# Patient Record
Sex: MALE | Race: WHITE | NOT HISPANIC OR LATINO | Employment: OTHER | URBAN - METROPOLITAN AREA
[De-identification: names, ages, dates, MRNs, and addresses within clinical notes are randomized per-mention and may not be internally consistent; named-entity substitution may affect disease eponyms.]

---

## 2017-07-17 ENCOUNTER — TRANSCRIBE ORDERS (OUTPATIENT)
Dept: ADMINISTRATIVE | Facility: HOSPITAL | Age: 82
End: 2017-07-17

## 2017-07-17 DIAGNOSIS — R55 SYNCOPE, NEAR: Primary | ICD-10-CM

## 2017-07-20 ENCOUNTER — HOSPITAL ENCOUNTER (OUTPATIENT)
Dept: MRI IMAGING | Facility: HOSPITAL | Age: 82
Discharge: HOME | End: 2017-07-20
Admitting: FAMILY MEDICINE

## 2017-07-20 ENCOUNTER — APPOINTMENT (OUTPATIENT)
Dept: MRI IMAGING | Facility: HOSPITAL | Age: 82
End: 2017-07-20

## 2017-07-20 ENCOUNTER — HOSPITAL ENCOUNTER (OUTPATIENT)
Dept: CARDIOLOGY | Facility: HOSPITAL | Age: 82
Discharge: HOME | End: 2017-07-20

## 2017-07-20 ENCOUNTER — HOSPITAL ENCOUNTER (INPATIENT)
Facility: HOSPITAL | Age: 82
LOS: 6 days | Discharge: HOME | End: 2017-07-26
Attending: EMERGENCY MEDICINE | Admitting: INTERNAL MEDICINE

## 2017-07-20 DIAGNOSIS — IMO0002 CAROTID STENOSIS WITH CEREBRAL INFARCTION LESS THAN 8 WEEKS AGO: ICD-10-CM

## 2017-07-20 DIAGNOSIS — I63.9 ACUTE CVA (CEREBROVASCULAR ACCIDENT) (HCC): Primary | ICD-10-CM

## 2017-07-20 DIAGNOSIS — R55 SYNCOPE, NEAR: ICD-10-CM

## 2017-07-20 PROBLEM — I48.91 ATRIAL FIBRILLATION (HCC): Status: ACTIVE | Noted: 2017-07-20

## 2017-07-20 PROBLEM — I65.29 CAROTID STENOSIS: Status: ACTIVE | Noted: 2017-07-20

## 2017-07-20 PROBLEM — Z79.01 LONG TERM (CURRENT) USE OF ANTICOAGULANTS: Status: ACTIVE | Noted: 2017-07-20

## 2017-07-20 LAB
ALBUMIN SERPL-MCNC: 3.9 G/DL (ref 3.5–5.2)
ALBUMIN/GLOB SERPL: 1 G/DL
ALP SERPL-CCNC: 40 U/L (ref 39–117)
ALT SERPL W P-5'-P-CCNC: 13 U/L (ref 1–41)
ANION GAP SERPL CALCULATED.3IONS-SCNC: 9.4 MMOL/L
AST SERPL-CCNC: 18 U/L (ref 1–40)
BASOPHILS # BLD AUTO: 0.09 10*3/MM3 (ref 0–0.2)
BASOPHILS NFR BLD AUTO: 1.2 % (ref 0–1.5)
BH CV XLRA MEAS LEFT DIST CCA EDV: -19.8 CM/SEC
BH CV XLRA MEAS LEFT DIST CCA PSV: -84.9 CM/SEC
BH CV XLRA MEAS LEFT DIST ICA EDV: -24.8 CM/SEC
BH CV XLRA MEAS LEFT DIST ICA PSV: -73.6 CM/SEC
BH CV XLRA MEAS LEFT ICA/CCA RATIO: 1
BH CV XLRA MEAS LEFT MID ICA EDV: -16.3 CM/SEC
BH CV XLRA MEAS LEFT MID ICA PSV: -78.5 CM/SEC
BH CV XLRA MEAS LEFT PROX CCA EDV: 16.3 CM/SEC
BH CV XLRA MEAS LEFT PROX CCA PSV: 72.9 CM/SEC
BH CV XLRA MEAS LEFT PROX ECA EDV: -6.4 CM/SEC
BH CV XLRA MEAS LEFT PROX ECA PSV: -63.7 CM/SEC
BH CV XLRA MEAS LEFT PROX ICA EDV: -14.9 CM/SEC
BH CV XLRA MEAS LEFT PROX ICA PSV: -77.1 CM/SEC
BH CV XLRA MEAS LEFT PROX SCLA PSV: 155 CM/SEC
BH CV XLRA MEAS LEFT VERTEBRAL A EDV: 5.2 CM/SEC
BH CV XLRA MEAS LEFT VERTEBRAL A PSV: 34.1 CM/SEC
BH CV XLRA MEAS RIGHT DIST CCA EDV: -13.4 CM/SEC
BH CV XLRA MEAS RIGHT DIST CCA PSV: -35.4 CM/SEC
BH CV XLRA MEAS RIGHT DIST ICA EDV: -17.2 CM/SEC
BH CV XLRA MEAS RIGHT DIST ICA PSV: -44.2 CM/SEC
BH CV XLRA MEAS RIGHT ICA/CCA RATIO: 12.2
BH CV XLRA MEAS RIGHT MID ICA EDV: -36.8 CM/SEC
BH CV XLRA MEAS RIGHT MID ICA PSV: -89.9 CM/SEC
BH CV XLRA MEAS RIGHT PROX CCA EDV: 14.2 CM/SEC
BH CV XLRA MEAS RIGHT PROX CCA PSV: 74 CM/SEC
BH CV XLRA MEAS RIGHT PROX ECA EDV: -67.7 CM/SEC
BH CV XLRA MEAS RIGHT PROX ECA PSV: -298 CM/SEC
BH CV XLRA MEAS RIGHT PROX ICA EDV: 184 CM/SEC
BH CV XLRA MEAS RIGHT PROX ICA PSV: 431 CM/SEC
BH CV XLRA MEAS RIGHT PROX SCLA PSV: 124 CM/SEC
BH CV XLRA MEAS RIGHT VERTEBRAL A EDV: 19 CM/SEC
BH CV XLRA MEAS RIGHT VERTEBRAL A PSV: 66.4 CM/SEC
BILIRUB SERPL-MCNC: 0.4 MG/DL (ref 0.1–1.2)
BUN BLD-MCNC: 22 MG/DL (ref 8–23)
BUN/CREAT SERPL: 18.5 (ref 7–25)
CALCIUM SPEC-SCNC: 9.6 MG/DL (ref 8.2–9.6)
CHLORIDE SERPL-SCNC: 101 MMOL/L (ref 98–107)
CO2 SERPL-SCNC: 27.6 MMOL/L (ref 22–29)
CREAT BLD-MCNC: 1.19 MG/DL (ref 0.76–1.27)
DEPRECATED RDW RBC AUTO: 47.9 FL (ref 37–54)
EOSINOPHIL # BLD AUTO: 0.24 10*3/MM3 (ref 0–0.7)
EOSINOPHIL NFR BLD AUTO: 3.2 % (ref 0.3–6.2)
ERYTHROCYTE [DISTWIDTH] IN BLOOD BY AUTOMATED COUNT: 14.7 % (ref 11.5–14.5)
GFR SERPL CREATININE-BSD FRML MDRD: 57 ML/MIN/1.73
GLOBULIN UR ELPH-MCNC: 3.8 GM/DL
GLUCOSE BLD-MCNC: 86 MG/DL (ref 65–99)
HCT VFR BLD AUTO: 45.7 % (ref 40.4–52.2)
HGB BLD-MCNC: 14.4 G/DL (ref 13.7–17.6)
HOLD SPECIMEN: NORMAL
HOLD SPECIMEN: NORMAL
IMM GRANULOCYTES # BLD: 0 10*3/MM3 (ref 0–0.03)
IMM GRANULOCYTES NFR BLD: 0 % (ref 0–0.5)
INR PPP: 2.86 (ref 0.9–1.1)
LEFT ARM BP: NORMAL MMHG
LYMPHOCYTES # BLD AUTO: 1.83 10*3/MM3 (ref 0.9–4.8)
LYMPHOCYTES NFR BLD AUTO: 24 % (ref 19.6–45.3)
MCH RBC QN AUTO: 28 PG (ref 27–32.7)
MCHC RBC AUTO-ENTMCNC: 31.5 G/DL (ref 32.6–36.4)
MCV RBC AUTO: 88.9 FL (ref 79.8–96.2)
MONOCYTES # BLD AUTO: 0.67 10*3/MM3 (ref 0.2–1.2)
MONOCYTES NFR BLD AUTO: 8.8 % (ref 5–12)
NEUTROPHILS # BLD AUTO: 4.78 10*3/MM3 (ref 1.9–8.1)
NEUTROPHILS NFR BLD AUTO: 62.8 % (ref 42.7–76)
PLATELET # BLD AUTO: 342 10*3/MM3 (ref 140–500)
PMV BLD AUTO: 9.4 FL (ref 6–12)
POTASSIUM BLD-SCNC: 4.3 MMOL/L (ref 3.5–5.2)
PROT SERPL-MCNC: 7.7 G/DL (ref 6–8.5)
PROTHROMBIN TIME: 29.1 SECONDS (ref 11.7–14.2)
RBC # BLD AUTO: 5.14 10*6/MM3 (ref 4.6–6)
RIGHT ARM BP: NORMAL MMHG
SODIUM BLD-SCNC: 138 MMOL/L (ref 136–145)
WBC NRBC COR # BLD: 7.61 10*3/MM3 (ref 4.5–10.7)
WHOLE BLOOD HOLD SPECIMEN: NORMAL
WHOLE BLOOD HOLD SPECIMEN: NORMAL

## 2017-07-20 PROCEDURE — 85610 PROTHROMBIN TIME: CPT | Performed by: EMERGENCY MEDICINE

## 2017-07-20 PROCEDURE — 99284 EMERGENCY DEPT VISIT MOD MDM: CPT

## 2017-07-20 PROCEDURE — 70549 MR ANGIOGRAPH NECK W/O&W/DYE: CPT

## 2017-07-20 PROCEDURE — A9577 INJ MULTIHANCE: HCPCS | Performed by: INTERNAL MEDICINE

## 2017-07-20 PROCEDURE — 70544 MR ANGIOGRAPHY HEAD W/O DYE: CPT

## 2017-07-20 PROCEDURE — 93005 ELECTROCARDIOGRAM TRACING: CPT | Performed by: EMERGENCY MEDICINE

## 2017-07-20 PROCEDURE — 0 GADOBENATE DIMEGLUMINE 529 MG/ML SOLUTION: Performed by: INTERNAL MEDICINE

## 2017-07-20 PROCEDURE — 80053 COMPREHEN METABOLIC PANEL: CPT | Performed by: EMERGENCY MEDICINE

## 2017-07-20 PROCEDURE — 93010 ELECTROCARDIOGRAM REPORT: CPT | Performed by: INTERNAL MEDICINE

## 2017-07-20 PROCEDURE — 99223 1ST HOSP IP/OBS HIGH 75: CPT | Performed by: RADIOLOGY

## 2017-07-20 PROCEDURE — 85025 COMPLETE CBC W/AUTO DIFF WBC: CPT | Performed by: EMERGENCY MEDICINE

## 2017-07-20 RX ORDER — ATORVASTATIN CALCIUM 80 MG/1
80 TABLET, FILM COATED ORAL DAILY
Status: DISCONTINUED | OUTPATIENT
Start: 2017-07-20 | End: 2017-07-26 | Stop reason: HOSPADM

## 2017-07-20 RX ORDER — WARFARIN SODIUM 5 MG/1
5 TABLET ORAL SEE ADMIN INSTRUCTIONS
COMMUNITY
End: 2017-07-26 | Stop reason: HOSPADM

## 2017-07-20 RX ORDER — SODIUM CHLORIDE 9 MG/ML
100 INJECTION, SOLUTION INTRAVENOUS CONTINUOUS
Status: DISCONTINUED | OUTPATIENT
Start: 2017-07-20 | End: 2017-07-24

## 2017-07-20 RX ORDER — SODIUM CHLORIDE 0.9 % (FLUSH) 0.9 %
1-10 SYRINGE (ML) INJECTION AS NEEDED
Status: DISCONTINUED | OUTPATIENT
Start: 2017-07-20 | End: 2017-07-26 | Stop reason: HOSPADM

## 2017-07-20 RX ORDER — ACETAMINOPHEN 325 MG/1
650 TABLET ORAL EVERY 4 HOURS PRN
Status: DISCONTINUED | OUTPATIENT
Start: 2017-07-20 | End: 2017-07-26 | Stop reason: HOSPADM

## 2017-07-20 RX ORDER — ASPIRIN 325 MG
325 TABLET ORAL ONCE
Status: COMPLETED | OUTPATIENT
Start: 2017-07-20 | End: 2017-07-20

## 2017-07-20 RX ORDER — SODIUM CHLORIDE 0.9 % (FLUSH) 0.9 %
10 SYRINGE (ML) INJECTION AS NEEDED
Status: DISCONTINUED | OUTPATIENT
Start: 2017-07-20 | End: 2017-07-24

## 2017-07-20 RX ORDER — ONDANSETRON 2 MG/ML
4 INJECTION INTRAMUSCULAR; INTRAVENOUS EVERY 6 HOURS PRN
Status: DISCONTINUED | OUTPATIENT
Start: 2017-07-20 | End: 2017-07-26 | Stop reason: HOSPADM

## 2017-07-20 RX ORDER — ACETAMINOPHEN 650 MG/1
650 SUPPOSITORY RECTAL EVERY 4 HOURS PRN
Status: DISCONTINUED | OUTPATIENT
Start: 2017-07-20 | End: 2017-07-26 | Stop reason: HOSPADM

## 2017-07-20 RX ADMIN — SODIUM CHLORIDE 100 ML/HR: 9 INJECTION, SOLUTION INTRAVENOUS at 21:16

## 2017-07-20 RX ADMIN — SODIUM CHLORIDE 1000 ML: 9 INJECTION, SOLUTION INTRAVENOUS at 16:17

## 2017-07-20 RX ADMIN — GADOBENATE DIMEGLUMINE 14 ML: 529 INJECTION, SOLUTION INTRAVENOUS at 20:09

## 2017-07-20 RX ADMIN — SODIUM CHLORIDE 500 ML: 9 INJECTION, SOLUTION INTRAVENOUS at 17:49

## 2017-07-20 RX ADMIN — ASPIRIN 325 MG: 325 TABLET ORAL at 17:09

## 2017-07-20 NOTE — PROGRESS NOTES
Bilateral carotid doppler complete and preliminary report positive for severe right carotid artery disease and mild lt carotid disease and pt. Complains of lt arm and leg numbness this week, and dizziness  . Spoke with Cara in Dr. Hadley's office. Pt. Was instructed to go to the emergency room now

## 2017-07-20 NOTE — NURSING NOTE
Dr Hadley called to instruct us to tell the pt: He is on current anticoagulant therapy. We can let him go and have him follow up with Dr Hadley as scheduled. Informed the pt and he still has a Vascular exam. Vascular called because appt is at 2:45 - pt is being taken over to that area now. NAD noted. Family with pt as well.

## 2017-07-21 ENCOUNTER — APPOINTMENT (OUTPATIENT)
Dept: CARDIOLOGY | Facility: HOSPITAL | Age: 82
End: 2017-07-21
Attending: RADIOLOGY

## 2017-07-21 LAB
ASCENDING AORTA: 2.5 CM
BH CV ECHO MEAS - ACS: 1 CM
BH CV ECHO MEAS - AI DEC SLOPE: 277 CM/SEC^2
BH CV ECHO MEAS - AI MAX PG: 65.4 MMHG
BH CV ECHO MEAS - AI MAX VEL: 404 CM/SEC
BH CV ECHO MEAS - AI P1/2T: 427.2 MSEC
BH CV ECHO MEAS - AO MEAN PG (FULL): 7 MMHG
BH CV ECHO MEAS - AO MEAN PG: 8 MMHG
BH CV ECHO MEAS - AO ROOT AREA (BSA CORRECTED): 1.7
BH CV ECHO MEAS - AO ROOT AREA: 8 CM^2
BH CV ECHO MEAS - AO ROOT DIAM: 3.2 CM
BH CV ECHO MEAS - AO V2 MEAN: 132 CM/SEC
BH CV ECHO MEAS - AO V2 VTI: 38.7 CM
BH CV ECHO MEAS - ASC AORTA: 2.5 CM
BH CV ECHO MEAS - AVA(I,A): 1.3 CM^2
BH CV ECHO MEAS - AVA(I,D): 1.3 CM^2
BH CV ECHO MEAS - BSA(HAYCOCK): 1.8 M^2
BH CV ECHO MEAS - BSA: 1.8 M^2
BH CV ECHO MEAS - BZI_BMI: 24.7 KILOGRAMS/M^2
BH CV ECHO MEAS - BZI_METRIC_HEIGHT: 170.2 CM
BH CV ECHO MEAS - BZI_METRIC_WEIGHT: 71.7 KG
BH CV ECHO MEAS - CONTRAST EF (2CH): 70.7 ML/M^2
BH CV ECHO MEAS - CONTRAST EF 4CH: 71.1 ML/M^2
BH CV ECHO MEAS - EDV(CUBED): 54.9 ML
BH CV ECHO MEAS - EDV(MOD-SP2): 92 ML
BH CV ECHO MEAS - EDV(MOD-SP4): 83 ML
BH CV ECHO MEAS - EDV(TEICH): 62 ML
BH CV ECHO MEAS - EF(CUBED): 77.8 %
BH CV ECHO MEAS - EF(MOD-SP2): 70.7 %
BH CV ECHO MEAS - EF(MOD-SP4): 71.1 %
BH CV ECHO MEAS - EF(TEICH): 70.8 %
BH CV ECHO MEAS - ESV(CUBED): 12.2 ML
BH CV ECHO MEAS - ESV(MOD-SP2): 27 ML
BH CV ECHO MEAS - ESV(MOD-SP4): 24 ML
BH CV ECHO MEAS - ESV(TEICH): 18.1 ML
BH CV ECHO MEAS - FS: 39.5 %
BH CV ECHO MEAS - IVS/LVPW: 1.2
BH CV ECHO MEAS - IVSD: 1.1 CM
BH CV ECHO MEAS - LAT PEAK E' VEL: 11 CM/SEC
BH CV ECHO MEAS - LV DIASTOLIC VOL/BSA (35-75): 45.4 ML/M^2
BH CV ECHO MEAS - LV MASS(C)D: 117.3 GRAMS
BH CV ECHO MEAS - LV MASS(C)DI: 64.1 GRAMS/M^2
BH CV ECHO MEAS - LV MEAN PG: 1 MMHG
BH CV ECHO MEAS - LV SYSTOLIC VOL/BSA (12-30): 13.1 ML/M^2
BH CV ECHO MEAS - LV V1 MEAN: 54.2 CM/SEC
BH CV ECHO MEAS - LV V1 VTI: 17.5 CM
BH CV ECHO MEAS - LVIDD: 3.8 CM
BH CV ECHO MEAS - LVIDS: 2.3 CM
BH CV ECHO MEAS - LVLD AP2: 7.2 CM
BH CV ECHO MEAS - LVLD AP4: 7.4 CM
BH CV ECHO MEAS - LVLS AP2: 6.1 CM
BH CV ECHO MEAS - LVLS AP4: 6.1 CM
BH CV ECHO MEAS - LVOT AREA (M): 2.8 CM^2
BH CV ECHO MEAS - LVOT AREA: 2.8 CM^2
BH CV ECHO MEAS - LVOT DIAM: 1.9 CM
BH CV ECHO MEAS - LVPWD: 0.9 CM
BH CV ECHO MEAS - MED PEAK E' VEL: 8 CM/SEC
BH CV ECHO MEAS - MR MAX PG: 83.2 MMHG
BH CV ECHO MEAS - MR MAX VEL: 456 CM/SEC
BH CV ECHO MEAS - MV DEC SLOPE: 352 CM/SEC^2
BH CV ECHO MEAS - MV DEC TIME: 0.22 SEC
BH CV ECHO MEAS - MV E MAX VEL: 78 CM/SEC
BH CV ECHO MEAS - MV MEAN PG: 2 MMHG
BH CV ECHO MEAS - MV P1/2T MAX VEL: 108 CM/SEC
BH CV ECHO MEAS - MV P1/2T: 89.9 MSEC
BH CV ECHO MEAS - MV V2 MEAN: 63.8 CM/SEC
BH CV ECHO MEAS - MV V2 VTI: 21.3 CM
BH CV ECHO MEAS - MVA P1/2T LCG: 2 CM^2
BH CV ECHO MEAS - MVA(P1/2T): 2.4 CM^2
BH CV ECHO MEAS - MVA(VTI): 2.3 CM^2
BH CV ECHO MEAS - PA ACC SLOPE: 0 CM/SEC^2
BH CV ECHO MEAS - PA ACC TIME: 0.05 SEC
BH CV ECHO MEAS - PA MAX PG (FULL): 2.1 MMHG
BH CV ECHO MEAS - PA MAX PG: 3.2 MMHG
BH CV ECHO MEAS - PA PR(ACCEL): 55.2 MMHG
BH CV ECHO MEAS - PA V2 MAX: 89.6 CM/SEC
BH CV ECHO MEAS - PI END-D VEL: 86.9 CM/SEC
BH CV ECHO MEAS - PVA(V,A): 1 CM^2
BH CV ECHO MEAS - PVA(V,D): 1 CM^2
BH CV ECHO MEAS - QP/QS: 0.32
BH CV ECHO MEAS - RAP SYSTOLE: 3 MMHG
BH CV ECHO MEAS - RV MAX PG: 1.1 MMHG
BH CV ECHO MEAS - RV MEAN PG: 1 MMHG
BH CV ECHO MEAS - RV V1 MAX: 51.9 CM/SEC
BH CV ECHO MEAS - RV V1 MEAN: 33.3 CM/SEC
BH CV ECHO MEAS - RV V1 VTI: 9 CM
BH CV ECHO MEAS - RVOT AREA: 1.8 CM^2
BH CV ECHO MEAS - RVOT DIAM: 1.5 CM
BH CV ECHO MEAS - RVSP: 41.2 MMHG
BH CV ECHO MEAS - SI(AO): 170.1 ML/M^2
BH CV ECHO MEAS - SI(CUBED): 23.3 ML/M^2
BH CV ECHO MEAS - SI(LVOT): 27.1 ML/M^2
BH CV ECHO MEAS - SI(MOD-SP2): 35.5 ML/M^2
BH CV ECHO MEAS - SI(MOD-SP4): 32.3 ML/M^2
BH CV ECHO MEAS - SI(TEICH): 24 ML/M^2
BH CV ECHO MEAS - SUP REN AO DIAM: 1.71 CM
BH CV ECHO MEAS - SV(AO): 311.2 ML
BH CV ECHO MEAS - SV(CUBED): 42.7 ML
BH CV ECHO MEAS - SV(LVOT): 49.6 ML
BH CV ECHO MEAS - SV(MOD-SP2): 65 ML
BH CV ECHO MEAS - SV(MOD-SP4): 59 ML
BH CV ECHO MEAS - SV(RVOT): 15.9 ML
BH CV ECHO MEAS - SV(TEICH): 43.8 ML
BH CV ECHO MEAS - TAPSE (>1.6): 1.9 CM2
BH CV ECHO MEAS - TR MAX VEL: 309 CM/SEC
BH CV XLRA - RV BASE: 3.2 CM
BH CV XLRA - TDI S': 11 CM/SEC
CHOLEST SERPL-MCNC: 166 MG/DL (ref 0–200)
DEPRECATED RDW RBC AUTO: 47.8 FL (ref 37–54)
E/E' RATIO: 8
ERYTHROCYTE [DISTWIDTH] IN BLOOD BY AUTOMATED COUNT: 14.7 % (ref 11.5–14.5)
HBA1C MFR BLD: 5.7 % (ref 4.8–5.6)
HCT VFR BLD AUTO: 42.1 % (ref 40.4–52.2)
HDLC SERPL-MCNC: 43 MG/DL (ref 40–60)
HGB BLD-MCNC: 13.2 G/DL (ref 13.7–17.6)
LDLC SERPL CALC-MCNC: 108 MG/DL (ref 0–100)
LDLC/HDLC SERPL: 2.5 {RATIO}
LEFT ATRIUM VOLUME INDEX: 25 ML/M2
MCH RBC QN AUTO: 28 PG (ref 27–32.7)
MCHC RBC AUTO-ENTMCNC: 31.4 G/DL (ref 32.6–36.4)
MCV RBC AUTO: 89.2 FL (ref 79.8–96.2)
PLATELET # BLD AUTO: 266 10*3/MM3 (ref 140–500)
PMV BLD AUTO: 9.7 FL (ref 6–12)
RBC # BLD AUTO: 4.72 10*6/MM3 (ref 4.6–6)
TRIGL SERPL-MCNC: 77 MG/DL (ref 0–150)
VLDLC SERPL-MCNC: 15.4 MG/DL (ref 5–40)
WBC NRBC COR # BLD: 6.47 10*3/MM3 (ref 4.5–10.7)

## 2017-07-21 PROCEDURE — 93306 TTE W/DOPPLER COMPLETE: CPT

## 2017-07-21 PROCEDURE — 80061 LIPID PANEL: CPT | Performed by: RADIOLOGY

## 2017-07-21 PROCEDURE — 97110 THERAPEUTIC EXERCISES: CPT

## 2017-07-21 PROCEDURE — 97161 PT EVAL LOW COMPLEX 20 MIN: CPT

## 2017-07-21 PROCEDURE — G8979 MOBILITY GOAL STATUS: HCPCS

## 2017-07-21 PROCEDURE — G8980 MOBILITY D/C STATUS: HCPCS

## 2017-07-21 PROCEDURE — 97535 SELF CARE MNGMENT TRAINING: CPT | Performed by: OCCUPATIONAL THERAPIST

## 2017-07-21 PROCEDURE — 97165 OT EVAL LOW COMPLEX 30 MIN: CPT | Performed by: OCCUPATIONAL THERAPIST

## 2017-07-21 PROCEDURE — G8978 MOBILITY CURRENT STATUS: HCPCS

## 2017-07-21 PROCEDURE — 85027 COMPLETE CBC AUTOMATED: CPT | Performed by: RADIOLOGY

## 2017-07-21 PROCEDURE — 93306 TTE W/DOPPLER COMPLETE: CPT | Performed by: INTERNAL MEDICINE

## 2017-07-21 PROCEDURE — 92610 EVALUATE SWALLOWING FUNCTION: CPT

## 2017-07-21 PROCEDURE — 83036 HEMOGLOBIN GLYCOSYLATED A1C: CPT | Performed by: RADIOLOGY

## 2017-07-21 RX ORDER — CLOPIDOGREL BISULFATE 75 MG/1
75 TABLET ORAL DAILY
Status: DISCONTINUED | OUTPATIENT
Start: 2017-07-21 | End: 2017-07-26 | Stop reason: HOSPADM

## 2017-07-21 RX ORDER — WARFARIN SODIUM 5 MG/1
5 TABLET ORAL SEE ADMIN INSTRUCTIONS
Status: DISCONTINUED | OUTPATIENT
Start: 2017-07-21 | End: 2017-07-21

## 2017-07-21 RX ADMIN — CLOPIDOGREL 75 MG: 75 TABLET, FILM COATED ORAL at 10:52

## 2017-07-21 RX ADMIN — SODIUM CHLORIDE 100 ML/HR: 9 INJECTION, SOLUTION INTRAVENOUS at 17:25

## 2017-07-21 RX ADMIN — SODIUM CHLORIDE 100 ML/HR: 9 INJECTION, SOLUTION INTRAVENOUS at 07:29

## 2017-07-21 RX ADMIN — ATORVASTATIN CALCIUM 80 MG: 80 TABLET, FILM COATED ORAL at 10:52

## 2017-07-22 LAB
INR PPP: 2.2 (ref 0.9–1.1)
PROTHROMBIN TIME: 23.7 SECONDS (ref 11.7–14.2)

## 2017-07-22 PROCEDURE — 99233 SBSQ HOSP IP/OBS HIGH 50: CPT | Performed by: NURSE PRACTITIONER

## 2017-07-22 PROCEDURE — 85610 PROTHROMBIN TIME: CPT | Performed by: RADIOLOGY

## 2017-07-22 RX ORDER — ASPIRIN 325 MG
325 TABLET ORAL DAILY
Status: DISCONTINUED | OUTPATIENT
Start: 2017-07-22 | End: 2017-07-26 | Stop reason: HOSPADM

## 2017-07-22 RX ADMIN — SODIUM CHLORIDE 100 ML/HR: 9 INJECTION, SOLUTION INTRAVENOUS at 03:36

## 2017-07-22 RX ADMIN — ATORVASTATIN CALCIUM 80 MG: 80 TABLET, FILM COATED ORAL at 08:26

## 2017-07-22 RX ADMIN — ASPIRIN 325 MG: 325 TABLET ORAL at 16:23

## 2017-07-22 RX ADMIN — CLOPIDOGREL 75 MG: 75 TABLET, FILM COATED ORAL at 08:26

## 2017-07-22 RX ADMIN — SODIUM CHLORIDE 100 ML/HR: 9 INJECTION, SOLUTION INTRAVENOUS at 14:17

## 2017-07-23 LAB
INR PPP: 1.64 (ref 0.9–1.1)
PROTHROMBIN TIME: 18.9 SECONDS (ref 11.7–14.2)

## 2017-07-23 PROCEDURE — 99024 POSTOP FOLLOW-UP VISIT: CPT | Performed by: NURSE PRACTITIONER

## 2017-07-23 PROCEDURE — 85610 PROTHROMBIN TIME: CPT | Performed by: NURSE PRACTITIONER

## 2017-07-23 RX ADMIN — SODIUM CHLORIDE 100 ML/HR: 9 INJECTION, SOLUTION INTRAVENOUS at 19:51

## 2017-07-23 RX ADMIN — ASPIRIN 325 MG: 325 TABLET ORAL at 08:12

## 2017-07-23 RX ADMIN — ATORVASTATIN CALCIUM 80 MG: 80 TABLET, FILM COATED ORAL at 08:12

## 2017-07-23 RX ADMIN — CLOPIDOGREL 75 MG: 75 TABLET, FILM COATED ORAL at 08:12

## 2017-07-23 RX ADMIN — SODIUM CHLORIDE 100 ML/HR: 9 INJECTION, SOLUTION INTRAVENOUS at 00:10

## 2017-07-23 RX ADMIN — SODIUM CHLORIDE 100 ML/HR: 9 INJECTION, SOLUTION INTRAVENOUS at 10:18

## 2017-07-24 ENCOUNTER — APPOINTMENT (OUTPATIENT)
Dept: GENERAL RADIOLOGY | Facility: HOSPITAL | Age: 82
End: 2017-07-24

## 2017-07-24 ENCOUNTER — ANESTHESIA EVENT (OUTPATIENT)
Dept: PERIOP | Facility: HOSPITAL | Age: 82
End: 2017-07-24

## 2017-07-24 ENCOUNTER — ANESTHESIA (OUTPATIENT)
Dept: PERIOP | Facility: HOSPITAL | Age: 82
End: 2017-07-24

## 2017-07-24 LAB
ALBUMIN SERPL-MCNC: 3.4 G/DL (ref 3.5–5.2)
ALBUMIN/GLOB SERPL: 1 G/DL
ALP SERPL-CCNC: 37 U/L (ref 39–117)
ALT SERPL W P-5'-P-CCNC: 10 U/L (ref 1–41)
ANION GAP SERPL CALCULATED.3IONS-SCNC: 13.3 MMOL/L
APTT PPP: 39.9 SECONDS (ref 22.7–35.4)
ASA PLATELET INHIBITION: 575 ARU
AST SERPL-CCNC: 16 U/L (ref 1–40)
BASOPHILS # BLD AUTO: 0.06 10*3/MM3 (ref 0–0.2)
BASOPHILS NFR BLD AUTO: 0.8 % (ref 0–1.5)
BILIRUB SERPL-MCNC: 0.5 MG/DL (ref 0.1–1.2)
BUN BLD-MCNC: 12 MG/DL (ref 8–23)
BUN/CREAT SERPL: 12.9 (ref 7–25)
CALCIUM SPEC-SCNC: 8.8 MG/DL (ref 8.2–9.6)
CHLORIDE SERPL-SCNC: 105 MMOL/L (ref 98–107)
CO2 SERPL-SCNC: 23.7 MMOL/L (ref 22–29)
CREAT BLD-MCNC: 0.93 MG/DL (ref 0.76–1.27)
DEPRECATED RDW RBC AUTO: 48.2 FL (ref 37–54)
EOSINOPHIL # BLD AUTO: 0.35 10*3/MM3 (ref 0–0.7)
EOSINOPHIL NFR BLD AUTO: 4.7 % (ref 0.3–6.2)
ERYTHROCYTE [DISTWIDTH] IN BLOOD BY AUTOMATED COUNT: 14.9 % (ref 11.5–14.5)
GFR SERPL CREATININE-BSD FRML MDRD: 76 ML/MIN/1.73
GLOBULIN UR ELPH-MCNC: 3.4 GM/DL
GLUCOSE BLD-MCNC: 88 MG/DL (ref 65–99)
GLUCOSE BLDC GLUCOMTR-MCNC: 80 MG/DL (ref 70–130)
HCT VFR BLD AUTO: 45.2 % (ref 40.4–52.2)
HGB BLD-MCNC: 14 G/DL (ref 13.7–17.6)
IMM GRANULOCYTES # BLD: 0 10*3/MM3 (ref 0–0.03)
IMM GRANULOCYTES NFR BLD: 0 % (ref 0–0.5)
INR PPP: 1.34 (ref 0.9–1.1)
LYMPHOCYTES # BLD AUTO: 1.7 10*3/MM3 (ref 0.9–4.8)
LYMPHOCYTES NFR BLD AUTO: 23.1 % (ref 19.6–45.3)
MCH RBC QN AUTO: 27.7 PG (ref 27–32.7)
MCHC RBC AUTO-ENTMCNC: 31 G/DL (ref 32.6–36.4)
MCV RBC AUTO: 89.3 FL (ref 79.8–96.2)
MONOCYTES # BLD AUTO: 0.71 10*3/MM3 (ref 0.2–1.2)
MONOCYTES NFR BLD AUTO: 9.6 % (ref 5–12)
NEUTROPHILS # BLD AUTO: 4.55 10*3/MM3 (ref 1.9–8.1)
NEUTROPHILS NFR BLD AUTO: 61.8 % (ref 42.7–76)
PA ADP PRP-ACNC: 69 PRU (ref 194–418)
PLATELET # BLD AUTO: 314 10*3/MM3 (ref 140–500)
PMV BLD AUTO: 9.8 FL (ref 6–12)
POTASSIUM BLD-SCNC: 3.9 MMOL/L (ref 3.5–5.2)
PROT SERPL-MCNC: 6.8 G/DL (ref 6–8.5)
PROTHROMBIN TIME: 16.1 SECONDS (ref 11.7–14.2)
RBC # BLD AUTO: 5.06 10*6/MM3 (ref 4.6–6)
SODIUM BLD-SCNC: 142 MMOL/L (ref 136–145)
WBC NRBC COR # BLD: 7.37 10*3/MM3 (ref 4.5–10.7)

## 2017-07-24 PROCEDURE — C1760 CLOSURE DEV, VASC: HCPCS | Performed by: RADIOLOGY

## 2017-07-24 PROCEDURE — 25010000002 HEPARIN (PORCINE) PER 1000 UNITS: Performed by: ANESTHESIOLOGY

## 2017-07-24 PROCEDURE — 85576 BLOOD PLATELET AGGREGATION: CPT | Performed by: NURSE PRACTITIONER

## 2017-07-24 PROCEDURE — C1725 CATH, TRANSLUMIN NON-LASER: HCPCS | Performed by: RADIOLOGY

## 2017-07-24 PROCEDURE — 25010000002 PROTAMINE SULFATE PER 10 MG: Performed by: ANESTHESIOLOGY

## 2017-07-24 PROCEDURE — C1884 EMBOLIZATION PROTECT SYST: HCPCS | Performed by: RADIOLOGY

## 2017-07-24 PROCEDURE — 85347 COAGULATION TIME ACTIVATED: CPT

## 2017-07-24 PROCEDURE — C1894 INTRO/SHEATH, NON-LASER: HCPCS | Performed by: RADIOLOGY

## 2017-07-24 PROCEDURE — C1769 GUIDE WIRE: HCPCS | Performed by: RADIOLOGY

## 2017-07-24 PROCEDURE — 85007 BL SMEAR W/DIFF WBC COUNT: CPT | Performed by: NURSE PRACTITIONER

## 2017-07-24 PROCEDURE — 80053 COMPREHEN METABOLIC PANEL: CPT | Performed by: NURSE PRACTITIONER

## 2017-07-24 PROCEDURE — C1760 CLOSURE DEV, VASC: HCPCS

## 2017-07-24 PROCEDURE — 25010000002 HEPARIN (PORCINE) PER 1000 UNITS: Performed by: RADIOLOGY

## 2017-07-24 PROCEDURE — 85610 PROTHROMBIN TIME: CPT | Performed by: NURSE PRACTITIONER

## 2017-07-24 PROCEDURE — C1876 STENT, NON-COA/NON-COV W/DEL: HCPCS

## 2017-07-24 PROCEDURE — C1876 STENT, NON-COA/NON-COV W/DEL: HCPCS | Performed by: RADIOLOGY

## 2017-07-24 PROCEDURE — 82962 GLUCOSE BLOOD TEST: CPT

## 2017-07-24 PROCEDURE — 85730 THROMBOPLASTIN TIME PARTIAL: CPT | Performed by: NURSE PRACTITIONER

## 2017-07-24 PROCEDURE — 99024 POSTOP FOLLOW-UP VISIT: CPT | Performed by: NURSE PRACTITIONER

## 2017-07-24 PROCEDURE — 037K3DZ DILATION OF RIGHT INTERNAL CAROTID ARTERY WITH INTRALUMINAL DEVICE, PERCUTANEOUS APPROACH: ICD-10-PCS | Performed by: RADIOLOGY

## 2017-07-24 PROCEDURE — 85025 COMPLETE CBC W/AUTO DIFF WBC: CPT | Performed by: NURSE PRACTITIONER

## 2017-07-24 PROCEDURE — 0 IODIXANOL PER 1 ML: Performed by: INTERNAL MEDICINE

## 2017-07-24 DEVICE — IMPLANTABLE DEVICE: Type: IMPLANTABLE DEVICE | Site: CAROTID | Status: FUNCTIONAL

## 2017-07-24 RX ORDER — ONDANSETRON 2 MG/ML
4 INJECTION INTRAMUSCULAR; INTRAVENOUS ONCE AS NEEDED
Status: DISCONTINUED | OUTPATIENT
Start: 2017-07-24 | End: 2017-07-24 | Stop reason: HOSPADM

## 2017-07-24 RX ORDER — IODIXANOL 320 MG/ML
100 INJECTION, SOLUTION INTRAVASCULAR
Status: COMPLETED | OUTPATIENT
Start: 2017-07-24 | End: 2017-07-24

## 2017-07-24 RX ORDER — MIDAZOLAM HYDROCHLORIDE 1 MG/ML
2 INJECTION INTRAMUSCULAR; INTRAVENOUS
Status: DISCONTINUED | OUTPATIENT
Start: 2017-07-24 | End: 2017-07-24 | Stop reason: HOSPADM

## 2017-07-24 RX ORDER — SODIUM CHLORIDE 0.9 % (FLUSH) 0.9 %
1-10 SYRINGE (ML) INJECTION AS NEEDED
Status: DISCONTINUED | OUTPATIENT
Start: 2017-07-24 | End: 2017-07-24 | Stop reason: HOSPADM

## 2017-07-24 RX ORDER — PROTAMINE SULFATE 10 MG/ML
INJECTION, SOLUTION INTRAVENOUS AS NEEDED
Status: DISCONTINUED | OUTPATIENT
Start: 2017-07-24 | End: 2017-07-24 | Stop reason: SURG

## 2017-07-24 RX ORDER — SODIUM CHLORIDE 9 MG/ML
9 INJECTION, SOLUTION INTRAVENOUS CONTINUOUS
Status: DISCONTINUED | OUTPATIENT
Start: 2017-07-24 | End: 2017-07-24

## 2017-07-24 RX ORDER — LABETALOL HYDROCHLORIDE 5 MG/ML
10 INJECTION, SOLUTION INTRAVENOUS
Status: COMPLETED | OUTPATIENT
Start: 2017-07-24 | End: 2017-07-25

## 2017-07-24 RX ORDER — MIDAZOLAM HYDROCHLORIDE 1 MG/ML
1 INJECTION INTRAMUSCULAR; INTRAVENOUS
Status: DISCONTINUED | OUTPATIENT
Start: 2017-07-24 | End: 2017-07-24 | Stop reason: HOSPADM

## 2017-07-24 RX ORDER — SODIUM CHLORIDE, SODIUM LACTATE, POTASSIUM CHLORIDE, CALCIUM CHLORIDE 600; 310; 30; 20 MG/100ML; MG/100ML; MG/100ML; MG/100ML
9 INJECTION, SOLUTION INTRAVENOUS CONTINUOUS
Status: DISCONTINUED | OUTPATIENT
Start: 2017-07-24 | End: 2017-07-24

## 2017-07-24 RX ORDER — FAMOTIDINE 10 MG/ML
20 INJECTION, SOLUTION INTRAVENOUS ONCE
Status: COMPLETED | OUTPATIENT
Start: 2017-07-24 | End: 2017-07-24

## 2017-07-24 RX ORDER — HEPARIN SODIUM 1000 [USP'U]/ML
INJECTION, SOLUTION INTRAVENOUS; SUBCUTANEOUS AS NEEDED
Status: DISCONTINUED | OUTPATIENT
Start: 2017-07-24 | End: 2017-07-24 | Stop reason: SURG

## 2017-07-24 RX ORDER — SODIUM CHLORIDE 9 MG/ML
75 INJECTION, SOLUTION INTRAVENOUS CONTINUOUS
Status: DISCONTINUED | OUTPATIENT
Start: 2017-07-24 | End: 2017-07-26 | Stop reason: HOSPADM

## 2017-07-24 RX ORDER — FENTANYL CITRATE 50 UG/ML
50 INJECTION, SOLUTION INTRAMUSCULAR; INTRAVENOUS
Status: DISCONTINUED | OUTPATIENT
Start: 2017-07-24 | End: 2017-07-24 | Stop reason: HOSPADM

## 2017-07-24 RX ORDER — LABETALOL HYDROCHLORIDE 5 MG/ML
INJECTION, SOLUTION INTRAVENOUS AS NEEDED
Status: DISCONTINUED | OUTPATIENT
Start: 2017-07-24 | End: 2017-07-24 | Stop reason: SURG

## 2017-07-24 RX ADMIN — HEPARIN SODIUM 3000 UNITS: 1000 INJECTION, SOLUTION INTRAVENOUS; SUBCUTANEOUS at 11:04

## 2017-07-24 RX ADMIN — ASPIRIN 325 MG: 325 TABLET ORAL at 08:01

## 2017-07-24 RX ADMIN — SODIUM CHLORIDE: 9 INJECTION, SOLUTION INTRAVENOUS at 11:15

## 2017-07-24 RX ADMIN — SODIUM CHLORIDE 9 ML/HR: 9 INJECTION, SOLUTION INTRAVENOUS at 09:02

## 2017-07-24 RX ADMIN — HEPARIN SODIUM 5000 UNITS: 1000 INJECTION, SOLUTION INTRAVENOUS; SUBCUTANEOUS at 10:45

## 2017-07-24 RX ADMIN — IODIXANOL 77 ML: 320 INJECTION, SOLUTION INTRAVASCULAR at 11:00

## 2017-07-24 RX ADMIN — LABETALOL HYDROCHLORIDE 10 MG: 5 INJECTION, SOLUTION INTRAVENOUS at 11:24

## 2017-07-24 RX ADMIN — SODIUM CHLORIDE 125 ML/HR: 9 INJECTION, SOLUTION INTRAVENOUS at 20:34

## 2017-07-24 RX ADMIN — LABETALOL HYDROCHLORIDE 10 MG: 5 INJECTION, SOLUTION INTRAVENOUS at 22:51

## 2017-07-24 RX ADMIN — FAMOTIDINE 20 MG: 10 INJECTION INTRAVENOUS at 09:02

## 2017-07-24 RX ADMIN — ATORVASTATIN CALCIUM 80 MG: 80 TABLET, FILM COATED ORAL at 23:05

## 2017-07-24 RX ADMIN — CLOPIDOGREL 75 MG: 75 TABLET, FILM COATED ORAL at 08:01

## 2017-07-24 RX ADMIN — PROTAMINE SULFATE 100 MG: 10 INJECTION, SOLUTION INTRAVENOUS at 11:27

## 2017-07-24 RX ADMIN — SODIUM CHLORIDE 100 ML/HR: 9 INJECTION, SOLUTION INTRAVENOUS at 05:27

## 2017-07-24 RX ADMIN — SODIUM CHLORIDE 125 ML/HR: 9 INJECTION, SOLUTION INTRAVENOUS at 13:16

## 2017-07-24 RX ADMIN — SODIUM CHLORIDE 5 MG/HR: 9 INJECTION, SOLUTION INTRAVENOUS at 10:14

## 2017-07-24 NOTE — ANESTHESIA POSTPROCEDURE EVALUATION
Patient: Danni Aguilar    Procedure Summary     Date Anesthesia Start Anesthesia Stop Room / Location    07/24/17 0924 1158  [facility] /  [facility]       Procedure Diagnosis Provider Provider    DIAGNOSTIC CEREBRAL ANGIOGRAM AND RIGHT CAROTID STENT PLACEMENT (N/A ) No diagnosis on file. MD Jillian Ruelas MD          Anesthesia Type: MAC  Last vitals  BP   120/69 (07/24/17 1315)    Temp        Pulse   73 (07/24/17 1315)   Resp   16 (07/24/17 1315)    SpO2   99 % (07/24/17 1315)      Post Anesthesia Care and Evaluation    Patient location during evaluation: bedside  Patient participation: complete - patient participated  Level of consciousness: awake and alert  Pain management: adequate  Airway patency: patent  Anesthetic complications: No anesthetic complications    Cardiovascular status: acceptable  Respiratory status: acceptable  Hydration status: acceptable

## 2017-07-24 NOTE — ANESTHESIA PREPROCEDURE EVALUATION
Anesthesia Evaluation     Patient summary reviewed and Nursing notes reviewed   NPO Solid Status: > 8 hours  NPO Liquid Status: > 8 hours     Airway   Mallampati: II  Dental      Comment: Prominent overbite    Pulmonary - negative pulmonary ROS and normal exam   Cardiovascular     ECG reviewed  Rhythm: irregular    (+) dysrhythmias Atrial Fib, PVD,       Neuro/Psych  (+) CVA,    GI/Hepatic/Renal/Endo - negative ROS     Musculoskeletal (-) negative ROS    Abdominal    Substance History - negative use     OB/GYN          Other - negative ROS                                       Anesthesia Plan    ASA 3     MAC     intravenous induction   Anesthetic plan and risks discussed with patient.

## 2017-07-25 PROBLEM — R33.9 URINARY RETENTION: Status: ACTIVE | Noted: 2017-07-25

## 2017-07-25 LAB
ACT BLD: 213 SECONDS (ref 82–152)
ACT BLD: 252 SECONDS (ref 82–152)
ANION GAP SERPL CALCULATED.3IONS-SCNC: 15.1 MMOL/L
BACTERIA UR QL AUTO: ABNORMAL /HPF
BILIRUB UR QL STRIP: NEGATIVE
BUN BLD-MCNC: 12 MG/DL (ref 8–23)
BUN/CREAT SERPL: 11.8 (ref 7–25)
CALCIUM SPEC-SCNC: 7.8 MG/DL (ref 8.2–9.6)
CHLORIDE SERPL-SCNC: 101 MMOL/L (ref 98–107)
CK MB SERPL-CCNC: 1.88 NG/ML
CK SERPL-CCNC: 44 U/L (ref 20–200)
CLARITY UR: CLEAR
CO2 SERPL-SCNC: 18.9 MMOL/L (ref 22–29)
COLOR UR: YELLOW
CREAT BLD-MCNC: 1.02 MG/DL (ref 0.76–1.27)
DEPRECATED RDW RBC AUTO: 48.3 FL (ref 37–54)
ERYTHROCYTE [DISTWIDTH] IN BLOOD BY AUTOMATED COUNT: 14.8 % (ref 11.5–14.5)
GFR SERPL CREATININE-BSD FRML MDRD: 69 ML/MIN/1.73
GLUCOSE BLD-MCNC: 114 MG/DL (ref 65–99)
GLUCOSE UR STRIP-MCNC: NEGATIVE MG/DL
HCT VFR BLD AUTO: 38.6 % (ref 40.4–52.2)
HGB BLD-MCNC: 12 G/DL (ref 13.7–17.6)
HGB UR QL STRIP.AUTO: ABNORMAL
HYALINE CASTS UR QL AUTO: ABNORMAL /LPF
KETONES UR QL STRIP: NEGATIVE
LEUKOCYTE ESTERASE UR QL STRIP.AUTO: NEGATIVE
MCH RBC QN AUTO: 27.8 PG (ref 27–32.7)
MCHC RBC AUTO-ENTMCNC: 31.1 G/DL (ref 32.6–36.4)
MCV RBC AUTO: 89.4 FL (ref 79.8–96.2)
NITRITE UR QL STRIP: NEGATIVE
PH UR STRIP.AUTO: 6 [PH] (ref 5–8)
PLATELET # BLD AUTO: 277 10*3/MM3 (ref 140–500)
PMV BLD AUTO: 9.8 FL (ref 6–12)
POTASSIUM BLD-SCNC: 3.7 MMOL/L (ref 3.5–5.2)
PROT UR QL STRIP: NEGATIVE
RBC # BLD AUTO: 4.32 10*6/MM3 (ref 4.6–6)
RBC # UR: ABNORMAL /HPF
REF LAB TEST METHOD: ABNORMAL
SODIUM BLD-SCNC: 135 MMOL/L (ref 136–145)
SP GR UR STRIP: 1.01 (ref 1–1.03)
SQUAMOUS #/AREA URNS HPF: ABNORMAL /HPF
TROPONIN T SERPL-MCNC: <0.01 NG/ML (ref 0–0.03)
UROBILINOGEN UR QL STRIP: ABNORMAL
WBC NRBC COR # BLD: 9.49 10*3/MM3 (ref 4.5–10.7)
WBC UR QL AUTO: ABNORMAL /HPF

## 2017-07-25 PROCEDURE — 85027 COMPLETE CBC AUTOMATED: CPT | Performed by: NURSE PRACTITIONER

## 2017-07-25 PROCEDURE — 25010000002 ONDANSETRON PER 1 MG: Performed by: RADIOLOGY

## 2017-07-25 PROCEDURE — 82550 ASSAY OF CK (CPK): CPT | Performed by: NURSE PRACTITIONER

## 2017-07-25 PROCEDURE — 81001 URINALYSIS AUTO W/SCOPE: CPT | Performed by: INTERNAL MEDICINE

## 2017-07-25 PROCEDURE — 82553 CREATINE MB FRACTION: CPT | Performed by: NURSE PRACTITIONER

## 2017-07-25 PROCEDURE — 84484 ASSAY OF TROPONIN QUANT: CPT | Performed by: NURSE PRACTITIONER

## 2017-07-25 PROCEDURE — 99024 POSTOP FOLLOW-UP VISIT: CPT | Performed by: NURSE PRACTITIONER

## 2017-07-25 PROCEDURE — 80048 BASIC METABOLIC PNL TOTAL CA: CPT | Performed by: NURSE PRACTITIONER

## 2017-07-25 RX ORDER — LISINOPRIL 10 MG/1
10 TABLET ORAL
Status: DISCONTINUED | OUTPATIENT
Start: 2017-07-25 | End: 2017-07-26

## 2017-07-25 RX ORDER — TAMSULOSIN HYDROCHLORIDE 0.4 MG/1
0.4 CAPSULE ORAL DAILY
Status: DISCONTINUED | OUTPATIENT
Start: 2017-07-25 | End: 2017-07-26 | Stop reason: HOSPADM

## 2017-07-25 RX ADMIN — SODIUM CHLORIDE 125 ML/HR: 9 INJECTION, SOLUTION INTRAVENOUS at 04:03

## 2017-07-25 RX ADMIN — ONDANSETRON 4 MG: 2 INJECTION INTRAMUSCULAR; INTRAVENOUS at 18:35

## 2017-07-25 RX ADMIN — ATORVASTATIN CALCIUM 80 MG: 80 TABLET, FILM COATED ORAL at 09:06

## 2017-07-25 RX ADMIN — CLOPIDOGREL 75 MG: 75 TABLET, FILM COATED ORAL at 09:06

## 2017-07-25 RX ADMIN — LISINOPRIL 10 MG: 10 TABLET ORAL at 18:36

## 2017-07-25 RX ADMIN — LABETALOL HYDROCHLORIDE 10 MG: 5 INJECTION, SOLUTION INTRAVENOUS at 22:49

## 2017-07-25 RX ADMIN — TAMSULOSIN HYDROCHLORIDE 0.4 MG: 0.4 CAPSULE ORAL at 09:06

## 2017-07-25 RX ADMIN — LABETALOL HYDROCHLORIDE 10 MG: 5 INJECTION, SOLUTION INTRAVENOUS at 09:24

## 2017-07-25 RX ADMIN — ASPIRIN 325 MG: 325 TABLET ORAL at 09:06

## 2017-07-25 RX ADMIN — SODIUM CHLORIDE 75 ML/HR: 9 INJECTION, SOLUTION INTRAVENOUS at 16:02

## 2017-07-26 VITALS
DIASTOLIC BLOOD PRESSURE: 50 MMHG | HEART RATE: 73 BPM | OXYGEN SATURATION: 90 % | HEIGHT: 67 IN | WEIGHT: 155 LBS | TEMPERATURE: 97.4 F | SYSTOLIC BLOOD PRESSURE: 96 MMHG | RESPIRATION RATE: 16 BRPM | BODY MASS INDEX: 24.33 KG/M2

## 2017-07-26 PROCEDURE — 99024 POSTOP FOLLOW-UP VISIT: CPT | Performed by: NURSE PRACTITIONER

## 2017-07-26 PROCEDURE — 97161 PT EVAL LOW COMPLEX 20 MIN: CPT

## 2017-07-26 RX ORDER — ASPIRIN 325 MG
325 TABLET ORAL DAILY
Qty: 14 TABLET | Refills: 0 | Status: SHIPPED | OUTPATIENT
Start: 2017-07-26 | End: 2017-08-09

## 2017-07-26 RX ORDER — CLOPIDOGREL BISULFATE 75 MG/1
75 TABLET ORAL DAILY
Qty: 30 TABLET | Refills: 0 | Status: SHIPPED | OUTPATIENT
Start: 2017-07-26 | End: 2017-08-25

## 2017-07-26 RX ORDER — LISINOPRIL 5 MG/1
5 TABLET ORAL
Status: DISCONTINUED | OUTPATIENT
Start: 2017-07-27 | End: 2017-07-26 | Stop reason: HOSPADM

## 2017-07-26 RX ORDER — TAMSULOSIN HYDROCHLORIDE 0.4 MG/1
0.4 CAPSULE ORAL DAILY
Qty: 30 CAPSULE | Refills: 0 | Status: SHIPPED | OUTPATIENT
Start: 2017-07-26 | End: 2020-08-06

## 2017-07-26 RX ORDER — LISINOPRIL 5 MG/1
5 TABLET ORAL
Qty: 30 TABLET | Refills: 0 | Status: SHIPPED | OUTPATIENT
Start: 2017-07-27 | End: 2017-08-25

## 2017-07-26 RX ORDER — ATORVASTATIN CALCIUM 80 MG/1
80 TABLET, FILM COATED ORAL DAILY
Qty: 30 TABLET | Refills: 0 | Status: SHIPPED | OUTPATIENT
Start: 2017-07-26

## 2017-07-26 RX ADMIN — TAMSULOSIN HYDROCHLORIDE 0.4 MG: 0.4 CAPSULE ORAL at 08:43

## 2017-07-26 RX ADMIN — CLOPIDOGREL 75 MG: 75 TABLET, FILM COATED ORAL at 08:43

## 2017-07-26 RX ADMIN — LISINOPRIL 10 MG: 10 TABLET ORAL at 08:43

## 2017-07-26 RX ADMIN — ASPIRIN 325 MG: 325 TABLET ORAL at 08:44

## 2017-07-26 RX ADMIN — SODIUM CHLORIDE 75 ML/HR: 9 INJECTION, SOLUTION INTRAVENOUS at 06:42

## 2017-07-26 RX ADMIN — ATORVASTATIN CALCIUM 80 MG: 80 TABLET, FILM COATED ORAL at 08:43

## 2017-07-28 ENCOUNTER — TELEPHONE (OUTPATIENT)
Dept: NEUROLOGY | Facility: CLINIC | Age: 82
End: 2017-07-28

## 2017-07-28 DIAGNOSIS — R55 SYNCOPE AND COLLAPSE: Primary | ICD-10-CM

## 2017-07-28 NOTE — TELEPHONE ENCOUNTER
I s/w patient and he is aware his 30 day f/u carotid u/s is scheduled for 8/23/17 arrival at 10:15am. His f/u with Taylor CHEATHAM is on 8/25/17 at 1pm arriving at 12:30pm. I mailed a reminder out today as well.

## 2017-07-28 NOTE — TELEPHONE ENCOUNTER
----- Message from Mary Rainey sent at 7/26/2017  4:08 PM EDT -----  Contact: Patient   Patient is calling for a 1 month follow up appointment with Dr Deepthi Amaya. Patient was discharged 7/26/17

## 2017-08-17 ENCOUNTER — TELEPHONE (OUTPATIENT)
Dept: NEUROLOGY | Facility: CLINIC | Age: 82
End: 2017-08-17

## 2017-08-17 NOTE — TELEPHONE ENCOUNTER
I S/W Mr. Aguilar.  He stated he was back to normal.  He and his wife are living alone and taking care of their own affairs.  He told he saw Dr. Cao and had his F/C removed yesterday and has been going to the bathroom without difficulty.  We reviewed his medications.  Lisinopril 5mg daily, Lipitor 80mg daily, and Plavix 75mg daily.  He acknowledged that he did receive a F/U packet to [facility] on August 25th to see Taylor CHEATHAM and to have a carotid US done on the 23rd of August.  We reviewed S/S of stroke and to call 911 immediately.  mRS 1.  IONA Wisdom RN

## 2017-08-23 ENCOUNTER — HOSPITAL ENCOUNTER (OUTPATIENT)
Dept: CARDIOLOGY | Facility: HOSPITAL | Age: 82
Discharge: HOME | End: 2017-08-23
Admitting: NURSE PRACTITIONER

## 2017-08-23 DIAGNOSIS — R55 SYNCOPE AND COLLAPSE: ICD-10-CM

## 2017-08-23 LAB
BH CV XLRA MEAS LEFT CCA RATIO VEL: 88.8 CM/SEC
BH CV XLRA MEAS LEFT DIST CCA EDV: 23.6 CM/SEC
BH CV XLRA MEAS LEFT DIST CCA PSV: 88.8 CM/SEC
BH CV XLRA MEAS LEFT DIST ICA EDV: -17 CM/SEC
BH CV XLRA MEAS LEFT DIST ICA PSV: -79.2 CM/SEC
BH CV XLRA MEAS LEFT ICA RATIO VEL: -79.2 CM/SEC
BH CV XLRA MEAS LEFT ICA/CCA RATIO: -0.89
BH CV XLRA MEAS LEFT MID ICA EDV: -22.3 CM/SEC
BH CV XLRA MEAS LEFT MID ICA PSV: -62.1 CM/SEC
BH CV XLRA MEAS LEFT PROX CCA EDV: 14.9 CM/SEC
BH CV XLRA MEAS LEFT PROX CCA PSV: 91.1 CM/SEC
BH CV XLRA MEAS LEFT PROX ECA EDV: -5.5 CM/SEC
BH CV XLRA MEAS LEFT PROX ECA PSV: -76.2 CM/SEC
BH CV XLRA MEAS LEFT PROX ICA EDV: -17.6 CM/SEC
BH CV XLRA MEAS LEFT PROX ICA PSV: -68.6 CM/SEC
BH CV XLRA MEAS LEFT PROX SCLA PSV: 117 CM/SEC
BH CV XLRA MEAS LEFT VERTEBRAL A EDV: -7.4 CM/SEC
BH CV XLRA MEAS LEFT VERTEBRAL A PSV: -38.3 CM/SEC
BH CV XLRA MEAS RIGHT CCA RATIO VEL: 67.4 CM/SEC
BH CV XLRA MEAS RIGHT DIST CCA EDV: 13.5 CM/SEC
BH CV XLRA MEAS RIGHT DIST CCA PSV: 67.4 CM/SEC
BH CV XLRA MEAS RIGHT DIST ICA EDV: -27 CM/SEC
BH CV XLRA MEAS RIGHT DIST ICA PSV: -97.9 CM/SEC
BH CV XLRA MEAS RIGHT ICA RATIO VEL: -117 CM/SEC
BH CV XLRA MEAS RIGHT ICA/CCA RATIO: -1.7
BH CV XLRA MEAS RIGHT MID ICA EDV: -17 CM/SEC
BH CV XLRA MEAS RIGHT MID ICA PSV: -90.9 CM/SEC
BH CV XLRA MEAS RIGHT PROX CCA EDV: 19.3 CM/SEC
BH CV XLRA MEAS RIGHT PROX CCA PSV: 80.3 CM/SEC
BH CV XLRA MEAS RIGHT PROX ECA EDV: -15.7 CM/SEC
BH CV XLRA MEAS RIGHT PROX ECA PSV: -134 CM/SEC
BH CV XLRA MEAS RIGHT PROX ICA EDV: -26.7 CM/SEC
BH CV XLRA MEAS RIGHT PROX ICA PSV: -117 CM/SEC
BH CV XLRA MEAS RIGHT PROX SCLA EDV: 22 CM/SEC
BH CV XLRA MEAS RIGHT PROX SCLA PSV: 101 CM/SEC
BH CV XLRA MEAS RIGHT VERTEBRAL A EDV: -21.2 CM/SEC
BH CV XLRA MEAS RIGHT VERTEBRAL A PSV: -67.2 CM/SEC
LEFT ARM BP: NORMAL MMHG
RIGHT ARM BP: NORMAL MMHG

## 2017-08-23 PROCEDURE — 93880 EXTRACRANIAL BILAT STUDY: CPT

## 2017-08-25 ENCOUNTER — OFFICE VISIT (OUTPATIENT)
Dept: NEUROLOGY | Facility: CLINIC | Age: 82
End: 2017-08-25

## 2017-08-25 VITALS
OXYGEN SATURATION: 98 % | HEIGHT: 67 IN | DIASTOLIC BLOOD PRESSURE: 43 MMHG | HEART RATE: 87 BPM | WEIGHT: 155 LBS | BODY MASS INDEX: 24.33 KG/M2 | SYSTOLIC BLOOD PRESSURE: 108 MMHG

## 2017-08-25 DIAGNOSIS — I48.19 PERSISTENT ATRIAL FIBRILLATION (HCC): ICD-10-CM

## 2017-08-25 DIAGNOSIS — I63.231 CEREBROVASCULAR ACCIDENT (CVA) DUE TO STENOSIS OF RIGHT CAROTID ARTERY (HCC): ICD-10-CM

## 2017-08-25 PROCEDURE — 99024 POSTOP FOLLOW-UP VISIT: CPT | Performed by: NURSE PRACTITIONER

## 2017-08-25 RX ORDER — WARFARIN SODIUM 5 MG/1
5 TABLET ORAL
COMMUNITY

## 2017-08-25 RX ORDER — ASPIRIN 81 MG/1
81 TABLET ORAL DAILY
Qty: 90 TABLET | Refills: 2 | Status: SHIPPED | OUTPATIENT
Start: 2017-08-25 | End: 2018-08-25

## 2017-08-25 NOTE — PROGRESS NOTES
DOS: 2017  NAME: Danni Aguilar   : 1926  PCP: Rolando Hadley MD    Chief Complaint   Patient presents with   • Cerebrovascular Accident      Neurological Problem and Interval History:  89 y.o. RHW male with Afib and LIU causing a stroke. He presents today for his 30 day carotid artery stent follow up.     He denies any new S/S of stroke and H/A. His groins have completely healed. He is back to doing everything he was able to do prior to the stroke. His BP has been low on the lisinopril. His plavix was stopped and the Coumadin resumed with Lovenox. Is also on Lipitor 80mg but has not had his labs checked. He will have his INR checked next week. He is tolerating his medications.   17 he presented to [facility] ER with recurrent left sided numbness and ? weakness. His symptoms resolved and he underwent ISAC stent placement for severe stenosis causing a stroke. At the time of the event he was on coumadin alone for his afib, no asa or plavix.     Review of Systems:        Review of Systems   Constitutional: Positive for fatigue. Negative for activity change, appetite change, chills, diaphoresis, fever and unexpected weight change.   HENT: Positive for voice change. Negative for drooling, hearing loss, tinnitus and trouble swallowing.    Eyes: Negative for photophobia, pain and visual disturbance.   Respiratory: Negative for chest tightness and shortness of breath.    Cardiovascular: Negative for chest pain, palpitations and leg swelling.   Gastrointestinal: Negative for blood in stool, nausea and vomiting.   Endocrine: Negative for cold intolerance and heat intolerance.   Genitourinary: Negative for difficulty urinating.   Musculoskeletal: Positive for neck stiffness (back of neck). Negative for gait problem and neck pain.   Skin: Negative for rash.   Neurological: Positive for light-headedness. Negative for dizziness, tremors, seizures, syncope, facial asymmetry, speech difficulty, weakness, numbness and  "headaches.   Hematological: Bruises/bleeds easily.   Psychiatric/Behavioral: Negative for agitation, behavioral problems, confusion, hallucinations, sleep disturbance and suicidal ideas. The patient is not nervous/anxious.        \"The following portions of the patient's history were reviewed and updated as appropriate: allergies, current medications, past family history, past medical history, past social history, past surgical history and problem list.\"  Review and Interpretation of Imagin17 Carotid US: Right  ICA PSV  , Left ICA PSV 26, EDV 17  17 Carotid US: right ICA  , Left ICA PSV 77, EDV 14      Laboratory Results:             Lab Results   Component Value Date    HGBA1C 5.70 (H) 2017     Lab Results   Component Value Date    CHOL 166 2017     Lab Results   Component Value Date    LDLCALC 108 (H) 2017     Lab Results   Component Value Date    HDL 43 2017     Lab Results   Component Value Date    TRIG 77 2017   No results found for: RPR  No results found for: TSH  No results found for: [id number]    Physical Examination:  mRS: 0  General Appearance:   Well developed, thin, well groomed, alert, and cooperative.  HEENT: Normocephalic.    Peripheral Vasculature: Radial and pedal pulses are equal and symmetric. No signs of distal embolization.  Extremities:    No edema or deformities. Normal joint ROM.  Skin:    No rashes or birth marks. Generalized ecchymosis     Neurological examination:  Higher Integrative  Function: Oriented to time, place and person. Normal registration, attention span and concentration. Normal language including comprehension, spontaneous speech, repetition, naming and vocabulary. No neglect with normal visual-spatial function and construction. Normal fund of knowledge and higher integrative function.  CN V: Normal facial sensation and strength of muscles of mastication.  CN VII: Facial movements are symmetric. No weakness.  CN " VIII:   Auditory acuity is normal.  CN IX & X:   Symmetric palatal movement.  CN XI: Sternocleidomastoid and trapezius are normal.  No weakness.  CN XII:   The tongue is midline.  No atrophy or fasciculations.  Motor: Normal muscle strength, bulk and tone in upper and lower extremities.  No fasciculations, rigidity, spasticity, or abnormal movements.  Sensation: Normal to light touch, temperature, and proprioception in arms and legs. Normal graphesthesia and no extinction on DSS.  Station and Gait: Normal gait and station.    Coordination:  Rapid alternating movements are normal.      Diagnoses / Discussion: Mr. Aguilar is a 89yo who presented today for his 30 day carotid stent follow up. In July he was Dx with multiple strokes in the right MCA territory due to severe ISAC stenosis. The repeat carotid US reveals that the stent is patent without re-stenosis. His coumadin has been resumed by Dr. Hadley, with subq lovenox bridge. The plavix has been stopped. I will start ASA, he will need one antiplatelet, life long. His BP is well controlled, stop lisinopril. He has been on Lipitor 80mg. I will check a lipid panel and decide on changing the dose of Lipitor. He will need repeat carotid US and Fu in 5 months, 1 year and then yearly. He and his family agree with the plan and will call with any questions or concerns.     Plan:   Start ASA 81mg daily   Stop Lisinopril    Continue coumadin and lovenox per Dr. Hadley    Continue lipitor 80mg, check lipid panel    Blood pressure control to <130/80   Goal LDL <70-recommend high dose statins-    Serum glucose < 140   Call 911 for stroke any stroke symptoms   Follow-up 5 months with carotid US  Danni was seen today for cerebrovascular accident.    Diagnoses and all orders for this visit:    Cerebrovascular accident (CVA) due to stenosis of right carotid artery  -     Lipid Panel; Future  -     Duplex Carotid Ultrasound CAR; Future    Persistent atrial fibrillation    Other  orders  -     aspirin 81 MG EC tablet; Take 1 tablet by mouth Daily.        Coding

## 2017-09-19 ENCOUNTER — HOSPITAL ENCOUNTER (EMERGENCY)
Facility: HOSPITAL | Age: 82
Discharge: HOME | End: 2017-09-19
Attending: EMERGENCY MEDICINE | Admitting: EMERGENCY MEDICINE

## 2017-09-19 ENCOUNTER — APPOINTMENT (OUTPATIENT)
Dept: GENERAL RADIOLOGY | Facility: HOSPITAL | Age: 82
End: 2017-09-19

## 2017-09-19 VITALS
HEART RATE: 93 BPM | OXYGEN SATURATION: 98 % | DIASTOLIC BLOOD PRESSURE: 78 MMHG | WEIGHT: 155 LBS | SYSTOLIC BLOOD PRESSURE: 135 MMHG | RESPIRATION RATE: 17 BRPM | BODY MASS INDEX: 23.49 KG/M2 | HEIGHT: 68 IN | TEMPERATURE: 97.7 F

## 2017-09-19 DIAGNOSIS — S50.12XA TRAUMATIC HEMATOMA OF LEFT FOREARM, INITIAL ENCOUNTER: Primary | ICD-10-CM

## 2017-09-19 DIAGNOSIS — R79.1 ELEVATED INR: ICD-10-CM

## 2017-09-19 LAB
BASOPHILS # BLD AUTO: 0.07 10*3/MM3 (ref 0–0.2)
BASOPHILS NFR BLD AUTO: 1 % (ref 0–1.5)
DEPRECATED RDW RBC AUTO: 50.1 FL (ref 37–54)
EOSINOPHIL # BLD AUTO: 0.23 10*3/MM3 (ref 0–0.7)
EOSINOPHIL NFR BLD AUTO: 3.2 % (ref 0.3–6.2)
ERYTHROCYTE [DISTWIDTH] IN BLOOD BY AUTOMATED COUNT: 15.3 % (ref 11.5–14.5)
HCT VFR BLD AUTO: 39 % (ref 40.4–52.2)
HGB BLD-MCNC: 12 G/DL (ref 13.7–17.6)
IMM GRANULOCYTES # BLD: 0.02 10*3/MM3 (ref 0–0.03)
IMM GRANULOCYTES NFR BLD: 0.3 % (ref 0–0.5)
INR PPP: 3.34 (ref 0.9–1.1)
LYMPHOCYTES # BLD AUTO: 1.23 10*3/MM3 (ref 0.9–4.8)
LYMPHOCYTES NFR BLD AUTO: 16.9 % (ref 19.6–45.3)
MCH RBC QN AUTO: 27.4 PG (ref 27–32.7)
MCHC RBC AUTO-ENTMCNC: 30.8 G/DL (ref 32.6–36.4)
MCV RBC AUTO: 89 FL (ref 79.8–96.2)
MONOCYTES # BLD AUTO: 0.63 10*3/MM3 (ref 0.2–1.2)
MONOCYTES NFR BLD AUTO: 8.6 % (ref 5–12)
NEUTROPHILS # BLD AUTO: 5.11 10*3/MM3 (ref 1.9–8.1)
NEUTROPHILS NFR BLD AUTO: 70 % (ref 42.7–76)
PLATELET # BLD AUTO: 379 10*3/MM3 (ref 140–500)
PMV BLD AUTO: 9.2 FL (ref 6–12)
PROTHROMBIN TIME: 32.8 SECONDS (ref 11.7–14.2)
RBC # BLD AUTO: 4.38 10*6/MM3 (ref 4.6–6)
WBC NRBC COR # BLD: 7.29 10*3/MM3 (ref 4.5–10.7)

## 2017-09-19 PROCEDURE — 73070 X-RAY EXAM OF ELBOW: CPT

## 2017-09-19 PROCEDURE — 85610 PROTHROMBIN TIME: CPT | Performed by: EMERGENCY MEDICINE

## 2017-09-19 PROCEDURE — 85025 COMPLETE CBC W/AUTO DIFF WBC: CPT | Performed by: EMERGENCY MEDICINE

## 2017-09-19 PROCEDURE — 99283 EMERGENCY DEPT VISIT LOW MDM: CPT

## 2017-09-19 NOTE — ED PROVIDER NOTES
EMERGENCY DEPARTMENT ENCOUNTER    CHIEF COMPLAINT  Chief Complaint: fall  History given by: patient  History limited by: nothing  Room Number: 16/16  PMD: Rolando Hadley MD      HPI:  Pt is a 89 y.o. male who presents complaining of left elbow hematoma s/p trip and fall one week ago. Pt states that he is unsure what he hit on the ground and denies a blow to the head, numbness, N/V, focal weakness, LOC or left elbow pain. Pt states that he went to his PMD's office earlier today and was told to come to the ED for a XR. Pt is on coumadin for a-fib.    Duration:  One week ago   Onset: sudden  Timing: constant  Location: left elbow  Radiation: none  Quality: hematoma  Intensity/Severity: moderate  Progression: unchanged  Associated Symptoms: none  Aggravating Factors: none  Alleviating Factors: none  Previous Episodes: Pt denies similar symptoms previously.  Treatment before arrival: none    PAST MEDICAL HISTORY  Active Ambulatory Problems     Diagnosis Date Noted   • Acute CVA (cerebrovascular accident) 07/20/2017   • Atrial fibrillation 07/20/2017   • Long term (current) use of anticoagulants 07/20/2017   • Carotid stenosis 07/20/2017   • Urinary retention 07/25/2017   • Cerebrovascular accident (CVA) due to stenosis of right carotid artery 08/25/2017     Resolved Ambulatory Problems     Diagnosis Date Noted   • No Resolved Ambulatory Problems     Past Medical History:   Diagnosis Date   • A-fib    • Irregular heart beat        PAST SURGICAL HISTORY  Past Surgical History:   Procedure Laterality Date   • BACK SURGERY     • CEREBRAL ANGIOGRAM N/A 7/24/2017    Procedure: DIAGNOSTIC CEREBRAL ANGIOGRAM AND RIGHT CAROTID STENT PLACEMENT;  Surgeon: Mauricio Yung MD;  [facility];  Service:        FAMILY HISTORY  History reviewed. No pertinent family history.    SOCIAL HISTORY  Social History     Social History   • Marital status:      Spouse name: N/A   • Number of children: N/A   • Years of  education: N/A     Occupational History   • Not on file.     Social History Main Topics   • Smoking status: Never Smoker   • Smokeless tobacco: Not on file   • Alcohol use No   • Drug use: Defer   • Sexual activity: Not on file     Other Topics Concern   • Not on file     Social History Narrative   • No narrative on file       ALLERGIES  Review of patient's allergies indicates no known allergies.    REVIEW OF SYSTEMS  Review of Systems   Constitutional: Negative for fever.   Respiratory: Negative for shortness of breath.    Cardiovascular: Negative for chest pain.   Gastrointestinal: Negative for nausea and vomiting.   Musculoskeletal: Negative for arthralgias.   Skin: Positive for wound (hematoma to left forearrm).   Neurological: Negative for weakness and numbness.       PHYSICAL EXAM  ED Triage Vitals   Temp Heart Rate Resp BP SpO2   09/19/17 1045 09/19/17 1045 09/19/17 1045 09/19/17 1050 09/19/17 1045   98.1 °F (36.7 °C) 111 18 130/66 98 %      Temp src Heart Rate Source Patient Position BP Location FiO2 (%)   09/19/17 1045 09/19/17 1045 09/19/17 1050 -- --   Tympanic Monitor Sitting         Physical Exam   Constitutional: He is oriented to person, place, and time. He appears distressed (mild).   HENT:   Head: Normocephalic and atraumatic.   Eyes: EOM are normal. Pupils are equal, round, and reactive to light.   Neck: Normal range of motion. Neck supple.   Cardiovascular: An irregularly irregular rhythm present. Bradycardia present.    Murmur heard.   Systolic (ejection murmur at the left sternal border) murmur is present with a grade of 2/6   Pulses:       Radial pulses are 2+ on the left side.   Pulmonary/Chest: Effort normal and breath sounds normal. No respiratory distress.   Abdominal: Soft. There is no tenderness. There is no rebound and no guarding.   Musculoskeletal: Normal range of motion. He exhibits no edema.        Left elbow: He exhibits normal range of motion. No tenderness found.        Cervical  back: He exhibits no tenderness.        Thoracic back: He exhibits no tenderness.        Lumbar back: He exhibits no tenderness.   Healing abrasions on posterior distal left arm and left elbow. Hematoma ([id number]) on proximal left ulnar forearm   Neurological: He is alert and oriented to person, place, and time. He has normal sensation and normal strength.   NVI distally   Skin: Skin is warm and dry. Abrasion noted.   Psychiatric: Mood and affect normal.   Nursing note and vitals reviewed.      LAB RESULTS  Lab Results (last 24 hours)     Procedure Component Value Units Date/Time    CBC & Differential [[id number]] Collected:  09/19/17 1125    Specimen:  Blood Updated:  09/19/17 1147    Narrative:       The following orders were created for panel order CBC & Differential.  Procedure                               Abnormality         Status                     ---------                               -----------         ------                     CBC Auto Differential[[id number]]        Abnormal            Final result                 Please view results for these tests on the individual orders.    Protime-INR [[id number]]  (Abnormal) Collected:  09/19/17 1125    Specimen:  Blood Updated:  09/19/17 1157     Protime 32.8 (H) Seconds      INR 3.34 (H)    CBC Auto Differential [[id number]]  (Abnormal) Collected:  09/19/17 1125    Specimen:  Blood Updated:  09/19/17 1147     WBC 7.29 10*3/mm3      RBC 4.38 (L) 10*6/mm3      Hemoglobin 12.0 (L) g/dL      Hematocrit 39.0 (L) %      MCV 89.0 fL      MCH 27.4 pg      MCHC 30.8 (L) g/dL      RDW 15.3 (H) %      RDW-SD 50.1 fl      MPV 9.2 fL      Platelets 379 10*3/mm3      Neutrophil % 70.0 %      Lymphocyte % 16.9 (L) %      Monocyte % 8.6 %      Eosinophil % 3.2 %      Basophil % 1.0 %      Immature Grans % 0.3 %      Neutrophils, Absolute 5.11 10*3/mm3      Lymphocytes, Absolute 1.23 10*3/mm3      Monocytes, Absolute 0.63 10*3/mm3      Eosinophils, Absolute 0.23 10*3/mm3       Basophils, Absolute 0.07 10*3/mm3      Immature Grans, Absolute 0.02 10*3/mm3           I ordered the above labs and reviewed the results    RADIOLOGY  XR Elbow 2 View Left   Final Result         1200- Reviewed pt's L elbow XR, which shows no fracture or joint effusion. Independently viewed by me. Interpreted by radiologist.       I ordered the above noted radiological studies. Interpreted by radiologist.  Reviewed by me in PACS.       PROCEDURES  Procedures      PROGRESS AND CONSULTS  ED Course     1111- Ordered blood work, PT with INR and L elbow XR for further evaluation.    1115- Discussed the plan to order a PT with INR and imaging for further evaluation. Pt agrees with the plan and all questions were addressed.    1224- Rechecked pt. Pt is resting comfortably. Notified pt and family of the pt's lab and imaging results, including the negative L elbow XR and elevated INR. Discussed the plan to discharge the pt home with instructions to not take his next dose of coumadin. Pt and family agree with the plan and all questions were addressed.    MEDICAL DECISION MAKING  Results were reviewed/discussed with the patient and they were also made aware of online access. Pt also made aware that some labs, such as cultures, will not be resulted during ER visit and follow up with PMD is necessary.     MDM  Number of Diagnoses or Management Options     Amount and/or Complexity of Data Reviewed  Clinical lab tests: ordered and reviewed (INR=3.34)  Tests in the radiology section of CPT®: ordered and reviewed (L elbow XR shows no fracture)  Independent visualization of images, tracings, or specimens: yes    Patient Progress  Patient progress: stable         DIAGNOSIS  Final diagnoses:   Traumatic hematoma of left forearm, initial encounter   Elevated INR       DISPOSITION  DISCHARGE    Patient discharged in stable condition.    Reviewed implications of results, diagnosis, meds, responsibility to follow up, warning signs and  symptoms of possible worsening, potential complications and reasons to return to ER, including fever, worsening pain or any concerns.    Patient/Family voiced understanding of above instructions.    Discussed plan for discharge, as there is no emergent indication for admission.  Pt/family is agreeable and understands need for follow up and repeat testing.  Pt is aware that discharge does not mean that nothing is wrong but it indicates no emergency is present that requires admission and they must continue care with follow-up as given below or physician of their choice.     FOLLOW-UP  Rolando Hadley MD  [address]  [address]  [facility] [address] [phone]    Call  As needed, If symptoms worsen         Medication List      Notice     No changes were made to your prescriptions during this visit.            Latest Documented Vital Signs:  As of 3:13 PM  BP- 135/78 HR- 93 Temp- 97.7 °F (36.5 °C) (Tympanic) O2 sat- 98%    --  Documentation assistance provided by ankur Chirinos for Dr. Bedoya.  Information recorded by the scribe was done at my direction and has been verified and validated by me.       Sophia Chirinos  09/19/17 [id number]       Gurpreet Bedoya MD  09/19/17 [id number]

## 2017-09-19 NOTE — DISCHARGE INSTRUCTIONS
Do not take your next coumadin dose.  You can use cool compresses to your forearm as needed.  The swelling may take up to a month to resolve.

## 2018-01-26 ENCOUNTER — HOSPITAL ENCOUNTER (OUTPATIENT)
Dept: CARDIOLOGY | Facility: HOSPITAL | Age: 83
Discharge: HOME | End: 2018-01-26
Admitting: NURSE PRACTITIONER

## 2018-01-26 DIAGNOSIS — I63.231 CEREBROVASCULAR ACCIDENT (CVA) DUE TO STENOSIS OF RIGHT CAROTID ARTERY (HCC): ICD-10-CM

## 2018-01-26 LAB
BH CV XLRA MEAS LEFT CCA RATIO VEL: 91.5 CM/SEC
BH CV XLRA MEAS LEFT DIST CCA EDV: 19.9 CM/SEC
BH CV XLRA MEAS LEFT DIST CCA PSV: 91.5 CM/SEC
BH CV XLRA MEAS LEFT DIST ICA EDV: -19.9 CM/SEC
BH CV XLRA MEAS LEFT DIST ICA PSV: -70.4 CM/SEC
BH CV XLRA MEAS LEFT ICA RATIO VEL: -55.1 CM/SEC
BH CV XLRA MEAS LEFT ICA/CCA RATIO: -0.6
BH CV XLRA MEAS LEFT MID ICA EDV: -26.4 CM/SEC
BH CV XLRA MEAS LEFT MID ICA PSV: -82.1 CM/SEC
BH CV XLRA MEAS LEFT PROX CCA EDV: 23.5 CM/SEC
BH CV XLRA MEAS LEFT PROX CCA PSV: 92.6 CM/SEC
BH CV XLRA MEAS LEFT PROX ECA EDV: -13.5 CM/SEC
BH CV XLRA MEAS LEFT PROX ECA PSV: -74.5 CM/SEC
BH CV XLRA MEAS LEFT PROX ICA EDV: -18.8 CM/SEC
BH CV XLRA MEAS LEFT PROX ICA PSV: -55.1 CM/SEC
BH CV XLRA MEAS LEFT PROX SCLA PSV: 110 CM/SEC
BH CV XLRA MEAS LEFT VERTEBRAL A EDV: 9.4 CM/SEC
BH CV XLRA MEAS LEFT VERTEBRAL A PSV: 41 CM/SEC
BH CV XLRA MEAS RIGHT CCA RATIO VEL: 61.3 CM/SEC
BH CV XLRA MEAS RIGHT DIST CCA EDV: 14.9 CM/SEC
BH CV XLRA MEAS RIGHT DIST CCA PSV: 61.3 CM/SEC
BH CV XLRA MEAS RIGHT DIST ICA EDV: -23.6 CM/SEC
BH CV XLRA MEAS RIGHT DIST ICA PSV: -99.8 CM/SEC
BH CV XLRA MEAS RIGHT ICA RATIO VEL: -126 CM/SEC
BH CV XLRA MEAS RIGHT ICA/CCA RATIO: -2.1
BH CV XLRA MEAS RIGHT MID ICA EDV: -25.9 CM/SEC
BH CV XLRA MEAS RIGHT MID ICA PSV: -91.9 CM/SEC
BH CV XLRA MEAS RIGHT PROX CCA EDV: 19.3 CM/SEC
BH CV XLRA MEAS RIGHT PROX CCA PSV: 76.2 CM/SEC
BH CV XLRA MEAS RIGHT PROX ECA EDV: -32.7 CM/SEC
BH CV XLRA MEAS RIGHT PROX ECA PSV: -213 CM/SEC
BH CV XLRA MEAS RIGHT PROX ICA EDV: -44 CM/SEC
BH CV XLRA MEAS RIGHT PROX ICA PSV: -126 CM/SEC
BH CV XLRA MEAS RIGHT PROX SCLA PSV: 86.7 CM/SEC
BH CV XLRA MEAS RIGHT VERTEBRAL A EDV: 17.3 CM/SEC
BH CV XLRA MEAS RIGHT VERTEBRAL A PSV: 57.8 CM/SEC
LEFT ARM BP: NORMAL MMHG
RIGHT ARM BP: NORMAL MMHG

## 2018-01-26 PROCEDURE — 93880 EXTRACRANIAL BILAT STUDY: CPT

## 2018-01-29 ENCOUNTER — OFFICE VISIT (OUTPATIENT)
Dept: NEUROLOGY | Facility: CLINIC | Age: 83
End: 2018-01-29

## 2018-01-29 VITALS
HEART RATE: 65 BPM | SYSTOLIC BLOOD PRESSURE: 108 MMHG | WEIGHT: 155 LBS | HEIGHT: 68 IN | BODY MASS INDEX: 23.49 KG/M2 | OXYGEN SATURATION: 96 % | DIASTOLIC BLOOD PRESSURE: 58 MMHG

## 2018-01-29 DIAGNOSIS — I48.19 PERSISTENT ATRIAL FIBRILLATION (HCC): ICD-10-CM

## 2018-01-29 DIAGNOSIS — I65.21 STENOSIS OF RIGHT CAROTID ARTERY: ICD-10-CM

## 2018-01-29 DIAGNOSIS — I63.231 CEREBROVASCULAR ACCIDENT (CVA) DUE TO STENOSIS OF RIGHT CAROTID ARTERY (HCC): ICD-10-CM

## 2018-01-29 DIAGNOSIS — E78.5 HYPERLIPIDEMIA, UNSPECIFIED HYPERLIPIDEMIA TYPE: ICD-10-CM

## 2018-01-29 DIAGNOSIS — I63.231 CEREBROVASCULAR ACCIDENT (CVA) DUE TO STENOSIS OF RIGHT CAROTID ARTERY (HCC): Primary | ICD-10-CM

## 2018-01-29 PROCEDURE — 99213 OFFICE O/P EST LOW 20 MIN: CPT | Performed by: NURSE PRACTITIONER

## 2018-01-29 NOTE — PROGRESS NOTES
DOS: 2018  NAME: Danni Aguilar   : 1926  PCP: Rolando Hadley MD    Chief Complaint   Patient presents with   • Cerebrovascular Accident        Neurological Problem and Interval History:  89 y.o. RHW male with hyperlipidemia, Afib and right carotid stenosis S/P stent placement 2017 and stroke. He presents today for his 6 month carotid artery stent follow up.     He denies any new S/S of stroke, no H/A, no falls. He is tolerate is medication, ASA and coumadin. He had lab work recently. His INR was 1.7 when last checked.     17 he presented to [facility] ER with recurrent left sided numbness and ? weakness. His symptoms resolved and he underwent ISAC stent placement for severe stenosis causing a stroke. At the time of the event he was on coumadin alone for his afib, no asa or plavix    Review of Systems:        Review of Systems   Constitutional: Negative for chills, fatigue and fever.   HENT: Negative for hearing loss, tinnitus and trouble swallowing.    Eyes: Negative for pain, redness, itching and visual disturbance.   Respiratory: Negative for cough, shortness of breath and wheezing.    Cardiovascular: Negative for chest pain, palpitations and leg swelling.   Gastrointestinal: Negative for constipation, diarrhea, nausea and vomiting.   Endocrine: Negative for cold intolerance, heat intolerance and polydipsia.   Genitourinary: Negative for decreased urine volume, frequency and urgency.   Musculoskeletal: Positive for gait problem and neck stiffness. Negative for back pain and neck pain.   Skin: Negative for color change, rash and wound.   Allergic/Immunologic: Negative for environmental allergies, food allergies and immunocompromised state.   Neurological: Negative for dizziness, tremors, seizures, syncope, facial asymmetry, speech difficulty, weakness, light-headedness, numbness and headaches.   Hematological: Negative for adenopathy. Bruises/bleeds easily.   Psychiatric/Behavioral: Negative for  "agitation, behavioral problems, confusion, decreased concentration, dysphoric mood, hallucinations, self-injury, sleep disturbance and suicidal ideas. The patient is not nervous/anxious and is not hyperactive.        \"The following portions of the patient's history were reviewed and updated as appropriate: allergies, current medications, past family history, past medical history, past social history, past surgical history and problem list.\"  Review and Interpretation of Imagin2018 Carotid US:   Right   ICA Prox -126.0 cm/sec       -44.0 cm/sec         Left   ICA Prox -55.1 cm/sec       -18.8 cm/sec           17 Carotid US: Right  ICA PSV  , Left ICA PSV 26, EDV 17  17 Carotid US: right ICA  , Left ICA PSV 77, EDV 14       Laboratory Results:       18 INR 1.8, LDL 74        Lab Results   Component Value Date    HGBA1C 5.70 (H) 2017     No results found for: RPR  No results found for: TSH  No results found for: [id number]    Physical Examination:  mRS: 0  General Appearance:   Well developed, well nourished, well groomed, alert, and cooperative.  HEENT: Normocephalic.    Neck and Spine: Normal range of motion.  Normal alignment. No mass or tenderness. No bruits.  Cardiac: Irregularly irregular. No murmurs.  Peripheral Vasculature: Radial and pedal pulses are equal and symmetric.     Neurological examination:  Higher Integrative  Function: Oriented to time, place and person. Normal registration, recall, attention span and concentration. Normal language including comprehension, spontaneous speech, repetition, naming and vocabulary. No neglect with normal visual-spatial function and construction. Normal fund of knowledge and higher integrative function.  CN III IV VI: Extraocular movements are full without nystagmus.   CN V: Normal facial sensation and strength of muscles of mastication.  CN VII: Facial movements are symmetric. No weakness.  CN VIII:   Auditory acuity is " normal.  CN IX & X:   Symmetric palatal movement.  CN XI: Sternocleidomastoid and trapezius are normal.  No weakness.  CN XII:   The tongue is midline.  No atrophy or fasciculations.  Motor: Normal muscle strength, bulk and tone in upper and lower extremities.    Station and Gait: Normal gait and station.        Diagnoses / Discussion:  Mr. Aguilar is a 89yo who presented today for his 6 month right carotid stent follow up. In July 2017 he was Dx with multiple strokes in the right MCA territory due to severe ISAC stenosis. The repeat carotid US reveals that the stent is patent without re-stenosis. He has remained on baby ASA, life long, and coumadin for stroke prevention. He needs continued aggressive risk factor control, goal LDL 40-70, continue Lipitor 80mg. He needs to continue to FU with Dr. Hadley for his INR and regular lipid panel.  He will need repeat carotid US and FU in 6 months with a carotid US, then yearly. He can discuss going off of Flomax with Dr. Hadley. He and his wife with the plan and will call with any questions or concerns.      Plan:   Continue ASA 81mg    Continue Lipitor 80mg   Blood pressure control to <130/80   Goal LDL <70-recommend high dose statins-    Serum glucose < 140   Call 911 for stroke any stroke symptoms   Follow-up 6 months with carotid US-same day  Danni was seen today for cerebrovascular accident.    Diagnoses and all orders for this visit:    Stenosis of right carotid artery    Cerebrovascular accident (CVA) due to stenosis of right carotid artery    Persistent atrial fibrillation    Hyperlipidemia, unspecified hyperlipidemia type      Coding

## 2018-07-12 ENCOUNTER — TELEPHONE (OUTPATIENT)
Dept: NEUROLOGY | Facility: CLINIC | Age: 83
End: 2018-07-12

## 2018-07-12 NOTE — TELEPHONE ENCOUNTER
I s/w pt and he is aware Taylor will see him in office on Monday 7/16/18 after his carotid u/s scan. I put him on her schedule for 3pm, but he will come to office after scan.

## 2018-07-12 NOTE — TELEPHONE ENCOUNTER
----- Message from LINDEN Dooley sent at 7/12/2018  3:38 PM EDT -----  Sure, or the next day I am in the office  ----- Message -----  From: Yancy Shaffer MA  Sent: 7/12/2018   1:34 PM  To: LINDEN Dooley    You are in the hospital that day. So it is ok to schedule a 30 min with you after u/s?    ----- Message -----  From: LINDEN Dooley  Sent: 7/12/2018  12:07 PM  To: Yancy Shaffer MA    Either works, whatever works for the patient   ----- Message -----  From: Yancy Shaffer MA  Sent: 7/12/2018  11:05 AM  To: LINDEN Dooley    Patient has his f/u carotid u/s on Monday 7/16/18 and said he was to follow up with you in clinic same day. He is coming from [place]. Would you want me to schedule him after for you to see, or just call him results? Thanks!           Post-Care Instructions: I reviewed with the patient in detail post-care instructions. Patient is to wear sunprotection, and avoid picking at any of the treated lesions. Pt may apply Vaseline to crusted or scabbing areas. Consent: The patient's consent was obtained including but not limited to risks of crusting, scabbing, blistering, scarring, darker or lighter pigmentary change, recurrence, incomplete removal and infection. Detail Level: Simple Render Post-Care Instructions In Note?: no Duration Of Freeze Thaw-Cycle (Seconds): 0

## 2018-07-16 ENCOUNTER — HOSPITAL ENCOUNTER (OUTPATIENT)
Dept: CARDIOLOGY | Facility: HOSPITAL | Age: 83
Discharge: HOME | End: 2018-07-16
Admitting: NURSE PRACTITIONER

## 2018-07-16 ENCOUNTER — OFFICE VISIT (OUTPATIENT)
Dept: NEUROLOGY | Facility: CLINIC | Age: 83
End: 2018-07-16

## 2018-07-16 VITALS
OXYGEN SATURATION: 96 % | HEART RATE: 82 BPM | BODY MASS INDEX: 23.19 KG/M2 | SYSTOLIC BLOOD PRESSURE: 128 MMHG | WEIGHT: 153 LBS | HEIGHT: 68 IN | DIASTOLIC BLOOD PRESSURE: 74 MMHG

## 2018-07-16 DIAGNOSIS — E78.5 HYPERLIPIDEMIA, UNSPECIFIED HYPERLIPIDEMIA TYPE: ICD-10-CM

## 2018-07-16 DIAGNOSIS — I63.231 CEREBROVASCULAR ACCIDENT (CVA) DUE TO STENOSIS OF RIGHT CAROTID ARTERY (HCC): ICD-10-CM

## 2018-07-16 DIAGNOSIS — I48.19 PERSISTENT ATRIAL FIBRILLATION (HCC): ICD-10-CM

## 2018-07-16 DIAGNOSIS — I65.21 STENOSIS OF RIGHT CAROTID ARTERY: ICD-10-CM

## 2018-07-16 LAB
BH CV XLRA MEAS LEFT DIST CCA EDV: 18.9 CM/SEC
BH CV XLRA MEAS LEFT DIST CCA PSV: 90.4 CM/SEC
BH CV XLRA MEAS LEFT DIST ICA EDV: 22.9 CM/SEC
BH CV XLRA MEAS LEFT DIST ICA PSV: 83.8 CM/SEC
BH CV XLRA MEAS LEFT ICA/CCA RATIO: 0.93
BH CV XLRA MEAS LEFT MID CCA EDV: 20.4 CM/SEC
BH CV XLRA MEAS LEFT MID CCA PSV: 95.9 CM/SEC
BH CV XLRA MEAS LEFT MID ICA EDV: 17 CM/SEC
BH CV XLRA MEAS LEFT MID ICA PSV: 79.7 CM/SEC
BH CV XLRA MEAS LEFT PROX CCA EDV: 15.7 CM/SEC
BH CV XLRA MEAS LEFT PROX CCA PSV: 112 CM/SEC
BH CV XLRA MEAS LEFT PROX ECA EDV: 6.38 CM/SEC
BH CV XLRA MEAS LEFT PROX ECA PSV: 80.4 CM/SEC
BH CV XLRA MEAS LEFT PROX ICA EDV: 15.2 CM/SEC
BH CV XLRA MEAS LEFT PROX ICA PSV: 66.3 CM/SEC
BH CV XLRA MEAS LEFT PROX SCLA EDV: 0 CM/SEC
BH CV XLRA MEAS LEFT PROX SCLA PSV: 154 CM/SEC
BH CV XLRA MEAS LEFT VERTEBRAL A EDV: 7.86 CM/SEC
BH CV XLRA MEAS LEFT VERTEBRAL A PSV: 35.7 CM/SEC
BH CV XLRA MEAS RIGHT DIST CCA EDV: 11.8 CM/SEC
BH CV XLRA MEAS RIGHT DIST CCA PSV: 53 CM/SEC
BH CV XLRA MEAS RIGHT DIST ICA EDV: 34.2 CM/SEC
BH CV XLRA MEAS RIGHT DIST ICA PSV: 115 CM/SEC
BH CV XLRA MEAS RIGHT ICA/CCA RATIO: 2.39
BH CV XLRA MEAS RIGHT MID CCA EDV: 15.8 CM/SEC
BH CV XLRA MEAS RIGHT MID CCA PSV: 63.3 CM/SEC
BH CV XLRA MEAS RIGHT MID ICA EDV: 29.5 CM/SEC
BH CV XLRA MEAS RIGHT MID ICA PSV: 107 CM/SEC
BH CV XLRA MEAS RIGHT PROX CCA EDV: 14.7 CM/SEC
BH CV XLRA MEAS RIGHT PROX CCA PSV: 87.9 CM/SEC
BH CV XLRA MEAS RIGHT PROX ECA EDV: 33.7 CM/SEC
BH CV XLRA MEAS RIGHT PROX ECA PSV: 400 CM/SEC
BH CV XLRA MEAS RIGHT PROX ICA EDV: 32.4 CM/SEC
BH CV XLRA MEAS RIGHT PROX ICA PSV: 127 CM/SEC
BH CV XLRA MEAS RIGHT PROX SCLA EDV: 0 CM/SEC
BH CV XLRA MEAS RIGHT PROX SCLA PSV: 153 CM/SEC
BH CV XLRA MEAS RIGHT VERTEBRAL A EDV: 17 CM/SEC
BH CV XLRA MEAS RIGHT VERTEBRAL A PSV: 87.9 CM/SEC
LEFT ARM BP: NORMAL MMHG
RIGHT ARM BP: NORMAL MMHG

## 2018-07-16 PROCEDURE — 99213 OFFICE O/P EST LOW 20 MIN: CPT | Performed by: NURSE PRACTITIONER

## 2018-07-16 PROCEDURE — 93880 EXTRACRANIAL BILAT STUDY: CPT

## 2018-07-16 NOTE — PROGRESS NOTES
DOS: 2018  NAME: Danni Aguilar   : 1926  PCP: Rolando Hadley MD    Chief Complaint   Patient presents with   • Cerebrovascular Accident        Neurological Problem and Interval History:  89 y.o. RHW male with hyperlipidemia, Afib and right carotid stenosis S/P stent placement 2017 and stroke. He presents today for his 1 year carotid artery stent follow up.      He denies any new S/S of stroke, no H/A, no falls. His INR is now regulated, they had trouble for a few months. He is tolerating his ASA.      17 he presented to [facility] ER with recurrent left sided numbness and ? weakness. His symptoms resolved and he underwent ISAC stent placement for severe stenosis causing a stroke. At the time of the event he was on coumadin alone for his afib, no asa or plavix     Review of Systems:        Review of Systems   Constitutional: Negative for chills, fatigue and fever.   HENT: Negative for hearing loss, tinnitus and trouble swallowing.    Eyes: Negative for pain, redness, itching and visual disturbance.   Respiratory: Negative for cough, shortness of breath and wheezing.    Cardiovascular: Negative for chest pain, palpitations and leg swelling.   Gastrointestinal: Negative for constipation, diarrhea, nausea and vomiting.   Endocrine: Negative for cold intolerance, heat intolerance and polydipsia.   Musculoskeletal: Negative for back pain, gait problem, neck pain and neck stiffness.   Skin: Negative for color change, rash and wound.   Allergic/Immunologic: Negative for environmental allergies, food allergies and immunocompromised state.   Neurological: Negative for dizziness, tremors, seizures, syncope, facial asymmetry, speech difficulty, weakness, light-headedness, numbness and headaches.   Hematological: Negative for adenopathy. Bruises/bleeds easily.   Psychiatric/Behavioral: Negative for agitation, behavioral problems, confusion, decreased concentration, dysphoric mood, hallucinations, self-injury,  "sleep disturbance and suicidal ideas. The patient is not nervous/anxious and is not hyperactive.          Current Outpatient Prescriptions:   •  aspirin 81 MG EC tablet, Take 1 tablet by mouth Daily., Disp: 90 tablet, Rfl: 2  •  atorvastatin (LIPITOR) 80 MG tablet, Take 1 tablet by mouth Daily., Disp: 30 tablet, Rfl: 0  •  warfarin (COUMADIN) 5 MG tablet, Take 5 mg by mouth Daily., Disp: , Rfl:   •  tamsulosin (FLOMAX) 0.4 MG capsule 24 hr capsule, Take 1 capsule by mouth Daily., Disp: 30 capsule, Rfl: 0    \"The following portions of the patient's history were reviewed and updated as appropriate: allergies, current medications, past family history, past medical history, past social history, past surgical history and problem list.\"  Review and Interpretation of Imagin2018 carotid US  Right   ICA Prox 127 cm/sec       32.4 cm/sec         ICA Mid 107 cm/sec       29.5 cm/sec         ICA Dist 115 cm/sec       34.2 cm/sec         Left   ICA Prox 66.3 cm/sec       15.2 cm/sec         ICA Mid 79.7 cm/sec       17 cm/sec         ICA Dist 83.8 cm/sec       22.9 cm/sec             2018 Carotid US:   Right   ICA Prox -126.0 cm/sec       -44.0 cm/sec         Left   ICA Prox -55.1 cm/sec       -18.8 cm/sec           17 Carotid US: pre treatment    right ICA PSV 431 , Left ICA PSV 77, EDV 14    Laboratory Results:   2018 LDL 74             Lab Results   Component Value Date    HGBA1C 5.70 (H) 2017         Lab Results   Component Value Date    CHOL 166 2017         Lab Results   Component Value Date    HDL 43 2017         Lab Results   Component Value Date     (H) 2017         Lab Results   Component Value Date    TRIG 77 2017       No results found for: RPR  No results found for: TSH  No results found for: [id number]    Physical Examination:  mRS: 0  General Appearance:   Well developed, well nourished, well groomed, alert, and cooperative.  HEENT: Normocephalic.  "   Neck and Spine: No bruits.  Cardiac: Irregularly irregular. No murmurs.  Extremities:    No edema or deformities.     Neurological examination:  Higher Integrative  Function: Oriented to time, place and person. Normal registration, attention span and concentration. Normal language including comprehension, spontaneous speech, repetition, reading, writing, naming and vocabulary. No neglect with normal visual-spatial function and construction. Normal fund of knowledge and higher integrative function.  CN II: Normal visual acuity and visual fields.    CN III IV VI: Extraocular movements are full without nystagmus.   CN V: Normal facial sensation and strength of muscles of mastication.  CN VII: Facial movements are symmetric. No weakness.  CN VIII:   Auditory acuity is normal.  CN IX & X:   Symmetric palatal movement.  CN XI: Sternocleidomastoid and trapezius are normal.  No weakness.  CN XII:   The tongue is midline.  No atrophy or fasciculations.  Motor: Normal muscle strength, bulk and tone in upper and lower extremities.  No fasciculations, rigidity, spasticity, or abnormal movements.  Sensation: Normal to light touch, temperature, and proprioception in arms and legs.   Station and Gait: Normal gait and station.    Coordination: Rapid alternating movements are normal.      Diagnoses / Discussion:  Mr. Aguilar is a 89yo who presented today for his 1 year right carotid stent follow up. In July 2017 he was Dx with multiple strokes in the right MCA territory due to severe ISAC stenosis. The repeat carotid US from today reveals that the stent is patent without re-stenosis. He has remained on baby ASA, life long, and coumadin for stroke prevention. He needs continued aggressive risk factor control, goal LDL 40-70, continue Lipitor 80mg. He needs to continue to FU with Dr. Hadley for his INR and regular lipid panel.  He will need repeat carotid US and FU in 1 year with a carotid US. He agrees with the plan and will call  with any questions or concerns.     Plan:   Continue ASA and Coumain   Lipitor 80mg    Blood pressure control to <130/80   Goal LDL <70-recommend high dose statins-    Serum glucose < 140   Call 911 for stroke any stroke symptoms   Follow-up 1 year with carotid US   Danni was seen today for cerebrovascular accident.    Diagnoses and all orders for this visit:    Stenosis of right carotid artery  -     Duplex Carotid Ultrasound CAR; Future    Hyperlipidemia, unspecified hyperlipidemia type    Persistent atrial fibrillation (CMS/HCC)        Coding

## 2019-01-14 ENCOUNTER — LAB REQUISITION (OUTPATIENT)
Dept: LAB | Facility: HOSPITAL | Age: 84
End: 2019-01-14

## 2019-01-14 DIAGNOSIS — Z00.00 ENCOUNTER FOR GENERAL ADULT MEDICAL EXAMINATION WITHOUT ABNORMAL FINDINGS: ICD-10-CM

## 2019-01-14 PROCEDURE — 88307 TISSUE EXAM BY PATHOLOGIST: CPT | Performed by: COLON & RECTAL SURGERY

## 2019-01-16 LAB
CYTO UR: NORMAL
LAB AP CASE REPORT: NORMAL
LAB AP CLINICAL INFORMATION: NORMAL
PATH REPORT.FINAL DX SPEC: NORMAL
PATH REPORT.GROSS SPEC: NORMAL

## 2019-07-09 ENCOUNTER — TELEPHONE (OUTPATIENT)
Dept: NEUROLOGY | Facility: CLINIC | Age: 84
End: 2019-07-09

## 2019-07-09 DIAGNOSIS — I65.23 BILATERAL CAROTID ARTERY STENOSIS: Primary | ICD-10-CM

## 2019-07-09 NOTE — TELEPHONE ENCOUNTER
----- Message from Katie Hernadez sent at 2019  1:04 PM EDT -----  Contact: [phone]  Patient was last seen on 18 with Taylor Robertson. She ordered a Duplex Carotid Ultrasound, which patient hasn't done. I've spoken with patient and he is agreeable to have scan done and also come in to follow up for his 1 year follow up. Can you see if Michelle could put in a new order for the scan, it has . Patient lives far away, and is trying to have scan and follow up appointment same day. Let me know if you have any questions, thanks.

## 2019-07-23 ENCOUNTER — HOSPITAL ENCOUNTER (OUTPATIENT)
Dept: CARDIOLOGY | Facility: HOSPITAL | Age: 84
Discharge: HOME | End: 2019-07-23
Admitting: NURSE PRACTITIONER

## 2019-07-23 DIAGNOSIS — I65.23 BILATERAL CAROTID ARTERY STENOSIS: ICD-10-CM

## 2019-07-23 LAB
BH CV DISTAL RIGHT ICA HIDDEN LRR: 1 CM
BH CV MID RIGHT ICA HIDDEN LRR: 1 CM
BH CV VAS CAROTID RIGHT DISTAL STENT EDV: 25.5 CM/S
BH CV VAS CAROTID RIGHT DISTAL STENT PSV: 116 CM/S
BH CV VAS CAROTID RIGHT MID STENT EDV: 20.6 CM/S
BH CV VAS CAROTID RIGHT MID STENT PSV: 122 CM/S
BH CV VAS CAROTID RIGHT PROXIMAL STENT EDV: 19.6 CM/S
BH CV VAS CAROTID RIGHT PROXIMAL STENT PSV: 130 CM/S
BH CV VAS CAROTID RIGHT STENT NATIVE VESSEL PROXIMAL EDV: 12.9 CM/S
BH CV VAS CAROTID RIGHT STENT NATIVE VESSEL PROXIMAL PS: 69.8 CM/S
BH CV XLRA MEAS LEFT DIST CCA EDV: -15.8 CM/SEC
BH CV XLRA MEAS LEFT DIST CCA PSV: -83.8 CM/SEC
BH CV XLRA MEAS LEFT DIST ICA EDV: -18.8 CM/SEC
BH CV XLRA MEAS LEFT DIST ICA PSV: -70.9 CM/SEC
BH CV XLRA MEAS LEFT MID ICA EDV: -18.8 CM/SEC
BH CV XLRA MEAS LEFT MID ICA PSV: -75.6 CM/SEC
BH CV XLRA MEAS LEFT PROX CCA EDV: 12.9 CM/SEC
BH CV XLRA MEAS LEFT PROX CCA PSV: 80.9 CM/SEC
BH CV XLRA MEAS LEFT PROX ECA EDV: -5.9 CM/SEC
BH CV XLRA MEAS LEFT PROX ECA PSV: -75 CM/SEC
BH CV XLRA MEAS LEFT PROX ICA EDV: -12.9 CM/SEC
BH CV XLRA MEAS LEFT PROX ICA PSV: -66.8 CM/SEC
BH CV XLRA MEAS LEFT PROX SCLA PSV: 155 CM/SEC
BH CV XLRA MEAS LEFT VERTEBRAL A EDV: -7.5 CM/SEC
BH CV XLRA MEAS LEFT VERTEBRAL A PSV: -42.4 CM/SEC
BH CV XLRA MEAS RIGHT DIST CCA EDV: 12.9 CM/SEC
BH CV XLRA MEAS RIGHT DIST CCA PSV: 69.8 CM/SEC
BH CV XLRA MEAS RIGHT DIST ICA EDV: -25.5 CM/SEC
BH CV XLRA MEAS RIGHT DIST ICA PSV: -116 CM/SEC
BH CV XLRA MEAS RIGHT MID ICA EDV: -20.6 CM/SEC
BH CV XLRA MEAS RIGHT MID ICA PSV: -122 CM/SEC
BH CV XLRA MEAS RIGHT PROX CCA EDV: 12.9 CM/SEC
BH CV XLRA MEAS RIGHT PROX CCA PSV: 77.4 CM/SEC
BH CV XLRA MEAS RIGHT PROX ECA EDV: -14.4 CM/SEC
BH CV XLRA MEAS RIGHT PROX ECA PSV: -210 CM/SEC
BH CV XLRA MEAS RIGHT PROX ICA EDV: -19.6 CM/SEC
BH CV XLRA MEAS RIGHT PROX ICA PSV: -130 CM/SEC
BH CV XLRA MEAS RIGHT PROX SCLA PSV: 145 CM/SEC
BH CV XLRA MEAS RIGHT VERTEBRAL A EDV: -17 CM/SEC
BH CV XLRA MEAS RIGHT VERTEBRAL A PSV: -63.3 CM/SEC
BH CVPROX RIGHT ICA HIDDEN LRR: 1 CM
LEFT ARM BP: NORMAL MMHG
RIGHT ARM BP: NORMAL MMHG

## 2019-07-23 PROCEDURE — 93880 EXTRACRANIAL BILAT STUDY: CPT

## 2019-08-08 ENCOUNTER — OFFICE VISIT (OUTPATIENT)
Dept: NEUROLOGY | Facility: CLINIC | Age: 84
End: 2019-08-08

## 2019-08-08 VITALS
HEART RATE: 76 BPM | BODY MASS INDEX: 23.67 KG/M2 | RESPIRATION RATE: 16 BRPM | HEIGHT: 68 IN | WEIGHT: 156.2 LBS | SYSTOLIC BLOOD PRESSURE: 152 MMHG | OXYGEN SATURATION: 98 % | DIASTOLIC BLOOD PRESSURE: 76 MMHG

## 2019-08-08 DIAGNOSIS — I63.231 CEREBROVASCULAR ACCIDENT (CVA) DUE TO STENOSIS OF RIGHT CAROTID ARTERY (HCC): Primary | ICD-10-CM

## 2019-08-08 DIAGNOSIS — Z79.01 LONG TERM CURRENT USE OF ANTICOAGULANT THERAPY: ICD-10-CM

## 2019-08-08 DIAGNOSIS — E78.2 MIXED HYPERLIPIDEMIA: ICD-10-CM

## 2019-08-08 DIAGNOSIS — I48.19 PERSISTENT ATRIAL FIBRILLATION (HCC): ICD-10-CM

## 2019-08-08 PROCEDURE — 99214 OFFICE O/P EST MOD 30 MIN: CPT | Performed by: NURSE PRACTITIONER

## 2019-08-08 RX ORDER — ASPIRIN 81 MG/1
81 TABLET, CHEWABLE ORAL DAILY
Status: ON HOLD | COMMUNITY
End: 2020-12-09

## 2019-08-08 NOTE — PROGRESS NOTES
DOS: 2019  NAME: Danni Aguilar   : 1926  PCP: Rolando Hadley MD    Chief Complaint   Patient presents with   • Coronary Artery Disease     1 year follow up   • Cerebrovascular Accident     Due to stenosis of R carotid artery      SUBJECTIVE  Neurological Problem:  89 y.o. RHW male with Afib, HLD, stroke d/t carotid stenosis s/p stent () who presents today for follow-up. He is accompanied by his daughter.     Interval History:   - 2017:  Mr. Aguilar presented to the [facility] with recurrent right-sided numbness/weakness.  His imaging showed multiple strokes in the right MCA territory along with severe right ICA stenosis.  He subsequently underwent right carotid artery stent placement.  He was placed on ASA and Plavix for 2 weeks followed by resuming anticoagulation and single antiplatelet therapy with ASA 81 mg.     He presents today and he continues on ASA 81 mg, warfarin and Lipitor is tolerating medications well.  He denies any signs/symptoms of bleeding.  He denies any new stroke/TIA symptoms.  He continues to live independently with his spouse [place] a farm, taking care of all his ADLs.  He continues to drive.  He takes his blood pressure regularly and states it is well controlled, usually lower than today's reading.  He follows up regularly with his PCP for routine lab work.  No record of recent lipid panel available.  He denies any changes in his health since his last visit.  He had a follow-up carotid ultrasound the end of July that was stable compared to recent one done a year prior.  He denies smoking. No alcohol.     Review of Systems:Review of Systems   Constitutional: Negative for activity change, appetite change and fatigue.   HENT: Negative for drooling, facial swelling, trouble swallowing and voice change.    Eyes: Positive for redness (L eye). Negative for photophobia, pain and visual disturbance.   Respiratory: Negative for choking, chest tightness and shortness of breath.     Cardiovascular: Negative for chest pain, palpitations and leg swelling.   Gastrointestinal: Negative for abdominal pain, constipation, diarrhea, nausea and vomiting.   Endocrine: Negative for polydipsia and polyphagia.   Musculoskeletal: Negative for back pain, gait problem and neck pain.   Skin: Negative for rash and wound.   Neurological: Negative for dizziness, tremors, seizures, syncope, facial asymmetry, speech difficulty, weakness, light-headedness, numbness and headaches.   Hematological: Bruises/bleeds easily.   Psychiatric/Behavioral: Negative for agitation, behavioral problems, confusion, decreased concentration, dysphoric mood, hallucinations, self-injury, sleep disturbance and suicidal ideas. The patient is not nervous/anxious and is not hyperactive.     Above ROS reviewed    Current Medications:   Current Outpatient Medications:   •  aspirin 81 MG chewable tablet, Chew 81 mg Daily., Disp: , Rfl:   •  atorvastatin (LIPITOR) 80 MG tablet, Take 1 tablet by mouth Daily., Disp: 30 tablet, Rfl: 0  •  warfarin (COUMADIN) 5 MG tablet, Take 5 mg by mouth Daily., Disp: , Rfl:   •  tamsulosin (FLOMAX) 0.4 MG capsule 24 hr capsule, Take 1 capsule by mouth Daily., Disp: 30 capsule, Rfl: 0    The following portions of the patient's history were reviewed and updated as appropriate: allergies, current medications, past family history, past medical history, past social history, past surgical history and problem list.    OBJECTIVE  Vitals:    08/08/19 0941   BP: 152/76   Pulse: 76   Resp: 16   SpO2: 98%       Diagnostics:  Carotid US 7/23/19:   Blood Pressure Measurements       Right Side Left Side   Blood Pressure 130/76 mmHg       143/74 mmHg            Carotid Velocities - Right Side      Systolic Diastolic   CCA Prox 77.4 cm/sec       12.9 cm/sec         CCA Mid           CCA Dist 69.8 cm/sec       12.9 cm/sec         ICA Prox -130 cm/sec       -19.6 cm/sec         ICA Mid -122 cm/sec       -20.6 cm/sec         ICA  Dist -116 cm/sec       -25.5 cm/sec         ECA -210 cm/sec       -14.4 cm/sec         Vertebral -63.3 cm/sec       -17 cm/sec         Subclavian 145 cm/sec             ICA/CCA              Carotid Velocities - Left Side      Systolic Diastolic   CCA Prox 80.9 cm/sec       12.9 cm/sec         CCA Mid           CCA Dist -83.8 cm/sec       -15.8 cm/sec         ICA Prox -66.8 cm/sec       -12.9 cm/sec         ICA Mid -75.6 cm/sec       -18.8 cm/sec         ICA Dist -70.9 cm/sec       -18.8 cm/sec         ECA -75 cm/sec       -5.9 cm/sec         Vertebral -42.4 cm/sec       -7.5 cm/sec         Subclavian 155 cm/sec             ICA/CCA              Carotid Stent Velocities - Right      Peak Systolic Velocity End Diastolic Velocity   Prox to stent 69.8 cm/s       12.9 cm/s         Prox stent 130 cm/s       19.6 cm/s         Mid Stent 122 cm/s       20.6 cm/s         Dist stent 116 cm/s       25.5 cm/s                Laboratory Results:         Lab Results   Component Value Date    WBC 7.29 09/19/2017    HGB 12.0 (L) 09/19/2017    HCT 39.0 (L) 09/19/2017    MCV 89.0 09/19/2017     09/19/2017     Lab Results   Component Value Date    GLUCOSE 114 (H) 07/25/2017    BUN 12 07/25/2017    CREATININE 1.02 07/25/2017    EGFRIFNONA 69 07/25/2017    BCR 11.8 07/25/2017    K 3.7 07/25/2017    CO2 18.9 (L) 07/25/2017    CALCIUM 7.8 (L) 07/25/2017    ALBUMIN 3.40 (L) 07/24/2017    AST 16 07/24/2017    ALT 10 07/24/2017     Lab Results   Component Value Date    HGBA1C 5.70 (H) 07/21/2017     Lab Results   Component Value Date    CHOL 166 07/21/2017     Lab Results   Component Value Date    HDL 43 07/21/2017     Lab Results   Component Value Date     (H) 07/21/2017     Lab Results   Component Value Date    TRIG 77 07/21/2017     No results found for: RPR  No results found for: TSH  No results found for: [id number]    Physical Examination:   General Appearance:   Well developed, thin, well groomed, alert, and  cooperative.  HEENT: Normocephalic.    Neck and Spine: Normal range of motion.  Normal alignment. No mass or tenderness.   Cardiac: Irregularly irregular.   Peripheral Vasculature: Radial pulses are equal and symmetric. No signs of distal embolization.  Extremities:    No edema or deformities. Normal joint ROM.  Skin:    No rashes or birth marks.  Psychiatric:    Euthymic. Normal affect.    Neurological examination:  Higher Integrative  Function: Oriented to time, place and person. Normal registration, recall (3/3 with 1 clue), attention span and concentration. Normal language including comprehension, spontaneous speech, repetition, reading, writing, naming and vocabulary. No neglect with normal visual-spatial function and construction. Normal fund of knowledge and higher integrative function.  CN II: Pupils are equal, round, and reactive to light. Normal visual acuity and visual fields.    CN III IV VI: Extraocular movements are full without nystagmus.   CN V: Normal facial sensation and strength of muscles of mastication.  CN VII: Facial movements are symmetric. No weakness.  CN VIII:   Auditory acuity is normal.  CN IX & X:   Symmetric palatal movement.  CN XI: Sternocleidomastoid and trapezius are normal.  No weakness.  CN XII:   The tongue is midline.  No atrophy or fasciculations.  Motor: Normal muscle strength, bulk and tone in upper and lower extremities.  No fasciculations, rigidity, spasticity, or abnormal movements.  Reflexes: 2+ in the upper and lower extremities.   Sensation: Normal to light touch, vibration, temperature, and proprioception in arms and legs. Normal graphesthesia and no extinction on DSS.  Station and Gait: Normal gait and station.    Coordination: Finger to nose test shows no dysmetria.  Heel to shin normal.    Impression:  Mr. Aguilar continues to do well following his right MCA territory stroke he suffered secondary to severe right ICA stenosis as/P carotid artery stent in July 2017.   His most recent carotid ultrasound shows patent stent, similar to previous surveillance scans.  He has remained on ASA 81 mg and warfarin for stroke prevention.  We reviewed his risk factors for stroke and the importance of risk factor control for stroke prevention including BP and cholesterol control.  We also discussed the signs/symptoms of stroke and the importance of calling 911 if you were to develop any of these.  We reviewed the importance of falls prevention given that he is on anticoagulation.  Patient and daughter voiced understanding and agree with above plan.  He will follow-up in 1 year with repeat carotid ultrasound, sooner if symptoms warrant.    Plan:     Continue ASA 81 mg and warfarin, Lipitor  Request lab work from PCP -- (recent lipid panel)  Monitor BP regularly  Keep well-hydrated   Secondary stroke prevention: Ideal targets for stroke prevention would be Blood pressure < 130/80; B12 > 500 TSH in normal range and LDL < 70; HbA1c < 6.5 and smoking cessation if applicable.  Call 911 for stroke symptoms  Follow-up in one year with carotid US.     I spent 30 minutes face to face with patient and daughter, with  > 50% spent counseling patient regarding diagnosis, review of diagnostics, personal risk factors for stroke and importance of risk factor control for stroke prevention.   Danni was seen today for coronary artery disease and cerebrovascular accident.    Diagnoses and all orders for this visit:    Cerebrovascular accident (CVA) due to stenosis of right carotid artery (CMS/HCC)    Persistent atrial fibrillation (CMS/HCC)    Mixed hyperlipidemia    Long term (current) use of anticoagulants        Coding      Dictated using Dragon

## 2020-08-05 ENCOUNTER — TELEPHONE (OUTPATIENT)
Dept: NEUROLOGY | Facility: CLINIC | Age: 85
End: 2020-08-05

## 2020-08-06 ENCOUNTER — OFFICE VISIT (OUTPATIENT)
Dept: NEUROLOGY | Facility: CLINIC | Age: 85
End: 2020-08-06

## 2020-08-06 VITALS
DIASTOLIC BLOOD PRESSURE: 80 MMHG | SYSTOLIC BLOOD PRESSURE: 118 MMHG | HEIGHT: 68 IN | WEIGHT: 149 LBS | HEART RATE: 85 BPM | OXYGEN SATURATION: 98 % | BODY MASS INDEX: 22.58 KG/M2

## 2020-08-06 DIAGNOSIS — I65.23 BILATERAL CAROTID ARTERY STENOSIS: ICD-10-CM

## 2020-08-06 DIAGNOSIS — I63.231 CEREBROVASCULAR ACCIDENT (CVA) DUE TO STENOSIS OF RIGHT CAROTID ARTERY (HCC): Primary | ICD-10-CM

## 2020-08-06 DIAGNOSIS — E78.2 MIXED HYPERLIPIDEMIA: ICD-10-CM

## 2020-08-06 DIAGNOSIS — I48.91 ATRIAL FIBRILLATION, UNSPECIFIED TYPE (HCC): ICD-10-CM

## 2020-08-06 PROCEDURE — 99213 OFFICE O/P EST LOW 20 MIN: CPT | Performed by: NURSE PRACTITIONER

## 2020-08-06 NOTE — PROGRESS NOTES
DOS: 2020  NAME: Danni Aguilar   : 1926  PCP: Rolando Hadley MD    Chief Complaint   Patient presents with   • Stroke      SUBJECTIVE  Neurological Problem:  89 y.o. RHW male with Afib, HLD, stroke d/t carotid stenosis s/p stent (2017) who presents today for follow-up    Interval History:   - 2017:  Mr. Aguilar presented to the [facility] with recurrent right-sided numbness/weakness.  His imaging showed multiple strokes in the right MCA territory along with severe right ICA stenosis.  He subsequently underwent right carotid artery stent placement.  He was placed on ASA and Plavix for 2 weeks followed by resuming anticoagulation and single antiplatelet therapy with ASA 81 mg.     Presents today and continues on ASA 81 mg, warfarin and Lipitor.  He is tolerating medications well.  He denies any signs or symptoms of bleeding.  He denies any new stroke/TIA symptoms.  He continues to live independently with his spouse on a farm, takes care of all of his ADLs and apparently continues to drive.  He states his BP is well controlled.  He follows up with his PCP for routine lab work, labs from 2020 show a lipid panel with a total of 146, HDL 48, LDL 77,  magnesium 2.1, INR 1.8.  His last carotid ultrasound was in 2019 that was stable compared to previous scan 1 year prior.  He denies smoking.  No alcohol.  No falls.    Review of Systems:Review of Systems   Constitutional: Negative for activity change, appetite change and fatigue.   HENT: Negative for ear pain, tinnitus and trouble swallowing.    Eyes: Negative for photophobia, pain and visual disturbance.   Musculoskeletal: Negative for back pain, gait problem and neck pain.   Neurological: Negative for dizziness, tremors, seizures, syncope, facial asymmetry, speech difficulty, weakness, light-headedness, numbness and headaches.   Psychiatric/Behavioral: Negative for agitation, behavioral problems, confusion, decreased concentration, dysphoric  mood, hallucinations, self-injury, sleep disturbance and suicidal ideas. The patient is not nervous/anxious and is not hyperactive.     Above ROS reviewed    The following portions of the patient's history were reviewed and updated as appropriate: allergies, current medications, past family history, past medical history, past social history, past surgical history and problem list.    Current Medications:   Current Outpatient Medications:   •  aspirin 81 MG chewable tablet, Chew 81 mg Daily., Disp: , Rfl:   •  atorvastatin (LIPITOR) 80 MG tablet, Take 1 tablet by mouth Daily., Disp: 30 tablet, Rfl: 0  •  warfarin (COUMADIN) 5 MG tablet, Take 5 mg by mouth Daily., Disp: , Rfl:   •  tamsulosin (FLOMAX) 0.4 MG capsule 24 hr capsule, Take 1 capsule by mouth Daily., Disp: 30 capsule, Rfl: 0  **I did not stop or change the above medications.  Patient's medication list was updated to reflect medications they have reported as currently taking, including medication changes made by other providers.    OBJECTIVE  Vitals:    08/06/20 0940   BP: 118/80   Pulse: 85   SpO2: 98%     Body mass index is 22.66 kg/m².    Diagnostics:    Laboratory Results:         Lab Results   Component Value Date    WBC 7.29 09/19/2017    HGB 12.0 (L) 09/19/2017    HCT 39.0 (L) 09/19/2017    MCV 89.0 09/19/2017     09/19/2017     Lab Results   Component Value Date    GLUCOSE 114 (H) 07/25/2017    BUN 12 07/25/2017    CREATININE 1.02 07/25/2017    EGFRIFNONA 69 07/25/2017    BCR 11.8 07/25/2017    K 3.7 07/25/2017    CO2 18.9 (L) 07/25/2017    CALCIUM 7.8 (L) 07/25/2017    ALBUMIN 3.40 (L) 07/24/2017    AST 16 07/24/2017    ALT 10 07/24/2017     Lab Results   Component Value Date    HGBA1C 5.70 (H) 07/21/2017     Lab Results   Component Value Date    CHOL 166 07/21/2017     Lab Results   Component Value Date    HDL 43 07/21/2017     Lab Results   Component Value Date     (H) 07/21/2017     Lab Results   Component Value Date    TRIG 77  07/21/2017       Physical Exam:  GENERAL: NAD, thin  HEENT: Normocephalic, atraumatic   COR: RRR  Resp: Even and unlabored  Extremities: Equal pulses, no signs of distal embolization.   Psychiatric: Normal mood and affect.    Neurological:   MS: AO. Language normal. No neglect. Follows all commands.  CN: II-XII grossly normal  Motor: Normal strength and tone throughout.  Sensory: Intact to light touch in arms and legs  Station and Gait: Normal gait and station.    Coordination: Normal finger to nose bilaterally    Impression:  Mr. Aguilar continues to do well following his right MCA territory stroke he suffered in July 2017 secondary to severe right ICA stenosis s/p LIU.  He has remained on ASA 81 mg and warfarin for stroke prevention, will recheck a carotid ultrasound, likely okay to d/c warfarin if US results remain stable.  We reviewed at length the importance of falls prevention given that he is on anticoagulation and antiplatelet therapy.  We also discussed the importance of BP and cholesterol control.  He will f/u here in one year or pending results of carotid US. Patient voiced understanding and agrees with plan.    Plan:     Check Carotid US  Okay to D/C ASA 81 mg  Continue warfarin  Monitor BP regularly  Follow-up with PCP for routine cholesterol surveillance  Keep well-hydrated  Secondary stroke prevention: Ideal targets for stroke prevention would be Blood pressure < 130/80; B12 > 500 TSH in normal range and LDL < 70; HbA1c < 6.5 and smoking cessation if applicable.  Call 911 for stroke symptoms  Follow-up as needed or pending results of Carotid US.    Danni was seen today for stroke.    Diagnoses and all orders for this visit:    Cerebrovascular accident (CVA) due to stenosis of right carotid artery (CMS/HCC)  -     Duplex Carotid Ultrasound CAR; Future    Bilateral carotid artery stenosis  -     Duplex Carotid Ultrasound CAR; Future        Coding      Dictated using Dragon

## 2020-08-14 ENCOUNTER — HOSPITAL ENCOUNTER (OUTPATIENT)
Dept: CARDIOLOGY | Facility: HOSPITAL | Age: 85
Discharge: HOME | End: 2020-08-14
Admitting: NURSE PRACTITIONER

## 2020-08-14 DIAGNOSIS — I65.23 BILATERAL CAROTID ARTERY STENOSIS: ICD-10-CM

## 2020-08-14 DIAGNOSIS — I63.231 CEREBROVASCULAR ACCIDENT (CVA) DUE TO STENOSIS OF RIGHT CAROTID ARTERY (HCC): ICD-10-CM

## 2020-08-14 LAB
BH CV XLRA MEAS LEFT DIST CCA EDV: 16.4 CM/SEC
BH CV XLRA MEAS LEFT DIST CCA PSV: 70.9 CM/SEC
BH CV XLRA MEAS LEFT DIST ICA EDV: -25.2 CM/SEC
BH CV XLRA MEAS LEFT DIST ICA PSV: -71.5 CM/SEC
BH CV XLRA MEAS LEFT ICA/CCA RATIO: 1.04
BH CV XLRA MEAS LEFT MID ICA EDV: -18.2 CM/SEC
BH CV XLRA MEAS LEFT MID ICA PSV: -73.9 CM/SEC
BH CV XLRA MEAS LEFT PROX CCA EDV: 13.5 CM/SEC
BH CV XLRA MEAS LEFT PROX CCA PSV: 66.8 CM/SEC
BH CV XLRA MEAS LEFT PROX ECA EDV: -13.5 CM/SEC
BH CV XLRA MEAS LEFT PROX ECA PSV: -78 CM/SEC
BH CV XLRA MEAS LEFT PROX ICA EDV: -13.5 CM/SEC
BH CV XLRA MEAS LEFT PROX ICA PSV: -73.9 CM/SEC
BH CV XLRA MEAS LEFT PROX SCLA PSV: 136 CM/SEC
BH CV XLRA MEAS LEFT VERTEBRAL A EDV: 6.7 CM/SEC
BH CV XLRA MEAS LEFT VERTEBRAL A PSV: 42 CM/SEC
BH CV XLRA MEAS RIGHT DIST CCA EDV: 12.3 CM/SEC
BH CV XLRA MEAS RIGHT DIST CCA PSV: 66.3 CM/SEC
BH CV XLRA MEAS RIGHT DIST ICA EDV: -23.4 CM/SEC
BH CV XLRA MEAS RIGHT DIST ICA PSV: -75.3 CM/SEC
BH CV XLRA MEAS RIGHT ICA/CCA RATIO: 1.41
BH CV XLRA MEAS RIGHT MID ICA EDV: -22.3 CM/SEC
BH CV XLRA MEAS RIGHT MID ICA PSV: -93.8 CM/SEC
BH CV XLRA MEAS RIGHT PROX CCA EDV: 13.5 CM/SEC
BH CV XLRA MEAS RIGHT PROX CCA PSV: 58 CM/SEC
BH CV XLRA MEAS RIGHT PROX ECA EDV: -37.7 CM/SEC
BH CV XLRA MEAS RIGHT PROX ECA PSV: -259 CM/SEC
BH CV XLRA MEAS RIGHT PROX ICA EDV: -21.1 CM/SEC
BH CV XLRA MEAS RIGHT PROX ICA PSV: -78 CM/SEC
BH CV XLRA MEAS RIGHT PROX SCLA PSV: 93.2 CM/SEC
BH CV XLRA MEAS RIGHT VERTEBRAL A EDV: -10.7 CM/SEC
BH CV XLRA MEAS RIGHT VERTEBRAL A PSV: -54.1 CM/SEC
BH CVPROX RIGHT ICA HIDDEN LRR: 1 CM
LEFT ARM BP: NORMAL MMHG
RIGHT ARM BP: NORMAL MMHG

## 2020-08-14 PROCEDURE — 93880 EXTRACRANIAL BILAT STUDY: CPT

## 2020-08-17 ENCOUNTER — TELEPHONE (OUTPATIENT)
Dept: NEUROLOGY | Facility: CLINIC | Age: 85
End: 2020-08-17

## 2020-08-17 NOTE — TELEPHONE ENCOUNTER
----- Message from LINDEN Mejia sent at 8/14/2020  5:09 PM EDT -----  Can you let patient know that his recent carotid US showed a patent right stent without significant narrowing and the LICA with some plaque but no significant stenosis.     Due to his bleeding risk while on both warfarin and ASA, recommend that he d/c his ASA 81 mg. Thanks.     I

## 2020-08-17 NOTE — TELEPHONE ENCOUNTER
Attempted to reach patient.  Unavailable at the time of the call per his wife, Ginna.  Gave carotid US results and instructions to dc ASA to his wife who states she will give him the message.

## 2020-12-09 ENCOUNTER — APPOINTMENT (OUTPATIENT)
Dept: GENERAL RADIOLOGY | Facility: HOSPITAL | Age: 85
End: 2020-12-09

## 2020-12-09 ENCOUNTER — HOSPITAL ENCOUNTER (OUTPATIENT)
Facility: HOSPITAL | Age: 85
Setting detail: OBSERVATION
Discharge: HOME | End: 2020-12-10
Attending: EMERGENCY MEDICINE | Admitting: INTERNAL MEDICINE

## 2020-12-09 ENCOUNTER — APPOINTMENT (OUTPATIENT)
Dept: MRI IMAGING | Facility: HOSPITAL | Age: 85
End: 2020-12-09

## 2020-12-09 ENCOUNTER — APPOINTMENT (OUTPATIENT)
Dept: CT IMAGING | Facility: HOSPITAL | Age: 85
End: 2020-12-09

## 2020-12-09 DIAGNOSIS — R20.0 LEFT LEG NUMBNESS: ICD-10-CM

## 2020-12-09 DIAGNOSIS — G45.9 TIA (TRANSIENT ISCHEMIC ATTACK): Primary | ICD-10-CM

## 2020-12-09 DIAGNOSIS — R20.0 LEFT ARM NUMBNESS: ICD-10-CM

## 2020-12-09 LAB
ABO GROUP BLD: NORMAL
ALBUMIN SERPL-MCNC: 4.1 G/DL (ref 3.5–5.2)
ALBUMIN SERPL-MCNC: 4.1 G/DL (ref 3.5–5.2)
ALBUMIN/GLOB SERPL: 1.1 G/DL
ALBUMIN/GLOB SERPL: 1.2 G/DL
ALP SERPL-CCNC: 61 U/L (ref 39–117)
ALP SERPL-CCNC: 63 U/L (ref 39–117)
ALT SERPL W P-5'-P-CCNC: 23 U/L (ref 1–41)
ALT SERPL W P-5'-P-CCNC: 24 U/L (ref 1–41)
ANION GAP SERPL CALCULATED.3IONS-SCNC: 7.9 MMOL/L (ref 5–15)
ANION GAP SERPL CALCULATED.3IONS-SCNC: 8.3 MMOL/L (ref 5–15)
AST SERPL-CCNC: 23 U/L (ref 1–40)
AST SERPL-CCNC: 26 U/L (ref 1–40)
B PARAPERT DNA SPEC QL NAA+PROBE: NOT DETECTED
B PERT DNA SPEC QL NAA+PROBE: NOT DETECTED
BASOPHILS # BLD AUTO: 0.13 10*3/MM3 (ref 0–0.2)
BASOPHILS NFR BLD AUTO: 1.6 % (ref 0–1.5)
BILIRUB SERPL-MCNC: 0.6 MG/DL (ref 0–1.2)
BILIRUB SERPL-MCNC: 0.7 MG/DL (ref 0–1.2)
BILIRUB UR QL STRIP: NEGATIVE
BLD GP AB SCN SERPL QL: NEGATIVE
BUN SERPL-MCNC: 17 MG/DL (ref 8–23)
BUN SERPL-MCNC: 23 MG/DL (ref 8–23)
BUN/CREAT SERPL: 17.3 (ref 7–25)
BUN/CREAT SERPL: 21.1 (ref 7–25)
C PNEUM DNA NPH QL NAA+NON-PROBE: NOT DETECTED
CALCIUM SPEC-SCNC: 9.2 MG/DL (ref 8.2–9.6)
CALCIUM SPEC-SCNC: 9.4 MG/DL (ref 8.2–9.6)
CHLORIDE SERPL-SCNC: 100 MMOL/L (ref 98–107)
CHLORIDE SERPL-SCNC: 97 MMOL/L (ref 98–107)
CLARITY UR: CLEAR
CO2 SERPL-SCNC: 28.7 MMOL/L (ref 22–29)
CO2 SERPL-SCNC: 29.1 MMOL/L (ref 22–29)
COLOR UR: YELLOW
CREAT SERPL-MCNC: 0.98 MG/DL (ref 0.76–1.27)
CREAT SERPL-MCNC: 1.09 MG/DL (ref 0.76–1.27)
DEPRECATED RDW RBC AUTO: 40 FL (ref 37–54)
DEPRECATED RDW RBC AUTO: 41.3 FL (ref 37–54)
EOSINOPHIL # BLD AUTO: 0.32 10*3/MM3 (ref 0–0.4)
EOSINOPHIL NFR BLD AUTO: 3.8 % (ref 0.3–6.2)
ERYTHROCYTE [DISTWIDTH] IN BLOOD BY AUTOMATED COUNT: 13.8 % (ref 12.3–15.4)
ERYTHROCYTE [DISTWIDTH] IN BLOOD BY AUTOMATED COUNT: 14 % (ref 12.3–15.4)
FLUAV SUBTYP SPEC NAA+PROBE: NOT DETECTED
FLUBV RNA ISLT QL NAA+PROBE: NOT DETECTED
GFR SERPL CREATININE-BSD FRML MDRD: 63 ML/MIN/1.73
GFR SERPL CREATININE-BSD FRML MDRD: 71 ML/MIN/1.73
GLOBULIN UR ELPH-MCNC: 3.5 GM/DL
GLOBULIN UR ELPH-MCNC: 3.7 GM/DL
GLUCOSE BLDC GLUCOMTR-MCNC: 92 MG/DL (ref 70–130)
GLUCOSE BLDC GLUCOMTR-MCNC: 97 MG/DL (ref 70–130)
GLUCOSE SERPL-MCNC: 100 MG/DL (ref 65–99)
GLUCOSE SERPL-MCNC: 84 MG/DL (ref 65–99)
GLUCOSE UR STRIP-MCNC: NEGATIVE MG/DL
HADV DNA SPEC NAA+PROBE: NOT DETECTED
HCOV 229E RNA SPEC QL NAA+PROBE: NOT DETECTED
HCOV HKU1 RNA SPEC QL NAA+PROBE: NOT DETECTED
HCOV NL63 RNA SPEC QL NAA+PROBE: NOT DETECTED
HCOV OC43 RNA SPEC QL NAA+PROBE: NOT DETECTED
HCT VFR BLD AUTO: 44 % (ref 37.5–51)
HCT VFR BLD AUTO: 46 % (ref 37.5–51)
HGB BLD-MCNC: 14.4 G/DL (ref 13–17.7)
HGB BLD-MCNC: 15 G/DL (ref 13–17.7)
HGB UR QL STRIP.AUTO: NEGATIVE
HMPV RNA NPH QL NAA+NON-PROBE: NOT DETECTED
HOLD SPECIMEN: NORMAL
HOLD SPECIMEN: NORMAL
HPIV1 RNA SPEC QL NAA+PROBE: NOT DETECTED
HPIV2 RNA SPEC QL NAA+PROBE: NOT DETECTED
HPIV3 RNA NPH QL NAA+PROBE: NOT DETECTED
HPIV4 P GENE NPH QL NAA+PROBE: NOT DETECTED
IMM GRANULOCYTES # BLD AUTO: 0.05 10*3/MM3 (ref 0–0.05)
IMM GRANULOCYTES NFR BLD AUTO: 0.6 % (ref 0–0.5)
INR PPP: 1.83 (ref 0.9–1.1)
INR PPP: 1.88 (ref 0.9–1.1)
KETONES UR QL STRIP: NEGATIVE
LEUKOCYTE ESTERASE UR QL STRIP.AUTO: NEGATIVE
LYMPHOCYTES # BLD AUTO: 2.14 10*3/MM3 (ref 0.7–3.1)
LYMPHOCYTES NFR BLD AUTO: 25.7 % (ref 19.6–45.3)
M PNEUMO IGG SER IA-ACNC: NOT DETECTED
MCH RBC QN AUTO: 26.3 PG (ref 26.6–33)
MCH RBC QN AUTO: 26.6 PG (ref 26.6–33)
MCHC RBC AUTO-ENTMCNC: 32.6 G/DL (ref 31.5–35.7)
MCHC RBC AUTO-ENTMCNC: 32.7 G/DL (ref 31.5–35.7)
MCV RBC AUTO: 80.3 FL (ref 79–97)
MCV RBC AUTO: 81.7 FL (ref 79–97)
MONOCYTES # BLD AUTO: 0.81 10*3/MM3 (ref 0.1–0.9)
MONOCYTES NFR BLD AUTO: 9.7 % (ref 5–12)
NEUTROPHILS NFR BLD AUTO: 4.87 10*3/MM3 (ref 1.7–7)
NEUTROPHILS NFR BLD AUTO: 58.6 % (ref 42.7–76)
NITRITE UR QL STRIP: NEGATIVE
NRBC BLD AUTO-RTO: 0 /100 WBC (ref 0–0.2)
PH UR STRIP.AUTO: 7 [PH] (ref 5–8)
PLATELET # BLD AUTO: 441 10*3/MM3 (ref 140–450)
PLATELET # BLD AUTO: 486 10*3/MM3 (ref 140–450)
PMV BLD AUTO: 8.9 FL (ref 6–12)
PMV BLD AUTO: 9.3 FL (ref 6–12)
POTASSIUM SERPL-SCNC: 3.8 MMOL/L (ref 3.5–5.2)
POTASSIUM SERPL-SCNC: 3.9 MMOL/L (ref 3.5–5.2)
PROT SERPL-MCNC: 7.6 G/DL (ref 6–8.5)
PROT SERPL-MCNC: 7.8 G/DL (ref 6–8.5)
PROT UR QL STRIP: NEGATIVE
PROTHROMBIN TIME: 20.9 SECONDS (ref 11.7–14.2)
PROTHROMBIN TIME: 21.4 SECONDS (ref 11.7–14.2)
QT INTERVAL: 444 MS
RBC # BLD AUTO: 5.48 10*6/MM3 (ref 4.14–5.8)
RBC # BLD AUTO: 5.63 10*6/MM3 (ref 4.14–5.8)
RH BLD: POSITIVE
RHINOVIRUS RNA SPEC NAA+PROBE: NOT DETECTED
RSV RNA NPH QL NAA+NON-PROBE: NOT DETECTED
SARS-COV-2 RNA NPH QL NAA+NON-PROBE: NOT DETECTED
SODIUM SERPL-SCNC: 134 MMOL/L (ref 136–145)
SODIUM SERPL-SCNC: 137 MMOL/L (ref 136–145)
SP GR UR STRIP: 1.01 (ref 1–1.03)
T&S EXPIRATION DATE: NORMAL
UROBILINOGEN UR QL STRIP: NORMAL
WBC # BLD AUTO: 8.17 10*3/MM3 (ref 3.4–10.8)
WBC # BLD AUTO: 8.32 10*3/MM3 (ref 3.4–10.8)
WHOLE BLOOD HOLD SPECIMEN: NORMAL
WHOLE BLOOD HOLD SPECIMEN: NORMAL

## 2020-12-09 PROCEDURE — 0 IOPAMIDOL PER 1 ML: Performed by: EMERGENCY MEDICINE

## 2020-12-09 PROCEDURE — 80053 COMPREHEN METABOLIC PANEL: CPT | Performed by: PHYSICIAN ASSISTANT

## 2020-12-09 PROCEDURE — 82962 GLUCOSE BLOOD TEST: CPT

## 2020-12-09 PROCEDURE — 70498 CT ANGIOGRAPHY NECK: CPT

## 2020-12-09 PROCEDURE — 81003 URINALYSIS AUTO W/O SCOPE: CPT | Performed by: PHYSICIAN ASSISTANT

## 2020-12-09 PROCEDURE — G0378 HOSPITAL OBSERVATION PER HR: HCPCS

## 2020-12-09 PROCEDURE — 93005 ELECTROCARDIOGRAM TRACING: CPT | Performed by: PHYSICIAN ASSISTANT

## 2020-12-09 PROCEDURE — 82565 ASSAY OF CREATININE: CPT

## 2020-12-09 PROCEDURE — 86901 BLOOD TYPING SEROLOGIC RH(D): CPT | Performed by: PHYSICIAN ASSISTANT

## 2020-12-09 PROCEDURE — 99285 EMERGENCY DEPT VISIT HI MDM: CPT

## 2020-12-09 PROCEDURE — 86850 RBC ANTIBODY SCREEN: CPT | Performed by: PHYSICIAN ASSISTANT

## 2020-12-09 PROCEDURE — 80053 COMPREHEN METABOLIC PANEL: CPT | Performed by: NURSE PRACTITIONER

## 2020-12-09 PROCEDURE — 85025 COMPLETE CBC W/AUTO DIFF WBC: CPT | Performed by: PHYSICIAN ASSISTANT

## 2020-12-09 PROCEDURE — 85027 COMPLETE CBC AUTOMATED: CPT | Performed by: NURSE PRACTITIONER

## 2020-12-09 PROCEDURE — 99214 OFFICE O/P EST MOD 30 MIN: CPT | Performed by: PSYCHIATRY & NEUROLOGY

## 2020-12-09 PROCEDURE — 92610 EVALUATE SWALLOWING FUNCTION: CPT

## 2020-12-09 PROCEDURE — 0202U NFCT DS 22 TRGT SARS-COV-2: CPT | Performed by: PHYSICIAN ASSISTANT

## 2020-12-09 PROCEDURE — 93010 ELECTROCARDIOGRAM REPORT: CPT | Performed by: INTERNAL MEDICINE

## 2020-12-09 PROCEDURE — 70551 MRI BRAIN STEM W/O DYE: CPT

## 2020-12-09 PROCEDURE — 71045 X-RAY EXAM CHEST 1 VIEW: CPT

## 2020-12-09 PROCEDURE — 85610 PROTHROMBIN TIME: CPT | Performed by: PHYSICIAN ASSISTANT

## 2020-12-09 PROCEDURE — 70496 CT ANGIOGRAPHY HEAD: CPT

## 2020-12-09 PROCEDURE — 85610 PROTHROMBIN TIME: CPT | Performed by: NURSE PRACTITIONER

## 2020-12-09 PROCEDURE — 0042T HC CT CEREBRAL PERFUSION W/WO CONTRAST: CPT

## 2020-12-09 PROCEDURE — 86900 BLOOD TYPING SEROLOGIC ABO: CPT | Performed by: PHYSICIAN ASSISTANT

## 2020-12-09 RX ORDER — WARFARIN SODIUM 5 MG/1
5 TABLET ORAL
Status: DISCONTINUED | OUTPATIENT
Start: 2020-12-09 | End: 2020-12-10 | Stop reason: SDUPTHER

## 2020-12-09 RX ORDER — SODIUM CHLORIDE 0.9 % (FLUSH) 0.9 %
10 SYRINGE (ML) INJECTION AS NEEDED
Status: DISCONTINUED | OUTPATIENT
Start: 2020-12-09 | End: 2020-12-10 | Stop reason: HOSPADM

## 2020-12-09 RX ORDER — ASPIRIN 81 MG/1
81 TABLET, CHEWABLE ORAL DAILY
Status: DISCONTINUED | OUTPATIENT
Start: 2020-12-09 | End: 2020-12-10 | Stop reason: HOSPADM

## 2020-12-09 RX ORDER — ACETAMINOPHEN 650 MG/1
650 SUPPOSITORY RECTAL EVERY 4 HOURS PRN
Status: DISCONTINUED | OUTPATIENT
Start: 2020-12-09 | End: 2020-12-10 | Stop reason: HOSPADM

## 2020-12-09 RX ORDER — ATORVASTATIN CALCIUM 80 MG/1
80 TABLET, FILM COATED ORAL NIGHTLY
Status: DISCONTINUED | OUTPATIENT
Start: 2020-12-09 | End: 2020-12-10 | Stop reason: HOSPADM

## 2020-12-09 RX ORDER — ONDANSETRON 2 MG/ML
4 INJECTION INTRAMUSCULAR; INTRAVENOUS EVERY 6 HOURS PRN
Status: DISCONTINUED | OUTPATIENT
Start: 2020-12-09 | End: 2020-12-10 | Stop reason: HOSPADM

## 2020-12-09 RX ORDER — SODIUM CHLORIDE 0.9 % (FLUSH) 0.9 %
10 SYRINGE (ML) INJECTION EVERY 12 HOURS SCHEDULED
Status: DISCONTINUED | OUTPATIENT
Start: 2020-12-09 | End: 2020-12-10 | Stop reason: HOSPADM

## 2020-12-09 RX ORDER — ASPIRIN 300 MG/1
300 SUPPOSITORY RECTAL DAILY
Status: DISCONTINUED | OUTPATIENT
Start: 2020-12-09 | End: 2020-12-10 | Stop reason: HOSPADM

## 2020-12-09 RX ORDER — ACETAMINOPHEN 325 MG/1
650 TABLET ORAL EVERY 4 HOURS PRN
Status: DISCONTINUED | OUTPATIENT
Start: 2020-12-09 | End: 2020-12-10 | Stop reason: HOSPADM

## 2020-12-09 RX ADMIN — IOPAMIDOL 95 ML: 755 INJECTION, SOLUTION INTRAVENOUS at 09:04

## 2020-12-09 RX ADMIN — IOPAMIDOL 50 ML: 755 INJECTION, SOLUTION INTRAVENOUS at 09:04

## 2020-12-09 RX ADMIN — SODIUM CHLORIDE, PRESERVATIVE FREE 10 ML: 5 INJECTION INTRAVENOUS at 20:19

## 2020-12-09 RX ADMIN — ASPIRIN 81 MG: 81 TABLET, CHEWABLE ORAL at 17:50

## 2020-12-09 NOTE — PLAN OF CARE
Goal Outcome Evaluation:  Plan of Care Reviewed With: patient  Progress: no change  Outcome Summary:     Bedside swallow evaluation completed with oropharyngeal swallow WFL. Trace L sided residue with pt aware and clearing independently.     Recommend: regular and thin liquid diet. Medications with thin liquids. Compensations to include: small bites/sips, upright for all PO, pt self check mouth frequently for oral pocketing. SLP to follow peripherally, available with changes in condition or if difficulties arise. Will monitor further neurology workup and imaging to determine if speech/language/cognitive evaluation is appropriate.    PPE: Patient was not wearing a face mask during this therapy encounter per swallow evaluation performed. Therapist used appropriate personal protective equipment including mask, eye protection and gloves.  Mask used was standard procedure mask. Appropriate PPE was worn during the entire therapy session. The patient coughed during this evaluation. Therapist was within 6 feet for 15 minutes or more during the evaluation. Hand hygiene was completed before and after therapy session. Patient is not in enhanced droplet precautions.

## 2020-12-09 NOTE — H&P
Patient Name:  Danni Aguilar  YOB: 1926  MRN:  [id number]  Admit Date:  12/9/2020  Patient Care Team:  Rolando Hadley MD as PCP - General (Family Medicine)      Subjective   History Present Illness     Chief Complaint   Patient presents with   • Neuro Deficit(s)     PT C/O INTERM. LEFT SIDED WEAKNESS AND FLACCIDITY STARTING 2-3 DAYS AGO; PT REPORTS LAST EPISODE OF FLACCIDITY WAS LAST NIGHT LASTING APPROX. 5-10 MINS; PT WEARING FACE MASK     HPI  Mr. Aguilar is a 89 y.o. non-smoker with a history of atrial fibrillation on warfarin, prior CVA, prior right ICA stenosis s/p stent, HLD and urinary retention that presents to [facility] complaining of intermittent left-sided weakness and numbness at home. He has a history of CVA as well as right carotid stenosis. He had a stent placed in 2017 and is followed by Neurology in the outpatient setting. He was last seen in their office in August of this year and was doing well from their standpoint. He was maintained on ASA/statin and warfarin therapy. Repeat carotid US did not show any restenosis at that time.    The patient came to the ED today where he reported intermittent left sided weakness for the last week. The episodes came spontaneously and resolved on their own. There is no known trigger to the event and lasted approximately 5 minutes at a time. He denied any loss of vision, speech changes or facial droop. He did report some ataxia with left leg dragging during the spells.   In the ED, CTA revealed patent right carotid stent with no significant extracranial or intracranial flow-limiting stenosis seen. CT perfusion was normal. Lab work showed subtherapeutic INR of 1.8 despite being on coumadin, otherwise labs are fairly unremarkable.        Review of Systems   Constitutional: Negative for chills and fever.   HENT: Negative for congestion and sore throat.    Eyes: Negative for discharge and itching.   Respiratory: Negative for cough,  chest tightness and shortness of breath.    Cardiovascular: Negative for chest pain, palpitations and leg swelling.   Gastrointestinal: Negative for abdominal pain, nausea and vomiting.   Endocrine: Negative for cold intolerance and heat intolerance.   Genitourinary: Negative for difficulty urinating and dysuria.   Musculoskeletal: Negative for back pain and gait problem.   Skin: Negative for color change and pallor.   Allergic/Immunologic: Negative for environmental allergies and food allergies.   Neurological: Positive for weakness and numbness. Negative for dizziness, facial asymmetry, speech difficulty and light-headedness.   Psychiatric/Behavioral: Negative for agitation and confusion.        Personal History     Past Medical History:   Diagnosis Date   • A-fib (CMS/HCC)    • Irregular heart beat      Past Surgical History:   Procedure Laterality Date   • BACK SURGERY     • CEREBRAL ANGIOGRAM N/A 7/24/2017    Procedure: DIAGNOSTIC CEREBRAL ANGIOGRAM AND RIGHT CAROTID STENT PLACEMENT;  Surgeon: Mauricio Yung MD;  Location: [facility] 18/19;  Service:      History reviewed. No pertinent family history.  Social History     Tobacco Use   • Smoking status: Never Smoker   • Smokeless tobacco: Never Used   Substance Use Topics   • Alcohol use: No   • Drug use: Defer     No current facility-administered medications on file prior to encounter.      Current Outpatient Medications on File Prior to Encounter   Medication Sig Dispense Refill   • atorvastatin (LIPITOR) 80 MG tablet Take 1 tablet by mouth Daily. 30 tablet 0   • warfarin (COUMADIN) 5 MG tablet Take 5 mg by mouth Daily.     • [DISCONTINUED] aspirin 81 MG chewable tablet Chew 81 mg Daily.       No Known Allergies    Objective    Objective     Vital Signs  Temp:  [97.8 °F (36.6 °C)-98 °F (36.7 °C)] 97.8 °F (36.6 °C)  Heart Rate:  [69-95] 85  Resp:  [16-18] 16  BP: (150-186)/(73-96) 180/95  SpO2:  [90 %-97 %] 97 %  on   ;   Device (Oxygen Therapy): room  air  Body mass index is 23.06 kg/m².    Physical Exam  Vitals signs and nursing note reviewed.   Constitutional:       Appearance: Normal appearance. He is well-developed. He is not ill-appearing.      Comments: appears stated age   HENT:      Head: Normocephalic and atraumatic.   Eyes:      Extraocular Movements: Extraocular movements intact.      Conjunctiva/sclera: Conjunctivae normal.   Neck:      Musculoskeletal: Neck supple.      Vascular: No JVD.   Cardiovascular:      Rate and Rhythm: Normal rate and regular rhythm.   Pulmonary:      Effort: Pulmonary effort is normal.      Breath sounds: Normal breath sounds.   Abdominal:      General: Bowel sounds are normal. There is no distension.      Palpations: Abdomen is soft.      Tenderness: There is no abdominal tenderness.   Musculoskeletal: Normal range of motion.   Skin:     General: Skin is warm and dry.   Neurological:      Mental Status: He is alert and oriented to person, place, and time. Mental status is at baseline.      Comments: maybe just slightly weaker  in the left hand   Psychiatric:         Mood and Affect: Mood normal.         Behavior: Behavior normal.         Results Review:  I reviewed the patient's new clinical results.  I reviewed the patient's new imaging results and agree with the interpretation.  I reviewed the patient's other test results and agree with the interpretation  I personally viewed and interpreted the patient's EKG/Telemetry data  Discussed with ED provider.    Lab Results (last 24 hours)     Procedure Component Value Units Date/Time    CBC & Differential [[id number]]  (Abnormal) Collected: 12/09/20 0752    Specimen: Blood Updated: 12/09/20 0909    Narrative:      The following orders were created for panel order CBC & Differential.  Procedure                               Abnormality         Status                     ---------                               -----------         ------                     CBC Auto  Differential[[id number]]        Abnormal            Final result                 Please view results for these tests on the individual orders.    Comprehensive Metabolic Panel [[id number]]  (Abnormal) Collected: 12/09/20 0752    Specimen: Blood Updated: 12/09/20 0910     Glucose 84 mg/dL      BUN 23 mg/dL      Creatinine 1.09 mg/dL      Sodium 137 mmol/L      Potassium 3.8 mmol/L      Chloride 100 mmol/L      CO2 29.1 mmol/L      Calcium 9.2 mg/dL      Total Protein 7.6 g/dL      Albumin 4.10 g/dL      ALT (SGPT) 24 U/L      AST (SGOT) 23 U/L      Alkaline Phosphatase 61 U/L      Total Bilirubin 0.6 mg/dL      eGFR Non African Amer 63 mL/min/1.73      Globulin 3.5 gm/dL      A/G Ratio 1.2 g/dL      BUN/Creatinine Ratio 21.1     Anion Gap 7.9 mmol/L     Narrative:      GFR Normal >60  Chronic Kidney Disease <60  Kidney Failure <15      Protime-INR [[id number]]  (Abnormal) Collected: 12/09/20 0752    Specimen: Blood Updated: 12/09/20 0909     Protime 21.4 Seconds      INR 1.88    CBC Auto Differential [[id number]]  (Abnormal) Collected: 12/09/20 0752    Specimen: Blood Updated: 12/09/20 0909     WBC 8.32 10*3/mm3      RBC 5.48 10*6/mm3      Hemoglobin 14.4 g/dL      Hematocrit 44.0 %      MCV 80.3 fL      MCH 26.3 pg      MCHC 32.7 g/dL      RDW 13.8 %      RDW-SD 40.0 fl      MPV 9.3 fL      Platelets 486 10*3/mm3      Neutrophil % 58.6 %      Lymphocyte % 25.7 %      Monocyte % 9.7 %      Eosinophil % 3.8 %      Basophil % 1.6 %      Immature Grans % 0.6 %      Neutrophils, Absolute 4.87 10*3/mm3      Lymphocytes, Absolute 2.14 10*3/mm3      Monocytes, Absolute 0.81 10*3/mm3      Eosinophils, Absolute 0.32 10*3/mm3      Basophils, Absolute 0.13 10*3/mm3      Immature Grans, Absolute 0.05 10*3/mm3      nRBC 0.0 /100 WBC     Urinalysis With Microscopic If Indicated (No Culture) - Urine, Clean Catch [[id number]]  (Normal) Collected: 12/09/20 0948    Specimen: Urine, Clean Catch Updated: 12/09/20 1022     Color, UA Yellow      Appearance, UA Clear     pH, UA 7.0     Specific Gravity, UA 1.015     Glucose, UA Negative     Ketones, UA Negative     Bilirubin, UA Negative     Blood, UA Negative     Protein, UA Negative     Leuk Esterase, UA Negative     Nitrite, UA Negative     Urobilinogen, UA 0.2 E.U./dL    Narrative:      Urine microscopic not indicated.    COVID PRE-OP / PRE-PROCEDURE SCREENING ORDER (NO ISOLATION) - Swab, Nasopharynx [[id number]]  (Normal) Collected: 12/09/20 0948    Specimen: Swab from Nasopharynx Updated: 12/09/20 1128    Narrative:      The following orders were created for panel order COVID PRE-OP / PRE-PROCEDURE SCREENING ORDER (NO ISOLATION) - Swab, Nasopharynx.  Procedure                               Abnormality         Status                     ---------                               -----------         ------                     Respiratory Panel PCR w/...[[id number]]  Normal              Final result                 Please view results for these tests on the individual orders.    Respiratory Panel PCR w/COVID-19(SARS-CoV-2) JENNIFER/ANGELA/JANET/PAD/COR/MAD/EKATERINA In-House, NP Swab in UTM/VTM, 3-4 HR TAT - Swab, Nasopharynx [[id number]]  (Normal) Collected: 12/09/20 0948    Specimen: Swab from Nasopharynx Updated: 12/09/20 1128     ADENOVIRUS, PCR Not Detected     Coronavirus 229E Not Detected     Coronavirus HKU1 Not Detected     Coronavirus NL63 Not Detected     Coronavirus OC43 Not Detected     COVID19 Not Detected     Human Metapneumovirus Not Detected     Human Rhinovirus/Enterovirus Not Detected     Influenza A PCR Not Detected     Influenza B PCR Not Detected     Parainfluenza Virus 1 Not Detected     Parainfluenza Virus 2 Not Detected     Parainfluenza Virus 3 Not Detected     Parainfluenza Virus 4 Not Detected     RSV, PCR Not Detected     Bordetella pertussis pcr Not Detected     Bordetella parapertussis PCR Not Detected     Chlamydophila pneumoniae PCR Not Detected     Mycoplasma pneumo by PCR Not Detected     Narrative:      Fact sheet for providers: https://docs.[employer]/wp-content/uploads/YAR9243-9364-MD4.1-EUA-Provider-Fact-Sheet-3.pdf    Fact sheet for patients: https://docs.[employer]/wp-content/uploads/BQK6295-9216-CH0.1-EUA-Patient-Fact-Sheet-1.pdf    Test performed by PCR.    CBC (No Diff) [[id number]]  (Normal) Collected: 12/09/20 1606    Specimen: Blood Updated: 12/09/20 1626     WBC 8.17 10*3/mm3      RBC 5.63 10*6/mm3      Hemoglobin 15.0 g/dL      Hematocrit 46.0 %      MCV 81.7 fL      MCH 26.6 pg      MCHC 32.6 g/dL      RDW 14.0 %      RDW-SD 41.3 fl      MPV 8.9 fL      Platelets 441 10*3/mm3     Comprehensive Metabolic Panel [[id number]]  (Abnormal) Collected: 12/09/20 1606    Specimen: Blood Updated: 12/09/20 1656     Glucose 100 mg/dL      BUN 17 mg/dL      Creatinine 0.98 mg/dL      Sodium 134 mmol/L      Potassium 3.9 mmol/L      Chloride 97 mmol/L      CO2 28.7 mmol/L      Calcium 9.4 mg/dL      Total Protein 7.8 g/dL      Albumin 4.10 g/dL      ALT (SGPT) 23 U/L      AST (SGOT) 26 U/L      Alkaline Phosphatase 63 U/L      Total Bilirubin 0.7 mg/dL      eGFR Non African Amer 71 mL/min/1.73      Globulin 3.7 gm/dL      A/G Ratio 1.1 g/dL      BUN/Creatinine Ratio 17.3     Anion Gap 8.3 mmol/L     Narrative:      GFR Normal >60  Chronic Kidney Disease <60  Kidney Failure <15      Protime-INR [[id number]]  (Abnormal) Collected: 12/09/20 1606    Specimen: Blood from Arm, Left Updated: 12/09/20 1644     Protime 20.9 Seconds      INR 1.83          Imaging Results (Last 24 Hours)     Procedure Component Value Units Date/Time    MRI Brain Without Contrast [[id number]] Resulted: 12/09/20 1709     Updated: 12/09/20 1709    CT Angiogram Head [[id number]] Collected: 12/09/20 1042     Updated: 12/09/20 1221    Narrative:      CT ANGIOGRAM HEAD AND NECK WITH CONTRAST AND CT PERFUSION STUDY     CLINICAL HISTORY: Intermittent left arm and leg weakness. History of  carotid stent     TECHNIQUE: CT scan of the  head was obtained with 3 mm axial images  before and after the administration of IV contrast. A CT angiogram of  the head and neck was obtained with 1 mm axial images following  administration of IV contrast. Sagittal, coronal, and 3-dimensional  reconstructed images were obtained. A CT perfusion study was performed  with subsequent construction of standard rapid software perfusion maps.     FINDINGS:     CTA HEAD: The right vertebral artery is dominant. The left vertebral  artery terminates in a PICA. There is a moderate degree of stenosis  involving the proximal basilar artery secondary to calcified  atherosclerotic plaque. The posterior cerebral arteries are  unremarkable. The cavernous internal carotid arteries are remarkable for  mild degrees of luminal compromise secondary to atherosclerotic changes.  The middle and anterior cerebral arteries are unremarkable.     Subcentimeter chronic infarcts are identified within the right parietal  lobe within the right MCA distribution and within the inferolateral  aspect of the right cerebellar hemisphere within the right PICA  distribution. Mild changes of chronic small vessel ischemic phenomena  are identified. No abnormal areas of contrast enhancement are noted.     CTA NECK: There is a bovine configuration of the aortic arch. The  innominate artery and proximal aspects of the right common carotid  artery and right subclavian artery are poorly visualized due to dense  venous opacification resulting in beam-hardening artifact. A mild degree  of stenosis is seen at the origin of the left subclavian artery. A mild  degree of stenosis is identified at the origin of the nondominant left  vertebral artery. The right vertebral artery is unremarkable in  appearance although again the most proximal aspect of the right  vertebral artery is obscured by beam-hardening artifact from adjacent  dense venous opacification. Atherosclerotic changes are identified  within the left  common carotid artery bifurcation and proximal internal  carotid. However, by NASCET criteria, there is at most a 10% to 20%  stenosis within the proximal portion of the left ICA. A stent is  identified within the distal aspect of the right common carotid artery  extending to the proximal aspect of the right internal carotid. The  stent is moderately narrowed and constrained by the calcific  atherosclerotic plaque. Along its posterior margins, there is some  hypodensity evident which could represent some intimal hyperplasia  versus mural thrombus. The lumen within the stent is difficult to  evaluate due to beam-hardening artifact from the stent. There is up to a  60% stenosis by NASCET criteria within the stent although the patency of  the stent could be further evaluated with either a Doppler study or a  catheter-based arteriogram.     Incidental note is made of some secretions along the right lateral  aspect of the trachea.     CT PERFUSION STUDY: No significant asymmetries are identified on the CT  perfusion examination to suggest hypoperfusion of the right hemisphere.  Also, there is no convincing evidence to suggest an area of acute  completed infarction.       Impression:      The noncontrast head CT as well as the CT perfusion study  demonstrate no convincing evidence for an area of acute completed  infarction. Also, the CT perfusion study demonstrates no convincing  evidence for hypoperfusion of the right hemisphere.     There is a stent visualized within the distal aspect of the right common  carotid artery extending to the proximal portion of the right internal  carotid. Along the posterior margins of the stent, there is an area of  relative hypodensity which could potentially represent some intimal  hyperplasia or mural thrombus. The lumen within the stent is difficult  to evaluate due to some beam-hardening artifact. The stent is somewhat  constrained by the calcific atherosclerotic plaque and there may  be up  to 60% NASCET stenosis within the stent given the constrained diameter  of the stent as well as the potential intimal hyperplasia/marrow  thrombus, although further evaluation of the stent could be obtained  with either a Doppler study or a catheter-based arteriogram.      There is at most a 10% to 20% NASCET stenosis within the left ICA.     There is a moderate degree of stenosis involving the proximal basilar  artery. Relatively mild degrees of atherosclerotic narrowing are  identified within the cavernous internal carotid arteries.     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     Incidental note is made of some secretions within the right lateral  aspect of the trachea.     The findings of the noncontrast head CT were discussed with Dr. William  on 12/09/2020 at approximately 8:58 AM. The findings of the CT angiogram  and CT perfusion study were discussed with Dr. William at approximately  9:15 AM.     Radiation dose reduction techniques were utilized, including automated  exposure control and exposure modulation based on body size.     This report was finalized on 12/9/2020 12:18 PM by Dr. Robinson Odell M.D.       CT Angiogram Neck [[id number]] Collected: 12/09/20 1042     Updated: 12/09/20 1221    Narrative:      CT ANGIOGRAM HEAD AND NECK WITH CONTRAST AND CT PERFUSION STUDY     CLINICAL HISTORY: Intermittent left arm and leg weakness. History of  carotid stent     TECHNIQUE: CT scan of the head was obtained with 3 mm axial images  before and after the administration of IV contrast. A CT angiogram of  the head and neck was obtained with 1 mm axial images following  administration of IV contrast. Sagittal, coronal, and 3-dimensional  reconstructed images were obtained. A CT perfusion study was performed  with subsequent construction of standard rapid software perfusion  maps.     FINDINGS:     CTA HEAD: The right vertebral artery is dominant. The left vertebral  artery terminates in a PICA. There is a moderate degree of stenosis  involving the proximal basilar artery secondary to calcified  atherosclerotic plaque. The posterior cerebral arteries are  unremarkable. The cavernous internal carotid arteries are remarkable for  mild degrees of luminal compromise secondary to atherosclerotic changes.  The middle and anterior cerebral arteries are unremarkable.     Subcentimeter chronic infarcts are identified within the right parietal  lobe within the right MCA distribution and within the inferolateral  aspect of the right cerebellar hemisphere within the right PICA  distribution. Mild changes of chronic small vessel ischemic phenomena  are identified. No abnormal areas of contrast enhancement are noted.     CTA NECK: There is a bovine configuration of the aortic arch. The  innominate artery and proximal aspects of the right common carotid  artery and right subclavian artery are poorly visualized due to dense  venous opacification resulting in beam-hardening artifact. A mild degree  of stenosis is seen at the origin of the left subclavian artery. A mild  degree of stenosis is identified at the origin of the nondominant left  vertebral artery. The right vertebral artery is unremarkable in  appearance although again the most proximal aspect of the right  vertebral artery is obscured by beam-hardening artifact from adjacent  dense venous opacification. Atherosclerotic changes are identified  within the left common carotid artery bifurcation and proximal internal  carotid. However, by NASCET criteria, there is at most a 10% to 20%  stenosis within the proximal portion of the left ICA. A stent is  identified within the distal aspect of the right common carotid artery  extending to the proximal aspect of the right internal carotid. The  stent is moderately narrowed and constrained by the  calcific  atherosclerotic plaque. Along its posterior margins, there is some  hypodensity evident which could represent some intimal hyperplasia  versus mural thrombus. The lumen within the stent is difficult to  evaluate due to beam-hardening artifact from the stent. There is up to a  60% stenosis by NASCET criteria within the stent although the patency of  the stent could be further evaluated with either a Doppler study or a  catheter-based arteriogram.     Incidental note is made of some secretions along the right lateral  aspect of the trachea.     CT PERFUSION STUDY: No significant asymmetries are identified on the CT  perfusion examination to suggest hypoperfusion of the right hemisphere.  Also, there is no convincing evidence to suggest an area of acute  completed infarction.       Impression:      The noncontrast head CT as well as the CT perfusion study  demonstrate no convincing evidence for an area of acute completed  infarction. Also, the CT perfusion study demonstrates no convincing  evidence for hypoperfusion of the right hemisphere.     There is a stent visualized within the distal aspect of the right common  carotid artery extending to the proximal portion of the right internal  carotid. Along the posterior margins of the stent, there is an area of  relative hypodensity which could potentially represent some intimal  hyperplasia or mural thrombus. The lumen within the stent is difficult  to evaluate due to some beam-hardening artifact. The stent is somewhat  constrained by the calcific atherosclerotic plaque and there may be up  to 60% NASCET stenosis within the stent given the constrained diameter  of the stent as well as the potential intimal hyperplasia/marrow  thrombus, although further evaluation of the stent could be obtained  with either a Doppler study or a catheter-based arteriogram.      There is at most a 10% to 20% NASCET stenosis within the left ICA.     There is a moderate degree of  stenosis involving the proximal basilar  artery. Relatively mild degrees of atherosclerotic narrowing are  identified within the cavernous internal carotid arteries.     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     Incidental note is made of some secretions within the right lateral  aspect of the trachea.     The findings of the noncontrast head CT were discussed with Dr. William  on 12/09/2020 at approximately 8:58 AM. The findings of the CT angiogram  and CT perfusion study were discussed with Dr. William at approximately  9:15 AM.     Radiation dose reduction techniques were utilized, including automated  exposure control and exposure modulation based on body size.     This report was finalized on 12/9/2020 12:18 PM by Dr. Robinson Odell M.D.       CT Cerebral Perfusion With & Without Contrast [[id number]] Collected: 12/09/20 1042     Updated: 12/09/20 1221    Narrative:      CT ANGIOGRAM HEAD AND NECK WITH CONTRAST AND CT PERFUSION STUDY     CLINICAL HISTORY: Intermittent left arm and leg weakness. History of  carotid stent     TECHNIQUE: CT scan of the head was obtained with 3 mm axial images  before and after the administration of IV contrast. A CT angiogram of  the head and neck was obtained with 1 mm axial images following  administration of IV contrast. Sagittal, coronal, and 3-dimensional  reconstructed images were obtained. A CT perfusion study was performed  with subsequent construction of standard rapid software perfusion maps.     FINDINGS:     CTA HEAD: The right vertebral artery is dominant. The left vertebral  artery terminates in a PICA. There is a moderate degree of stenosis  involving the proximal basilar artery secondary to calcified  atherosclerotic plaque. The posterior cerebral arteries are  unremarkable. The cavernous internal carotid arteries are remarkable  for  mild degrees of luminal compromise secondary to atherosclerotic changes.  The middle and anterior cerebral arteries are unremarkable.     Subcentimeter chronic infarcts are identified within the right parietal  lobe within the right MCA distribution and within the inferolateral  aspect of the right cerebellar hemisphere within the right PICA  distribution. Mild changes of chronic small vessel ischemic phenomena  are identified. No abnormal areas of contrast enhancement are noted.     CTA NECK: There is a bovine configuration of the aortic arch. The  innominate artery and proximal aspects of the right common carotid  artery and right subclavian artery are poorly visualized due to dense  venous opacification resulting in beam-hardening artifact. A mild degree  of stenosis is seen at the origin of the left subclavian artery. A mild  degree of stenosis is identified at the origin of the nondominant left  vertebral artery. The right vertebral artery is unremarkable in  appearance although again the most proximal aspect of the right  vertebral artery is obscured by beam-hardening artifact from adjacent  dense venous opacification. Atherosclerotic changes are identified  within the left common carotid artery bifurcation and proximal internal  carotid. However, by NASCET criteria, there is at most a 10% to 20%  stenosis within the proximal portion of the left ICA. A stent is  identified within the distal aspect of the right common carotid artery  extending to the proximal aspect of the right internal carotid. The  stent is moderately narrowed and constrained by the calcific  atherosclerotic plaque. Along its posterior margins, there is some  hypodensity evident which could represent some intimal hyperplasia  versus mural thrombus. The lumen within the stent is difficult to  evaluate due to beam-hardening artifact from the stent. There is up to a  60% stenosis by NASCET criteria within the stent although the patency  of  the stent could be further evaluated with either a Doppler study or a  catheter-based arteriogram.     Incidental note is made of some secretions along the right lateral  aspect of the trachea.     CT PERFUSION STUDY: No significant asymmetries are identified on the CT  perfusion examination to suggest hypoperfusion of the right hemisphere.  Also, there is no convincing evidence to suggest an area of acute  completed infarction.       Impression:      The noncontrast head CT as well as the CT perfusion study  demonstrate no convincing evidence for an area of acute completed  infarction. Also, the CT perfusion study demonstrates no convincing  evidence for hypoperfusion of the right hemisphere.     There is a stent visualized within the distal aspect of the right common  carotid artery extending to the proximal portion of the right internal  carotid. Along the posterior margins of the stent, there is an area of  relative hypodensity which could potentially represent some intimal  hyperplasia or mural thrombus. The lumen within the stent is difficult  to evaluate due to some beam-hardening artifact. The stent is somewhat  constrained by the calcific atherosclerotic plaque and there may be up  to 60% NASCET stenosis within the stent given the constrained diameter  of the stent as well as the potential intimal hyperplasia/marrow  thrombus, although further evaluation of the stent could be obtained  with either a Doppler study or a catheter-based arteriogram.      There is at most a 10% to 20% NASCET stenosis within the left ICA.     There is a moderate degree of stenosis involving the proximal basilar  artery. Relatively mild degrees of atherosclerotic narrowing are  identified within the cavernous internal carotid arteries.     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     Incidental note  is made of some secretions within the right lateral  aspect of the trachea.     The findings of the noncontrast head CT were discussed with Dr. William  on 12/09/2020 at approximately 8:58 AM. The findings of the CT angiogram  and CT perfusion study were discussed with Dr. William at approximately  9:15 AM.     Radiation dose reduction techniques were utilized, including automated  exposure control and exposure modulation based on body size.     This report was finalized on 12/9/2020 12:18 PM by Dr. Robinson Odell M.D.       XR Chest 1 View [[id number]] Collected: 12/09/20 1029     Updated: 12/09/20 1036    Narrative:      PORTABLE CHEST X-RAY     HISTORY: Stroke protocol. Neurologic deficit.     TECHNIQUE: Portable chest x-rays provided. There is no previous chest  imaging for correlation.     FINDINGS: Cardiac silhouette is mildly enlarged. Pulmonary vasculature  is engorged and there is mild interstitial prominence bilaterally of  unknown chronicity. This may represent mild pulmonary edema. No effusion  or infiltrate is evident. There is no pneumothorax.       Impression:      Pulmonary vascular engorgement with some interstitial  prominence which may represent mild pulmonary edema.     This report was finalized on 12/9/2020 10:31 AM by Dr. Asa Calvert M.D.             Results for orders placed during the hospital encounter of 07/20/17   Adult transthoracic echo complete    Narrative · Calculated EF = 71.1%.  · Left ventricular diastolic dysfunction (grade I) consistent with   impaired relaxation.  · Right ventricular cavity is mild-to-moderately dilated.  · calcification of the aortic valve  · Mild mitral valve regurgitation is present  · Moderate tricuspid valve regurgitation is present.  · Estimated right ventricular systolic pressure from tricuspid   regurgitation is mildly elevated (35-45 mmHg). Calculated right   ventricular systolic pressure from tricuspid regurgitation is 41.2 mmHg.  · Calculated  right ventricular systolic pressure from tricuspid   regurgitation is 41.2 mmHg.  · Mild aortic valve regurgitation is present.  · Saline test results are negative.          ECG 12 Lead   Final Result   HEART RATE= 77  bpm   RR Interval= 781  ms   HI Interval=   ms   P Horizontal Axis=   deg   P Front Axis=   deg   QRSD Interval= 104  ms   QT Interval= 444  ms   QRS Axis= 92  deg   T Wave Axis= 44  deg   - ABNORMAL ECG -   Atrial fibrillation   Right axis deviation   Prolonged QT interval new vs previous ecg   Electronically Signed By: Zachariah Campos ([facility]) 09-Dec-2020 15:32:59   Date and Time of Study: 2020-12-09 09:44:41           Assessment/Plan     Active Hospital Problems    Diagnosis  POA   • TIA (transient ischemic attack) [G45.9]  Yes   • Hyperlipidemia [E78.5]  Yes   • Urinary retention [R33.9]  Yes   • Long term (current) use of anticoagulants [Z79.01]  Not Applicable   • Atrial fibrillation (CMS/HCC) [I48.91]  Yes      Resolved Hospital Problems   No resolved problems to display.     · appreciate neurology- await MRI results, continue low dose ASA, statin, pharmacy to dose coumadin, daily INR.  · no need for repeat labs in AM  · I discussed the patient's findings and my recommendations with patient and nursing staff.    VTE Prophylaxis - Warfarin (home med).  Code Status - Full code.  Dispo: hopefully home tomorrow        LINDEN Savage  [facility]  12/09/20  17:17 EST

## 2020-12-09 NOTE — ED NOTES
Nursing report ED to floor  Danni Aguilar  89 y.o.  male    HPI (triage note):   Chief Complaint   Patient presents with   • Neuro Deficit(s)     PT C/O INTERM. LEFT SIDED WEAKNESS AND FLACCIDITY STARTING 2-3 DAYS AGO; PT REPORTS LAST EPISODE OF FLACCIDITY WAS LAST NIGHT LASTING APPROX. 5-10 MINS; PT WEARING FACE MASK       Admitting doctor:   Richie Rao MD    Admitting diagnosis:   The primary encounter diagnosis was TIA (transient ischemic attack). Diagnoses of Left arm numbness and Left leg numbness were also pertinent to this visit.    Code status:   Current Code Status     Date Active Code Status Order ID Comments User Context       Prior    Advance Care Planning Activity          Allergies:   Patient has no known allergies.    Weight:       12/09/20  0749   Weight: 68.8 kg (151 lb 9.6 oz)       Most recent vitals:   Vitals:    12/09/20 1230 12/09/20 1300 12/09/20 1330 12/09/20 1430   BP: (!) 186/96 177/93 (!) 183/85 150/87   BP Location:       Patient Position:       Pulse: 77 76 95 79   Resp:       Temp:       TempSrc:       SpO2: 97% 94% 90% 95%   Weight:       Height:           Active LDAs/IV Access:   Lines, Drains & Airways    Active LDAs     Name:   Placement date:   Placement time:   Site:   Days:    Peripheral IV 12/09/20 0750 Right Antecubital   12/09/20    0750    Antecubital   less than 1                Labs (abnormal labs have a star):   Labs Reviewed   COMPREHENSIVE METABOLIC PANEL - Abnormal; Notable for the following components:       Result Value    CO2 29.1 (*)     All other components within normal limits    Narrative:     GFR Normal >60  Chronic Kidney Disease <60  Kidney Failure <15     PROTIME-INR - Abnormal; Notable for the following components:    Protime 21.4 (*)     INR 1.88 (*)     All other components within normal limits   CBC WITH AUTO DIFFERENTIAL - Abnormal; Notable for the following components:    MCH 26.3 (*)     Platelets 486 (*)     Basophil % 1.6 (*)     Immature  Grans % 0.6 (*)     All other components within normal limits   RESPIRATORY PANEL PCR W/ COVID-19 (SARS-COV-2) JENNIFER/ANGELA/JANET/PAD/COR/MAD/EKATERINA IN-HOUSE, NP SWAB IN UTM/VTP, 3-4 HR TAT - Normal    Narrative:     Fact sheet for providers: https://docs.[employer]/wp-content/uploads/SPE4487-5949-BY6.1-EUA-Provider-Fact-Sheet-3.pdf    Fact sheet for patients: https://docs.[employer]/wp-content/uploads/CSV5913-4720-YI6.1-EUA-Patient-Fact-Sheet-1.pdf    Test performed by PCR.   URINALYSIS W/ MICROSCOPIC IF INDICATED (NO CULTURE) - Normal    Narrative:     Urine microscopic not indicated.   COVID PRE-OP / PRE-PROCEDURE SCREENING ORDER (NO ISOLATION)    Narrative:     The following orders were created for panel order COVID PRE-OP / PRE-PROCEDURE SCREENING ORDER (NO ISOLATION) - Swab, Nasopharynx.  Procedure                               Abnormality         Status                     ---------                               -----------         ------                     Respiratory Panel PCR w/...[[id number]]  Normal              Final result                 Please view results for these tests on the individual orders.   RAINBOW DRAW    Narrative:     The following orders were created for panel order [place] Draw.  Procedure                               Abnormality         Status                     ---------                               -----------         ------                     Light Blue Top[[id number]]                                   Final result               Green Top (Gel)[[id number]]                                  Final result               Lavender Top[[id number]]                                     Final result               Gold Top - SST[[id number]]                                   Final result                 Please view results for these tests on the individual orders.   POCT GLUCOSE FINGERSTICK   TYPE AND SCREEN   LIGHT BLUE TOP   GREEN TOP   LAVENDER TOP   GOLD TOP - SST   CBC AND DIFFERENTIAL     Narrative:     The following orders were created for panel order CBC & Differential.  Procedure                               Abnormality         Status                     ---------                               -----------         ------                     CBC Auto Differential[[id number]]        Abnormal            Final result                 Please view results for these tests on the individual orders.       EKG:   ECG 12 Lead   Preliminary Result   HEART RATE= 77  bpm   RR Interval= 781  ms   MD Interval=   ms   P Horizontal Axis=   deg   P Front Axis=   deg   QRSD Interval= 104  ms   QT Interval= 444  ms   QRS Axis= 92  deg   T Wave Axis= 44  deg   - ABNORMAL ECG -   Atrial fibrillation   Right axis deviation   Prolonged QT interval   Electronically Signed By:    Date and Time of Study: 2020-12-09 09:44:41          Meds given in ED:   Medications   sodium chloride 0.9 % flush 10 mL (has no administration in time range)   iopamidol (ISOVUE-370) 76 % injection 100 mL (95 mL Intravenous Given by Other 12/9/20 0904)   iopamidol (ISOVUE-370) 76 % injection 50 mL (50 mL Intravenous Given by Other 12/9/20 0904)       Imaging results:  Ct Angiogram Neck    Result Date: 12/9/2020  The noncontrast head CT as well as the CT perfusion study demonstrate no convincing evidence for an area of acute completed infarction. Also, the CT perfusion study demonstrates no convincing evidence for hypoperfusion of the right hemisphere.  There is a stent visualized within the distal aspect of the right common carotid artery extending to the proximal portion of the right internal carotid. Along the posterior margins of the stent, there is an area of relative hypodensity which could potentially represent some intimal hyperplasia or mural thrombus. The lumen within the stent is difficult to evaluate due to some beam-hardening artifact. The stent is somewhat constrained by the calcific atherosclerotic plaque and there may be up to 60%  NASCET stenosis within the stent given the constrained diameter of the stent as well as the potential intimal hyperplasia/marrow thrombus, although further evaluation of the stent could be obtained with either a Doppler study or a catheter-based arteriogram.  There is at most a 10% to 20% NASCET stenosis within the left ICA.  There is a moderate degree of stenosis involving the proximal basilar artery. Relatively mild degrees of atherosclerotic narrowing are identified within the cavernous internal carotid arteries.  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  Incidental note is made of some secretions within the right lateral aspect of the trachea.  The findings of the noncontrast head CT were discussed with Dr. William on 12/09/2020 at approximately 8:58 AM. The findings of the CT angiogram and CT perfusion study were discussed with Dr. William at approximately 9:15 AM.  Radiation dose reduction techniques were utilized, including automated exposure control and exposure modulation based on body size.  This report was finalized on 12/9/2020 12:18 PM by Dr. Robinson Odell M.D.      Xr Chest 1 View    Result Date: 12/9/2020  Pulmonary vascular engorgement with some interstitial prominence which may represent mild pulmonary edema.  This report was finalized on 12/9/2020 10:31 AM by Dr. Asa Calvert M.D.      Ct Angiogram Head    Result Date: 12/9/2020  The noncontrast head CT as well as the CT perfusion study demonstrate no convincing evidence for an area of acute completed infarction. Also, the CT perfusion study demonstrates no convincing evidence for hypoperfusion of the right hemisphere.  There is a stent visualized within the distal aspect of the right common carotid artery extending to the proximal portion of the right internal carotid. Along the posterior margins of the stent, there  is an area of relative hypodensity which could potentially represent some intimal hyperplasia or mural thrombus. The lumen within the stent is difficult to evaluate due to some beam-hardening artifact. The stent is somewhat constrained by the calcific atherosclerotic plaque and there may be up to 60% NASCET stenosis within the stent given the constrained diameter of the stent as well as the potential intimal hyperplasia/marrow thrombus, although further evaluation of the stent could be obtained with either a Doppler study or a catheter-based arteriogram.  There is at most a 10% to 20% NASCET stenosis within the left ICA.  There is a moderate degree of stenosis involving the proximal basilar artery. Relatively mild degrees of atherosclerotic narrowing are identified within the cavernous internal carotid arteries.  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  Incidental note is made of some secretions within the right lateral aspect of the trachea.  The findings of the noncontrast head CT were discussed with Dr. William on 12/09/2020 at approximately 8:58 AM. The findings of the CT angiogram and CT perfusion study were discussed with Dr. William at approximately 9:15 AM.  Radiation dose reduction techniques were utilized, including automated exposure control and exposure modulation based on body size.  This report was finalized on 12/9/2020 12:18 PM by Dr. Robinson Odell M.D.      Ct Cerebral Perfusion With & Without Contrast    Result Date: 12/9/2020  The noncontrast head CT as well as the CT perfusion study demonstrate no convincing evidence for an area of acute completed infarction. Also, the CT perfusion study demonstrates no convincing evidence for hypoperfusion of the right hemisphere.  There is a stent visualized within the distal aspect of the right common carotid artery extending to the  proximal portion of the right internal carotid. Along the posterior margins of the stent, there is an area of relative hypodensity which could potentially represent some intimal hyperplasia or mural thrombus. The lumen within the stent is difficult to evaluate due to some beam-hardening artifact. The stent is somewhat constrained by the calcific atherosclerotic plaque and there may be up to 60% NASCET stenosis within the stent given the constrained diameter of the stent as well as the potential intimal hyperplasia/marrow thrombus, although further evaluation of the stent could be obtained with either a Doppler study or a catheter-based arteriogram.  There is at most a 10% to 20% NASCET stenosis within the left ICA.  There is a moderate degree of stenosis involving the proximal basilar artery. Relatively mild degrees of atherosclerotic narrowing are identified within the cavernous internal carotid arteries.  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  Incidental note is made of some secretions within the right lateral aspect of the trachea.  The findings of the noncontrast head CT were discussed with Dr. William on 12/09/2020 at approximately 8:58 AM. The findings of the CT angiogram and CT perfusion study were discussed with Dr. William at approximately 9:15 AM.  Radiation dose reduction techniques were utilized, including automated exposure control and exposure modulation based on body size.  This report was finalized on 12/9/2020 12:18 PM by Dr. Robinson Odell M.D.        Ambulatory status:       Social issues:   Social History     Socioeconomic History   • Marital status:      Spouse name: Not on file   • Number of children: Not on file   • Years of education: Not on file   • Highest education level: Not on file   Tobacco Use   • Smoking status: Never Smoker   • Smokeless tobacco:  Never Used   Substance and Sexual Activity   • Alcohol use: No   • Drug use: Defer   • Sexual activity: Defer    Nursing report ED to floor       Kimberly Blackmon RN  12/09/20 [id number]

## 2020-12-09 NOTE — CONSULTS
Acute rehab referral received via stroke order set. Patient with NIHSS 1. Will sign off.     Thank you!    Esvin Edwards RN  p

## 2020-12-09 NOTE — CONSULTS
Neurology Consult Note    Consult Date: 12/9/2020    Referring MD: Dr. Johnson    Reason for Consult I have been asked to see the patient in neurological consultation to render advice and opinion regarding left sided weakness, numbness    Danni Aguilar is a 89 y.o. male with past medical history of atrial fibrillation on warfarin and right carotid stent placement in 2017 who has done well since that time.  He was last seen in our office 4 months ago and was not having any new neurologic issues at that time.  He reports that over the past 1 week he has had 1-3 episodes per day that consist of transient numbness and sometimes weakness of the left upper and lower extremities.  These last approximately 5 minutes and have no exacerbating or relieving factors or any particular trigger.  He reports that this is similar to his initial symptoms from carotid stenosis prior to receiving his stent.  He denies any loss of vision in either eye.  He denies any change in his speech.  He does endorse some ataxia and dragging of the left leg which is more frequent than the spells he complains of.  He denies any recent changes to his medications.    Past Medical/Surgical Hx:  Past Medical History:   Diagnosis Date   • A-fib (CMS/HCC)    • Irregular heart beat      Past Surgical History:   Procedure Laterality Date   • BACK SURGERY     • CEREBRAL ANGIOGRAM N/A 7/24/2017    Procedure: DIAGNOSTIC CEREBRAL ANGIOGRAM AND RIGHT CAROTID STENT PLACEMENT;  Surgeon: Mauricio Yung MD;  Location: [facility] 18/19;  Service:        Medications On Admission  Warfarin, low-dose aspirin    Allergies:  No Known Allergies    Social Hx:  Social History     Socioeconomic History   • Marital status:      Spouse name: Not on file   • Number of children: Not on file   • Years of education: Not on file   • Highest education level: Not on file   Tobacco Use   • Smoking status: Never Smoker   • Smokeless tobacco: Never Used   Substance and  "Sexual Activity   • Alcohol use: No   • Drug use: Defer   • Sexual activity: Defer       Family Hx:  History reviewed. No pertinent family history.    Review of systems  Constitutional: [No fevers, chills]  Eye: [No recent visual problems, eye discharge]  Respiratory: [No shortness of breath, cough]  Cardiovascular: [No Chest pain, palpitations]  Neurologic: [+ Weakness, numbness]  Psychiatric: [No anxiety, depression]    All other systems reviewed and are negative    Exam    /78 (BP Location: Left arm, Patient Position: Lying)   Pulse 90   Temp 98 °F (36.7 °C) (Tympanic)   Resp 18   Ht 172.7 cm (67.99\")   Wt 68.8 kg (151 lb 9.6 oz)   SpO2 97%   BMI 23.06 kg/m²   gen: NAD, vitals reviewed  Eyes: fundus sharp with no papilledema or retinal hemorrhages  HEENT: no nuchal rigidity  CVS: Irregular, S1, S2, systolic murmur  MS: oriented x3, recent/remote memory intact, normal attention/concentration, language intact, no neglect, normal fund of knowledge  CN: visual acuity grossly normal, visual fields full, PERRL, EOMI, facial sensation equal, no facial droop, hearing symmetric, palate elevates symmetrically, shoulder shrug equal, tongue midline  Motor: 5/5 throughout upper and lower extremities, normal tone  Sensation: intact to vibration and temperature throughout  Reflexes: 2+ throughout upper and lower extremities, downgoing plantars  Coordination: no dysmetria with finger to nose bilaterally  Gait: no ataxia, normal station    DATA:    Lab Results   Component Value Date    GLUCOSE 84 12/09/2020    CALCIUM 9.2 12/09/2020     12/09/2020    K 3.8 12/09/2020    CO2 29.1 (H) 12/09/2020     12/09/2020    BUN 23 12/09/2020    CREATININE 1.09 12/09/2020    EGFRIFNONA 63 12/09/2020    BCR 21.1 12/09/2020    ANIONGAP 7.9 12/09/2020     Lab Results   Component Value Date    WBC 8.32 12/09/2020    HGB 14.4 12/09/2020    HCT 44.0 12/09/2020    MCV 80.3 12/09/2020     (H) 12/09/2020     Lab Results "   Component Value Date     (H) 07/21/2017     Lab Results   Component Value Date    HGBA1C 5.70 (H) 07/21/2017     Lab Results   Component Value Date    INR 1.88 (H) 12/09/2020    INR 3.34 (H) 09/19/2017    INR 1.34 (H) 07/24/2017    PROTIME 21.4 (H) 12/09/2020    PROTIME 32.8 (H) 09/19/2017    PROTIME 16.1 (H) 07/24/2017       Lab review: Platelets 486, , INR 1.9    Imaging review: I personally reviewed his CTA and CT perfusion were performed in the emergency department.  CTA shows a patent right carotid stent, no significant extracranial or intracranial flow-limiting stenosis seen.  CT perfusion is normal.  I discussed the study with reading neuroradiologist Dr. Odell    Diagnoses:  Left sided weakness  History of right carotid stent  paroxysmal atrial fibrillation  Anticoagulated    Comment: 89-year-old male with A. fib, carotid stent presenting with transient left-sided weakness and numbness.  His carotid stent is patent on CTA but his INR is slightly subtherapeutic.    PLAN:  Observation overnight  MRI brain without contrast  Target INR 2-3  Continue low dose ASA  BP <180/110    Will follow

## 2020-12-09 NOTE — THERAPY EVALUATION
Acute Care - Speech Language Pathology   Swallow Initial Evaluation [facility]     Patient Name: Danni Aguilar  : 1926  MRN: [id number]  Today's Date: 2020               Admit Date: 2020    Visit Dx:     ICD-10-CM ICD-9-CM   1. TIA (transient ischemic attack)  G45.9 435.9   2. Left arm numbness  R20.0 782.0   3. Left leg numbness  R20.0 782.0     Patient Active Problem List   Diagnosis   • Acute CVA (cerebrovascular accident) (CMS/HCC)   • Atrial fibrillation (CMS/HCC)   • Long term (current) use of anticoagulants   • Carotid stenosis   • Urinary retention   • Cerebrovascular accident (CVA) due to stenosis of right carotid artery (CMS/HCC)   • Hyperlipidemia   • TIA (transient ischemic attack)     Past Medical History:   Diagnosis Date   • A-fib (CMS/HCC)    • Irregular heart beat      Past Surgical History:   Procedure Laterality Date   • BACK SURGERY     • CEREBRAL ANGIOGRAM N/A 2017    Procedure: DIAGNOSTIC CEREBRAL ANGIOGRAM AND RIGHT CAROTID STENT PLACEMENT;  Surgeon: Mauricio Yung MD;  Location: [facility] ;  Service:      PPE: Patient was not wearing a face mask during this therapy encounter per swallow evaluation performed. Therapist used appropriate personal protective equipment including mask, eye protection and gloves.  Mask used was standard procedure mask. Appropriate PPE was worn during the entire therapy session. The patient coughed during this evaluation. Therapist was within 6 feet for 15 minutes or more during the evaluation. Hand hygiene was completed before and after therapy session. Patient is not in enhanced droplet precautions.          SWALLOW EVALUATION (last 72 hours)      SLP Adult Swallow Evaluation     Row Name 20 1600          Document Type  evaluation  -AB    Subjective Information  no complaints  -AB    Patient Observations  alert;cooperative;agree to therapy  -AB    Patient/Family/Caregiver Comments/Observations  Pt is pleasant,  cooperative. he can provide all pertinent medical information and biographical info. functional  memory is adequate for events of day.   -AB    Care Plan Review  evaluation/treatment results reviewed  -AB    Patient Effort  excellent  -AB          Patient Profile Reviewed  yes  -AB    Pertinent History Of Current Problem  89 y.o. male who lives at home alone independently, admit w/L sided upper and lower extremity numbness/tingling. L facial droop, uncertain of new onset.   -AB    Current Method of Nutrition  NPO  -AB    Precautions/Limitations, Vision  WFL with corrective lenses  -AB    Precautions/Limitations, Hearing  WFL;for purposes of eval  -AB    Prior Level of Function-Communication  WFL  -AB    Prior Level of Function-Swallowing  no diet consistency restrictions;safe, efficient swallowing in all situations  -AB    Plans/Goals Discussed with  patient  -AB    Barriers to Rehab  none identified  -AB          Additional Documentation  Pain Scale: Numbers Pre/Post-Treatment (Group)  -AB          Pretreatment Pain Rating  0/10 - no pain  -AB    Posttreatment Pain Rating  0/10 - no pain  -AB          Oral Lesions or Structural Abnormalities and/or variants  none  -AB    Dentition Assessment  natural, present and adequate  -AB    Secretion Management  WNL/WFL  -AB    Mucosal Quality  moist, healthy  -AB    Gag Response  -- DNA  -AB    Volitional Swallow  WFL  -AB    Volitional Cough  WFL  -AB          Oral Motor General Assessment  oral labial or buccal impairment  -AB    Oral Labial or Buccal Impairment, Detail, Cranial Nerve VII (Facial):  left labial droop  -AB    Oral Motor, Comment  L labial droop does not impact ROM  -AB          Clinical Swallow Evaluation Summary  Bedside swallow evaluation completed with: ice chips, water by cup/straw, puree, mechanical soft, and regular solids. All oral phase functions deemed WFL; labial seal adequate without anterior loss, A-P transport is timely and efficient, no oral  residue or buccal pocketing are noted post prandial. Exception remains minimal L sided buccal pocketing with regular solids with pt aware, clearing independently with a lingual sweep. Upon pharyngeal swallow initiation, digital palpation performed with all swallows. Hyo-laryngeal movement appears adequate with x1 timely swallow initiated per bolus. No overt s/s aspiration are noted post prandial. No significant comorbidities indicative that instrumental evaluation is required. Recommendations include: initiation of regular and thin liquid diet, with follow up PRN.    -AB          SLP Swallowing Diagnosis  functional oral phase;functional pharyngeal phase  -AB    Functional Impact  no impact on function  -AB    Rehab Potential/Prognosis, Swallowing  good, to achieve stated therapy goals  -AB    Swallow Criteria for Skilled Therapeutic Interventions Met  demonstrates skilled criteria  -AB          Therapy Frequency (Swallow)  PRN  -AB    Predicted Duration Therapy Intervention (Days)  until discharge  -AB    SLP Diet Recommendation  regular textures;thin liquids  -AB    Recommended Precautions and Strategies  upright posture during/after eating;small bites of food and sips of liquid;check mouth frequently for oral residue/pocketing  -AB    Oral Care Recommendations  Oral Care before breakfast, after meals and PRN  -AB    SLP Rec. for Method of Medication Administration  meds whole;with thin liquids  -AB    Monitor for Signs of Aspiration  yes;notify SLP if any concerns  -AB    Anticipated Discharge Disposition (SLP)  unknown  -AB          Oral Nutrition/Hydration Goal Selection (SLP)  oral nutrition/hydration, SLP goal 1  -AB          Oral Nutrition/Hydration Goal 1, SLP  Will tolerate L/R diet without oral stage difficulties or complications associated with aspiration  -AB    Time Frame (Oral Nutrition/Hydration Goal 1, SLP)  by discharge  -AB      User Key  (r) = Recorded By, (t) = Taken By, (c) = Cosigned By     Initials Name Effective Dates    AB Dari Arguelles, MS CCC-SLP 10/05/20 -           EDUCATION  The patient has been educated in the following areas:   Dysphagia (Swallowing Impairment).    SLP Recommendation and Plan  SLP Swallowing Diagnosis: functional oral phase, functional pharyngeal phase  SLP Diet Recommendation: regular textures, thin liquids  Recommended Precautions and Strategies: upright posture during/after eating, small bites of food and sips of liquid, check mouth frequently for oral residue/pocketing  SLP Rec. for Method of Medication Administration: meds whole, with thin liquids     Monitor for Signs of Aspiration: yes, notify SLP if any concerns     Swallow Criteria for Skilled Therapeutic Interventions Met: demonstrates skilled criteria  Anticipated Discharge Disposition (SLP): unknown  Rehab Potential/Prognosis, Swallowing: good, to achieve stated therapy goals  Therapy Frequency (Swallow): PRN  Predicted Duration Therapy Intervention (Days): until discharge      Plan of Care Reviewed With: patient  Progress: no change  Outcome Summary: Bedside swallow evaluation completed with oropharyngeal swallow WFL. Trace L sided residue with pt aware and clearing independently. Recommend: regular and thin liquid diet. Medications with thin liquids. Compensations to include: small bites/sips, upright for all PO, pt self check mouth frequently for oral pocketing. SLP to follow peripherally, available with changes in condition or if difficulties arise. Will monitor further neurology workup and imaging to determine if speech/language/cognitive evaluation is appropriate.    SLP GOALS     Row Name 12/09/20 1600             Oral Nutrition/Hydration Goal 1 (SLP)    Oral Nutrition/Hydration Goal 1, SLP  Will tolerate L/R diet without oral stage difficulties or complications associated with aspiration  -AB      Time Frame (Oral Nutrition/Hydration Goal 1, SLP)  by discharge  -AB        User Key  (r) = Recorded By,  (t) = Taken By, (c) = Cosigned By    Initials Name Provider Type    Dari Griffith MS CCC-SLP Speech and Language Pathologist           SLP Outcome Measures (last 72 hours)      SLP Outcome Measures     Row Name 12/09/20 1600             SLP Outcome Measures    Outcome Measure Used?  Adult NOMS  -AB         Adult FCM Scores    FCM Chosen  Swallowing  -AB      Swallowing FCM Score  6  -AB        User Key  (r) = Recorded By, (t) = Taken By, (c) = Cosigned By    Initials Name Effective Dates    Dari Griffith MS CCC-SLP 10/05/20 -            Time Calculation:   Time Calculation- SLP     Row Name 12/09/20 1614             Time Calculation- SLP    SLP Start Time  1500  -AB      SLP Received On  12/09/20  -AB        User Key  (r) = Recorded By, (t) = Taken By, (c) = Cosigned By    Initials Name Provider Type    Dari Griffith MS CCC-SLP Speech and Language Pathologist          Therapy Charges for Today     Code Description Service Date Service Provider Modifiers Qty    [id number] HC ST EVAL ORAL PHARYNG SWALLOW 3 12/9/2020 Dari Arguelles MS CCC-SLP GN 1               Dari Arguelles MS CCC-SLP  12/9/2020

## 2020-12-09 NOTE — ED NOTES
"Pt states left arm and leg numbness that has been occurring x1 week. Pt's last \"episode\" was last night in which lasts for no longer than 5 mins. Pt states he currently has no symptoms. Pt hx of stroke x3 years ago. Pt on warfarin for hx of a. Fib. Pt in NAD at this time     Patient in mask. This RN in appropriate PPE - including mask, goggles, and gloves during all of patient care.        Sara Dangelo, RN  12/09/20 0759    "

## 2020-12-09 NOTE — ED PROVIDER NOTES
Pt presents to the ED c/o  intermittent left arm and left leg weakness for the past week or so.  The episodes happen sporadically and resolve spontaneously.  He has history of carotid artery stenosis and has a carotid artery stent as well as atrial fibrillation-anti-coagulated with Coumadin.     On exam,   Awake, alert, no acute distress  Neuro-cranial nerves II through XII intact.  No appreciable focal deficits peripherally.     Plan: CT of the brain, CT angiogram and CT perfusion scan were performed.  The patient was seen and evaluated by stroke neurologist.  He will be admitted for further evaluation and treatment.      Appropriate PPE was worn by myself and the patient throughout her entire interaction.       Attestation:  The ANDRAE and I have discussed this patient's history, physical exam, and treatment plan.  I have reviewed the documentation and personally had a face to face interaction with the patient. I affirm the documentation and agree with the treatment and plan.  The attached note describes my personal findings.            Mario Johnson MD  12/09/20 1013

## 2020-12-09 NOTE — ED PROVIDER NOTES
EMERGENCY DEPARTMENT ENCOUNTER    Room Number:  P591/1  Date seen:  12/10/2020  Time seen: 08:49 EST  PCP: Rolando Hadley MD  Historian: Patient    HPI:  Chief complaint: Neuro deficit  A complete HPI/ROS/PMH/PSH/SH/FH are unobtainable due to: None  Context:Danni Aguilar is a 89 y.o. male, who presents to the ED with c/o intermittent left arm and left leg numbness which has been going on for 7 days.  He says the episodes last for approximately 5 minutes and spontaneously resolves.  Associated symptoms include intermittent dizziness.  Said he had very similar symptoms 3 years ago and had carotid artery stenosis.  Patient has a history of A. fib and is anticoagulated with Coumadin.  He last took his Coumadin this morning. Says he waited to come to the ER due to COVID. Pt denies any weakness, AMS, headache.     Patient was placed in face mask in first look. Patient was wearing facemask when I entered the room and throughout our encounter. I wore full protective equipment throughout this patient encounter including a face mask, goggles, and gloves. Hand hygiene was performed before donning protective equipment and after removal when leaving the room.      MEDICAL RECORD REVIEW  Pt was seen at [facility] ER on 9/19/2017 for fall and left elbow hematoma.    ALLERGIES  Patient has no known allergies.    PAST MEDICAL HISTORY  Active Ambulatory Problems     Diagnosis Date Noted   • Acute CVA (cerebrovascular accident) (CMS/Formerly [facility]) 07/20/2017   • Atrial fibrillation (CMS/[facility]) 07/20/2017   • Long term (current) use of anticoagulants 07/20/2017   • Carotid stenosis 07/20/2017   • Urinary retention 07/25/2017   • Cerebrovascular accident (CVA) due to stenosis of right carotid artery (CMS/[facility]) 08/25/2017   • Hyperlipidemia 01/29/2018     Resolved Ambulatory Problems     Diagnosis Date Noted   • No Resolved Ambulatory Problems     Past Medical History:   Diagnosis Date   • A-fib (CMS/[facility])    • Irregular heart beat        PAST SURGICAL  HISTORY  Past Surgical History:   Procedure Laterality Date   • BACK SURGERY     • CEREBRAL ANGIOGRAM N/A 7/24/2017    Procedure: DIAGNOSTIC CEREBRAL ANGIOGRAM AND RIGHT CAROTID STENT PLACEMENT;  Surgeon: Mauricio Yung MD;  Location: [facility] 18/19;  Service:        FAMILY HISTORY  History reviewed. No pertinent family history.    SOCIAL HISTORY  Social History     Socioeconomic History   • Marital status:      Spouse name: Not on file   • Number of children: Not on file   • Years of education: Not on file   • Highest education level: Not on file   Tobacco Use   • Smoking status: Never Smoker   • Smokeless tobacco: Never Used   Substance and Sexual Activity   • Alcohol use: No   • Drug use: Defer   • Sexual activity: Defer       REVIEW OF SYSTEMS  Review of Systems    All systems reviewed and negative except for those discussed in HPI.     PHYSICAL EXAM    ED Triage Vitals   Temp Heart Rate Resp BP SpO2   12/09/20 0732 12/09/20 0732 12/09/20 0732 12/09/20 0749 12/09/20 0732   98 °F (36.7 °C) 90 18 160/78 97 %      Temp src Heart Rate Source Patient Position BP Location FiO2 (%)   12/09/20 0732 -- 12/09/20 0749 12/09/20 0749 --   Tympanic  Lying Left arm      Physical Exam    I have reviewed the triage vital signs and nursing notes.      GENERAL: not distressed  HENT: nares patent  EYES: no scleral icterus  NECK: no ROM limitations  CV: regular rhythm, regular rate  RESPIRATORY: normal effort  ABDOMEN: soft  : deferred  MUSCULOSKELETAL: no deformity  NEURO: alert, moves all extremities, follows commands    Minimal L facial paralysis (secondary to stroke several years ago); no other focal neuro deficits.    Sensation intact.  5/5 strength in all extremities.  Normal cerebellar testing.  No drift in any extremity.  No dysarthria.  No aphasia.  No neglect/extinction.     SKIN: warm, dry    LAB RESULTS  Recent Results (from the past 24 hour(s))   CBC (No Diff)    Collection Time: 12/09/20  4:06 PM     Specimen: Blood   Result Value Ref Range    WBC 8.17 3.40 - 10.80 10*3/mm3    RBC 5.63 4.14 - 5.80 10*6/mm3    Hemoglobin 15.0 13.0 - 17.7 g/dL    Hematocrit 46.0 37.5 - 51.0 %    MCV 81.7 79.0 - 97.0 fL    MCH 26.6 26.6 - 33.0 pg    MCHC 32.6 31.5 - 35.7 g/dL    RDW 14.0 12.3 - 15.4 %    RDW-SD 41.3 37.0 - 54.0 fl    MPV 8.9 6.0 - 12.0 fL    Platelets 441 140 - 450 10*3/mm3   Comprehensive Metabolic Panel    Collection Time: 12/09/20  4:06 PM    Specimen: Blood   Result Value Ref Range    Glucose 100 (H) 65 - 99 mg/dL    BUN 17 8 - 23 mg/dL    Creatinine 0.98 0.76 - 1.27 mg/dL    Sodium 134 (L) 136 - 145 mmol/L    Potassium 3.9 3.5 - 5.2 mmol/L    Chloride 97 (L) 98 - 107 mmol/L    CO2 28.7 22.0 - 29.0 mmol/L    Calcium 9.4 8.2 - 9.6 mg/dL    Total Protein 7.8 6.0 - 8.5 g/dL    Albumin 4.10 3.50 - 5.20 g/dL    ALT (SGPT) 23 1 - 41 U/L    AST (SGOT) 26 1 - 40 U/L    Alkaline Phosphatase 63 39 - 117 U/L    Total Bilirubin 0.7 0.0 - 1.2 mg/dL    eGFR Non African Amer 71 >60 mL/min/1.73    Globulin 3.7 gm/dL    A/G Ratio 1.1 g/dL    BUN/Creatinine Ratio 17.3 7.0 - 25.0    Anion Gap 8.3 5.0 - 15.0 mmol/L   Protime-INR    Collection Time: 12/09/20  4:06 PM    Specimen: Arm, Left; Blood   Result Value Ref Range    Protime 20.9 (H) 11.7 - 14.2 Seconds    INR 1.83 (H) 0.90 - 1.10   POC Glucose Once    Collection Time: 12/09/20  5:37 PM    Specimen: Blood   Result Value Ref Range    Glucose 92 70 - 130 mg/dL   POC Glucose Once    Collection Time: 12/09/20 11:47 PM    Specimen: Blood   Result Value Ref Range    Glucose 97 70 - 130 mg/dL   POC Glucose Once    Collection Time: 12/10/20  5:54 AM    Specimen: Blood   Result Value Ref Range    Glucose 81 70 - 130 mg/dL   Hemoglobin A1c    Collection Time: 12/10/20  8:55 AM    Specimen: Hand, Left; Blood   Result Value Ref Range    Hemoglobin A1C 5.80 (H) 4.80 - 5.60 %   Lipid Panel    Collection Time: 12/10/20  8:55 AM    Specimen: Hand, Left; Blood   Result Value Ref Range     Total Cholesterol 131 0 - 200 mg/dL    Triglycerides 100 0 - 150 mg/dL    HDL Cholesterol 55 40 - 60 mg/dL    LDL Cholesterol  57 0 - 100 mg/dL    VLDL Cholesterol 19 5 - 40 mg/dL    LDL/HDL Ratio 1.02    CBC (No Diff)    Collection Time: 12/10/20  8:55 AM    Specimen: Hand, Left; Blood   Result Value Ref Range    WBC 8.25 3.40 - 10.80 10*3/mm3    RBC 6.04 (H) 4.14 - 5.80 10*6/mm3    Hemoglobin 15.5 13.0 - 17.7 g/dL    Hematocrit 49.1 37.5 - 51.0 %    MCV 81.3 79.0 - 97.0 fL    MCH 25.7 (L) 26.6 - 33.0 pg    MCHC 31.6 31.5 - 35.7 g/dL    RDW 13.9 12.3 - 15.4 %    RDW-SD 40.6 37.0 - 54.0 fl    MPV 9.1 6.0 - 12.0 fL    Platelets 497 (H) 140 - 450 10*3/mm3   Comprehensive Metabolic Panel    Collection Time: 12/10/20  8:55 AM    Specimen: Hand, Left; Blood   Result Value Ref Range    Glucose 124 (H) 65 - 99 mg/dL    BUN 19 8 - 23 mg/dL    Creatinine 1.10 0.76 - 1.27 mg/dL    Sodium 135 (L) 136 - 145 mmol/L    Potassium 4.2 3.5 - 5.2 mmol/L    Chloride 100 98 - 107 mmol/L    CO2 26.3 22.0 - 29.0 mmol/L    Calcium 9.3 8.2 - 9.6 mg/dL    Total Protein 7.8 6.0 - 8.5 g/dL    Albumin 4.10 3.50 - 5.20 g/dL    ALT (SGPT) 27 1 - 41 U/L    AST (SGOT) 28 1 - 40 U/L    Alkaline Phosphatase 66 39 - 117 U/L    Total Bilirubin 0.6 0.0 - 1.2 mg/dL    eGFR Non African Amer 62 >60 mL/min/1.73    Globulin 3.7 gm/dL    A/G Ratio 1.1 g/dL    BUN/Creatinine Ratio 17.3 7.0 - 25.0    Anion Gap 8.7 5.0 - 15.0 mmol/L   Protime-INR    Collection Time: 12/10/20  8:55 AM    Specimen: Hand, Left; Blood   Result Value Ref Range    Protime 20.9 (H) 11.7 - 14.2 Seconds    INR 1.83 (H) 0.90 - 1.10         RADIOLOGY RESULTS  MRI Brain Without Contrast   Final Result   There is a 4 mm area of increased signal intensity on the   diffusion sequence involving the right parietal lobe posteriorly without   convincing signal loss on the ADC map. The appearance is nonspecific but   suspicious for a small acute to subacute infarct. Clinical correlation   is  recommended. Further evaluation could be performed with a follow-up   MRI examination of the brain with and without contrast. There is   moderate atrophy. Moderate small vessel ischemic disease is noted. A   small remote infarct is appreciated involving the right cerebellar   hemisphere posterolaterally.       This report was finalized on 12/10/2020 6:10 AM by Dr. Ramez Lind M.D.          XR Chest 1 View   Final Result   Pulmonary vascular engorgement with some interstitial   prominence which may represent mild pulmonary edema.       This report was finalized on 12/9/2020 10:31 AM by Dr. Asa Calvert M.D.          CT Angiogram Head   Final Result   The noncontrast head CT as well as the CT perfusion study   demonstrate no convincing evidence for an area of acute completed   infarction. Also, the CT perfusion study demonstrates no convincing   evidence for hypoperfusion of the right hemisphere.       There is a stent visualized within the distal aspect of the right common   carotid artery extending to the proximal portion of the right internal   carotid. Along the posterior margins of the stent, there is an area of   relative hypodensity which could potentially represent some intimal   hyperplasia or mural thrombus. The lumen within the stent is difficult   to evaluate due to some beam-hardening artifact. The stent is somewhat   constrained by the calcific atherosclerotic plaque and there may be up   to 60% NASCET stenosis within the stent given the constrained diameter   of the stent as well as the potential intimal hyperplasia/marrow   thrombus, although further evaluation of the stent could be obtained   with either a Doppler study or a catheter-based arteriogram.        There is at most a 10% to 20% NASCET stenosis within the left ICA.       There is a moderate degree of stenosis involving the proximal basilar   artery. Relatively mild degrees of atherosclerotic narrowing are   identified within the  cavernous internal carotid arteries.       Small chronic infarcts are identified within the right parietal lobe and   the right cerebellar hemisphere within the right MCA and right PICA   distribution respectively.       Incidental note is made of some secretions within the right lateral   aspect of the trachea.       The findings of the noncontrast head CT were discussed with Dr. William   on 12/09/2020 at approximately 8:58 AM. The findings of the CT angiogram   and CT perfusion study were discussed with Dr. William at approximately   9:15 AM.       Radiation dose reduction techniques were utilized, including automated   exposure control and exposure modulation based on body size.       This report was finalized on 12/9/2020 12:18 PM by Dr. Robinson Odell M.D.          CT Angiogram Neck   Final Result   The noncontrast head CT as well as the CT perfusion study   demonstrate no convincing evidence for an area of acute completed   infarction. Also, the CT perfusion study demonstrates no convincing   evidence for hypoperfusion of the right hemisphere.       There is a stent visualized within the distal aspect of the right common   carotid artery extending to the proximal portion of the right internal   carotid. Along the posterior margins of the stent, there is an area of   relative hypodensity which could potentially represent some intimal   hyperplasia or mural thrombus. The lumen within the stent is difficult   to evaluate due to some beam-hardening artifact. The stent is somewhat   constrained by the calcific atherosclerotic plaque and there may be up   to 60% NASCET stenosis within the stent given the constrained diameter   of the stent as well as the potential intimal hyperplasia/marrow   thrombus, although further evaluation of the stent could be obtained   with either a Doppler study or a catheter-based arteriogram.        There is at most a 10% to 20% NASCET stenosis within the left ICA.       There is a  moderate degree of stenosis involving the proximal basilar   artery. Relatively mild degrees of atherosclerotic narrowing are   identified within the cavernous internal carotid arteries.       Small chronic infarcts are identified within the right parietal lobe and   the right cerebellar hemisphere within the right MCA and right PICA   distribution respectively.       Incidental note is made of some secretions within the right lateral   aspect of the trachea.       The findings of the noncontrast head CT were discussed with Dr. William   on 12/09/2020 at approximately 8:58 AM. The findings of the CT angiogram   and CT perfusion study were discussed with Dr. William at approximately   9:15 AM.       Radiation dose reduction techniques were utilized, including automated   exposure control and exposure modulation based on body size.       This report was finalized on 12/9/2020 12:18 PM by Dr. Robinson Odell M.D.          CT Cerebral Perfusion With & Without Contrast   Final Result   The noncontrast head CT as well as the CT perfusion study   demonstrate no convincing evidence for an area of acute completed   infarction. Also, the CT perfusion study demonstrates no convincing   evidence for hypoperfusion of the right hemisphere.       There is a stent visualized within the distal aspect of the right common   carotid artery extending to the proximal portion of the right internal   carotid. Along the posterior margins of the stent, there is an area of   relative hypodensity which could potentially represent some intimal   hyperplasia or mural thrombus. The lumen within the stent is difficult   to evaluate due to some beam-hardening artifact. The stent is somewhat   constrained by the calcific atherosclerotic plaque and there may be up   to 60% NASCET stenosis within the stent given the constrained diameter   of the stent as well as the potential intimal hyperplasia/marrow   thrombus, although further evaluation of the  stent could be obtained   with either a Doppler study or a catheter-based arteriogram.        There is at most a 10% to 20% NASCET stenosis within the left ICA.       There is a moderate degree of stenosis involving the proximal basilar   artery. Relatively mild degrees of atherosclerotic narrowing are   identified within the cavernous internal carotid arteries.       Small chronic infarcts are identified within the right parietal lobe and   the right cerebellar hemisphere within the right MCA and right PICA   distribution respectively.       Incidental note is made of some secretions within the right lateral   aspect of the trachea.       The findings of the noncontrast head CT were discussed with Dr. William   on 12/09/2020 at approximately 8:58 AM. The findings of the CT angiogram   and CT perfusion study were discussed with Dr. William at approximately   9:15 AM.       Radiation dose reduction techniques were utilized, including automated   exposure control and exposure modulation based on body size.       This report was finalized on 12/9/2020 12:18 PM by Dr. Robinson Odell M.D.                PROGRESS, DATA ANALYSIS, CONSULTS AND MEDICAL DECISION MAKING  All labs have been independently reviewed by me.  All radiology studies have been reviewed by me and discussed with radiologist dictating the report. Discussion below represents my analysis of pertinent findings related to patient's condition, differential diagnosis, treatment plan and final disposition.     ED Course as of Dec 10 1522   Wed Dec 09, 2020   0846 I discussed with Dr. Hamilton, stroke neurology on-call regarding patient.  Although patient is not a TPA candidate, he would like the Team D protocol ordered at this time.  He says the OR does not need to be notified at this time.    [SS]   9547 Dr Hamilton is in the ER evaluating.  He has reviewed the CTA head and neck does not see anything acute.  Notes the carotid stent is patent.  Recommends  "admitting patient for an MRI    [SS]   1119 I still has not heard back from A  We paged A at this time.    [SS]   1134 I discussed with Dr. Rao [facility] regarding patient.  Agrees admit patient and all question addressed at this time.    [SS]      ED Course User Index  [SS] Shilpa Vail PA-C       The differential diagnosis include but are not limited to CVA, intracranial hemorrhage, TIA, metabolic encephalopthy.         Reviewed pt's history and workup with Dr. Johnson.  After a bedside evaluation, Dr. Johnson agrees with the plan of care.        (FOR ADMIT) Based on the patient's lab findings and presenting symptoms, the doctor and I feel it is appropriate to admit the patient for further management, evaluation, and treatment.  I have discussed this with the admitting team.  I have also discussed this with the patient/family.  They are in agreement with admission.          Disposition vitals:  /78 (BP Location: Right arm, Patient Position: Sitting)   Pulse 67   Temp 98.2 °F (36.8 °C) (Oral)   Resp 16   Ht 172.7 cm (67.99\")   Wt 68.8 kg (151 lb 9.6 oz)   SpO2 95%   BMI 23.06 kg/m²       DIAGNOSIS  Final diagnoses:   TIA (transient ischemic attack)   Left arm numbness   Left leg numbness       FOLLOW UP   Rolando Hadley MD  [address] [facility] [address] [phone]      monday or tuesday for INR check         Shilpa Vail PA-C  12/10/20 1522    "

## 2020-12-10 VITALS
BODY MASS INDEX: 22.97 KG/M2 | TEMPERATURE: 98.2 F | DIASTOLIC BLOOD PRESSURE: 78 MMHG | WEIGHT: 151.6 LBS | SYSTOLIC BLOOD PRESSURE: 113 MMHG | RESPIRATION RATE: 16 BRPM | OXYGEN SATURATION: 95 % | HEART RATE: 67 BPM | HEIGHT: 68 IN

## 2020-12-10 LAB
ALBUMIN SERPL-MCNC: 4.1 G/DL (ref 3.5–5.2)
ALBUMIN/GLOB SERPL: 1.1 G/DL
ALP SERPL-CCNC: 66 U/L (ref 39–117)
ALT SERPL W P-5'-P-CCNC: 27 U/L (ref 1–41)
ANION GAP SERPL CALCULATED.3IONS-SCNC: 8.7 MMOL/L (ref 5–15)
AST SERPL-CCNC: 28 U/L (ref 1–40)
BILIRUB SERPL-MCNC: 0.6 MG/DL (ref 0–1.2)
BUN SERPL-MCNC: 19 MG/DL (ref 8–23)
BUN/CREAT SERPL: 17.3 (ref 7–25)
CALCIUM SPEC-SCNC: 9.3 MG/DL (ref 8.2–9.6)
CHLORIDE SERPL-SCNC: 100 MMOL/L (ref 98–107)
CHOLEST SERPL-MCNC: 131 MG/DL (ref 0–200)
CO2 SERPL-SCNC: 26.3 MMOL/L (ref 22–29)
CREAT SERPL-MCNC: 1.1 MG/DL (ref 0.76–1.27)
DEPRECATED RDW RBC AUTO: 40.6 FL (ref 37–54)
ERYTHROCYTE [DISTWIDTH] IN BLOOD BY AUTOMATED COUNT: 13.9 % (ref 12.3–15.4)
GFR SERPL CREATININE-BSD FRML MDRD: 62 ML/MIN/1.73
GLOBULIN UR ELPH-MCNC: 3.7 GM/DL
GLUCOSE BLDC GLUCOMTR-MCNC: 81 MG/DL (ref 70–130)
GLUCOSE SERPL-MCNC: 124 MG/DL (ref 65–99)
HBA1C MFR BLD: 5.8 % (ref 4.8–5.6)
HCT VFR BLD AUTO: 49.1 % (ref 37.5–51)
HDLC SERPL-MCNC: 55 MG/DL (ref 40–60)
HGB BLD-MCNC: 15.5 G/DL (ref 13–17.7)
INR PPP: 1.83 (ref 0.9–1.1)
LDLC SERPL CALC-MCNC: 57 MG/DL (ref 0–100)
LDLC/HDLC SERPL: 1.02 {RATIO}
MCH RBC QN AUTO: 25.7 PG (ref 26.6–33)
MCHC RBC AUTO-ENTMCNC: 31.6 G/DL (ref 31.5–35.7)
MCV RBC AUTO: 81.3 FL (ref 79–97)
PLATELET # BLD AUTO: 497 10*3/MM3 (ref 140–450)
PMV BLD AUTO: 9.1 FL (ref 6–12)
POTASSIUM SERPL-SCNC: 4.2 MMOL/L (ref 3.5–5.2)
PROT SERPL-MCNC: 7.8 G/DL (ref 6–8.5)
PROTHROMBIN TIME: 20.9 SECONDS (ref 11.7–14.2)
RBC # BLD AUTO: 6.04 10*6/MM3 (ref 4.14–5.8)
SODIUM SERPL-SCNC: 135 MMOL/L (ref 136–145)
TRIGL SERPL-MCNC: 100 MG/DL (ref 0–150)
VLDLC SERPL-MCNC: 19 MG/DL (ref 5–40)
WBC # BLD AUTO: 8.25 10*3/MM3 (ref 3.4–10.8)

## 2020-12-10 PROCEDURE — 85027 COMPLETE CBC AUTOMATED: CPT | Performed by: NURSE PRACTITIONER

## 2020-12-10 PROCEDURE — 80061 LIPID PANEL: CPT | Performed by: NURSE PRACTITIONER

## 2020-12-10 PROCEDURE — 97165 OT EVAL LOW COMPLEX 30 MIN: CPT

## 2020-12-10 PROCEDURE — 83036 HEMOGLOBIN GLYCOSYLATED A1C: CPT | Performed by: NURSE PRACTITIONER

## 2020-12-10 PROCEDURE — 82962 GLUCOSE BLOOD TEST: CPT

## 2020-12-10 PROCEDURE — 92523 SPEECH SOUND LANG COMPREHEN: CPT

## 2020-12-10 PROCEDURE — G0378 HOSPITAL OBSERVATION PER HR: HCPCS

## 2020-12-10 PROCEDURE — 85610 PROTHROMBIN TIME: CPT | Performed by: NURSE PRACTITIONER

## 2020-12-10 PROCEDURE — 80053 COMPREHEN METABOLIC PANEL: CPT | Performed by: NURSE PRACTITIONER

## 2020-12-10 PROCEDURE — 99214 OFFICE O/P EST MOD 30 MIN: CPT | Performed by: NURSE PRACTITIONER

## 2020-12-10 RX ORDER — ASPIRIN 81 MG/1
81 TABLET, CHEWABLE ORAL DAILY
Start: 2020-12-11

## 2020-12-10 RX ORDER — WARFARIN SODIUM 7.5 MG/1
7.5 TABLET ORAL
Status: COMPLETED | OUTPATIENT
Start: 2020-12-10 | End: 2020-12-10

## 2020-12-10 RX ADMIN — SODIUM CHLORIDE, PRESERVATIVE FREE 10 ML: 5 INJECTION INTRAVENOUS at 08:22

## 2020-12-10 RX ADMIN — ASPIRIN 81 MG: 81 TABLET, CHEWABLE ORAL at 08:22

## 2020-12-10 RX ADMIN — WARFARIN 7.5 MG: 7.5 TABLET ORAL at 14:14

## 2020-12-10 NOTE — THERAPY EVALUATION
Acute Care - Speech Language Pathology Initial Evaluation  [facility]     Patient Name: Danni Aguilar  : 1926  MRN: [id number]  Today's Date: 12/10/2020               Admit Date: 2020     Visit Dx:    ICD-10-CM ICD-9-CM   1. TIA (transient ischemic attack)  G45.9 435.9   2. Left arm numbness  R20.0 782.0   3. Left leg numbness  R20.0 782.0     Patient Active Problem List   Diagnosis   • Acute CVA (cerebrovascular accident) (CMS/HCC)   • Atrial fibrillation (CMS/HCC)   • Long term (current) use of anticoagulants   • Carotid stenosis   • Urinary retention   • Cerebrovascular accident (CVA) due to stenosis of right carotid artery (CMS/HCC)   • Hyperlipidemia   • TIA (transient ischemic attack)     Past Medical History:   Diagnosis Date   • A-fib (CMS/HCC)    • Irregular heart beat      Past Surgical History:   Procedure Laterality Date   • BACK SURGERY     • CEREBRAL ANGIOGRAM N/A 2017    Procedure: DIAGNOSTIC CEREBRAL ANGIOGRAM AND RIGHT CAROTID STENT PLACEMENT;  Surgeon: Mauricio Yung MD;  Location: [facility] ;  Service:         SLP EVALUATION (last 72 hours)      SLP SLC Evaluation     Row Name 12/10/20 1100                   Communication Assessment/Intervention    Document Type  evaluation  -OC        Subjective Information  no complaints  -OC        Patient Observations  alert;cooperative;agree to therapy  -OC        Patient Effort  excellent  -OC           General Information    Patient Profile Reviewed  yes  -OC        Precautions/Limitations, Vision  WFL with corrective lenses  -OC        Precautions/Limitations, Hearing  WFL;for purposes of eval  -OC        Patient Level of Education  unknown  -OC        Prior Level of Function-Communication  WFL;other (see comments) reports forgetfulness with aging  -OC        Plans/Goals Discussed with  patient  -OC        Barriers to Rehab  none identified  -OC        Patient's Goals for Discharge  patient did not state  -OC         Standardized Assessment Used  Cognistat  -OC           Pain Scale: Numbers Pre/Post-Treatment    Pretreatment Pain Rating  0/10 - no pain  -OC        Posttreatment Pain Rating  0/10 - no pain  -OC           Cognitive Assessment Intervention- SLP    Cognition, Comment  The patient scored within the average range for orientation, attention, language, comprehension, repetition, naming, calculations, reasoning of silimilarities, and judgement. Patient scored mild-moderate for memory. Patient warned SLP that memory score would be lower as he has noticed forgetfulness with aging. Patient reports he feels at baseline at this time. SLP offered OP Speech Therapy if increased deficits noticed at home after DC.   -OC           SLP Clinical Impressions    SLP Diagnosis  WFL with mild-moderate memory deficits  -OC        Rehab Potential/Prognosis  good  -OC        SLC Criteria for Skilled Therapy Interventions Met  baseline status;declined skilled intervention at this time  -OC        Functional Impact  no impact on function  -OC           Recommendations    Therapy Frequency (SLP SLC)  evaluation only  -OC        Predicted Duration Therapy Intervention (Days)  until discharge  -OC        Anticipated Discharge Disposition (SLP)  unknown  -OC           SLP Discharge Summary    Discharge Destination  home w/ assist  -OC        Reason for Discharge  other (see comments) reports baseline  -OC          User Key  (r) = Recorded By, (t) = Taken By, (c) = Cosigned By    Initials Name Effective Dates    OC Elle Owusu MA,CCC-SLP 06/08/18 -              EDUCATION  The patient has been educated in the following areas:     Cognitive Impairment.    SLP Recommendation and Plan  SLP Diagnosis: WFL with mild-moderate memory deficits  Reason for Discharge: other (see comments)(reports baseline)     SLC Criteria for Skilled Therapy Interventions Met: baseline status, declined skilled intervention at this time  Anticipated Discharge Disposition  (SLP): unknown        Predicted Duration Therapy Intervention (Days): until discharge                   Plan of Care Reviewed With: patient  Outcome Summary: Cognitive-linguistic eval completed. The patient scored within the average range for orientation, attention, language, comprehension, repetition, naming, calculations, reasoning of silimilarities, and judgement. Patient scored mild-moderate for memory. Patient warned SLP that memory score would be lower as he has noticed forgetfulness with aging. Patient reports he feels at baseline at this time. SLP offered OP Speech Therapy if increased deficits noticed at home after DC.      SLP GOALS     Row Name 12/09/20 1600             Oral Nutrition/Hydration Goal 1 (SLP)    Oral Nutrition/Hydration Goal 1, SLP  Will tolerate L/R diet without oral stage difficulties or complications associated with aspiration  -AB      Time Frame (Oral Nutrition/Hydration Goal 1, SLP)  by discharge  -AB        User Key  (r) = Recorded By, (t) = Taken By, (c) = Cosigned By    Initials Name Provider Type    AB Dari Arguelles, MS CCC-SLP Speech and Language Pathologist             SLP Outcome Measures (last 72 hours)      SLP Outcome Measures     Row Name 12/10/20 1100 12/09/20 1600          SLP Outcome Measures    Outcome Measure Used?  Adult NOMS  -OC  Adult NOMS  -AB        Adult FCM Scores    FCM Chosen  Memory  -OC  Swallowing  -AB     Swallowing FCM Score  --  6  -AB     Memory FCM Score  5  -OC  --       User Key  (r) = Recorded By, (t) = Taken By, (c) = Cosigned By    Initials Name Effective Dates    OC Francoise, CLAUDIA Almeida,CCC-SLP 06/08/18 -     AB Dari Arguelles MS CCC-SLP 10/05/20 -               Time Calculation:     Time Calculation- SLP     Row Name 12/10/20 1342             Time Calculation- SLP    SLP Start Time  1100  -OC      SLP Received On  12/10/20  -OC        User Key  (r) = Recorded By, (t) = Taken By, (c) = Cosigned By    Initials Name Provider Type    OC Card,  CLAUDIA Almeida,CCC-SLP Speech and Language Pathologist          Therapy Charges for Today     Code Description Service Date Service Provider Modifiers Qty    [id number] HC ST EVAL SPEECH AND PROD W LANG  5 12/10/2020 Elle Owusu MA,CCC-SLP GN 1             ADULT NOMS (last 72 hours)      Adult NOMS     Row Name 12/10/20 1100 12/09/20 1600                Adult FCM Scores    FCM Chosen  Memory  -OC  Swallowing  -AB       Swallowing FCM Score  --  6  -AB       Memory FCM Score  5  -OC  --         User Key  (r) = Recorded By, (t) = Taken By, (c) = Cosigned By    Initials Name Effective Dates    OC Elle Owusu MA,CCC-SLP 06/08/18 -     AB Dari Arguelles, MS CCC-SLP 10/05/20 -                  Elle Owusu MA,CCC-SLP  12/10/2020

## 2020-12-10 NOTE — PROGRESS NOTES
"DOS: 12/10/2020  NAME: Danni Aguilar   : 1926  PCP: Rolando Hadley MD  Chief Complaint   Patient presents with   • Neuro Deficit(s)     PT C/O INTERM. LEFT SIDED WEAKNESS AND FLACCIDITY STARTING 2-3 DAYS AGO; PT REPORTS LAST EPISODE OF FLACCIDITY WAS LAST NIGHT LASTING APPROX. 5-10 MINS; PT WEARING FACE MASK     Stroke    Subjective: Patient states he feels mostly back to baseline. No headache. Vision at baseline. Pt seen in follow up today, however the problem is new to the examiner.      Objective:  Vital signs: /69 (BP Location: Right arm, Patient Position: Lying)   Pulse 67   Temp 97.5 °F (36.4 °C) (Oral)   Resp 16   Ht 172.7 cm (67.99\")   Wt 68.8 kg (151 lb 9.6 oz)   SpO2 95%   BMI 23.06 kg/m²       General appearance: Well developed, well nourished, well groomed, alert and cooperative.   HEENT: Normocephalic.   Neck and spine: Neck supple. Normal alignment.   Cardiac: Regular rate and rhythm.   Peripheral Vasculature: Radial pulses are equal and symmetric.  Chest Exam: Clear to auscultation bilaterally, no wheezes, no rhonchi.  Extremities: Normal, no edema.   Skin: No rashes or birthmarks.     Higher integrative function: Oriented to time, place, person, intact recent and remote memory, attention span, concentration and language. Spontaneous speech, fund of vocabulary are normal.   CN II: Normal  visual fields.   CN III IV VI: Extraocular movements are full without nystagmus. Pupils are equal, round, and reactive to light.   CN V: Normal facial sensation.  CN VII: Facial movements are symmetric, no weakness.   CN VIII: Auditory acuity is normal.   CN IX & X: Symmetric palatal movement.   CN XI: Sternocleidomastoid and trapezius are normal. No weakness.   CN XII: The tongue is midline.   Motor: Normal muscle strength, bulk, and tone in upper and lower extremities. No fasciculations, rigidity, spasticity or abnormal movements.   Sensation: LT intact/symmetric.  Station and gait: " Deferred  Muscle stretch reflexes: Plantar reflexes are flexor bilaterally.   Coordination: Finger to nose test showed no dysmetria. Rapid alternating movements were normal. Heel to shin normal.     Scheduled Meds:aspirin, 81 mg, Oral, Daily    Or  aspirin, 300 mg, Rectal, Daily  atorvastatin, 80 mg, Oral, Nightly  sodium chloride, 10 mL, Intravenous, Q12H  warfarin, 5 mg, Oral, Daily      Continuous Infusions:   PRN Meds:.•  acetaminophen **OR** acetaminophen  •  ondansetron  •  sodium chloride  •  sodium chloride    Laboratory results:  Lab Results   Component Value Date    GLUCOSE 124 (H) 12/10/2020    CALCIUM 9.3 12/10/2020     (L) 12/10/2020    K 4.2 12/10/2020    CO2 26.3 12/10/2020     12/10/2020    BUN 19 12/10/2020    CREATININE 1.10 12/10/2020    EGFRIFNONA 62 12/10/2020    BCR 17.3 12/10/2020    ANIONGAP 8.7 12/10/2020     Lab Results   Component Value Date    WBC 8.25 12/10/2020    HGB 15.5 12/10/2020    HCT 49.1 12/10/2020    MCV 81.3 12/10/2020     (H) 12/10/2020     Lab Results   Component Value Date    CHOL 131 12/10/2020    CHOL 166 07/21/2017     Lab Results   Component Value Date    HDL 55 12/10/2020    HDL 43 07/21/2017     Lab Results   Component Value Date    LDL 57 12/10/2020     (H) 07/21/2017     Lab Results   Component Value Date    TRIG 100 12/10/2020    TRIG 77 07/21/2017     Results from last 7 days   Lab 12/10/20  0855   HEMOGLOBIN A1C 5.80*      Review and interpretation of imaging: MRI brain images viewed by me, there is a right parietal punctate perfusion abnormality without ADC correlate.  CT ANGIOGRAM HEAD AND NECK WITH CONTRAST AND CT PERFUSION STUDY     CLINICAL HISTORY: Intermittent left arm and leg weakness. History of  carotid stent     TECHNIQUE: CT scan of the head was obtained with 3 mm axial images  before and after the administration of IV contrast. A CT angiogram of  the head and neck was obtained with 1 mm axial images following  administration  of IV contrast. Sagittal, coronal, and 3-dimensional  reconstructed images were obtained. A CT perfusion study was performed  with subsequent construction of standard rapid software perfusion maps.     FINDINGS:     CTA HEAD: The right vertebral artery is dominant. The left vertebral  artery terminates in a PICA. There is a moderate degree of stenosis  involving the proximal basilar artery secondary to calcified  atherosclerotic plaque. The posterior cerebral arteries are  unremarkable. The cavernous internal carotid arteries are remarkable for  mild degrees of luminal compromise secondary to atherosclerotic changes.  The middle and anterior cerebral arteries are unremarkable.     Subcentimeter chronic infarcts are identified within the right parietal  lobe within the right MCA distribution and within the inferolateral  aspect of the right cerebellar hemisphere within the right PICA  distribution. Mild changes of chronic small vessel ischemic phenomena  are identified. No abnormal areas of contrast enhancement are noted.     CTA NECK: There is a bovine configuration of the aortic arch. The  innominate artery and proximal aspects of the right common carotid  artery and right subclavian artery are poorly visualized due to dense  venous opacification resulting in beam-hardening artifact. A mild degree  of stenosis is seen at the origin of the left subclavian artery. A mild  degree of stenosis is identified at the origin of the nondominant left  vertebral artery. The right vertebral artery is unremarkable in  appearance although again the most proximal aspect of the right  vertebral artery is obscured by beam-hardening artifact from adjacent  dense venous opacification. Atherosclerotic changes are identified  within the left common carotid artery bifurcation and proximal internal  carotid. However, by NASCET criteria, there is at most a 10% to 20%  stenosis within the proximal portion of the left ICA. A stent  is  identified within the distal aspect of the right common carotid artery  extending to the proximal aspect of the right internal carotid. The  stent is moderately narrowed and constrained by the calcific  atherosclerotic plaque. Along its posterior margins, there is some  hypodensity evident which could represent some intimal hyperplasia  versus mural thrombus. The lumen within the stent is difficult to  evaluate due to beam-hardening artifact from the stent. There is up to a  60% stenosis by NASCET criteria within the stent although the patency of  the stent could be further evaluated with either a Doppler study or a  catheter-based arteriogram.     Incidental note is made of some secretions along the right lateral  aspect of the trachea.     CT PERFUSION STUDY: No significant asymmetries are identified on the CT  perfusion examination to suggest hypoperfusion of the right hemisphere.  Also, there is no convincing evidence to suggest an area of acute  completed infarction.     IMPRESSION:  The noncontrast head CT as well as the CT perfusion study  demonstrate no convincing evidence for an area of acute completed  infarction. Also, the CT perfusion study demonstrates no convincing  evidence for hypoperfusion of the right hemisphere.     There is a stent visualized within the distal aspect of the right common  carotid artery extending to the proximal portion of the right internal  carotid. Along the posterior margins of the stent, there is an area of  relative hypodensity which could potentially represent some intimal  hyperplasia or mural thrombus. The lumen within the stent is difficult  to evaluate due to some beam-hardening artifact. The stent is somewhat  constrained by the calcific atherosclerotic plaque and there may be up  to 60% NASCET stenosis within the stent given the constrained diameter  of the stent as well as the potential intimal hyperplasia/marrow  thrombus, although further evaluation of the  stent could be obtained  with either a Doppler study or a catheter-based arteriogram.      There is at most a 10% to 20% NASCET stenosis within the left ICA.     There is a moderate degree of stenosis involving the proximal basilar  artery. Relatively mild degrees of atherosclerotic narrowing are  identified within the cavernous internal carotid arteries.     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     Incidental note is made of some secretions within the right lateral  aspect of the trachea.     The findings of the noncontrast head CT were discussed with Dr. William  on 12/09/2020 at approximately 8:58 AM. The findings of the CT angiogram  and CT perfusion study were discussed with Dr. William at approximately  9:15 AM.     Radiation dose reduction techniques were utilized, including automated  exposure control and exposure modulation based on body size.     This report was finalized on 12/9/2020 12:18 PM by Dr. Robinson Odell M.D.     MRI EXAMINATION OF THE BRAIN WITHOUT CONTRAST     HISTORY: Stroke.     COMPARISON: CT angiogram of the neck and head 12/09/2020.     TECHNIQUE: A MRI examination of the brain was performed utilizing  sagittal T1, axial diffusion, T1, T2, T2 FLAIR and gradient echo T2  sequences.     FINDINGS: The diffusion sequence demonstrates a small area of increased  signal intensity involving the subcortical white matter of the right  parietal lobe posteriorly measuring approximately 4 mm in size. No  convincing signal loss is identified on the ADC map, however, the area  of increased signal intensity is small. A small acute to subacute  infarct cannot be excluded. A small remote infarct involving the right  cerebellar hemisphere is noted posterolaterally and inferiorly. This was  present on the MRI examination of the brain from 07/20/2017. There is  moderate small  vessel ischemic disease and mild-to-moderate atrophy.     IMPRESSION:  There is a 4 mm area of increased signal intensity on the  diffusion sequence involving the right parietal lobe posteriorly without  convincing signal loss on the ADC map. The appearance is nonspecific but  suspicious for a small acute to subacute infarct. Clinical correlation  is recommended. Further evaluation could be performed with a follow-up  MRI examination of the brain with and without contrast. There is  moderate atrophy. Moderate small vessel ischemic disease is noted. A  small remote infarct is appreciated involving the right cerebellar  hemisphere posterolaterally.     This report was finalized on 12/10/2020 6:10 AM by Dr. Ramez Lind M.D.       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  She reports the symptoms are similar to the initial symptoms he had when he had his strokes in 2017.  He was taking aspirin 81 mg, warfarin, and atorvastatin 80 mg prior to arrival.  His INR was 1.88 on arrival.    Work-up:  CTA head/neck: Stent in the right CCA extending into the proximal right ICA with an area of relative hypodensity which could potentially represent some intimal hyperplasia or mural thrombus.  There may be up to 60% stenosis within the stent.  10 to 20% left ICA stenosis.  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  CT perfusion: No evidence for hypoperfusion  MRI brain: 4 mm area of increasing intensity on DWI in the right parietal lobe without convincing signal loss on ADC map.  Appearance nonspecific but suspicious for small acute subacute infarct.  Labs: Hemoglobin A1c  5.80%, LDL 57, sodium 134, UA negative, COVID-19 testing -12/9.    Diagnoses:  R parietal infarct  S/P R ICA stent, patent  Subtherapeutic INR  Afib    Plan:  ASA 81 mg continued  Pharmacy consult warfarin management, Goal INR >2.0-3  Lipitor 80 mg continued  Neurochecks  BP control- normalize  Stroke Education  VIOLETTA/SCDs  PT/OT/ST  D/W Dr William today. No further neurologic work up. F/U outpatient with Michelle Avila as planned. Will sign off, please call with further questions/concerns.

## 2020-12-10 NOTE — PLAN OF CARE
Patient admitted due to left sided weakness. Very pleasant, cooperative. MRI was completed this afternoon, final results pending. Possible discharge tomorrow depending on MRI results.  Blood pressures have been elevated, parameters are to keep blood pressure under 180/110.     Goal Outcome Evaluation:  Plan of Care Reviewed With: patient  Progress: no change

## 2020-12-10 NOTE — PLAN OF CARE
Goal Outcome Evaluation:  Plan of Care Reviewed With: patient  Progress: no change  Outcome Summary: Pt presents from home with intermittent L UE weakness and tingling.  Pt currently denies sensation changes, is moving B UE freely and is SBA for mobilty.  Pt denies concern for return home where pt lives with wife. OT provides safety recommendations. No further skilled acute care OT needs at this time. Pt aware can reconsult if changes occur and discuss with RN.        Patient was placed in a face mask during this therapy encounter. Therapist used appropriate personal protective equipment including surgical mask, eye shield and gloves during the entire therapy session. Hand hygiene was completed before and after therapy session. Patient is not in enhanced droplet precautions.

## 2020-12-10 NOTE — PROGRESS NOTES
Discharge Planning Assessment  [facility]     Patient Name: Danni Aguilar  MRN: [id number]  Today's Date: 12/10/2020    Admit Date: 12/9/2020    Discharge Needs Assessment     Row Name 12/10/20 1100       Living Environment    Lives With  spouse    Name(s) of Who Lives With Patient  Ginna Aguilar [phone] spouse    Current Living Arrangements  home/apartment/condo    Primary Care Provided by  self    Provides Primary Care For  no one    Family Caregiver if Needed  child(tiny), adult;spouse    Family Caregiver Names  Ginna Aguilar [phone] spouse; pt has 5 adult children    Quality of Family Relationships  supportive;involved    Able to Return to Prior Arrangements  yes       Resource/Environmental Concerns    Resource/Environmental Concerns  none    Transportation Concerns  car, none       Transition Planning    Patient/Family Anticipates Transition to  home with family    Patient/Family Anticipated Services at Transition  none    Transportation Anticipated  family or friend will provide       Discharge Needs Assessment    Equipment Currently Used at Home  none        Discharge Plan     Row Name 12/10/20 7558       Plan    Plan  Home with spouse    Plan Comments  Spoke with pt for screening of DCP/needs. Pt did [facility] his spouse Ginna Aguilar.  Pt declines that he has every used HH or SNU in the past.  Pt stated that he has been getting up in his room to go to the bathroom.  Pt denies using any DME.   Pt does not feel he will ahve any needs at [place].  Pt denies having any issues getting or affording his home meds    Pt stated that his family will be able to provide transposrtation home at [place].        Continued Care and Services - Admitted Since 12/9/2020    Coordination has not been started for this encounter.         Demographic Summary     Row Name 12/10/20 1100       General Information    Admission Type  observation    Arrived From  home    Referral Source  admission  list    Reason for Consult  discharge planning    Preferred Language  English     Used During This Interaction  no        Functional Status     Row Name 12/10/20 1100       Functional Status    Usual Activity Tolerance  good    Current Activity Tolerance  moderate       Functional Status, IADL    Medications  independent    Meal Preparation  independent    Housekeeping  independent    Laundry  independent    Shopping  independent       Mental Status    General Appearance WDL  WDL       Mental Status Summary    Recent Changes in Mental Status/Cognitive Functioning  no changes        Psychosocial    No documentation.       Abuse/Neglect    No documentation.       Legal    No documentation.       Substance Abuse    No documentation.       Patient Forms    No documentation.           TIANA Ortiz

## 2020-12-10 NOTE — PROGRESS NOTES
Pharmacy Consult: Warfarin Dosing/ Monitoring    Danni Aguilar is a 89 y.o. male, estimated creatinine clearance is 40.8 mL/min (by C-G formula based on SCr of 1.1 mg/dL). weighing 68.8 kg (151 lb 9.6 oz).     has a past medical history of A-fib (CMS/HCC) and Irregular heart beat.    Social History     Tobacco Use    Smoking status: Never Smoker    Smokeless tobacco: Never Used   Substance Use Topics    Alcohol use: No    Drug use: Defer       Results from last 7 days   Lab Units 12/10/20  0855 12/09/20  1606 12/09/20  0752   INR  1.83* 1.83* 1.88*   HEMOGLOBIN g/dL 15.5 15.0 14.4   HEMATOCRIT % 49.1 46.0 44.0   PLATELETS 10*3/mm3 497* 441 486*     Results from last 7 days   Lab Units 12/10/20  0855 12/09/20  1606 12/09/20  0752   SODIUM mmol/L 135* 134* 137   POTASSIUM mmol/L 4.2 3.9 3.8   CHLORIDE mmol/L 100 97* 100   CO2 mmol/L 26.3 28.7 29.1*   BUN mg/dL 19 17 23   CREATININE mg/dL 1.10 0.98 1.09   CALCIUM mg/dL 9.3 9.4 9.2   BILIRUBIN mg/dL 0.6 0.7 0.6   ALK PHOS U/L 66 63 61   ALT (SGPT) U/L 27 23 24   AST (SGOT) U/L 28 26 23   GLUCOSE mg/dL 124* 100* 84     Anticoagulation history: per last AC monitoring visit 12/3: warfarin 5 mg daily     Hospital Anticoagulation:  Consulting provider: LINDEN Asencio  Indication: Atrial fibrillation   Target INR: 2-3  Expected duration: Indefinite    Bridge Therapy: No                Date 12/10            INR 1.83            Warfarin dose 7.5 mg              Potential drug interactions:   Asprin: increased risk of bleed     Relevant nutrition status: Regular diet     Education complete?/ Date: TBD    Assessment/Plan:  INR is subtherapeutic at 1.83 (Goal 2-3). Patient to discharge today. I would recommend that he receive 7.5 mg x 1 today (1.5 of his home tabs) and then resume his normal regimen of 5 mg daily (1 tablet) with follow up on his INR early next week, ideally Mon/Tuesday.    Pharmacy will continue to follow until discharge or discontinuation of warfarin.    Elsi Leo, [facility]  12/10/2020

## 2020-12-10 NOTE — PLAN OF CARE
Goal Outcome Evaluation:  Plan of Care Reviewed With: patient  Progress: no change  Outcome Summary: Cognitive-linguistic eval completed. The patient scored within the average range for orientation, attention, language, comprehension, repetition, naming, calculations, reasoning of silimilarities, and judgement. Patient scored mild-moderate for memory. Patient warned SLP that memory score would be lower as he has noticed forgetfulness with aging. Patient reports he feels at baseline at this time. SLP offered OP Speech Therapy if increased deficits noticed at home after DC.    Patient  wearing a face mask during this therapy encounter. Therapist used appropriate personal protective equipment including mask, eye protection and gloves.  Mask used was standard procedure mask. Appropriate PPE was worn during the entire therapy session. Hand hygiene was completed before and after therapy session. Patient is not in enhanced droplet precautions.

## 2020-12-10 NOTE — PLAN OF CARE
Patient A&O x4,VSS, RA, A.fib on monitor. Denies chest pain or SOB. NIH=1, for facial droop. Adequate PO intake and UOP. Up to the bathroom x assist. Will continue to monitor.  Problem: Adult Inpatient Plan of Care  Goal: Plan of Care Review  Outcome: Ongoing, Progressing  Flowsheets (Taken 12/10/2020 0348)  Plan of Care Reviewed With: patient  Goal: Patient-Specific Goal (Individualized)  Outcome: Ongoing, Progressing  Goal: Absence of Hospital-Acquired Illness or Injury  Outcome: Ongoing, Progressing  Intervention: Identify and Manage Fall Risk  Flowsheets  Taken 12/10/2020 0348  Safety Promotion/Fall Prevention:  • assistive device/personal items within reach  • activity supervised  • clutter free environment maintained  Taken 12/10/2020 0226  Safety Promotion/Fall Prevention:  • assistive device/personal items within reach  • clutter free environment maintained  Taken 12/10/2020 0032  Safety Promotion/Fall Prevention:  • assistive device/personal items within reach  • clutter free environment maintained  Taken 12/9/2020 2212  Safety Promotion/Fall Prevention:  • activity supervised  • assistive device/personal items within reach  • clutter free environment maintained  Taken 12/9/2020 2019  Safety Promotion/Fall Prevention:  • activity supervised  • assistive device/personal items within reach  • clutter free environment maintained  Intervention: Prevent Skin Injury  Flowsheets  Taken 12/10/2020 0315 by Emma Aguiar PCT  Body Position: position changed independently  Taken 12/10/2020 0226 by Isatu Bowden RN  Body Position: position changed independently  Taken 12/10/2020 0032 by Isatu Bowden RN  Body Position: position changed independently  Taken 12/9/2020 2212 by Isatu Bowden RN  Body Position: position changed independently  Taken 12/9/2020 2019 by Isatu Bowden RN  Body Position: position changed independently  Intervention: Prevent and Manage VTE (venous thromboembolism)  Risk  Flowsheets  Taken 12/10/2020 0226  VTE Prevention/Management:  • bilateral  • sequential compression devices on  Taken 12/10/2020 0032  VTE Prevention/Management:  • bilateral  • sequential compression devices on  Taken 12/9/2020 2212  VTE Prevention/Management:  • bilateral  • sequential compression devices on  Taken 12/9/2020 2019  VTE Prevention/Management:  • bilateral  • sequential compression devices on  Intervention: Prevent Infection  Flowsheets  Taken 12/10/2020 0226  Infection Prevention: rest/sleep promoted  Taken 12/10/2020 0032  Infection Prevention: rest/sleep promoted  Taken 12/9/2020 2212  Infection Prevention: rest/sleep promoted  Taken 12/9/2020 2019  Infection Prevention: single patient room provided  Goal: Optimal Comfort and Wellbeing  Outcome: Ongoing, Progressing  Intervention: Provide Person-Centered Care  Flowsheets (Taken 12/9/2020 1600 by Ana Alvarado RN)  Trust Relationship/Rapport: care explained  Goal: Readiness for Transition of Care  Outcome: Ongoing, Progressing     Problem: Fall Injury Risk  Goal: Absence of Fall and Fall-Related Injury  Outcome: Ongoing, Progressing  Intervention: Identify and Manage Contributors to Fall Injury Risk  Flowsheets  Taken 12/10/2020 0226 by Isatu Bowden RN  Medication Review/Management: medications reviewed  Taken 12/10/2020 0032 by Isatu Bowden RN  Medication Review/Management: medications reviewed  Taken 12/9/2020 2212 by Isatu Bowden RN  Medication Review/Management: medications reviewed  Taken 12/9/2020 2019 by Isatu Bowden RN  Medication Review/Management: medications reviewed  Taken 12/9/2020 1600 by Ana Alvarado RN  Self-Care Promotion: independence encouraged  Intervention: Promote Injury-Free Environment  Flowsheets  Taken 12/10/2020 0348  Safety Promotion/Fall Prevention:  • assistive device/personal items within reach  • activity supervised  • clutter free environment maintained  Taken 12/10/2020  0226  Safety Promotion/Fall Prevention:  • assistive device/personal items within reach  • clutter free environment maintained  Taken 12/10/2020 0032  Safety Promotion/Fall Prevention:  • assistive device/personal items within reach  • clutter free environment maintained  Taken 12/9/2020 2212  Safety Promotion/Fall Prevention:  • activity supervised  • assistive device/personal items within reach  • clutter free environment maintained  Taken 12/9/2020 2019  Safety Promotion/Fall Prevention:  • activity supervised  • assistive device/personal items within reach  • clutter free environment maintained

## 2020-12-10 NOTE — DISCHARGE SUMMARY
Patient Name: Danni Aguilar  : 1926  MRN: [id number]    Date of Admission: 2020  Date of Discharge:  12/10/2020  Primary Care Physician: Rolando Hadley MD      Chief Complaint:   Neuro Deficit(s) (PT C/O INTERM. LEFT SIDED WEAKNESS AND FLACCIDITY STARTING 2-3 DAYS AGO; PT REPORTS LAST EPISODE OF FLACCIDITY WAS LAST NIGHT LASTING APPROX. 5-10 MINS; PT WEARING FACE MASK)      Discharge Diagnoses     Active Hospital Problems    Diagnosis  POA   • TIA (transient ischemic attack) [G45.9]  Yes   • Hyperlipidemia [E78.5]  Yes   • Urinary retention [R33.9]  Yes   • Long term (current) use of anticoagulants [Z79.01]  Not Applicable   • Atrial fibrillation (CMS/HCC) [I48.91]  Yes      Resolved Hospital Problems   No resolved problems to display.        Hospital Course     Mr. Aguilar is a 89 y.o. male with a history of CVAs, HLD and atrial fibrillation who presented to [facility] initially complaining of left sided weakness.  Please see the admitting history and physical for further details.  He was found to have an acute to subacute R parietal infarct with a subtherapeutic INR and was admitted to the hospital for further evaluation and treatment. Neurology evaluated the patient and believes infarct is likely secondary to subtherapeutic INR, they recommend increasing coumadin for now per pharmacy recommendations with goal 2-3, lipitor and add back asa 81mg. His symptoms have resolved and he has been cleared for discharge home. He will receive 7.5mg Coumadin prior to discharge today and will need to have repeat INR with PCP on Monday or Tuesday. He should also follow up with neurology in 2 months.       Day of Discharge     Subjective:  no new complaints or events overnight. he is more than ready to go home.     Review of Systems   Constitutional: Negative for chills and fever.   HENT: Negative for congestion and sore throat.    Eyes: Negative for discharge and itching.   Respiratory: Negative  for chest tightness and shortness of breath.    Cardiovascular: Negative for chest pain, palpitations and leg swelling.   Gastrointestinal: Negative for abdominal pain, nausea and vomiting.   Endocrine: Negative for cold intolerance and heat intolerance.   Genitourinary: Negative for difficulty urinating and dysuria.   Musculoskeletal: Negative for back pain and gait problem.   Skin: Negative for color change and pallor.   Allergic/Immunologic: Negative for environmental allergies and food allergies.   Neurological: Negative for dizziness, speech difficulty, weakness, light-headedness and numbness.   Psychiatric/Behavioral: Negative for agitation and behavioral problems.       Physical Exam:  Temp:  [97.1 °F (36.2 °C)-98.2 °F (36.8 °C)] 98.2 °F (36.8 °C)  Heart Rate:  [67-85] 67  Resp:  [16] 16  BP: (113-181)/() 113/78  Body mass index is 23.06 kg/m².  Physical Exam  Vitals signs and nursing note reviewed.   Constitutional:       Appearance: Normal appearance. He is well-developed.      Comments: appears stated age if not younger   HENT:      Head: Normocephalic and atraumatic.   Eyes:      Extraocular Movements: Extraocular movements intact.      Conjunctiva/sclera: Conjunctivae normal.   Neck:      Musculoskeletal: Normal range of motion and neck supple.      Vascular: No JVD.   Cardiovascular:      Rate and Rhythm: Normal rate and regular rhythm.   Pulmonary:      Effort: Pulmonary effort is normal.      Breath sounds: Normal breath sounds.   Abdominal:      General: Bowel sounds are normal. There is no distension.      Palpations: Abdomen is soft.      Tenderness: There is no abdominal tenderness.   Musculoskeletal: Normal range of motion.         General: No swelling.   Skin:     General: Skin is warm and dry.   Neurological:      Mental Status: He is alert and oriented to person, place, and time. Mental status is at baseline.   Psychiatric:         Mood and Affect: Mood normal.         Behavior: Behavior  normal.         Consultants     Consult Orders (all) (From admission, onward)     Start     Ordered    12/09/20 1535  Inpatient Neurology Consult Stroke  Once     Specialty:  Neurology  Provider:  Rolando William MD    12/09/20 1536              Procedures     Imaging Results (All)     Procedure Component Value Units Date/Time    MRI Brain Without Contrast [[id number]] Collected: 12/09/20 1859     Updated: 12/10/20 0613    Narrative:      MRI EXAMINATION OF THE BRAIN WITHOUT CONTRAST     HISTORY: Stroke.     COMPARISON: CT angiogram of the neck and head 12/09/2020.     TECHNIQUE: A MRI examination of the brain was performed utilizing  sagittal T1, axial diffusion, T1, T2, T2 FLAIR and gradient echo T2  sequences.     FINDINGS: The diffusion sequence demonstrates a small area of increased  signal intensity involving the subcortical white matter of the right  parietal lobe posteriorly measuring approximately 4 mm in size. No  convincing signal loss is identified on the ADC map, however, the area  of increased signal intensity is small. A small acute to subacute  infarct cannot be excluded. A small remote infarct involving the right  cerebellar hemisphere is noted posterolaterally and inferiorly. This was  present on the MRI examination of the brain from 07/20/2017. There is  moderate small vessel ischemic disease and mild-to-moderate atrophy.       Impression:      There is a 4 mm area of increased signal intensity on the  diffusion sequence involving the right parietal lobe posteriorly without  convincing signal loss on the ADC map. The appearance is nonspecific but  suspicious for a small acute to subacute infarct. Clinical correlation  is recommended. Further evaluation could be performed with a follow-up  MRI examination of the brain with and without contrast. There is  moderate atrophy. Moderate small vessel ischemic disease is noted. A  small remote infarct is appreciated involving the right  cerebellar  hemisphere posterolaterally.     This report was finalized on 12/10/2020 6:10 AM by Dr. Ramez Lind M.D.       CT Angiogram Head [[id number]] Collected: 12/09/20 1042     Updated: 12/09/20 1221    Narrative:      CT ANGIOGRAM HEAD AND NECK WITH CONTRAST AND CT PERFUSION STUDY     CLINICAL HISTORY: Intermittent left arm and leg weakness. History of  carotid stent     TECHNIQUE: CT scan of the head was obtained with 3 mm axial images  before and after the administration of IV contrast. A CT angiogram of  the head and neck was obtained with 1 mm axial images following  administration of IV contrast. Sagittal, coronal, and 3-dimensional  reconstructed images were obtained. A CT perfusion study was performed  with subsequent construction of standard rapid software perfusion maps.     FINDINGS:     CTA HEAD: The right vertebral artery is dominant. The left vertebral  artery terminates in a PICA. There is a moderate degree of stenosis  involving the proximal basilar artery secondary to calcified  atherosclerotic plaque. The posterior cerebral arteries are  unremarkable. The cavernous internal carotid arteries are remarkable for  mild degrees of luminal compromise secondary to atherosclerotic changes.  The middle and anterior cerebral arteries are unremarkable.     Subcentimeter chronic infarcts are identified within the right parietal  lobe within the right MCA distribution and within the inferolateral  aspect of the right cerebellar hemisphere within the right PICA  distribution. Mild changes of chronic small vessel ischemic phenomena  are identified. No abnormal areas of contrast enhancement are noted.     CTA NECK: There is a bovine configuration of the aortic arch. The  innominate artery and proximal aspects of the right common carotid  artery and right subclavian artery are poorly visualized due to dense  venous opacification resulting in beam-hardening artifact. A mild degree  of stenosis is seen at  the origin of the left subclavian artery. A mild  degree of stenosis is identified at the origin of the nondominant left  vertebral artery. The right vertebral artery is unremarkable in  appearance although again the most proximal aspect of the right  vertebral artery is obscured by beam-hardening artifact from adjacent  dense venous opacification. Atherosclerotic changes are identified  within the left common carotid artery bifurcation and proximal internal  carotid. However, by NASCET criteria, there is at most a 10% to 20%  stenosis within the proximal portion of the left ICA. A stent is  identified within the distal aspect of the right common carotid artery  extending to the proximal aspect of the right internal carotid. The  stent is moderately narrowed and constrained by the calcific  atherosclerotic plaque. Along its posterior margins, there is some  hypodensity evident which could represent some intimal hyperplasia  versus mural thrombus. The lumen within the stent is difficult to  evaluate due to beam-hardening artifact from the stent. There is up to a  60% stenosis by NASCET criteria within the stent although the patency of  the stent could be further evaluated with either a Doppler study or a  catheter-based arteriogram.     Incidental note is made of some secretions along the right lateral  aspect of the trachea.     CT PERFUSION STUDY: No significant asymmetries are identified on the CT  perfusion examination to suggest hypoperfusion of the right hemisphere.  Also, there is no convincing evidence to suggest an area of acute  completed infarction.       Impression:      The noncontrast head CT as well as the CT perfusion study  demonstrate no convincing evidence for an area of acute completed  infarction. Also, the CT perfusion study demonstrates no convincing  evidence for hypoperfusion of the right hemisphere.     There is a stent visualized within the distal aspect of the right common  carotid artery  extending to the proximal portion of the right internal  carotid. Along the posterior margins of the stent, there is an area of  relative hypodensity which could potentially represent some intimal  hyperplasia or mural thrombus. The lumen within the stent is difficult  to evaluate due to some beam-hardening artifact. The stent is somewhat  constrained by the calcific atherosclerotic plaque and there may be up  to 60% NASCET stenosis within the stent given the constrained diameter  of the stent as well as the potential intimal hyperplasia/marrow  thrombus, although further evaluation of the stent could be obtained  with either a Doppler study or a catheter-based arteriogram.      There is at most a 10% to 20% NASCET stenosis within the left ICA.     There is a moderate degree of stenosis involving the proximal basilar  artery. Relatively mild degrees of atherosclerotic narrowing are  identified within the cavernous internal carotid arteries.     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     Incidental note is made of some secretions within the right lateral  aspect of the trachea.     The findings of the noncontrast head CT were discussed with Dr. William  on 12/09/2020 at approximately 8:58 AM. The findings of the CT angiogram  and CT perfusion study were discussed with Dr. William at approximately  9:15 AM.     Radiation dose reduction techniques were utilized, including automated  exposure control and exposure modulation based on body size.     This report was finalized on 12/9/2020 12:18 PM by Dr. Robinson Odell M.D.       CT Angiogram Neck [[id number]] Collected: 12/09/20 1042     Updated: 12/09/20 1221    Narrative:      CT ANGIOGRAM HEAD AND NECK WITH CONTRAST AND CT PERFUSION STUDY     CLINICAL HISTORY: Intermittent left arm and leg weakness. History of  carotid stent     TECHNIQUE:  CT scan of the head was obtained with 3 mm axial images  before and after the administration of IV contrast. A CT angiogram of  the head and neck was obtained with 1 mm axial images following  administration of IV contrast. Sagittal, coronal, and 3-dimensional  reconstructed images were obtained. A CT perfusion study was performed  with subsequent construction of standard rapid software perfusion maps.     FINDINGS:     CTA HEAD: The right vertebral artery is dominant. The left vertebral  artery terminates in a PICA. There is a moderate degree of stenosis  involving the proximal basilar artery secondary to calcified  atherosclerotic plaque. The posterior cerebral arteries are  unremarkable. The cavernous internal carotid arteries are remarkable for  mild degrees of luminal compromise secondary to atherosclerotic changes.  The middle and anterior cerebral arteries are unremarkable.     Subcentimeter chronic infarcts are identified within the right parietal  lobe within the right MCA distribution and within the inferolateral  aspect of the right cerebellar hemisphere within the right PICA  distribution. Mild changes of chronic small vessel ischemic phenomena  are identified. No abnormal areas of contrast enhancement are noted.     CTA NECK: There is a bovine configuration of the aortic arch. The  innominate artery and proximal aspects of the right common carotid  artery and right subclavian artery are poorly visualized due to dense  venous opacification resulting in beam-hardening artifact. A mild degree  of stenosis is seen at the origin of the left subclavian artery. A mild  degree of stenosis is identified at the origin of the nondominant left  vertebral artery. The right vertebral artery is unremarkable in  appearance although again the most proximal aspect of the right  vertebral artery is obscured by beam-hardening artifact from adjacent  dense venous opacification. Atherosclerotic changes are identified  within  the left common carotid artery bifurcation and proximal internal  carotid. However, by NASCET criteria, there is at most a 10% to 20%  stenosis within the proximal portion of the left ICA. A stent is  identified within the distal aspect of the right common carotid artery  extending to the proximal aspect of the right internal carotid. The  stent is moderately narrowed and constrained by the calcific  atherosclerotic plaque. Along its posterior margins, there is some  hypodensity evident which could represent some intimal hyperplasia  versus mural thrombus. The lumen within the stent is difficult to  evaluate due to beam-hardening artifact from the stent. There is up to a  60% stenosis by NASCET criteria within the stent although the patency of  the stent could be further evaluated with either a Doppler study or a  catheter-based arteriogram.     Incidental note is made of some secretions along the right lateral  aspect of the trachea.     CT PERFUSION STUDY: No significant asymmetries are identified on the CT  perfusion examination to suggest hypoperfusion of the right hemisphere.  Also, there is no convincing evidence to suggest an area of acute  completed infarction.       Impression:      The noncontrast head CT as well as the CT perfusion study  demonstrate no convincing evidence for an area of acute completed  infarction. Also, the CT perfusion study demonstrates no convincing  evidence for hypoperfusion of the right hemisphere.     There is a stent visualized within the distal aspect of the right common  carotid artery extending to the proximal portion of the right internal  carotid. Along the posterior margins of the stent, there is an area of  relative hypodensity which could potentially represent some intimal  hyperplasia or mural thrombus. The lumen within the stent is difficult  to evaluate due to some beam-hardening artifact. The stent is somewhat  constrained by the calcific atherosclerotic plaque and  there may be up  to 60% NASCET stenosis within the stent given the constrained diameter  of the stent as well as the potential intimal hyperplasia/marrow  thrombus, although further evaluation of the stent could be obtained  with either a Doppler study or a catheter-based arteriogram.      There is at most a 10% to 20% NASCET stenosis within the left ICA.     There is a moderate degree of stenosis involving the proximal basilar  artery. Relatively mild degrees of atherosclerotic narrowing are  identified within the cavernous internal carotid arteries.     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     Incidental note is made of some secretions within the right lateral  aspect of the trachea.     The findings of the noncontrast head CT were discussed with Dr. William  on 12/09/2020 at approximately 8:58 AM. The findings of the CT angiogram  and CT perfusion study were discussed with Dr. William at approximately  9:15 AM.     Radiation dose reduction techniques were utilized, including automated  exposure control and exposure modulation based on body size.     This report was finalized on 12/9/2020 12:18 PM by Dr. Robinson Odell M.D.       CT Cerebral Perfusion With & Without Contrast [[id number]] Collected: 12/09/20 1042     Updated: 12/09/20 1221    Narrative:      CT ANGIOGRAM HEAD AND NECK WITH CONTRAST AND CT PERFUSION STUDY     CLINICAL HISTORY: Intermittent left arm and leg weakness. History of  carotid stent     TECHNIQUE: CT scan of the head was obtained with 3 mm axial images  before and after the administration of IV contrast. A CT angiogram of  the head and neck was obtained with 1 mm axial images following  administration of IV contrast. Sagittal, coronal, and 3-dimensional  reconstructed images were obtained. A CT perfusion study was performed  with subsequent construction of  standard rapid software perfusion maps.     FINDINGS:     CTA HEAD: The right vertebral artery is dominant. The left vertebral  artery terminates in a PICA. There is a moderate degree of stenosis  involving the proximal basilar artery secondary to calcified  atherosclerotic plaque. The posterior cerebral arteries are  unremarkable. The cavernous internal carotid arteries are remarkable for  mild degrees of luminal compromise secondary to atherosclerotic changes.  The middle and anterior cerebral arteries are unremarkable.     Subcentimeter chronic infarcts are identified within the right parietal  lobe within the right MCA distribution and within the inferolateral  aspect of the right cerebellar hemisphere within the right PICA  distribution. Mild changes of chronic small vessel ischemic phenomena  are identified. No abnormal areas of contrast enhancement are noted.     CTA NECK: There is a bovine configuration of the aortic arch. The  innominate artery and proximal aspects of the right common carotid  artery and right subclavian artery are poorly visualized due to dense  venous opacification resulting in beam-hardening artifact. A mild degree  of stenosis is seen at the origin of the left subclavian artery. A mild  degree of stenosis is identified at the origin of the nondominant left  vertebral artery. The right vertebral artery is unremarkable in  appearance although again the most proximal aspect of the right  vertebral artery is obscured by beam-hardening artifact from adjacent  dense venous opacification. Atherosclerotic changes are identified  within the left common carotid artery bifurcation and proximal internal  carotid. However, by NASCET criteria, there is at most a 10% to 20%  stenosis within the proximal portion of the left ICA. A stent is  identified within the distal aspect of the right common carotid artery  extending to the proximal aspect of the right internal carotid. The  stent is moderately  narrowed and constrained by the calcific  atherosclerotic plaque. Along its posterior margins, there is some  hypodensity evident which could represent some intimal hyperplasia  versus mural thrombus. The lumen within the stent is difficult to  evaluate due to beam-hardening artifact from the stent. There is up to a  60% stenosis by NASCET criteria within the stent although the patency of  the stent could be further evaluated with either a Doppler study or a  catheter-based arteriogram.     Incidental note is made of some secretions along the right lateral  aspect of the trachea.     CT PERFUSION STUDY: No significant asymmetries are identified on the CT  perfusion examination to suggest hypoperfusion of the right hemisphere.  Also, there is no convincing evidence to suggest an area of acute  completed infarction.       Impression:      The noncontrast head CT as well as the CT perfusion study  demonstrate no convincing evidence for an area of acute completed  infarction. Also, the CT perfusion study demonstrates no convincing  evidence for hypoperfusion of the right hemisphere.     There is a stent visualized within the distal aspect of the right common  carotid artery extending to the proximal portion of the right internal  carotid. Along the posterior margins of the stent, there is an area of  relative hypodensity which could potentially represent some intimal  hyperplasia or mural thrombus. The lumen within the stent is difficult  to evaluate due to some beam-hardening artifact. The stent is somewhat  constrained by the calcific atherosclerotic plaque and there may be up  to 60% NASCET stenosis within the stent given the constrained diameter  of the stent as well as the potential intimal hyperplasia/marrow  thrombus, although further evaluation of the stent could be obtained  with either a Doppler study or a catheter-based arteriogram.      There is at most a 10% to 20% NASCET stenosis within the left ICA.      There is a moderate degree of stenosis involving the proximal basilar  artery. Relatively mild degrees of atherosclerotic narrowing are  identified within the cavernous internal carotid arteries.     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     Incidental note is made of some secretions within the right lateral  aspect of the trachea.     The findings of the noncontrast head CT were discussed with Dr. William  on 12/09/2020 at approximately 8:58 AM. The findings of the CT angiogram  and CT perfusion study were discussed with Dr. William at approximately  9:15 AM.     Radiation dose reduction techniques were utilized, including automated  exposure control and exposure modulation based on body size.     This report was finalized on 12/9/2020 12:18 PM by Dr. Robinson Odell M.D.       XR Chest 1 View [[id number]] Collected: 12/09/20 1029     Updated: 12/09/20 1036    Narrative:      PORTABLE CHEST X-RAY     HISTORY: Stroke protocol. Neurologic deficit.     TECHNIQUE: Portable chest x-rays provided. There is no previous chest  imaging for correlation.     FINDINGS: Cardiac silhouette is mildly enlarged. Pulmonary vasculature  is engorged and there is mild interstitial prominence bilaterally of  unknown chronicity. This may represent mild pulmonary edema. No effusion  or infiltrate is evident. There is no pneumothorax.       Impression:      Pulmonary vascular engorgement with some interstitial  prominence which may represent mild pulmonary edema.     This report was finalized on 12/9/2020 10:31 AM by Dr. Asa Calvert M.D.             Pertinent Labs     Results from last 7 days   Lab Units 12/10/20  0855 12/09/20  1606 12/09/20  0752   WBC 10*3/mm3 8.25 8.17 8.32   HEMOGLOBIN g/dL 15.5 15.0 14.4   PLATELETS 10*3/mm3 497* 441 486*     Results from last 7 days   Lab Units 12/10/20  0855  12/09/20  1606 12/09/20  0752   SODIUM mmol/L 135* 134* 137   POTASSIUM mmol/L 4.2 3.9 3.8   CHLORIDE mmol/L 100 97* 100   CO2 mmol/L 26.3 28.7 29.1*   BUN mg/dL 19 17 23   CREATININE mg/dL 1.10 0.98 1.09   GLUCOSE mg/dL 124* 100* 84   Estimated Creatinine Clearance: 40.8 mL/min (by C-G formula based on SCr of 1.1 mg/dL).  Results from last 7 days   Lab Units 12/10/20  0855 12/09/20  1606 12/09/20  0752   ALBUMIN g/dL 4.10 4.10 4.10   BILIRUBIN mg/dL 0.6 0.7 0.6   ALK PHOS U/L 66 63 61   AST (SGOT) U/L 28 26 23   ALT (SGPT) U/L 27 23 24     Results from last 7 days   Lab Units 12/10/20  0855 12/09/20  1606 12/09/20  0752   CALCIUM mg/dL 9.3 9.4 9.2   ALBUMIN g/dL 4.10 4.10 4.10           Results from last 7 days   Lab Units 12/10/20  0855   CHOLESTEROL mg/dL 131   TRIGLYCERIDES mg/dL 100   HDL CHOL mg/dL 55   LDL CHOL mg/dL 57           Test Results Pending at Discharge       Discharge Details        Discharge Medications      New Medications      Instructions Start Date   aspirin 81 MG chewable tablet   81 mg, Oral, Daily   Start Date: December 11, 2020        Continue These Medications      Instructions Start Date   atorvastatin 80 MG tablet  Commonly known as: LIPITOR   80 mg, Oral, Daily      warfarin 5 MG tablet  Commonly known as: COUMADIN   5 mg, Oral, Daily Warfarin             No Known Allergies      Discharge Disposition:  Home or Self Care    Discharge Diet:  Diet Order   Procedures   • Diet Regular       Discharge Activity:   Activity Instructions     Activity as Tolerated            CODE STATUS:    Code Status and Medical Interventions:   Ordered at: 12/09/20 1536     Code Status:    CPR     Medical Interventions (Level of Support Prior to Arrest):    Full       No future appointments.  Additional Instructions for the Follow-ups that You Need to Schedule     Discharge Follow-up with PCP   As directed       Currently Documented PCP:    Rolando Hadley MD    PCP Phone Number:    [phone]     Follow  Up Details: monday or tuesday for INR check         Discharge Follow-up with Specified Provider: neurology; 2 Months   As directed      To: neurology    Follow Up: 2 Months           Follow-up Information     Rolando Hadley MD .    Specialty: Family Medicine  Why: monday or tuesday for INR check  Contact information:  [address] [address]  [phone]                   Additional Instructions for the Follow-ups that You Need to Schedule     Discharge Follow-up with PCP   As directed       Currently Documented PCP:    Rolando Hadley MD    PCP Phone Number:    [phone]     Follow Up Details: monday or tuesday for INR check         Discharge Follow-up with Specified Provider: neurology; 2 Months   As directed      To: neurology    Follow Up: 2 Months           Time Spent on Discharge:  Greater than 30 minutes      LINDEN Savage  [facility]  12/10/20  13:33 EST

## 2020-12-11 ENCOUNTER — READMISSION MANAGEMENT (OUTPATIENT)
Dept: CALL CENTER | Facility: HOSPITAL | Age: 85
End: 2020-12-11

## 2020-12-11 ENCOUNTER — HOSPITAL ENCOUNTER (EMERGENCY)
Facility: HOSPITAL | Age: 85
End: 2020-12-11

## 2020-12-11 NOTE — PROGRESS NOTES
Case Management Discharge Note      Final Note: Patient DC'd home         Selected Continued Care - Discharged on 12/10/2020 Admission date: 12/9/2020 - Discharge disposition: Home or Self Care    Destination    No services have been selected for the patient.              Durable Medical Equipment    No services have been selected for the patient.              Dialysis/Infusion    No services have been selected for the patient.              Home Medical Care    No services have been selected for the patient.              Therapy    No services have been selected for the patient.              Community Resources    No services have been selected for the patient.                  Transportation Services  Private: Car    Final Discharge Disposition Code: 01 - home or self-care

## 2020-12-11 NOTE — OUTREACH NOTE
Prep Survey      Responses   [facility] patient discharged from?  [place]   Is LACE score < 7 ?  Yes   Eligibility  Readm Mgmt   Discharge diagnosis  transient ischemic attack   Does the patient have one of the following disease processes/diagnoses(primary or secondary)?  Stroke (TIA)   Does the patient have Home health ordered?  No   Is there a DME ordered?  No   Prep survey completed?  Yes          Priyanka Murguia RN

## 2020-12-14 ENCOUNTER — READMISSION MANAGEMENT (OUTPATIENT)
Dept: CALL CENTER | Facility: HOSPITAL | Age: 85
End: 2020-12-14

## 2020-12-15 NOTE — OUTREACH NOTE
Stroke Week 1 Survey      Responses   [facility] patient discharged from?  [place]   Does the patient have one of the following disease processes/diagnoses(primary or secondary)?  Stroke (TIA)   Week 1 attempt successful?  Yes   Call start time  1923   Call end time  1925   Discharge diagnosis  transient ischemic attack   Meds reviewed with patient/caregiver?  Yes   Is the patient having any side effects they believe may be caused by any medication additions or changes?  No   Does the patient have all medications ordered at discharge?  Yes   Is the patient taking all medications as directed (includes completed medication regime)?  Yes   Does the patient have a primary care provider?   Yes   Does the patient have an appointment with their PCP within 7 days of discharge?  Yes   Has the patient kept scheduled appointments due by today?  N/A   Has home health visited the patient within 72 hours of discharge?  N/A   Psychosocial issues?  No   Does the patient have any residual symptoms from stroke/TIA?  No   Does the patient understand the diet ordered at discharge?  Yes   Did the patient receive a copy of their discharge instructions?  Yes   Nursing interventions  Reviewed instructions with patient   What is the patient's perception of their health status since discharge?  Improving   Is the patient able to teach back FAST for Stroke?  Yes   Is the patient/caregiver able to teach back the risk factors for a stroke?  High blood pressure-goal below 120/80   Is the patient/caregiver able to teach back signs and symptoms related to disease process for when to call PCP?  Yes   Is the patient/caregiver able to teach back signs and symptoms related to disease process for when to call 911?  Yes   If the patient is a current smoker, are they able to teach back resources for cessation?  Not a smoker   Is the patient/caregiver able to teach back the hierarchy of who to call/visit for symptoms/problems? PCP, Specialist,  Home health nurse, Urgent Care, ED, 911  Yes   Additional teach back comments  States he is doing well and has appt tomorrow with his PCP.   Week 1 call completed?  Yes   Revoked  No further contact(revokes)-requires comment   Is the patient interested in additional calls from an ambulatory ?  NOTE:  applies to high risk patients requiring additional follow-up.  No   Graduated/Revoked comments  Denies needs or questions at this time          Shyanne Moreira LPN

## 2020-12-18 LAB — CREAT BLDA-MCNC: 0.9 MG/DL (ref 0.6–1.3)

## 2021-01-27 ENCOUNTER — TELEPHONE (OUTPATIENT)
Dept: NEUROLOGY | Facility: CLINIC | Age: 86
End: 2021-01-27

## 2021-01-27 NOTE — TELEPHONE ENCOUNTER
"Stroke Phone Call  Spoke with the patient  · Admission Date: 12/9/2020  · Discharge Date:  12/11/2020  · Discharge Destination: Home  · Meds reviewed with patient/caregiver?    [x]Yes [] No   o Antiplatelet: ASA  - Understands purpose     [x]  Yes     []  No     - Understands how to take      [x]  Yes     []  No    o Cholesterol Reducing: Lipitor  - Understands purpose     [x]  Yes     []  No    - Understands how to take      [x]  Yes     []  No   o Anticoagulant:  Coumadin  - Understands purpose     [x]  Yes     []  No    - Understands how to take      [x]  Yes     []  No     · Is the patient taking all medication as directed?   [x]  Yes  []  No  · Discussed personal risk factors   [x]  Yes []  No    o Physical Inactivity  - Engaged in physical activity [x]  Yes []  No   o High blood pressure   - Reviewed medications ordered for high blood pressure  - Has been monitoring BP [x]  Yes     []  No  - BP goal <130/80  - Per patient BP was elevated, PCP aware and \"prescribed something.\"  Patient states it makes his BP too low and might stop it.  Advised patient to call PCP to discuss possibly changing before discontinuing.   o High cholesterol   - Review desired LDL goal <70  o Atherosclerosis  - Plaque inside the arteries, “hardening of the arteries”  o Atrial fibrillation   • Discussed signs and symptoms of stroke and when patient to call 911?   [x]  Yes []  No  o Sudden weakness or numbness of the face, arm, or leg especially on one side of the body  o Sudden confusion, trouble speaking or understanding  o Sudden trouble seeing in one or both eyes   o Sudden trouble walking, dizziness, loss of balance or coordination  o Sudden severe headaches with no known cause    Notified Patient that if any of these symptoms occur to call 911  · Does the patient have any new signs or symptoms of a stroke?   []  Yes     [x]  No  • Does the patient have increasing stiffness in arms, hands, or legs?    []  Yes     [x]  No   Is this " interfering with activities of daily living?   []  Yes     [x]  No  · Does the patient have an appointment with PCP?  [x]  Yes     []  No  · Does the patient have 3 month Stroke Clinic appointment?  [x]  Yes     []  No  · Is the patient currently in therapy, outpatient, or home health?  []  Yes     [x]  No     Needs a referral?       []  Yes     [x]  No    Patient Satisfaction   · How would you rate your satisfaction with the instructions provided about your specific risk factors for stroke?   []Poor  [] Fair    [] Good [x] Very Good  [] Excellent   · How would you rate your satisfaction with the instructions provided on the warnings signs and symptoms of stroke?   []Poor  [] Fair   [] Good [x] Very Good  [] Excellent   · How well did we explain the importance of calling 911 to activate the emergency medical system for new signs and symptoms of stroke?    []Poor  [] Fair   [] Good [x] Very Good  [] Excellent   · Would you recommend the stroke center to your friends and family?   []Definitely Would Not  [] Probably Would Not  [] Neutral   []  Probably Would [x] Definitely Would  • Did you understand who all of your providers were and what their roles were?      [x]  Yes     []  No

## 2021-03-10 ENCOUNTER — OFFICE VISIT (OUTPATIENT)
Dept: NEUROLOGY | Facility: CLINIC | Age: 86
End: 2021-03-10

## 2021-03-10 VITALS
WEIGHT: 146 LBS | DIASTOLIC BLOOD PRESSURE: 68 MMHG | BODY MASS INDEX: 22.13 KG/M2 | HEIGHT: 68 IN | HEART RATE: 69 BPM | SYSTOLIC BLOOD PRESSURE: 118 MMHG | OXYGEN SATURATION: 97 %

## 2021-03-10 DIAGNOSIS — E78.2 MIXED HYPERLIPIDEMIA: ICD-10-CM

## 2021-03-10 DIAGNOSIS — I65.21 STENOSIS OF RIGHT CAROTID ARTERY: ICD-10-CM

## 2021-03-10 DIAGNOSIS — Z86.73 RECENT CEREBROVASCULAR ACCIDENT (CVA): ICD-10-CM

## 2021-03-10 DIAGNOSIS — I48.0 PAROXYSMAL ATRIAL FIBRILLATION (HCC): ICD-10-CM

## 2021-03-10 DIAGNOSIS — I67.9 CEREBROVASCULAR DISEASE: ICD-10-CM

## 2021-03-10 PROCEDURE — 99214 OFFICE O/P EST MOD 30 MIN: CPT | Performed by: NURSE PRACTITIONER

## 2021-03-10 RX ORDER — METOPROLOL SUCCINATE 25 MG/1
25 TABLET, EXTENDED RELEASE ORAL DAILY
COMMUNITY
Start: 2020-12-28 | End: 2021-06-26

## 2021-03-10 NOTE — PROGRESS NOTES
DOS: 3/10/2021  NAME: Danni Aguilar   : 1926  PCP: Rolando Hadley MD    Chief Complaint   Patient presents with   • Stroke      SUBJECTIVE  Neurological Problem:  89 y.o. RHW male with Afib, HLD, stroke d/t carotid stenosis s/p stent () who presents today for follow-up. He is accompanied by his daughter ([place]).     Interval History:   - 2017:  Mr. Aguilar presented to the [facility] with recurrent right-sided numbness/weakness.  His imaging showed multiple strokes in the right MCA territory along with severe right ICA stenosis.  He subsequently underwent right carotid artery stent placement.  He was placed on ASA and Plavix for 2 weeks followed by resuming anticoagulation and single antiplatelet therapy with ASA 81 mg.     Last seen in the office in 2020, doing well, carotid US showed patent stent. Unfortunately he presented to [facility] in 2020 with episodes of transient numbness and sometimes weakness of the left side.  Reportedly taking ASA 81 mg, warfarin and Lipitor 80 mg PTA.  INR on arrival was 1.88.  He underwent a stroke work-up, CTA h/n shows right CCA stent, and proximal right ICA, area of relative hyperdensity which could potentially represent some intimal hyperplasia or mural thrombus, may be up to 60% stenosis in the stent.  Left ICA 10 to 20% stenosis.  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  CTP negative.  MRI of brain showed a 4 mm area on DWI of the right parietal lobe suspicious for small acute to subacute infarct. It was recommended that he continue ASA 81 mg along with warfarin with INR goal of 2-3.    He presents today and continues on ASA 81 mg, warfarin and Lipitor, tolerating medications well.  He feels he is back to his baseline, does not think he had any residuals following the stroke.  He denies any recurrent episodes of left-sided numbness or weakness.  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  No problems with sleep, good appetite.  No other changes in his health since his last visit.  He has gotten the first shot of the vaccine.  States his BP is well controlled, is usually on the low side.  No smoking. No alcohol. No changes in gait, no falls.     Review of Systems:Review of Systems   Constitutional: Negative for activity change, appetite change and fatigue.   HENT: Negative for ear pain, tinnitus and trouble swallowing.    Eyes: Negative for photophobia, pain and visual disturbance.   Respiratory: Negative for cough, chest tightness and shortness of breath.    Cardiovascular: Negative for chest pain, palpitations and leg swelling.   Gastrointestinal: Negative for abdominal pain, nausea and vomiting.   Endocrine: Negative for cold intolerance, heat intolerance and polydipsia.   Musculoskeletal: Negative for back pain, gait problem and neck pain.   Skin: Negative for color change, pallor and rash.   Allergic/Immunologic: Negative for environmental allergies, food allergies and immunocompromised state.   Neurological: Positive for light-headedness. Negative for dizziness, tremors, seizures, syncope, facial asymmetry, speech difficulty, weakness, numbness and headaches.   Hematological: Negative for adenopathy. Does not bruise/bleed easily.   Psychiatric/Behavioral: Negative for agitation, behavioral problems, confusion, decreased concentration, dysphoric mood, hallucinations, self-injury, sleep disturbance and suicidal ideas. The patient is not nervous/anxious and is not hyperactive.     Above ROS reviewed    The following portions of the patient's history were reviewed and updated as appropriate: allergies, current medications, past family history, past medical history, past social history, past surgical history and problem list.    Current Medications:   Current Outpatient Medications:   •  aspirin 81 MG chewable tablet, Chew 1 tablet Daily., Disp:  , Rfl:   •  atorvastatin (LIPITOR) 80  MG tablet, Take 1 tablet by mouth Daily., Disp: 30 tablet, Rfl: 0  •  warfarin (COUMADIN) 5 MG tablet, Take 5 mg by mouth Daily., Disp: , Rfl:   •  metoprolol succinate XL (TOPROL-XL) 25 MG 24 hr tablet, Take 25 mg by mouth Daily., Disp: , Rfl:   **I did not stop or change the above medications.  Patient's medication list was updated to reflect medications they have reported as currently taking, including medication changes made by other providers.    OBJECTIVE  Vitals:    03/10/21 0804   BP: 118/68   Pulse: 69   SpO2: 97%     Body mass index is 22.2 kg/m².    Diagnostics:    Laboratory Results:         Lab Results   Component Value Date    WBC 8.25 12/10/2020    HGB 15.5 12/10/2020    HCT 49.1 12/10/2020    MCV 81.3 12/10/2020     (H) 12/10/2020     Lab Results   Component Value Date    GLUCOSE 124 (H) 12/10/2020    BUN 19 12/10/2020    CREATININE 1.10 12/10/2020    EGFRIFNONA 62 12/10/2020    BCR 17.3 12/10/2020    K 4.2 12/10/2020    CO2 26.3 12/10/2020    CALCIUM 9.3 12/10/2020    ALBUMIN 4.10 12/10/2020    AST 28 12/10/2020    ALT 27 12/10/2020     Lab Results   Component Value Date    HGBA1C 5.80 (H) 12/10/2020     Lab Results   Component Value Date    CHOL 131 12/10/2020    CHOL 166 07/21/2017     Lab Results   Component Value Date    HDL 55 12/10/2020    HDL 43 07/21/2017     Lab Results   Component Value Date    LDL 57 12/10/2020     (H) 07/21/2017     Lab Results   Component Value Date    TRIG 100 12/10/2020    TRIG 77 07/21/2017     This SmartLink has not been configured with any valid records.     Lab Results   Component Value Date    INR 1.83 (H) 12/10/2020    INR 1.83 (H) 12/09/2020    INR 1.88 (H) 12/09/2020    PROTIME 20.9 (H) 12/10/2020    PROTIME 20.9 (H) 12/09/2020    PROTIME 21.4 (H) 12/09/2020     Physical Exam:  GENERAL: NAD, thin  HEENT: Normocephalic, atraumatic   COR: RRR  Resp: Even and unlabored  Extremities: No signs of distal embolization.    Skin: No rashes, lesions or  ulcers.  Psychiatric: Normal mood and affect.    Neurological:   MS: AO. Language normal. No neglect. Follows all commands.  CN: II-XII grossly normal  Motor: Normal strength and tone throughout.  Sensory: Intact to light touch in arms and legs  Station and Gait: Normal gait and station.    Coordination: Normal finger to nose bilaterally    Impression:  Mr. Aguilar is a history of right MCA territory stroke he suffered in July 2017 secondary severe right ICA stenosis s/p LIU who presents for follow-up for recent hospitalization for right parietal infarct he suffered in December 2020, stent was patent, INR was mildly subtherapeutic.  It was recommended he continue on ASA 81 mg, warfarin, statin. He will need to continue vascular RF control. Recommend he get repeat carotid US in one year. He can f/u with PCP and f/u here as needed.     Plan:    Continue ASA 81 mg, warfarin (INR goal 2-3)  Monitor BP regularly, avoid hypotension  Keep well-hydrated  F/U with PCP for continued cholesterol surveillance  Recommend carotid US in one year, he can f/u with PCP  Secondary stroke prevention: Ideal targets for stroke prevention would be Blood pressure < 130/80; B12 > 500 TSH in normal range and LDL < 70; HbA1c < 6.5 and smoking cessation if applicable.  F/U here as needed.       I spent a total of 30 minutes today in reviewing records, prior diagnostics, examination of patient as well as counseling and educating patient regarding diagnoses, symptoms, reviewing diagnostics, pharmacologic treatment options, recommendations, lifestyle modifications, coordination of care and documenting plan of care.        Diagnoses and all orders for this visit:    1. Recent cerebrovascular accident (CVA)    2. Cerebrovascular disease    3. Stenosis of right carotid artery    4. Mixed hyperlipidemia    5. Paroxysmal atrial fibrillation (CMS/HCC)        Coding      Dictated using Dragon

## 2023-04-20 ENCOUNTER — HOSPITAL ENCOUNTER (EMERGENCY)
Facility: HOSPITAL | Age: 88
Discharge: HOME | End: 2023-04-20
Attending: EMERGENCY MEDICINE
Payer: MEDICARE

## 2023-04-20 ENCOUNTER — APPOINTMENT (OUTPATIENT)
Dept: CT IMAGING | Facility: HOSPITAL | Age: 88
End: 2023-04-20
Payer: MEDICARE

## 2023-04-20 VITALS
RESPIRATION RATE: 18 BRPM | HEART RATE: 78 BPM | WEIGHT: 135 LBS | BODY MASS INDEX: 21.19 KG/M2 | OXYGEN SATURATION: 98 % | HEIGHT: 67 IN | DIASTOLIC BLOOD PRESSURE: 88 MMHG | SYSTOLIC BLOOD PRESSURE: 171 MMHG | TEMPERATURE: 99.6 F

## 2023-04-20 DIAGNOSIS — S01.81XA FOREHEAD LACERATION, INITIAL ENCOUNTER: ICD-10-CM

## 2023-04-20 DIAGNOSIS — Z79.01 CHRONIC ANTICOAGULATION: ICD-10-CM
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
## 2023-04-20 DIAGNOSIS — S51.011A SKIN TEAR OF RIGHT ELBOW WITHOUT COMPLICATION, INITIAL ENCOUNTER: ICD-10-CM

## 2023-04-20 LAB
ALBUMIN SERPL-MCNC: 4 G/DL (ref 3.5–5.2)
ALBUMIN/GLOB SERPL: 1.3 G/DL
ALP SERPL-CCNC: 59 U/L (ref 39–117)
ALT SERPL W P-5'-P-CCNC: 21 U/L (ref 1–41)
ANION GAP SERPL CALCULATED.3IONS-SCNC: 10.7 MMOL/L (ref 5–15)
APTT PPP: 42 SECONDS (ref 22.7–35.4)
AST SERPL-CCNC: 24 U/L (ref 1–40)
BILIRUB SERPL-MCNC: 0.6 MG/DL (ref 0–1.2)
BUN SERPL-MCNC: 29 MG/DL (ref 8–23)
BUN/CREAT SERPL: 25.9 (ref 7–25)
CALCIUM SPEC-SCNC: 9.3 MG/DL (ref 8.2–9.6)
CHLORIDE SERPL-SCNC: 98 MMOL/L (ref 98–107)
CO2 SERPL-SCNC: 24.3 MMOL/L (ref 22–29)
CREAT SERPL-MCNC: 1.12 MG/DL (ref 0.76–1.27)
EGFRCR SERPLBLD CKD-EPI 2021: 60.1 ML/MIN/1.73
GLOBULIN UR ELPH-MCNC: 3.2 GM/DL
GLUCOSE SERPL-MCNC: 112 MG/DL (ref 65–99)
INR PPP: 1.41 (ref 0.9–1.1)
POTASSIUM SERPL-SCNC: 4.6 MMOL/L (ref 3.5–5.2)
PROT SERPL-MCNC: 7.2 G/DL (ref 6–8.5)
PROTHROMBIN TIME: 17.4 SECONDS (ref 11.7–14.2)
SODIUM SERPL-SCNC: 133 MMOL/L (ref 136–145)

## 2023-04-20 PROCEDURE — 72125 CT NECK SPINE W/O DYE: CPT

## 2023-04-20 PROCEDURE — 85730 THROMBOPLASTIN TIME PARTIAL: CPT | Performed by: EMERGENCY MEDICINE

## 2023-04-20 PROCEDURE — 85610 PROTHROMBIN TIME: CPT | Performed by: EMERGENCY MEDICINE

## 2023-04-20 PROCEDURE — 99283 EMERGENCY DEPT VISIT LOW MDM: CPT

## 2023-04-20 PROCEDURE — 70450 CT HEAD/BRAIN W/O DYE: CPT

## 2023-04-20 PROCEDURE — 80053 COMPREHEN METABOLIC PANEL: CPT | Performed by: EMERGENCY MEDICINE

## 2023-04-20 PROCEDURE — 36415 COLL VENOUS BLD VENIPUNCTURE: CPT

## 2023-04-20 NOTE — DISCHARGE INSTRUCTIONS
Expect to be stiff and sore, continue current medications, ice for any pain or swelling, close PCP follow-up for recheck within 1 week, ED return for worsening symptoms as needed.  The glue and Steri-Strips will eventually fall off on their own.

## 2023-04-20 NOTE — ED PROVIDER NOTES
TRIAGE PROVIDER NOTE    I personally performed a brief face-to-face medical evaluation of the patient upon their arrival to the emergency department.  This exam was performed in an effort to decrease the time from intake to seeing a provider and to decrease delays in care.  The history of present illness is condensed.  Other providers may see the patient as well if deemed necessary after this evaluation.    HPI: Danni Aguilar is a 89 y.o. male with a PMH significant for atrial fibrillation anticoagulated on Coumadin who presents to the ED c/o head injury.  The patient lost his footing and fell today without any provoking lightheadedness, dizziness, syncope.  He did strike the right side of his head on the ground at that time and sustained a laceration to the forehead.  He is up-to-date on his tetanus vaccination.  He denies neck pain.  Denies numbness or weakness of the face or extremities, slurred speech, visual disturbance, photophobia, headache.  He is asymptomatic at this time.      FOCUSED PHYSICAL EXAM:  ED Triage Vitals   Temp Heart Rate Resp BP SpO2   04/20/23 1503 04/20/23 1503 04/20/23 1503 04/20/23 1518 04/20/23 1503   99.6 °F (37.6 °C) 81 20 100/68 98 %      Temp src Heart Rate Source Patient Position BP Location FiO2 (%)   04/20/23 1503 -- 04/20/23 1518 04/20/23 1518 --   Tympanic  Sitting Left arm      General: No acute distress  Lungs: Clear to auscultation bilaterally, no wheezes  Heart: Regular rate and rhythm, no murmur  Abdomen: Soft, nontender, nondistended  Extremities: No gross injury or deformity  HEENT: Laceration to the right-sided forehead with bleeding controlled at this time.  NECK: No tenderness to the cervical spine midline or paraspinal muscles.      ASSESSMENT/PLAN:  Patient presentation and initial evaluation consistent with head injury causing laceration to the right forehead.  He is anticoagulated and will require CT scan of the head without contrast to further evaluate for  intracranial pathology.  His symptoms are well controlled at this time.    Orders Placed This Encounter   Procedures   • CT Head Without Contrast   • CT Cervical Spine Without Contrast   • Comprehensive Metabolic Panel   • Protime-INR   • aPTT        Provider Attestation:  I personally reviewed the past medical history, past surgical history, social history, family history, current medications, and allergies as they appear in the chart.    1532 IRiley PA-C, I evaluated the patient briefly at triage and performed an evaluation.  The contents of this note to this point have been provided by myself.        EMERGENCY DEPARTMENT ENCOUNTER    Room Number:  HD2/H  Date of encounter:  4/20/2023  PCP: Justin Longoria MD  Historian: Patient, wife      HPI:  Chief Complaint: Fall, head injury  A complete HPI/ROS/PMH/PSH/SH/FH are unobtainable due to: None    Context: Danni Aguilar is a 89 y.o. male who presents to the ED via private vehicle for evaluation of a fall with forward head injury earlier today.  Patient states that he sometimes loses his balance due to her previous strokes.  Was using a cane at that time.  No LOC.  No nausea or vomiting.  No numbness or weakness of the arms or legs.  Denies any significant neck pain.  Does take warfarin.      MEDICAL RECORD REVIEW    External (non-ED) record review: Chart review in epic does confirm that the patient takes warfarin    PAST MEDICAL HISTORY  Active Ambulatory Problems     Diagnosis Date Noted   • Acute CVA (cerebrovascular accident) 07/20/2017   • Atrial fibrillation 07/20/2017   • Long term (current) use of anticoagulants 07/20/2017   • Carotid stenosis 07/20/2017   • Urinary retention 07/25/2017   • Cerebrovascular accident (CVA) due to stenosis of right carotid artery 08/25/2017   • Hyperlipidemia 01/29/2018   • Cerebrovascular disease 12/09/2020   • Recent cerebrovascular accident (CVA) 03/10/2021     Resolved Ambulatory Problems     Diagnosis Date  Noted   • No Resolved Ambulatory Problems     Past Medical History:   Diagnosis Date   • A-fib    • Irregular heart beat          PAST SURGICAL HISTORY  Past Surgical History:   Procedure Laterality Date   • BACK SURGERY     • CEREBRAL ANGIOGRAM N/A 7/24/2017    Procedure: DIAGNOSTIC CEREBRAL ANGIOGRAM AND RIGHT CAROTID STENT PLACEMENT;  Surgeon: Mauricio Yung MD;  Location: [facility] 18/19;  Service:          FAMILY HISTORY  History reviewed. No pertinent family history.      SOCIAL HISTORY  Social History     Socioeconomic History   • Marital status:    Tobacco Use   • Smoking status: Never   • Smokeless tobacco: Never   Substance and Sexual Activity   • Alcohol use: No   • Drug use: Defer   • Sexual activity: Defer         ALLERGIES  Patient has no known allergies.        REVIEW OF SYSTEMS  Review of Systems     All systems reviewed and negative except for those discussed in HPI.       PHYSICAL EXAM    I have reviewed the triage vital signs and nursing notes.    ED Triage Vitals   Temp Heart Rate Resp BP SpO2   04/20/23 1503 04/20/23 1503 04/20/23 1503 04/20/23 1518 04/20/23 1503   99.6 °F (37.6 °C) 81 20 100/68 98 %      Temp src Heart Rate Source Patient Position BP Location FiO2 (%)   04/20/23 1503 -- 04/20/23 1518 04/20/23 1518 --   Tympanic  Sitting Left arm        Physical Exam  General: No acute distress, nontoxic  HEENT: Mucous membranes moist, superficial linear lacerations in the right anterior forehead without significant active bleeding, EOMI  Neck: Full ROM, supple, nontender  Pulm: Symmetric chest rise, nonlabored, lungs CTAB  Cardiovascular: Regular rate and rhythm, intact distal pulses  GI: Soft, nontender, nondistended, no rebound, no guarding, bowel sounds present  MSK: Full ROM, no deformity  Skin: Warm, dry  Neuro: Awake, alert, oriented x 4, GCS 15, moving all extremities, no focal deficits  Psych: Calm, cooperative        LAB RESULTS  Recent Results (from the past 24  hour(s))   Comprehensive Metabolic Panel    Collection Time: 04/20/23  3:29 PM    Specimen: Blood   Result Value Ref Range    Glucose 112 (H) 65 - 99 mg/dL    BUN 29 (H) 8 - 23 mg/dL    Creatinine 1.12 0.76 - 1.27 mg/dL    Sodium 133 (L) 136 - 145 mmol/L    Potassium 4.6 3.5 - 5.2 mmol/L    Chloride 98 98 - 107 mmol/L    CO2 24.3 22.0 - 29.0 mmol/L    Calcium 9.3 8.2 - 9.6 mg/dL    Total Protein 7.2 6.0 - 8.5 g/dL    Albumin 4.0 3.5 - 5.2 g/dL    ALT (SGPT) 21 1 - 41 U/L    AST (SGOT) 24 1 - 40 U/L    Alkaline Phosphatase 59 39 - 117 U/L    Total Bilirubin 0.6 0.0 - 1.2 mg/dL    Globulin 3.2 gm/dL    A/G Ratio 1.3 g/dL    BUN/Creatinine Ratio 25.9 (H) 7.0 - 25.0    Anion Gap 10.7 5.0 - 15.0 mmol/L    eGFR 60.1 >60.0 mL/min/1.73   Protime-INR    Collection Time: 04/20/23  3:29 PM    Specimen: Blood   Result Value Ref Range    Protime 17.4 (H) 11.7 - 14.2 Seconds    INR 1.41 (H) 0.90 - 1.10   aPTT    Collection Time: 04/20/23  3:29 PM    Specimen: Blood   Result Value Ref Range    PTT 42.0 (H) 22.7 - 35.4 seconds       Ordered the above labs and independently interpreted results. My findings will be discussed in the medical decision making section below        RADIOLOGY  CT Head Without Contrast, CT Cervical Spine Without Contrast    Result Date: 4/20/2023  CT CERVICAL SPINE WO CONTRAST-, CT HEAD WO CONTRAST-  INDICATIONS: Trauma  TECHNIQUE: Radiation dose reduction techniques were utilized, including automated exposure control and exposure modulation based on body size. Noncontrast head CT, cervical spine CT  COMPARISON: 12/09/2020  FINDINGS:  Head CT:  No acute intracranial hemorrhage, midline shift or mass effect. No acute territorial infarct is identified.  Moderate periventricular hypodensities suggest chronic small vessel ischemic change in a patient this age.  Arterial calcifications are seen at the base of the brain.  Ventricles, cisterns, cerebral sulci appear stable. Mild chronic stable prominence of the  bilateral prefrontal CSF space.  The visualized paranasal sinuses, orbits, mastoid air cells are unremarkable.   Cervical spine CT:  No acute fracture is identified.  Slight/borderline anterolisthesis of C2 on C3.  Bilateral carotid arterial calcifications and right carotid arterial stent are noted. Left thyroid nodularity is apparent, suboptimally evaluated with this technique, thyroid ultrasound correlation can be obtained as indicated.  Biapical fibronodular changes in the partly included lungs appear stable from the prior exam.        No acute intracranial hemorrhage or hydrocephalus; chronic changes of the brain. No acute cervical fracture identified. If there is further clinical concern, MRI could be considered for further evaluation.  Thyroid nodularity.  This report was finalized on 4/20/2023 5:50 PM by Dr. Leonard Fong M.D.        Ordered the above noted radiological studies.  Independently interpreted by me and my independent review of findings can be found in the ED Course.  See dictation for official radiology interpretation.      PROCEDURES    Procedures      MEDICATIONS GIVEN IN ER    Medications - No data to display      PROGRESS, DATA ANALYSIS, CONSULTS, AND MEDICAL DECISION MAKING    Please note that this section constitutes my independent interpretation of clinical data including lab results, radiology, EKG's.  This constitutes my independent professional opinion regarding differential diagnosis and management of this patient.  It may include any factors such as history from outside sources, review of external records, social determinants of health, management of medications, response to those treatments, and discussions with other providers.    Differential Diagnosis and Plan: Initial concern for skull fracture, intracranial hemorrhage, cervical fracture, among others.  This appears to have been a mechanical fall, will evaluate labs and CT results and reevaluation with results.  Lacerations  I think can be repaired with glue and Steri-Strips.    Additional sources:  - Discussed/ obtained information from independent historians:       - Chronic or social conditions impacting care:      - Shared decision making:  Patient and wife at bedside fully updated on and in agreement with the course and plan moving forward    ED Course as of 04/20/23 1848   u Apr 20, 2023 1812 Glucose(!): 112 [DC]   1812 BUN(!): 29 [DC]   1812 Creatinine: 1.12 [DC]   1812 Sodium(!): 133 [DC]   1812 Potassium: 4.6 [DC]   1812 INR(!): 1.41 [DC]   1812 PTT(!): 42.0 [DC]   1812 CT Head Without Contrast  Radiology report reviewed, no evidence of any intracranial hemorrhage or skull fracture [DC]   1812 CT Cervical Spine Without Contrast  Radiology report reviewed, no evidence of fracture or subluxation [DC]   1845 Patient remains a well-appearing in no acute distress.  Fully updated on the reassuring imaging studies and labs today.  Forehead laceration repaired with glue and Steri-Strips.  Supportive care measures discussed, outpatient PCP follow-up, ED return for worsening symptoms as needed. [DC]      ED Course User Index  [DC] Romel Whiteside MD       Hospitalization Considered?:     Orders Placed During This Visit:  Orders Placed This Encounter   Procedures   • CT Head Without Contrast   • CT Cervical Spine Without Contrast   • Comprehensive Metabolic Panel   • Protime-INR   • aPTT       Additional orders considered but not placed:      Independent interpretation of labs, radiology studies, and discussions with consultants: See ED Course        AS OF 18:48 EDT VITALS:    BP - 171/88  HR - 78  TEMP - 99.6 °F (37.6 °C) (Tympanic)  02 SATS - 98%        DIAGNOSIS  Final diagnoses:   Fall, initial encounter   Forehead laceration, initial encounter   Chronic anticoagulation   Skin tear of right elbow without complication, initial encounter         DISPOSITION  DISCHARGE    Patient discharged in stable condition.    Reviewed  implications of results, diagnosis, meds, responsibility to follow up, warning signs and symptoms of possible worsening, potential complications and reasons to return to ER. If your blood pressure > 120/80 please follow up with your primary care provider for further management.    Patient/Family voiced understanding of above instructions.    Discussed plan for discharge, as there is no emergent indication for admission. Pt/family is agreeable and understands need for follow up and repeat testing.  Pt is aware that discharge does not mean that nothing is wrong but it indicates no emergency is present that requires admission and they must continue care with follow-up as given below or physician of their choice.     FOLLOW-UP  [facility] Emergency Department  [address] [facility] [address] [phone]    As needed, If symptoms worsen    Justin Longoria MD  [address] [facility] [address] [phone]    Schedule an appointment as soon as possible for a visit   for recheck within 1 week as needed         Medication List      No changes were made to your prescriptions during this visit.                       --    Please note that portions of this were completed with a voice recognition program.       Note Disclaimer: At [facility], we believe that sharing information builds trust and better relationships. You are receiving this note because you are receiving care at [facility] or recently visited. It is possible you will see health information before a provider has talked with you about it. This kind of information can be easy to misunderstand. To help you fully understand what it means for your health, we urge you to discuss this note with your provider.           Romel Whiteside MD  04/20/23 1950     understanding of above instructions.    Discussed plan for discharge, as there is no emergent indication for admission. Pt/family is agreeable and understands need for follow up and repeat testing.  Pt is aware that discharge does not mean that nothing is wrong but it indicates no emergency is present that requires admission and they must continue care with follow-up as given below or physician of their choice.     FOLLOW-UP  [facility] Emergency Department  [address] [facility] [address] [phone]    As needed, If symptoms worsen    Justin Longoria MD  [address] [facility] [id number] [phone]    Schedule an appointment as soon as possible for a visit   for recheck within 1 week as needed         Medication List      No changes were made to your prescriptions during this visit.                       --    Please note that portions of this were completed with a voice recognition program.       Note Disclaimer: At [facility], we believe that sharing information builds trust and better relationships. You are receiving this note because you are receiving care at [facility] or recently visited. It is possible you will see health information before a provider has talked with you about it. This kind of information can be easy to misunderstand. To help you fully understand what it means for your health, we urge you to discuss this note with your provider.           Romel Whiteside MD  04/20/23 184       Romel Whiteside MD  04/25/23 4398

## 2023-04-20 NOTE — ED TRIAGE NOTES
Patient to her from with family at side. Reported he lost his foot and fell. Patient has injury to forehead/ and right elbow. Patient reported he is on blood thinners. No loc reported.

## 2023-10-08 ENCOUNTER — APPOINTMENT (OUTPATIENT)
Dept: CT IMAGING | Facility: HOSPITAL | Age: 88
End: 2023-10-08
Payer: MEDICARE

## 2023-10-08 ENCOUNTER — APPOINTMENT (OUTPATIENT)
Dept: GENERAL RADIOLOGY | Facility: HOSPITAL | Age: 88
End: 2023-10-08
Payer: MEDICARE

## 2023-10-08 ENCOUNTER — HOSPITAL ENCOUNTER (EMERGENCY)
Facility: HOSPITAL | Age: 88
Discharge: HOME | End: 2023-10-08
Attending: EMERGENCY MEDICINE | Admitting: EMERGENCY MEDICINE
Payer: MEDICARE

## 2023-10-08 VITALS
OXYGEN SATURATION: 94 % | TEMPERATURE: 98.2 F | SYSTOLIC BLOOD PRESSURE: 144 MMHG | WEIGHT: 135 LBS | RESPIRATION RATE: 16 BRPM | BODY MASS INDEX: 21.19 KG/M2 | DIASTOLIC BLOOD PRESSURE: 83 MMHG | HEART RATE: 75 BPM | HEIGHT: 67 IN

## 2023-10-08 DIAGNOSIS — I48.11 LONGSTANDING PERSISTENT ATRIAL FIBRILLATION: ICD-10-CM

## 2023-10-08 DIAGNOSIS — S70.12XA HEMATOMA OF LEFT THIGH, INITIAL ENCOUNTER: ICD-10-CM

## 2023-10-08 DIAGNOSIS — R29.6 UNWITNESSED FALL: Primary | ICD-10-CM

## 2023-10-08 DIAGNOSIS — R79.1 SUPRATHERAPEUTIC INR: ICD-10-CM

## 2023-10-08 LAB
ANION GAP SERPL CALCULATED.3IONS-SCNC: 8.7 MMOL/L (ref 5–15)
BASOPHILS # BLD AUTO: 0.12 10*3/MM3 (ref 0–0.2)
BASOPHILS NFR BLD AUTO: 1.1 % (ref 0–1.5)
BUN SERPL-MCNC: 14 MG/DL (ref 8–23)
BUN/CREAT SERPL: 19.2 (ref 7–25)
CALCIUM SPEC-SCNC: 8.4 MG/DL (ref 8.2–9.6)
CHLORIDE SERPL-SCNC: 94 MMOL/L (ref 98–107)
CO2 SERPL-SCNC: 26.3 MMOL/L (ref 22–29)
CREAT SERPL-MCNC: 0.73 MG/DL (ref 0.76–1.27)
DEPRECATED RDW RBC AUTO: 44.8 FL (ref 37–54)
EGFRCR SERPLBLD CKD-EPI 2021: 83.3 ML/MIN/1.73
EOSINOPHIL # BLD AUTO: 0.19 10*3/MM3 (ref 0–0.4)
EOSINOPHIL NFR BLD AUTO: 1.8 % (ref 0.3–6.2)
ERYTHROCYTE [DISTWIDTH] IN BLOOD BY AUTOMATED COUNT: 15.2 % (ref 12.3–15.4)
GLUCOSE SERPL-MCNC: 110 MG/DL (ref 65–99)
HCT VFR BLD AUTO: 33.3 % (ref 37.5–51)
HGB BLD-MCNC: 10.9 G/DL (ref 13–17.7)
IMM GRANULOCYTES # BLD AUTO: 0.05 10*3/MM3 (ref 0–0.05)
IMM GRANULOCYTES NFR BLD AUTO: 0.5 % (ref 0–0.5)
INR PPP: 5.77 (ref 0.9–1.1)
LYMPHOCYTES # BLD AUTO: 0.92 10*3/MM3 (ref 0.7–3.1)
LYMPHOCYTES NFR BLD AUTO: 8.5 % (ref 19.6–45.3)
MCH RBC QN AUTO: 26.7 PG (ref 26.6–33)
MCHC RBC AUTO-ENTMCNC: 32.7 G/DL (ref 31.5–35.7)
MCV RBC AUTO: 81.4 FL (ref 79–97)
MONOCYTES # BLD AUTO: 1.12 10*3/MM3 (ref 0.1–0.9)
MONOCYTES NFR BLD AUTO: 10.4 % (ref 5–12)
NEUTROPHILS NFR BLD AUTO: 77.7 % (ref 42.7–76)
NEUTROPHILS NFR BLD AUTO: 8.41 10*3/MM3 (ref 1.7–7)
NRBC BLD AUTO-RTO: 0 /100 WBC (ref 0–0.2)
PLATELET # BLD AUTO: 686 10*3/MM3 (ref 140–450)
PMV BLD AUTO: 8.6 FL (ref 6–12)
POTASSIUM SERPL-SCNC: 4.1 MMOL/L (ref 3.5–5.2)
PROTHROMBIN TIME: 53.5 SECONDS (ref 11.7–14.2)
RBC # BLD AUTO: 4.09 10*6/MM3 (ref 4.14–5.8)
SODIUM SERPL-SCNC: 129 MMOL/L (ref 136–145)
WBC NRBC COR # BLD: 10.81 10*3/MM3 (ref 3.4–10.8)

## 2023-10-08 PROCEDURE — 73552 X-RAY EXAM OF FEMUR 2/>: CPT

## 2023-10-08 PROCEDURE — 85025 COMPLETE CBC W/AUTO DIFF WBC: CPT | Performed by: EMERGENCY MEDICINE

## 2023-10-08 PROCEDURE — 80048 BASIC METABOLIC PNL TOTAL CA: CPT | Performed by: EMERGENCY MEDICINE

## 2023-10-08 PROCEDURE — 85610 PROTHROMBIN TIME: CPT | Performed by: EMERGENCY MEDICINE

## 2023-10-08 PROCEDURE — 99284 EMERGENCY DEPT VISIT MOD MDM: CPT

## 2023-10-08 PROCEDURE — 70450 CT HEAD/BRAIN W/O DYE: CPT

## 2023-10-08 NOTE — DISCHARGE INSTRUCTIONS
Your INR level was 5.8 indicating that the blood is too thin.  Please hold Coumadin dose for the next 2 days.  Contact Coumadin clinic for lower dose.  You may consider asking your providers to change from Coumadin to newer medication such as Eliquis or Xarelto.    Do not hesitate to return for increased leg pain, swelling, numbness/weakness or as needed.

## 2023-10-08 NOTE — ED PROVIDER NOTES
EMERGENCY DEPARTMENT ENCOUNTER    Room Number:  26/26  Date seen:  10/8/2023  PCP: Justin Longoria MD  Historian: Patient, EMS who brought patient      HPI:  Chief Complaint: Fall, left thigh pain  A complete HPI/ROS/PMH/PSH/SH/FH are unobtainable due to:   Context: Danni Aguilar is a 89 y.o. male who presents to the ED c/o fall, left thigh pain.  Patient had a fall several days ago and struck his left thigh.  Has had some pain and swelling in that thigh.  Patient generally gets around with a walker but has had several falls.  Family thinks that he may have had trouble with his right leg.  Patient himself has no real specific complaints other than some moderate left thigh pain.  Denies recent illness.      MEDICAL RECORD REVIEW (non ED)  I reviewed prior medical records including most recent office visit from July of this year.  Patient has a history of atrial fibrillation, prior stroke and takes Coumadin on a regular basis.    PAST MEDICAL HISTORY  Active Ambulatory Problems     Diagnosis Date Noted    Acute CVA (cerebrovascular accident) 07/20/2017    Atrial fibrillation 07/20/2017    Long term (current) use of anticoagulants 07/20/2017    Carotid stenosis 07/20/2017    Urinary retention 07/25/2017    Cerebrovascular accident (CVA) due to stenosis of right carotid artery 08/25/2017    Hyperlipidemia 01/29/2018    Cerebrovascular disease 12/09/2020    Recent cerebrovascular accident (CVA) 03/10/2021     Resolved Ambulatory Problems     Diagnosis Date Noted    No Resolved Ambulatory Problems     Past Medical History:   Diagnosis Date    A-fib     Irregular heart beat          PAST SURGICAL HISTORY  Past Surgical History:   Procedure Laterality Date    BACK SURGERY      CEREBRAL ANGIOGRAM N/A 7/24/2017    Procedure: DIAGNOSTIC CEREBRAL ANGIOGRAM AND RIGHT CAROTID STENT PLACEMENT;  Surgeon: Mauricio Yung MD;  Location: [facility] OR 18/19;  Service:          FAMILY HISTORY  History reviewed. No pertinent  family history.      SOCIAL HISTORY  Social History     Socioeconomic History    Marital status:    Tobacco Use    Smoking status: Never    Smokeless tobacco: Never   Substance and Sexual Activity    Alcohol use: No    Drug use: Defer    Sexual activity: Defer         ALLERGIES  Patient has no known allergies.        REVIEW OF SYSTEMS  Review of Systems   Constitutional:  Negative for fever.   Respiratory:  Negative for shortness of breath.    Cardiovascular:  Negative for chest pain.   Musculoskeletal:         Left eye pain as per HPI   All other systems reviewed and are negative.           PHYSICAL EXAM  ED Triage Vitals [10/08/23 1059]   Temp Heart Rate Resp BP SpO2   98.2 øF (36.8 øC) 84 16 151/72 98 %      Temp src Heart Rate Source Patient Position BP Location FiO2 (%)   Oral Monitor -- -- --       Physical Exam    GENERAL: Alert and pleasant male who looks significantly younger than stated age.  Triage vitals reviewed and are fairly benign.  Temperature heart rate and O2 sats are reassuring.  HENT: nares patent, atraumatic  EYES: no scleral icterus  CV: Irregularly irregular with heart rate in the 80s  RESPIRATORY: normal effort, clear to auscultation bilaterally  ABDOMEN: soft, nontender to palpation  MUSCULOSKELETAL: Spine-no significant bony tenderness to palpation  Upper extremities-atraumatic  Lower extremities-there is some mild diffuse swelling and tenderness to palpation throughout the thigh.  No noted bony deformities.  Good range of motion at the knee ankle and foot.  Compartments are soft and he is able to flex and extend from the knee on down without any significant problems.  NEURO: Strength sensation and coordination are grossly intact.  Speech and mentation are unremarkable  SKIN: warm, dry      Vital signs and nursing notes reviewed.          LAB RESULTS  Recent Results (from the past 24 hour(s))   Protime-INR    Collection Time: 10/08/23 12:27 PM    Specimen: Blood   Result Value Ref  Range    Protime 53.5 (C) 11.7 - 14.2 Seconds    INR 5.77 (C) 0.90 - 1.10   Basic Metabolic Panel    Collection Time: 10/08/23 12:27 PM    Specimen: Blood   Result Value Ref Range    Glucose 110 (H) 65 - 99 mg/dL    BUN 14 8 - 23 mg/dL    Creatinine 0.73 (L) 0.76 - 1.27 mg/dL    Sodium 129 (L) 136 - 145 mmol/L    Potassium 4.1 3.5 - 5.2 mmol/L    Chloride 94 (L) 98 - 107 mmol/L    CO2 26.3 22.0 - 29.0 mmol/L    Calcium 8.4 8.2 - 9.6 mg/dL    BUN/Creatinine Ratio 19.2 7.0 - 25.0    Anion Gap 8.7 5.0 - 15.0 mmol/L    eGFR 83.3 >60.0 mL/min/1.73   CBC Auto Differential    Collection Time: 10/08/23 12:27 PM    Specimen: Blood   Result Value Ref Range    WBC 10.81 (H) 3.40 - 10.80 10*3/mm3    RBC 4.09 (L) 4.14 - 5.80 10*6/mm3    Hemoglobin 10.9 (L) 13.0 - 17.7 g/dL    Hematocrit 33.3 (L) 37.5 - 51.0 %    MCV 81.4 79.0 - 97.0 fL    MCH 26.7 26.6 - 33.0 pg    MCHC 32.7 31.5 - 35.7 g/dL    RDW 15.2 12.3 - 15.4 %    RDW-SD 44.8 37.0 - 54.0 fl    MPV 8.6 6.0 - 12.0 fL    Platelets 686 (H) 140 - 450 10*3/mm3    Neutrophil % 77.7 (H) 42.7 - 76.0 %    Lymphocyte % 8.5 (L) 19.6 - 45.3 %    Monocyte % 10.4 5.0 - 12.0 %    Eosinophil % 1.8 0.3 - 6.2 %    Basophil % 1.1 0.0 - 1.5 %    Immature Grans % 0.5 0.0 - 0.5 %    Neutrophils, Absolute 8.41 (H) 1.70 - 7.00 10*3/mm3    Lymphocytes, Absolute 0.92 0.70 - 3.10 10*3/mm3    Monocytes, Absolute 1.12 (H) 0.10 - 0.90 10*3/mm3    Eosinophils, Absolute 0.19 0.00 - 0.40 10*3/mm3    Basophils, Absolute 0.12 0.00 - 0.20 10*3/mm3    Immature Grans, Absolute 0.05 0.00 - 0.05 10*3/mm3    nRBC 0.0 0.0 - 0.2 /100 WBC       Ordered the above labs and independently interpreted results. My findings will be discussed in the medical decision making section below        RADIOLOGY  CT Head Without Contrast    Result Date: 10/8/2023  CT HEAD WITHOUT CONTRAST  HISTORY: Unwitnessed fall. On blood thinners.  COMPARISON: CT head 04/20/2023.  FINDINGS: There is age-appropriate atrophy. The brain and  ventricles are symmetrical. There is no evidence of intracranial hemorrhage or a focal area of decreased attenuation to suggest acute infarction or contusion. Prominent CSF is appreciated overlying the frontal lobes bilaterally similar in appearance as compared to the prior studies. Bone windows show no evidence of a calvarial fracture.      There is no evidence of fracture or of intracranial hemorrhage. Atrophy, small vessel ischemic disease and prominent CSF overlying the frontal lobes anteriorly bilaterally all appear similar to the prior study.    Radiation dose reduction techniques were utilized, including automated exposure control and exposure modulation based on body size.       XR Femur 2 View Left    Result Date: 10/8/2023  XR FEMUR 2 VW LEFT-  INDICATIONS: Trauma   TECHNIQUE: 5 views of the left femur  COMPARISON: None available  FINDINGS:  No acute fracture, erosion, or dislocation is identified. Left hip joint space appears preserved. Medial tibiofemoral joint space narrowing is moderate to prominent. Arterial calcifications are conspicuous. Minimal knee effusion is apparent. Follow-up/further evaluation can be obtained as indications persist.       As described.    This report was finalized on 10/8/2023 1:45 PM by Dr. Leonard Fong M.D on Workstation: [employer]       I ordered and independently reviewed the above noted radiographic studies.      I viewed images of left femur which showed no obvious fracture per my independent interpretation.    See radiologist's dictation for official interpretation.             PROCEDURES  Procedures          MEDICATIONS GIVEN IN ER  Medications - No data to display            MEDICAL DECISION MAKING, PROGRESS, and CONSULTS    All labs have been independently reviewed by me.  All radiology studies have been reviewed by me and I have also reviewed the radiology report.   EKG's independently viewed and interpreted by me.  Discussion below represents my analysis of  pertinent findings related to patient's condition, differential diagnosis, treatment plan and final disposition.      Additional sources:  - Discussed/ obtained information from independent historians: Daughters at bedside    - External (non-ED) record review: Please see documented above    - Chronic or social conditions impacting care: Age 89, chronic anticoagulation    - Shared decision making: I discussed ED evaluation and treatment plan with patient who is in agreement.  Patient presents after unwitnessed fall several days ago.  He has had left thigh pain and swelling.  Denying significant pain or weakness in that leg.    Given his age and anticoagulated status we did scan his head to make sure there is no intracranial injury.  Scan unremarkable.  Labs showed decreased hemoglobin, likely related to left thigh hematoma.  INR elevated at 5.8.  No signs of active bleeding.    We will hold Coumadin x2 days and have them contact Coumadin clinic for repeat doses.  Consider changing to DOAC.    Exam benign without evidence of compartment syndrome or serious acute medical condition.      Orders placed during this visit:  Orders Placed This Encounter   Procedures    CT Head Without Contrast    XR Femur 2 View Left    Protime-INR    Basic Metabolic Panel    CBC Auto Differential    CBC & Differential           Differential diagnosis:    Please see as documented below in ED course      Independent interpretation of labs, radiology studies, and discussions with consultants:  ED Course as of 10/08/23 1444   Sun Oct 08, 2023   [id number]-old male with history of A-fib on Coumadin presents after fall several days ago.  He does have left leg pain.  Family concerned about possible right leg weakness.    On exam he is well-appearing and looks younger than stated age.  Vitals are benign.  I really do not detect any lower extremity weakness other than his left leg may be a little bit weaker related to  hematoma.    Assessment-we will certainly get an x-ray of the left thigh to rule out bony injury.  Suspect that there is a decent sized hematoma.  We will go ahead and check his INR to see if he is supratherapeutic.  Given family concerns about right leg weakness and several unwitnessed falls we will get a CT scan of the head to rule out an acute intracranial hemorrhage in a patient who is on anticoagulation.  Also would look for acute or subacute stroke. [DB]   1428 X-rays of the left femur independently interpreted by me show no obvious fracture. [DB]   1428 CT scan of the brain independently interpreted by me shows no obvious traumatic injury.  There is atrophy consistent with age.   [DB]   1428 Patient's INR is pretty significantly elevated at 5.8.    This does represent some coagulopathy but fortunately there is no intracranial hemorrhage.  I do think that this is making his hematoma of the left thigh somewhat worse but patient does not show signs of compartment syndrome at this time.  We will ask patient to hold Coumadin today and tomorrow and contact his Coumadin dosing clinic for repeat and lower dose.    CBC does show some mild anemia with hemoglobin of 10.9 which is down from 15 drawn several years ago.  This may represent chronic anemia although may represent some blood loss anemia from left thigh hematoma.    Patient has soft compartments and  not showing signs of compartment syndrome but this was discussed with family at bedside and patient.    At this point I do not see real indication for hospitalization.  Suspect he does have a left thigh hematoma related to supratherapeutic Coumadin level and recent trauma.  We will hold Coumadin and repeat adjust dosing.  Head CT and otherwise labs are benign. [DB]   1437 I discussed results of all testing with daughters at bedside and patient.    Discussed the pros and cons of anticoagulation.  Patient may need to come off anticoagulation or change from Coumadin  to a DOAC.  Encouraged patient family to discuss with primary care provider and/or cardiologist.  We will hold Coumadin dose x2 days and then ask them to get a recheck of dose. [DB]      ED Course User Index  [DB] Riley Ashley MD               DIAGNOSIS  Final diagnoses:   Unwitnessed fall   Hematoma of left thigh, initial encounter   Supratherapeutic INR   Longstanding persistent atrial fibrillation         DISPOSITION  DISCHARGE    Patient discharged in stable condition.    Reviewed implications of results, diagnosis, meds, responsibility to follow up, warning signs and symptoms of possible worsening, potential complications and reasons to return to ER, including increased pain or as needed.    Patient/Family voiced understanding of above instructions.    Discussed plan for discharge, as there is no emergent indication for admission. Patient referred to primary care provider for BP management due to today's BP. Pt/family is agreeable and understands need for follow up and repeat testing.  Pt is aware that discharge does not mean that nothing is wrong but it indicates no emergency is present that requires admission and they must continue care with follow-up as given below or physician of their choice.     FOLLOW-UP  Justin Longoria MD  [address] [facility] [address] [phone]    In 2 days  If Not Better, Call for Appointment         Medication List      No changes were made to your prescriptions during this visit.                   Latest Documented Vital Signs:  As of 14:44 EDT  BP- 155/75 HR- 65 Temp- 98.2 øF (36.8 øC) (Oral) O2 sat- 96%              --    Please note that portions of this were completed with a voice recognition program.       Note Disclaimer: At [facility], we believe that sharing information builds trust and better relationships. You are receiving this note because you are receiving care at [facility] or recently visited. It is possible you will see [facility]  information before a provider has talked with you about it. This kind of information can be easy to misunderstand. To help you fully understand what it means for your health, we urge you to discuss this note with your provider.             Riley Ashley MD  10/08/23 2862

## 2023-10-08 NOTE — ED NOTES
"Patient arrives via PV with family. Patient lives at home with his wife with family checking on him frequently. Patient has various bruises on his body including his R buttocks, L chest/axilla, posterior L arm, and medial L thigh. Patient c/o pain and swelling to the L thigh, appears larger compared to the right, no discoloration or increased warmth noted compared to the right. Patient states he fell 10/6 but family thinks it was before then. Family states he has been \"dragging his right leg\". Patient is on warfarin and states his head \"bounced off the floor\" when he fell. Patient denies LOC. Patient disoriented to time. Vital signs stable.   "

## 2023-12-04 ENCOUNTER — HOSPITAL ENCOUNTER (INPATIENT)
Facility: HOSPITAL | Age: 88
LOS: 4 days | Discharge: NURSING, REHAB OR LONG-TERM CARE | DRG: 866 | End: 2023-12-09
Attending: EMERGENCY MEDICINE | Admitting: INTERNAL MEDICINE
Payer: MEDICARE

## 2023-12-04 ENCOUNTER — APPOINTMENT (OUTPATIENT)
Dept: GENERAL RADIOLOGY | Facility: HOSPITAL | Age: 88
DRG: 866 | End: 2023-12-04
Payer: MEDICARE

## 2023-12-04 DIAGNOSIS — B33.8 RSV INFECTION: Primary | ICD-10-CM

## 2023-12-04 DIAGNOSIS — E87.1 HYPONATREMIA: ICD-10-CM

## 2023-12-04 DIAGNOSIS — R53.1 GENERALIZED WEAKNESS: ICD-10-CM

## 2023-12-04 DIAGNOSIS — R29.6 FREQUENT FALLS: ICD-10-CM

## 2023-12-04 PROBLEM — D75.839 THROMBOCYTOSIS: Status: ACTIVE | Noted: 2023-12-04

## 2023-12-04 PROBLEM — R79.89 ELEVATED BRAIN NATRIURETIC PEPTIDE (BNP) LEVEL: Status: ACTIVE | Noted: 2023-12-04

## 2023-12-04 LAB
ALBUMIN SERPL-MCNC: 3.3 G/DL (ref 3.5–5.2)
ALBUMIN/GLOB SERPL: 1 G/DL
ALP SERPL-CCNC: 53 U/L (ref 39–117)
ALT SERPL W P-5'-P-CCNC: 25 U/L (ref 1–41)
ANION GAP SERPL CALCULATED.3IONS-SCNC: 9 MMOL/L (ref 5–15)
AST SERPL-CCNC: 43 U/L (ref 1–40)
B PARAPERT DNA SPEC QL NAA+PROBE: NOT DETECTED
B PERT DNA SPEC QL NAA+PROBE: NOT DETECTED
BACTERIA UR QL AUTO: ABNORMAL /HPF
BASOPHILS # BLD AUTO: 0.07 10*3/MM3 (ref 0–0.2)
BASOPHILS NFR BLD AUTO: 0.6 % (ref 0–1.5)
BILIRUB SERPL-MCNC: 0.9 MG/DL (ref 0–1.2)
BILIRUB UR QL STRIP: NEGATIVE
BUN SERPL-MCNC: 19 MG/DL (ref 8–23)
BUN/CREAT SERPL: 22.9 (ref 7–25)
C PNEUM DNA NPH QL NAA+NON-PROBE: NOT DETECTED
CALCIUM SPEC-SCNC: 8 MG/DL (ref 8.2–9.6)
CHLORIDE SERPL-SCNC: 98 MMOL/L (ref 98–107)
CLARITY UR: CLEAR
CO2 SERPL-SCNC: 22 MMOL/L (ref 22–29)
COLOR UR: YELLOW
CREAT SERPL-MCNC: 0.83 MG/DL (ref 0.76–1.27)
D-LACTATE SERPL-SCNC: 1.7 MMOL/L (ref 0.5–2)
DEPRECATED RDW RBC AUTO: 44.6 FL (ref 37–54)
EGFRCR SERPLBLD CKD-EPI 2021: 80.1 ML/MIN/1.73
EOSINOPHIL # BLD AUTO: 0 10*3/MM3 (ref 0–0.4)
EOSINOPHIL NFR BLD AUTO: 0 % (ref 0.3–6.2)
ERYTHROCYTE [DISTWIDTH] IN BLOOD BY AUTOMATED COUNT: 14.8 % (ref 12.3–15.4)
FLUAV SUBTYP SPEC NAA+PROBE: NOT DETECTED
FLUBV RNA ISLT QL NAA+PROBE: NOT DETECTED
GLOBULIN UR ELPH-MCNC: 3.2 GM/DL
GLUCOSE SERPL-MCNC: 119 MG/DL (ref 65–99)
GLUCOSE UR STRIP-MCNC: NEGATIVE MG/DL
HADV DNA SPEC NAA+PROBE: NOT DETECTED
HCOV 229E RNA SPEC QL NAA+PROBE: NOT DETECTED
HCOV HKU1 RNA SPEC QL NAA+PROBE: NOT DETECTED
HCOV NL63 RNA SPEC QL NAA+PROBE: NOT DETECTED
HCOV OC43 RNA SPEC QL NAA+PROBE: NOT DETECTED
HCT VFR BLD AUTO: 39.5 % (ref 37.5–51)
HGB BLD-MCNC: 12.4 G/DL (ref 13–17.7)
HGB UR QL STRIP.AUTO: ABNORMAL
HMPV RNA NPH QL NAA+NON-PROBE: NOT DETECTED
HOLD SPECIMEN: NORMAL
HPIV1 RNA ISLT QL NAA+PROBE: NOT DETECTED
HPIV2 RNA SPEC QL NAA+PROBE: NOT DETECTED
HPIV3 RNA NPH QL NAA+PROBE: NOT DETECTED
HPIV4 P GENE NPH QL NAA+PROBE: NOT DETECTED
HYALINE CASTS UR QL AUTO: ABNORMAL /LPF
IMM GRANULOCYTES # BLD AUTO: 0.11 10*3/MM3 (ref 0–0.05)
IMM GRANULOCYTES NFR BLD AUTO: 0.9 % (ref 0–0.5)
INR PPP: 2.47 (ref 0.9–1.1)
INR PPP: 2.64 (ref 0.9–1.1)
KETONES UR QL STRIP: NEGATIVE
LEUKOCYTE ESTERASE UR QL STRIP.AUTO: ABNORMAL
LYMPHOCYTES # BLD AUTO: 0.4 10*3/MM3 (ref 0.7–3.1)
LYMPHOCYTES NFR BLD AUTO: 3.4 % (ref 19.6–45.3)
M PNEUMO IGG SER IA-ACNC: NOT DETECTED
MCH RBC QN AUTO: 26.3 PG (ref 26.6–33)
MCHC RBC AUTO-ENTMCNC: 31.4 G/DL (ref 31.5–35.7)
MCV RBC AUTO: 83.7 FL (ref 79–97)
MONOCYTES # BLD AUTO: 0.78 10*3/MM3 (ref 0.1–0.9)
MONOCYTES NFR BLD AUTO: 6.7 % (ref 5–12)
NEUTROPHILS NFR BLD AUTO: 10.35 10*3/MM3 (ref 1.7–7)
NEUTROPHILS NFR BLD AUTO: 88.4 % (ref 42.7–76)
NITRITE UR QL STRIP: NEGATIVE
NRBC BLD AUTO-RTO: 0 /100 WBC (ref 0–0.2)
NT-PROBNP SERPL-MCNC: 3817 PG/ML (ref 0–1800)
PH UR STRIP.AUTO: 5.5 [PH] (ref 5–8)
PLATELET # BLD AUTO: 703 10*3/MM3 (ref 140–450)
PMV BLD AUTO: 8.9 FL (ref 6–12)
POTASSIUM SERPL-SCNC: 4.6 MMOL/L (ref 3.5–5.2)
PROCALCITONIN SERPL-MCNC: 0.51 NG/ML (ref 0–0.25)
PROT SERPL-MCNC: 6.5 G/DL (ref 6–8.5)
PROT UR QL STRIP: ABNORMAL
PROTHROMBIN TIME: 27.2 SECONDS (ref 11.7–14.2)
PROTHROMBIN TIME: 28.7 SECONDS (ref 11.7–14.2)
QT INTERVAL: 351 MS
QTC INTERVAL: 492 MS
RBC # BLD AUTO: 4.72 10*6/MM3 (ref 4.14–5.8)
RBC # UR STRIP: ABNORMAL /HPF
REF LAB TEST METHOD: ABNORMAL
RHINOVIRUS RNA SPEC NAA+PROBE: NOT DETECTED
RSV RNA NPH QL NAA+NON-PROBE: DETECTED
SARS-COV-2 RNA NPH QL NAA+NON-PROBE: NOT DETECTED
SODIUM SERPL-SCNC: 129 MMOL/L (ref 136–145)
SP GR UR STRIP: 1.02 (ref 1–1.03)
SQUAMOUS #/AREA URNS HPF: ABNORMAL /HPF
TROPONIN T SERPL HS-MCNC: 43 NG/L
UROBILINOGEN UR QL STRIP: ABNORMAL
WBC # UR STRIP: ABNORMAL /HPF
WBC NRBC COR # BLD AUTO: 11.71 10*3/MM3 (ref 3.4–10.8)
WHOLE BLOOD HOLD COAG: NORMAL
WHOLE BLOOD HOLD SPECIMEN: NORMAL

## 2023-12-04 PROCEDURE — 93010 ELECTROCARDIOGRAM REPORT: CPT | Performed by: INTERNAL MEDICINE

## 2023-12-04 PROCEDURE — G0378 HOSPITAL OBSERVATION PER HR: HCPCS

## 2023-12-04 PROCEDURE — 36415 COLL VENOUS BLD VENIPUNCTURE: CPT

## 2023-12-04 PROCEDURE — 85610 PROTHROMBIN TIME: CPT | Performed by: INTERNAL MEDICINE

## 2023-12-04 PROCEDURE — 71045 X-RAY EXAM CHEST 1 VIEW: CPT

## 2023-12-04 PROCEDURE — 99285 EMERGENCY DEPT VISIT HI MDM: CPT

## 2023-12-04 PROCEDURE — 87040 BLOOD CULTURE FOR BACTERIA: CPT | Performed by: EMERGENCY MEDICINE

## 2023-12-04 PROCEDURE — 83605 ASSAY OF LACTIC ACID: CPT | Performed by: EMERGENCY MEDICINE

## 2023-12-04 PROCEDURE — 81001 URINALYSIS AUTO W/SCOPE: CPT | Performed by: EMERGENCY MEDICINE

## 2023-12-04 PROCEDURE — 85610 PROTHROMBIN TIME: CPT | Performed by: EMERGENCY MEDICINE

## 2023-12-04 PROCEDURE — 25010000002 SODIUM CHLORIDE 0.9 % WITH KCL 20 MEQ 20-0.9 MEQ/L-% SOLUTION: Performed by: INTERNAL MEDICINE

## 2023-12-04 PROCEDURE — 85025 COMPLETE CBC W/AUTO DIFF WBC: CPT | Performed by: EMERGENCY MEDICINE

## 2023-12-04 PROCEDURE — 93005 ELECTROCARDIOGRAM TRACING: CPT | Performed by: EMERGENCY MEDICINE

## 2023-12-04 PROCEDURE — 84484 ASSAY OF TROPONIN QUANT: CPT | Performed by: EMERGENCY MEDICINE

## 2023-12-04 PROCEDURE — 84145 PROCALCITONIN (PCT): CPT | Performed by: EMERGENCY MEDICINE

## 2023-12-04 PROCEDURE — 80053 COMPREHEN METABOLIC PANEL: CPT | Performed by: EMERGENCY MEDICINE

## 2023-12-04 PROCEDURE — 0202U NFCT DS 22 TRGT SARS-COV-2: CPT | Performed by: EMERGENCY MEDICINE

## 2023-12-04 PROCEDURE — 83880 ASSAY OF NATRIURETIC PEPTIDE: CPT | Performed by: EMERGENCY MEDICINE

## 2023-12-04 RX ORDER — AMOXICILLIN 250 MG
2 CAPSULE ORAL 2 TIMES DAILY
Status: DISCONTINUED | OUTPATIENT
Start: 2023-12-04 | End: 2023-12-09 | Stop reason: HOSPADM

## 2023-12-04 RX ORDER — SODIUM CHLORIDE 0.9 % (FLUSH) 0.9 %
10 SYRINGE (ML) INJECTION EVERY 12 HOURS SCHEDULED
Status: DISCONTINUED | OUTPATIENT
Start: 2023-12-04 | End: 2023-12-09 | Stop reason: HOSPADM

## 2023-12-04 RX ORDER — POLYETHYLENE GLYCOL 3350 17 G/17G
17 POWDER, FOR SOLUTION ORAL DAILY PRN
Status: DISCONTINUED | OUTPATIENT
Start: 2023-12-04 | End: 2023-12-09 | Stop reason: HOSPADM

## 2023-12-04 RX ORDER — ACETAMINOPHEN 160 MG/5ML
650 SOLUTION ORAL EVERY 4 HOURS PRN
Status: DISCONTINUED | OUTPATIENT
Start: 2023-12-04 | End: 2023-12-09 | Stop reason: HOSPADM

## 2023-12-04 RX ORDER — SODIUM CHLORIDE 0.9 % (FLUSH) 0.9 %
10 SYRINGE (ML) INJECTION AS NEEDED
Status: DISCONTINUED | OUTPATIENT
Start: 2023-12-04 | End: 2023-12-09 | Stop reason: HOSPADM

## 2023-12-04 RX ORDER — ASPIRIN 81 MG/1
81 TABLET, CHEWABLE ORAL DAILY
Status: DISCONTINUED | OUTPATIENT
Start: 2023-12-04 | End: 2023-12-09 | Stop reason: HOSPADM

## 2023-12-04 RX ORDER — WARFARIN SODIUM 6 MG/1
6 TABLET ORAL
Status: DISCONTINUED | OUTPATIENT
Start: 2023-12-04 | End: 2023-12-06

## 2023-12-04 RX ORDER — SODIUM CHLORIDE AND POTASSIUM CHLORIDE 150; 900 MG/100ML; MG/100ML
100 INJECTION, SOLUTION INTRAVENOUS CONTINUOUS
Status: DISCONTINUED | OUTPATIENT
Start: 2023-12-04 | End: 2023-12-07

## 2023-12-04 RX ORDER — WARFARIN SODIUM 6 MG/1
6 TABLET ORAL
COMMUNITY
End: 2023-12-09 | Stop reason: HOSPADM

## 2023-12-04 RX ORDER — WARFARIN SODIUM 5 MG/1
5 TABLET ORAL
Status: DISCONTINUED | OUTPATIENT
Start: 2023-12-04 | End: 2023-12-04 | Stop reason: DRUGHIGH

## 2023-12-04 RX ORDER — ACETAMINOPHEN 650 MG/1
650 SUPPOSITORY RECTAL EVERY 4 HOURS PRN
Status: DISCONTINUED | OUTPATIENT
Start: 2023-12-04 | End: 2023-12-09 | Stop reason: HOSPADM

## 2023-12-04 RX ORDER — ATORVASTATIN CALCIUM 80 MG/1
80 TABLET, FILM COATED ORAL DAILY
Status: DISCONTINUED | OUTPATIENT
Start: 2023-12-04 | End: 2023-12-09 | Stop reason: HOSPADM

## 2023-12-04 RX ORDER — SODIUM CHLORIDE 9 MG/ML
40 INJECTION, SOLUTION INTRAVENOUS AS NEEDED
Status: DISCONTINUED | OUTPATIENT
Start: 2023-12-04 | End: 2023-12-09 | Stop reason: HOSPADM

## 2023-12-04 RX ORDER — BISACODYL 5 MG/1
5 TABLET, DELAYED RELEASE ORAL DAILY PRN
Status: DISCONTINUED | OUTPATIENT
Start: 2023-12-04 | End: 2023-12-09 | Stop reason: HOSPADM

## 2023-12-04 RX ORDER — BISACODYL 10 MG
10 SUPPOSITORY, RECTAL RECTAL DAILY PRN
Status: DISCONTINUED | OUTPATIENT
Start: 2023-12-04 | End: 2023-12-09 | Stop reason: HOSPADM

## 2023-12-04 RX ORDER — ONDANSETRON 2 MG/ML
4 INJECTION INTRAMUSCULAR; INTRAVENOUS EVERY 6 HOURS PRN
Status: DISCONTINUED | OUTPATIENT
Start: 2023-12-04 | End: 2023-12-09 | Stop reason: HOSPADM

## 2023-12-04 RX ORDER — ACETAMINOPHEN 325 MG/1
650 TABLET ORAL EVERY 4 HOURS PRN
Status: DISCONTINUED | OUTPATIENT
Start: 2023-12-04 | End: 2023-12-09 | Stop reason: HOSPADM

## 2023-12-04 RX ADMIN — POTASSIUM CHLORIDE AND SODIUM CHLORIDE 100 ML/HR: 900; 150 INJECTION, SOLUTION INTRAVENOUS at 16:44

## 2023-12-04 RX ADMIN — POTASSIUM CHLORIDE AND SODIUM CHLORIDE 100 ML/HR: 900; 150 INJECTION, SOLUTION INTRAVENOUS at 20:33

## 2023-12-04 NOTE — ED NOTES
Patient to ER via ems from home for generalized weakness x the last week causing frequent falls did not hit head is on blood thinners  Patient has cough    Family states expressive aphagia is normal for him but might be worse today    Wife has recently been sick as well

## 2023-12-04 NOTE — ED PROVIDER NOTES
" EMERGENCY DEPARTMENT ENCOUNTER    Room Number:  N635/1  PCP: Justin Longoria MD  Historian: Patient, daughter (POA), EMS      HPI:  Chief Complaint: Cough, generalized weakness  A complete HPI/ROS/PMH/PSH/SH/FH are unobtainable due to: Nothing  Context: Danni Aguilar is a 89 y.o. male, with a history of atrial fibrillation, who presents to the ED from home by EMS c/o cough and generalized weakness.  Daughter states the patient has had a cough for about 1 week.  Symptoms developed after she took him to the  home for visitation.  Cough is nonproductive.  Patient denies fever although daughter states that he \"felt warm\".  Daughter also states the patient has fallen multiple times in the past week due to generalized weakness.  He has a walker but does not always remember to use it.  Patient denies hitting his head or sustaining any injury.  He is on warfarin.  Patient denies chest pain, shortness of breath, abdominal pain, vomiting, diarrhea, or dysuria.  Patient lives at home with his wife.            PAST MEDICAL HISTORY  Active Ambulatory Problems     Diagnosis Date Noted    Acute CVA (cerebrovascular accident) 2017    Atrial fibrillation 2017    Long term (current) use of anticoagulants 2017    Carotid stenosis 2017    Urinary retention 2017    Cerebrovascular accident (CVA) due to stenosis of right carotid artery 2017    Hyperlipidemia 2018    Cerebrovascular disease 2020    Recent cerebrovascular accident (CVA) 03/10/2021     Resolved Ambulatory Problems     Diagnosis Date Noted    No Resolved Ambulatory Problems     Past Medical History:   Diagnosis Date    A-fib     Carotid artery stenosis     Irregular heart beat          PAST SURGICAL HISTORY  Past Surgical History:   Procedure Laterality Date    BACK SURGERY      CAROTID ARTERY ANGIOPLASTY      CEREBRAL ANGIOGRAM N/A 2017    Procedure: DIAGNOSTIC CEREBRAL ANGIOGRAM AND RIGHT CAROTID STENT " PLACEMENT;  Surgeon: Mauricio Yung MD;  Location: [facility] OR 18/19;  Service:     RECTAL SURGERY           FAMILY HISTORY  History reviewed. No pertinent family history.      SOCIAL HISTORY  Social History     Socioeconomic History    Marital status:    Tobacco Use    Smoking status: Never    Smokeless tobacco: Never   Vaping Use    Vaping Use: Never used   Substance and Sexual Activity    Alcohol use: No    Drug use: Defer    Sexual activity: Defer         ALLERGIES  Patient has no known allergies.    REVIEW OF SYSTEMS  All systems have been reviewed and are negative except for those listed in the HPI      PHYSICAL EXAM  ED Triage Vitals [12/04/23 1126]   Temp Heart Rate Resp BP SpO2   100.4 °F (38 °C) 90 18 113/46 97 %      Temp src Heart Rate Source Patient Position BP Location FiO2 (%)   -- -- -- -- --       Physical Exam      GENERAL: Awake and alert.  Oriented to self and place but not to month or year (daughter states this is baseline for him) nontoxic-appearing elderly male.  No acute distress  HENT: NCAT, nares patent, mildly dry mucous membranes  EYES: PERRL, EOMI  CV: Irregularly irregular rhythm, normal rate  RESPIRATORY: normal effort, scattered rhonchi in both lung bases  ABDOMEN: soft  MUSCULOSKELETAL: Extremities are nontender with full range of motion.  C/T/L-spine and chest are nontender.  Pelvis is stable  NEURO: Speech is somewhat halting but nondysarthric.  No facial droop.  Normal strength in all extremities.  Follows commands.  PSYCH:  calm, cooperative  SKIN: warm, dry    Vital signs and nursing notes reviewed.          LAB RESULTS  Recent Results (from the past 24 hour(s))   Green Top (Gel)    Collection Time: 12/04/23 11:45 AM   Result Value Ref Range    Extra Tube Hold for add-ons.    Lavender Top    Collection Time: 12/04/23 11:45 AM   Result Value Ref Range    Extra Tube hold for add-on    Gold Top - SST    Collection Time: 12/04/23 11:45 AM   Result Value Ref Range     Extra Tube Hold for add-ons.    Gray Top    Collection Time: 12/04/23 11:45 AM   Result Value Ref Range    Extra Tube Hold for add-ons.    Light Blue Top    Collection Time: 12/04/23 11:45 AM   Result Value Ref Range    Extra Tube Hold for add-ons.    Comprehensive Metabolic Panel    Collection Time: 12/04/23 11:45 AM    Specimen: Blood   Result Value Ref Range    Glucose 119 (H) 65 - 99 mg/dL    BUN 19 8 - 23 mg/dL    Creatinine 0.83 0.76 - 1.27 mg/dL    Sodium 129 (L) 136 - 145 mmol/L    Potassium 4.6 3.5 - 5.2 mmol/L    Chloride 98 98 - 107 mmol/L    CO2 22.0 22.0 - 29.0 mmol/L    Calcium 8.0 (L) 8.2 - 9.6 mg/dL    Total Protein 6.5 6.0 - 8.5 g/dL    Albumin 3.3 (L) 3.5 - 5.2 g/dL    ALT (SGPT) 25 1 - 41 U/L    AST (SGOT) 43 (H) 1 - 40 U/L    Alkaline Phosphatase 53 39 - 117 U/L    Total Bilirubin 0.9 0.0 - 1.2 mg/dL    Globulin 3.2 gm/dL    A/G Ratio 1.0 g/dL    BUN/Creatinine Ratio 22.9 7.0 - 25.0    Anion Gap 9.0 5.0 - 15.0 mmol/L    eGFR 80.1 >60.0 mL/min/1.73   Procalcitonin    Collection Time: 12/04/23 11:45 AM    Specimen: Blood   Result Value Ref Range    Procalcitonin 0.51 (H) 0.00 - 0.25 ng/mL   Lactic Acid, Plasma    Collection Time: 12/04/23 11:45 AM    Specimen: Blood   Result Value Ref Range    Lactate 1.7 0.5 - 2.0 mmol/L   Protime-INR    Collection Time: 12/04/23 11:45 AM    Specimen: Blood   Result Value Ref Range    Protime 28.7 (H) 11.7 - 14.2 Seconds    INR 2.64 (H) 0.90 - 1.10   CBC Auto Differential    Collection Time: 12/04/23 11:45 AM    Specimen: Blood   Result Value Ref Range    WBC 11.71 (H) 3.40 - 10.80 10*3/mm3    RBC 4.72 4.14 - 5.80 10*6/mm3    Hemoglobin 12.4 (L) 13.0 - 17.7 g/dL    Hematocrit 39.5 37.5 - 51.0 %    MCV 83.7 79.0 - 97.0 fL    MCH 26.3 (L) 26.6 - 33.0 pg    MCHC 31.4 (L) 31.5 - 35.7 g/dL    RDW 14.8 12.3 - 15.4 %    RDW-SD 44.6 37.0 - 54.0 fl    MPV 8.9 6.0 - 12.0 fL    Platelets 703 (H) 140 - 450 10*3/mm3    Neutrophil % 88.4 (H) 42.7 - 76.0 %    Lymphocyte % 3.4  (L) 19.6 - 45.3 %    Monocyte % 6.7 5.0 - 12.0 %    Eosinophil % 0.0 (L) 0.3 - 6.2 %    Basophil % 0.6 0.0 - 1.5 %    Immature Grans % 0.9 (H) 0.0 - 0.5 %    Neutrophils, Absolute 10.35 (H) 1.70 - 7.00 10*3/mm3    Lymphocytes, Absolute 0.40 (L) 0.70 - 3.10 10*3/mm3    Monocytes, Absolute 0.78 0.10 - 0.90 10*3/mm3    Eosinophils, Absolute 0.00 0.00 - 0.40 10*3/mm3    Basophils, Absolute 0.07 0.00 - 0.20 10*3/mm3    Immature Grans, Absolute 0.11 (H) 0.00 - 0.05 10*3/mm3    nRBC 0.0 0.0 - 0.2 /100 WBC   Single High Sensitivity Troponin T    Collection Time: 12/04/23 11:45 AM    Specimen: Blood   Result Value Ref Range    HS Troponin T 43 (H) <22 ng/L   BNP    Collection Time: 12/04/23 11:45 AM    Specimen: Blood   Result Value Ref Range    proBNP 3,817.0 (H) 0.0 - 1,800.0 pg/mL   Respiratory Panel PCR w/COVID-19(SARS-CoV-2) JENNIFER/ANGELA/JANET/PAD/COR/EKATERINA In-House, NP Swab in [facility], 2 HR TAT - Swab, Nasopharynx    Collection Time: 12/04/23 12:23 PM    Specimen: Nasopharynx; Swab   Result Value Ref Range    ADENOVIRUS, PCR Not Detected Not Detected    Coronavirus 229E Not Detected Not Detected    Coronavirus HKU1 Not Detected Not Detected    Coronavirus NL63 Not Detected Not Detected    Coronavirus OC43 Not Detected Not Detected    COVID19 Not Detected Not Detected - Ref. Range    Human Metapneumovirus Not Detected Not Detected    Human Rhinovirus/Enterovirus Not Detected Not Detected    Influenza A PCR Not Detected Not Detected    Influenza B PCR Not Detected Not Detected    Parainfluenza Virus 1 Not Detected Not Detected    Parainfluenza Virus 2 Not Detected Not Detected    Parainfluenza Virus 3 Not Detected Not Detected    Parainfluenza Virus 4 Not Detected Not Detected    RSV, PCR Detected (A) Not Detected    Bordetella pertussis pcr Not Detected Not Detected    Bordetella parapertussis PCR Not Detected Not Detected    Chlamydophila pneumoniae PCR Not Detected Not Detected    Mycoplasma pneumo by PCR Not Detected Not  Detected   Urinalysis With Microscopic If Indicated (No Culture) - Urine, Clean Catch    Collection Time: 12/04/23  1:27 PM    Specimen: Urine, Clean Catch   Result Value Ref Range    Color, UA Yellow Yellow, Straw    Appearance, UA Clear Clear    pH, UA 5.5 5.0 - 8.0    Specific Gravity, UA 1.021 1.005 - 1.030    Glucose, UA Negative Negative    Ketones, UA Negative Negative    Bilirubin, UA Negative Negative    Blood, UA Moderate (2+) (A) Negative    Protein, UA 30 mg/dL (1+) (A) Negative    Leuk Esterase, UA Trace (A) Negative    Nitrite, UA Negative Negative    Urobilinogen, UA 1.0 E.U./dL 0.2 - 1.0 E.U./dL   Urinalysis, Microscopic Only - Urine, Clean Catch    Collection Time: 12/04/23  1:27 PM    Specimen: Urine, Clean Catch   Result Value Ref Range    RBC, UA 11-20 (A) None Seen, 0-2 /HPF    WBC, UA 0-2 None Seen, 0-2 /HPF    Bacteria, UA None Seen None Seen /HPF    Squamous Epithelial Cells, UA 0-2 None Seen, 0-2 /HPF    Hyaline Casts, UA 3-6 None Seen /LPF    Methodology Automated Microscopy    ECG 12 Lead Dyspnea    Collection Time: 12/04/23  1:29 PM   Result Value Ref Range    QT Interval 351 ms    QTC Interval 492 ms       Ordered the above labs and reviewed the results.        RADIOLOGY  XR Chest AP    Result Date: 12/4/2023  EXAM: XR CHEST AP-  INDICATION: Cough and fever  COMPARISON: 12/9/2020       No focal consolidation. Left lower lobe linear atelectasis/scarring. No pleural effusion or pneumothorax. Normal size cardiomediastinal silhouette. Dilated central pulmonary vasculature without findings of redistribution/edema. No focal osseous abnormality.    This report was finalized on 12/4/2023 12:49 PM by Dr. Jean-Pierre Palmer M.D on Workstation: [id number]       Ordered the above noted radiological studies. Reviewed by me in PACS.            PROCEDURES  Procedures              MEDICATIONS GIVEN IN ER  Medications   sodium chloride 0.9 % flush 10 mL (has no administration in time range)   atorvastatin  (LIPITOR) tablet 80 mg (80 mg Oral Not Given 12/4/23 1637)   warfarin (COUMADIN) tablet 5 mg (has no administration in time range)   aspirin chewable tablet 81 mg (81 mg Oral Not Given 12/4/23 1637)   Pharmacy to dose warfarin (has no administration in time range)   sodium chloride 0.9 % flush 10 mL (has no administration in time range)   sodium chloride 0.9 % flush 10 mL (has no administration in time range)   sodium chloride 0.9 % infusion 40 mL (has no administration in time range)   sennosides-docusate (PERICOLACE) 8.6-50 MG per tablet 2 tablet (has no administration in time range)     And   polyethylene glycol (MIRALAX) packet 17 g (has no administration in time range)     And   bisacodyl (DULCOLAX) EC tablet 5 mg (has no administration in time range)     And   bisacodyl (DULCOLAX) suppository 10 mg (has no administration in time range)   sodium chloride 0.9 % with KCl 20 mEq/L infusion (100 mL/hr Intravenous New Bag 12/4/23 1644)   Potassium Replacement - Follow Nurse / BPA Driven Protocol (has no administration in time range)   Magnesium Standard Dose Replacement - Follow Nurse / BPA Driven Protocol (has no administration in time range)   Phosphorus Replacement - Follow Nurse / BPA Driven Protocol (has no administration in time range)   Calcium Replacement - Follow Nurse / BPA Driven Protocol (has no administration in time range)   acetaminophen (TYLENOL) tablet 650 mg (has no administration in time range)     Or   acetaminophen (TYLENOL) 160 MG/5ML oral solution 650 mg (has no administration in time range)     Or   acetaminophen (TYLENOL) suppository 650 mg (has no administration in time range)   ondansetron (ZOFRAN) injection 4 mg (has no administration in time range)                   MEDICAL DECISION MAKING, PROGRESS, and CONSULTS    All labs have been independently reviewed by me.  All radiology studies have been reviewed by me and I have also reviewed the radiology report.   EKG's independently viewed  and interpreted by me.  Discussion below represents my analysis of pertinent findings related to patient's condition, differential diagnosis, treatment plan and final disposition.      Additional sources:  - Discussed/ obtained information from independent historians: Daughter at bedside    - External (non-ED) record review: Patient was last admitted here in December 2020 for acute versus subacute stroke.  He has a history of A-fib and is on warfarin.  Patient saw his cardiologist in October 2023 for follow-up for permanent atrial fibrillation, pulmonary hypertension, and carotid atherosclerosis.  Family was wanting the patient to be taken off of Coumadin.  He was given a prescription for Eliquis.    - Chronic or social conditions impacting care: None          Orders placed during this visit:  Orders Placed This Encounter   Procedures    Respiratory Panel PCR w/COVID-19(SARS-CoV-2) JENNIFER/ANGELA/JANET/PAD/COR/EKATERINA In-House, NP Swab in UTM/VTM, 2 HR TAT - Swab, Nasopharynx    Blood Culture - Blood,    Blood Culture - Blood,    XR Chest AP    [place] Draw    Comprehensive Metabolic Panel    Procalcitonin    Lactic Acid, Plasma    Urinalysis With Microscopic If Indicated (No Culture) - Urine, Clean Catch    Protime-INR    CBC Auto Differential    Single High Sensitivity Troponin T    BNP    Urinalysis, Microscopic Only - Urine, Clean Catch    Protime-INR    Comprehensive Metabolic Panel    CBC Auto Differential    Diet: Cardiac Diets; Healthy Heart (2-3 Na+); Texture: Regular Texture (IDDSI 7); Fluid Consistency: Thin (IDDSI 0)    Vital Signs    Intake & Output    Weigh Patient    Oral Care    Saline Lock & Maintain IV Access    Code Status and Medical Interventions:    LHA (on-call MD unless specified) Details    Inpatient Case Management  Consult    ECG 12 Lead Dyspnea    Insert Peripheral IV    Insert Peripheral IV    Initiate Observation Status    Green Top (Gel)    Lavender Top    Gold Top - SST    Gray Top     Light Blue Top    CBC & Differential         Additional orders considered but not ordered:  None        Differential diagnosis:    Differential diagnosis includes but is not limited to CHF, acute coronary syndrome, pulmonary embolism, pneumothorax, pneumonia, asthma/COPD, deconditioning, anemia, anxiety.         Independent interpretation of labs, radiology studies, and discussions with consultants:  ED Course as of 12/04/23 1707   Mon Dec 04, 2023   1235 INR(!): 2.64 [WH]   1235 WBC(!): 11.71 [WH]   1235 Hemoglobin(!): 12.4 [WH]   1235 Chest x-ray personally interpreted by me.  My personal interpretation is: Heart size is borderline.  No pleural effusion.  No pneumothorax. [WH]   1302 Per the radiologist, chest x-ray some linear atelectasis/scarring in the left lower lobe.  There is no focal consolidation, effusion, or pneumothorax. [WH]   1302 Lactate: 1.7 [WH]   1302 Glucose(!): 119 [WH]   1302 Sodium(!): 129 [WH]   1321 proBNP(!): 3,817.0 [WH]   1321 Procalcitonin(!): 0.51 [WH]   1321 HS Troponin T(!): 43 [WH]   1332 RSV, PCR(!): Detected [WH]   1333 EKG personally interpreted by me at 1331.  My personal interpretation is:         EKG time: 1329  Rhythm/Rate: Atrial fibrillation with a PVC, rate 118  P waves and MS: Irregular, varying  QRS, axis: RAD, RBBB  ST and T waves: Inverted T waves in the anterior leads    Interpreted Contemporaneously by me, independently viewed  EKG is changed compared to prior EKG done on 12/9/2020   [WH]   1358 RBC, UA(!): 11-20 [WH]   1358 WBC, UA: 0-2 [WH]   1358 Bacteria, UA: None Seen [WH]   1358 Leukocytes, UA(!): Trace [WH]   1358 Nitrite, UA: Negative [WH]   1407 Test results discussed with the patient and his daughter.  Shared decision-making was discussed and admission was recommended.  They are agreeable with this.  Follow-up will be placed to the hospitalist. [WH]   1440 Case discussed with Dr. Weiss and he agrees to admit the patient to a monitored bed.  Pertinent  history, exam findings, test results, ED course, and diagnoses were discussed with him. [WH]   1450 MDM: Patient presented to ED complaining of cough, fever, and generalized weakness.  Temperature was 100.4.  He was not hypoxic.  Chest x-ray did not show any evidence of pneumonia.  Respiratory panel was positive for RSV.  Patient does not have a UTI.  He was mildly hyponatremic.  Case was discussed with the hospitalist and he will be admitted. [WH]      ED Course User Index  [WH] Mario Mcelroy MD               DIAGNOSIS  Final diagnoses:   RSV infection   Hyponatremia   Generalized weakness   Frequent falls         DISPOSITION  ADMISSION    Discussed treatment plan and reason for admission with pt/family and admitting physician.  Pt/family voiced understanding of the plan for admission for further testing/treatment as needed.               Latest Documented Vital Signs:  As of 17:07 EST  BP- 116/68 HR- 109 Temp- 98.6 °F (37 °C) (Oral) O2 sat- 94%              --    Please note that portions of this were completed with a voice recognition program.       Note Disclaimer: At [facility], we believe that sharing information builds trust and better relationships. You are receiving this note because you are receiving care at [facility] or recently visited. It is possible you will see health information before a provider has talked with you about it. This kind of information can be easy to misunderstand. To help you fully understand what it means for your health, we urge you to discuss this note with your provider.             Mario Mcelroy MD  12/04/23 2898

## 2023-12-04 NOTE — PROGRESS NOTES
[facility] Clinical Pharmacy Services: Warfarin Dosing/Monitoring Consult    Danni Aguilar is a 89 y.o. male, estimated creatinine clearance is 43.1 mL/min (by C-G formula based on SCr of 0.83 mg/dL). weighing 58.5 kg (128 lb 15.5 oz).    Results from last 7 days   Lab Units 12/04/23  1646 12/04/23  1145   INR  2.47* 2.64*   HEMOGLOBIN g/dL  --  12.4*   HEMATOCRIT %  --  39.5   PLATELETS 10*3/mm3  --  703*     Prior to admission anticoagulation: warfarin 6 mg daily    Hospital Anticoagulation:  Consulting provider: Dr. Weiss  Start date: 12/4  Indication: A Fib - requiring full anticoagulation  Target INR: 2 - 3  Expected duration: tbd   Bridge Therapy: No      Potential food or drug interactions: none at this time    Education complete?/Date: No; plan for follow up TBD    Assessment/Plan:  Dose: INR therapeutic, will resume home regimen starting with 6mg tonight  Monitor for any signs or symptoms of bleeding  Follow up daily INRs and dose adjustments    Pharmacy will continue to follow until discharge or discontinuation of warfarin.     Sherrill Snow [facility]  Clinical Pharmacist

## 2023-12-04 NOTE — H&P
Internal medicine history and physical  INTERNAL MEDICINE   [facility]       Patient Identification:  Name: Danni Aguilar  Age: 89 y.o.  Sex: male  :  1926  MRN: [id number]                   Primary Care Physician: Justin Longoria MD                               Date of admission:2023    Chief Complaint: Generalized weakness with cough over the course of last 3 to 4 days with multiple fall yesterday.    History of Present Illness:   Source of information patient himself, patient's daughter at the bedside and review of records.  Patient is a 89-year-old male who lives with his wife and his family member and children are always around and take care of them was in his usual state of his health until 3 4 days ago when he started having cough congestion and decrease in appetite and progressive weakness.  His wife has also similar symptoms but she seems to be improving but he did not recover and continue to decline.  He was so weak yesterday that he had fallen multiple times but did not hit his head.  Because of his continued decline and debility EMS was called and patient was brought to the emergency room for further evaluation and was found to have hyponatremia and respiratory viral panel positive for RSV.  Patient was initially placed on 2 L nasal cannula oxygen and was noted to have a temperature of 100.4 for which he received some Tylenol.  Patient did receive IV fluids and according to the daughter and the patient he is feeling somewhat better.  He still feels fatigue and weak.  But not as bad as when he came into the hospital earlier.      Past Medical History:  Past Medical History:   Diagnosis Date    A-fib     Irregular heart beat      Past Surgical History:  Past Surgical History:   Procedure Laterality Date    BACK SURGERY      CEREBRAL ANGIOGRAM N/A 2017    Procedure: DIAGNOSTIC CEREBRAL ANGIOGRAM AND RIGHT CAROTID STENT PLACEMENT;  Surgeon: Mauricio Yung MD;   "Location: [facility] OR 18/19;  Service:       Home Meds:  (Not in a hospital admission)    Current Meds:     Current Facility-Administered Medications:     [COMPLETED] Insert Peripheral IV, , , Once **AND** sodium chloride 0.9 % flush 10 mL, 10 mL, Intravenous, PRN, Mario Mcelroy MD    Current Outpatient Medications:     aspirin 81 MG chewable tablet, Chew 1 tablet Daily., Disp:  , Rfl:     atorvastatin (LIPITOR) 80 MG tablet, Take 1 tablet by mouth Daily., Disp: 30 tablet, Rfl: 0    metoprolol succinate XL (TOPROL-XL) 25 MG 24 hr tablet, Take 1 tablet by mouth Daily., Disp: , Rfl:     warfarin (COUMADIN) 5 MG tablet, Take 1 tablet by mouth Daily., Disp: , Rfl:   Allergies:  No Known Allergies  Social History:   Social History     Tobacco Use    Smoking status: Never    Smokeless tobacco: Never   Substance Use Topics    Alcohol use: No      Family History:  History reviewed. No pertinent family history.       Review of Systems  See history of present illness and past medical history.   Constitutional: Remarkable for generalized weakness and fatigue and somnolence  Cardiovascular: Remarkable for no chest pain but does have shortness of breath  Respiratory: Remarkable for cough and congestion  GI: Remarkable for decreased appetite,No nausea vomiting or diarrhea  : Remarkable for no burning in urination frequency or urgency  Musculoskeletal: Remarkable for no new joint aches and pain  Neurological: Remarkable for generalized weakness.  Does have some speech impediment which is worse due to generalized weakness but able to speak well once he arrived on the floor.  Vitals:   /72   Pulse 109   Temp 99.9 °F (37.7 °C) (Oral)   Resp 18   Ht 170.2 cm (67\")   Wt 59.2 kg (130 lb 8 oz)   SpO2 95%   BMI 20.44 kg/m²   I/O: No intake or output data in the 24 hours ending 12/04/23 1531  Exam:  Patient is examined using the personal protective equipment as per guidelines from infection control for this " particular patient as enacted.  Hand washing was performed before and after patient interaction.  General Appearance:  Chronically ill emaciated elderly male who is pleasant interactive and appropriate does not appear to be toxic but appears emaciated.   Head:    Normocephalic, without obvious abnormality, atraumatic   Eyes:    PERRL, conjunctiva/corneas clear, EOM's intact, both eyes   Ears:    Normal external ear canals, both ears   Nose:   Nares normal, septum midline, mucosa normal, no drainage    or sinus tenderness   Throat: Dry oral mucosa   Neck: Supple and no adenopathy noted   Back:     Symmetric, no curvature, ROM normal, no CVA tenderness   Lungs:     Clear to auscultation bilaterally, respirations unlabored   Chest Wall:    No tenderness or deformity    Heart:  S1-S2 irregularly irregular   Abdomen:   Scaphoid abdomen   Extremities: No edema   Pulses:   Pulses palpable in all extremities; symmetric all extremities   Skin: Chronic changes and ecchymotic skin changes noted   Neurologic: Grossly nonfocal       Data Review:      I reviewed the patient's new clinical results.  Results from last 7 days   Lab Units 12/04/23  1145   WBC 10*3/mm3 11.71*   HEMOGLOBIN g/dL 12.4*   PLATELETS 10*3/mm3 703*     Results from last 7 days   Lab Units 12/04/23  1145   SODIUM mmol/L 129*   POTASSIUM mmol/L 4.6   CHLORIDE mmol/L 98   CO2 mmol/L 22.0   BUN mg/dL 19   CREATININE mg/dL 0.83   CALCIUM mg/dL 8.0*   GLUCOSE mg/dL 119*     XR Chest AP    Result Date: 12/4/2023  No focal consolidation. Left lower lobe linear atelectasis/scarring. No pleural effusion or pneumothorax. Normal size cardiomediastinal silhouette. Dilated central pulmonary vasculature without findings of redistribution/edema. No focal osseous abnormality.    This report was finalized on 12/4/2023 12:49 PM by Dr. Jean-Pierre Palmer M.D on Workstation: [id number]     Microbiology Results (last 10 days)       Procedure Component Value - Date/Time    Respiratory  Panel PCR w/COVID-19(SARS-CoV-2) JENNIFER/ANGELA/JANET/PAD/COR/EKATERINA In-House, NP Swab in UTM/VTM, 2 HR TAT - Swab, Nasopharynx [[id number]]  (Abnormal) Collected: 12/04/23 1223    Lab Status: Final result Specimen: Swab from Nasopharynx Updated: 12/04/23 1329     ADENOVIRUS, PCR Not Detected     Coronavirus 229E Not Detected     Coronavirus HKU1 Not Detected     Coronavirus NL63 Not Detected     Coronavirus OC43 Not Detected     COVID19 Not Detected     Human Metapneumovirus Not Detected     Human Rhinovirus/Enterovirus Not Detected     Influenza A PCR Not Detected     Influenza B PCR Not Detected     Parainfluenza Virus 1 Not Detected     Parainfluenza Virus 2 Not Detected     Parainfluenza Virus 3 Not Detected     Parainfluenza Virus 4 Not Detected     RSV, PCR Detected     Bordetella pertussis pcr Not Detected     Bordetella parapertussis PCR Not Detected     Chlamydophila pneumoniae PCR Not Detected     Mycoplasma pneumo by PCR Not Detected    Narrative:      In the setting of a positive respiratory panel with a viral infection PLUS a negative procalcitonin without other underlying concern for bacterial infection, consider observing off antibiotics or discontinuation of antibiotics and continue supportive care. If the respiratory panel is positive for atypical bacterial infection (Bordetella pertussis, Chlamydophila pneumoniae, or Mycoplasma pneumoniae), consider antibiotic de-escalation to target atypical bacterial infection.            Assessment:  Active Hospital Problems    Diagnosis  POA    **RSV infection [B33.8]  Yes    Hyponatremia [E87.1]  Yes    Thrombocytosis [D75.839]  Unknown    Recent cerebrovascular accident (CVA) [Z86.73]  Yes    Atrial fibrillation [I48.91]  Yes    Long term (current) use of anticoagulants [Z79.01]  Not Applicable       Medical decision making/care plan: See admitting orders  Generalized weakness with decline in function with cough and shortness of breath and decreased appetite due to  systemic RSV infection/bronchitis-plan is to admit the patient, oxygen supplementation, symptomatic management of cough and as needed nebulizer treatment.  Provided with continuous pulse ox monitoring.  OT PT evaluation.  Hyponatremia in the setting of respiratory illness with decreased appetite and decreased intake likely hypovolemic hyponatremia-provide him with IV fluids and monitor renal function and if the serum sodium level does not improve from 1/20/1992 tomorrow 134 and 35 over the course of next 12 hours or declines the low 129 then further workup for hyponatremia as needed.  History of CVA-continue his current regimen and monitor.  History of atrial fibrillation-continue his anticoagulation therapy to keep his INR between 2.5-3 range.    Carlos Weiss MD   12/4/2023  15:31 EST    Parts of this note may be an electronic transcription/translation of spoken language to printed text using the Dragon dictation system.

## 2023-12-04 NOTE — ED NOTES
"Nursing report ED to floor  Danni Aguilar  89 y.o.  male    HPI (triage note):   Chief Complaint   Patient presents with    Weakness - Generalized    Fever    Cough       Danni Aguilar is a 89 y.o. male, with a history of atrial fibrillation, who presents to the ED from home by EMS c/o cough and generalized weakness.  Daughter states the patient has had a cough for about 1 week.  Symptoms developed after she took him to the  home for visitation.  Cough is nonproductive.  Patient denies fever although daughter states that he \"felt warm\".  Daughter also states the patient has fallen multiple times in the past week due to generalized weakness.  He has a walker but does not always remember to use it.  Patient denies hitting his head or sustaining any injury.  He is on warfarin.  Patient denies chest pain, shortness of breath, abdominal pain, vomiting, diarrhea, or dysuria.  Patient lives at home with his wife.       Admitting doctor:   Carlos Weiss MD    Admitting diagnosis:   The primary encounter diagnosis was RSV infection. Diagnoses of Hyponatremia, Generalized weakness, and Frequent falls were also pertinent to this visit.    Code status:   Current Code Status       Date Active Code Status Order ID Comments User Context       Prior            Allergies:   Patient has no known allergies.    Past Medical History:  Past Medical History:   Diagnosis Date    A-fib     Irregular heart beat         Weight:       23  1346   Weight: 59.2 kg (130 lb 8 oz)       Most recent vitals:   Vitals:    23 1418 23 1430 23 1431 23 1455   BP:   129/75    Pulse:  101 100 93   Resp:       Temp: 99.9 °F (37.7 °C)      TempSrc: Oral      SpO2:  91% 96% 95%   Weight:       Height:           Active LDAs/IV Access:   Lines, Drains & Airways       Active LDAs       Name Placement date Placement time Site Days    Peripheral IV 23 Right;Lateral Forearm 23  --  Forearm  less than 1              " "      Labs (abnormal labs have a star):   Labs Reviewed   RESPIRATORY PANEL PCR W/ COVID-19 (SARS-COV-2), NP SWAB IN UTM/VTP, 2 HR TAT - Abnormal; Notable for the following components:       Result Value    RSV, PCR Detected (*)     All other components within normal limits    Narrative:     In the setting of a positive respiratory panel with a viral infection PLUS a negative procalcitonin without other underlying concern for bacterial infection, consider observing off antibiotics or discontinuation of antibiotics and continue supportive care. If the respiratory panel is positive for atypical bacterial infection (Bordetella pertussis, Chlamydophila pneumoniae, or Mycoplasma pneumoniae), consider antibiotic de-escalation to target atypical bacterial infection.   COMPREHENSIVE METABOLIC PANEL - Abnormal; Notable for the following components:    Glucose 119 (*)     Sodium 129 (*)     Calcium 8.0 (*)     Albumin 3.3 (*)     AST (SGOT) 43 (*)     All other components within normal limits    Narrative:     GFR Normal >60  Chronic Kidney Disease <60  Kidney Failure <15    The GFR formula is only valid for adults with stable renal function between ages 18 and 70.   PROCALCITONIN - Abnormal; Notable for the following components:    Procalcitonin 0.51 (*)     All other components within normal limits    Narrative:     As a Marker for Sepsis (Non-Neonates):    1. <0.5 ng/mL represents a low risk of severe sepsis and/or septic shock.  2. >2 ng/mL represents a high risk of severe sepsis and/or septic shock.    As a Marker for Lower Respiratory Tract Infections that require antibiotic therapy:    PCT on Admission    Antibiotic Therapy       6-12 Hrs later    >0.5                Strongly Recommended  >0.25 - <0.5        Recommended   0.1 - 0.25          Discouraged              Remeasure/reassess PCT  <0.1                Strongly Discouraged     Remeasure/reassess PCT    As 28 day mortality risk marker: \"Change in Procalcitonin " "Result\" (>80% or <=80%) if Day 0 (or Day 1) and Day 4 values are available. Refer to [email or web address]://[email or web address]-pct-calculator.[email or web address]    Change in PCT <=80%  A decrease of PCT levels below or equal to 80% defines a positive change in PCT test result representing a higher risk for 28-day all-cause mortality of patients diagnosed with severe sepsis for septic shock.    Change in PCT >80%  A decrease of PCT levels of more than 80% defines a negative change in PCT result representing a lower risk for 28-day all-cause mortality of patients diagnosed with severe sepsis or septic shock.      URINALYSIS W/ MICROSCOPIC IF INDICATED (NO CULTURE) - Abnormal; Notable for the following components:    Blood, UA Moderate (2+) (*)     Protein, UA 30 mg/dL (1+) (*)     Leuk Esterase, UA Trace (*)     All other components within normal limits   PROTIME-INR - Abnormal; Notable for the following components:    Protime 28.7 (*)     INR 2.64 (*)     All other components within normal limits   CBC WITH AUTO DIFFERENTIAL - Abnormal; Notable for the following components:    WBC 11.71 (*)     Hemoglobin 12.4 (*)     MCH 26.3 (*)     MCHC 31.4 (*)     Platelets 703 (*)     Neutrophil % 88.4 (*)     Lymphocyte % 3.4 (*)     Eosinophil % 0.0 (*)     Immature Grans % 0.9 (*)     Neutrophils, Absolute 10.35 (*)     Lymphocytes, Absolute 0.40 (*)     Immature Grans, Absolute 0.11 (*)     All other components within normal limits   SINGLE HSTROPONIN T - Abnormal; Notable for the following components:    HS Troponin T 43 (*)     All other components within normal limits    Narrative:     High Sensitive Troponin T Reference Range:  <14.0 ng/L- Negative Female for AMI  <22.0 ng/L- Negative Male for AMI  >=14 - Abnormal Female indicating possible myocardial injury.  >=22 - Abnormal Male indicating possible myocardial injury.   Clinicians would have to utilize clinical acumen, EKG, Troponin, and serial changes to determine if it is an Acute Myocardial Infarction or " myocardial injury due to an underlying chronic condition.        BNP (IN-HOUSE) - Abnormal; Notable for the following components:    proBNP 3,817.0 (*)     All other components within normal limits    Narrative:     This assay is used as an aid in the diagnosis of individuals suspected of having heart failure. It can be used as an aid in the diagnosis of acute decompensated heart failure (ADHF) in patients presenting with signs and symptoms of ADHF to the emergency department (ED). In addition, NT-proBNP of <300 pg/mL indicates ADHF is not likely.    Age Range Result Interpretation  NT-proBNP Concentration (pg/mL:      <50             Positive            >450                   Gray                 300-450                    Negative             <300    50-75           Positive            >900                  Gray                300-900                  Negative            <300      >75             Positive            >1800                  Gray                300-1800                  Negative            <300   URINALYSIS, MICROSCOPIC ONLY - Abnormal; Notable for the following components:    RBC, UA 11-20 (*)     All other components within normal limits   LACTIC ACID, PLASMA - Normal   BLOOD CULTURE   BLOOD CULTURE   RAINBOW DRAW    Narrative:     The following orders were created for panel order [facility] Draw.  Procedure                               Abnormality         Status                     ---------                               -----------         ------                     Green Top (Gel)[[id number]]                                  Final result               Lavender Top[[id number]]                                     Final result               Gold Top - SST[[id number]]                                   Final result               Acosta Top[[id number]]                                         In process                 Light Blue Top[[id number]]                                   Final result                  Please view results for these tests on the individual orders.   GREEN TOP   LAVENDER TOP   GOLD TOP - SST   LIGHT BLUE TOP   CBC AND DIFFERENTIAL    Narrative:     The following orders were created for panel order CBC & Differential.  Procedure                               Abnormality         Status                     ---------                               -----------         ------                     CBC Auto Differential[[id number]]        Abnormal            Final result                 Please view results for these tests on the individual orders.   GRAY TOP       EKG:   ECG 12 Lead Dyspnea   Preliminary Result   HEART RATE= 118  bpm   RR Interval= 508  ms   SC Interval= 136  ms   P Horizontal Axis= 190  deg   P Front Axis= 0  deg   QRSD Interval= 140  ms   QT Interval= 351  ms   QTcB= 492  ms   QRS Axis= 85  deg   T Wave Axis= 52  deg   - ABNORMAL ECG -   Sinus tachycardia   Ventricular premature complex   Right bundle branch block   Electronically Signed By:    Date and Time of Study: 2023-12-04 13:29:28          Meds given in ED:   Medications   sodium chloride 0.9 % flush 10 mL (has no administration in time range)       Imaging results:  XR Chest AP    Result Date: 12/4/2023  No focal consolidation. Left lower lobe linear atelectasis/scarring. No pleural effusion or pneumothorax. Normal size cardiomediastinal silhouette. Dilated central pulmonary vasculature without findings of redistribution/edema. No focal osseous abnormality.    This report was finalized on 12/4/2023 12:49 PM by Dr. Jean-Pierre Palmer M.D on Workstation: [id number]       Ambulatory status:   - assist x1    Social issues:   Social History     Socioeconomic History    Marital status:    Tobacco Use    Smoking status: Never    Smokeless tobacco: Never   Substance and Sexual Activity    Alcohol use: No    Drug use: Defer    Sexual activity: Defer          NIH Stroke Scale:         Romel Gimenez RN  12/04/23 14:50 EST    Nurse  Direct line for any questions: [phone]

## 2023-12-05 PROBLEM — B33.8 RSV (RESPIRATORY SYNCYTIAL VIRUS INFECTION): Status: ACTIVE | Noted: 2023-12-05

## 2023-12-05 LAB
ALBUMIN SERPL-MCNC: 3.4 G/DL (ref 3.5–5.2)
ALBUMIN/GLOB SERPL: 1 G/DL
ALP SERPL-CCNC: 56 U/L (ref 39–117)
ALT SERPL W P-5'-P-CCNC: 29 U/L (ref 1–41)
ANION GAP SERPL CALCULATED.3IONS-SCNC: 8.5 MMOL/L (ref 5–15)
AST SERPL-CCNC: 50 U/L (ref 1–40)
BASOPHILS # BLD AUTO: 0.08 10*3/MM3 (ref 0–0.2)
BASOPHILS NFR BLD AUTO: 0.7 % (ref 0–1.5)
BILIRUB SERPL-MCNC: 0.7 MG/DL (ref 0–1.2)
BUN SERPL-MCNC: 18 MG/DL (ref 8–23)
BUN/CREAT SERPL: 22.2 (ref 7–25)
CALCIUM SPEC-SCNC: 8.8 MG/DL (ref 8.2–9.6)
CHLORIDE SERPL-SCNC: 96 MMOL/L (ref 98–107)
CO2 SERPL-SCNC: 26.5 MMOL/L (ref 22–29)
CREAT SERPL-MCNC: 0.81 MG/DL (ref 0.76–1.27)
DEPRECATED RDW RBC AUTO: 45.7 FL (ref 37–54)
EGFRCR SERPLBLD CKD-EPI 2021: 80.7 ML/MIN/1.73
EOSINOPHIL # BLD AUTO: 0.02 10*3/MM3 (ref 0–0.4)
EOSINOPHIL NFR BLD AUTO: 0.2 % (ref 0.3–6.2)
ERYTHROCYTE [DISTWIDTH] IN BLOOD BY AUTOMATED COUNT: 15 % (ref 12.3–15.4)
GLOBULIN UR ELPH-MCNC: 3.3 GM/DL
GLUCOSE SERPL-MCNC: 102 MG/DL (ref 65–99)
HCT VFR BLD AUTO: 35.9 % (ref 37.5–51)
HGB BLD-MCNC: 11.8 G/DL (ref 13–17.7)
IMM GRANULOCYTES # BLD AUTO: 0.08 10*3/MM3 (ref 0–0.05)
IMM GRANULOCYTES NFR BLD AUTO: 0.7 % (ref 0–0.5)
INR PPP: 2.87 (ref 0.9–1.1)
LYMPHOCYTES # BLD AUTO: 1.05 10*3/MM3 (ref 0.7–3.1)
LYMPHOCYTES NFR BLD AUTO: 8.7 % (ref 19.6–45.3)
MCH RBC QN AUTO: 27.3 PG (ref 26.6–33)
MCHC RBC AUTO-ENTMCNC: 32.9 G/DL (ref 31.5–35.7)
MCV RBC AUTO: 82.9 FL (ref 79–97)
MONOCYTES # BLD AUTO: 1.04 10*3/MM3 (ref 0.1–0.9)
MONOCYTES NFR BLD AUTO: 8.7 % (ref 5–12)
NEUTROPHILS NFR BLD AUTO: 81 % (ref 42.7–76)
NEUTROPHILS NFR BLD AUTO: 9.75 10*3/MM3 (ref 1.7–7)
NRBC BLD AUTO-RTO: 0 /100 WBC (ref 0–0.2)
PLATELET # BLD AUTO: 598 10*3/MM3 (ref 140–450)
PMV BLD AUTO: 9.1 FL (ref 6–12)
POTASSIUM SERPL-SCNC: 3.8 MMOL/L (ref 3.5–5.2)
PROT SERPL-MCNC: 6.7 G/DL (ref 6–8.5)
PROTHROMBIN TIME: 30.7 SECONDS (ref 11.7–14.2)
RBC # BLD AUTO: 4.33 10*6/MM3 (ref 4.14–5.8)
SODIUM SERPL-SCNC: 131 MMOL/L (ref 136–145)
WBC NRBC COR # BLD AUTO: 12.02 10*3/MM3 (ref 3.4–10.8)

## 2023-12-05 PROCEDURE — 80053 COMPREHEN METABOLIC PANEL: CPT | Performed by: INTERNAL MEDICINE

## 2023-12-05 PROCEDURE — 85610 PROTHROMBIN TIME: CPT | Performed by: INTERNAL MEDICINE

## 2023-12-05 PROCEDURE — 97110 THERAPEUTIC EXERCISES: CPT

## 2023-12-05 PROCEDURE — 97162 PT EVAL MOD COMPLEX 30 MIN: CPT

## 2023-12-05 PROCEDURE — 25010000002 SODIUM CHLORIDE 0.9 % WITH KCL 20 MEQ 20-0.9 MEQ/L-% SOLUTION: Performed by: INTERNAL MEDICINE

## 2023-12-05 PROCEDURE — 97535 SELF CARE MNGMENT TRAINING: CPT

## 2023-12-05 PROCEDURE — 97166 OT EVAL MOD COMPLEX 45 MIN: CPT

## 2023-12-05 PROCEDURE — 85025 COMPLETE CBC W/AUTO DIFF WBC: CPT | Performed by: INTERNAL MEDICINE

## 2023-12-05 RX ORDER — GUAIFENESIN 600 MG/1
1200 TABLET, EXTENDED RELEASE ORAL EVERY 12 HOURS SCHEDULED
Status: DISCONTINUED | OUTPATIENT
Start: 2023-12-05 | End: 2023-12-09 | Stop reason: HOSPADM

## 2023-12-05 RX ORDER — HYDROCODONE BITARTRATE AND HOMATROPINE METHYLBROMIDE ORAL SOLUTION 5; 1.5 MG/5ML; MG/5ML
5 LIQUID ORAL EVERY 6 HOURS PRN
Status: DISCONTINUED | OUTPATIENT
Start: 2023-12-05 | End: 2023-12-09 | Stop reason: HOSPADM

## 2023-12-05 RX ADMIN — GUAIFENESIN 1200 MG: 600 TABLET, EXTENDED RELEASE ORAL at 14:49

## 2023-12-05 RX ADMIN — POTASSIUM CHLORIDE AND SODIUM CHLORIDE 100 ML/HR: 900; 150 INJECTION, SOLUTION INTRAVENOUS at 17:47

## 2023-12-05 RX ADMIN — ATORVASTATIN CALCIUM 80 MG: 80 TABLET, FILM COATED ORAL at 08:30

## 2023-12-05 RX ADMIN — HYDROCODONE BITARTRATE AND HOMATROPINE METHYLBROMIDE ORAL SOLUTION 5 ML: 5; 1.5 LIQUID ORAL at 14:49

## 2023-12-05 RX ADMIN — WARFARIN 6 MG: 6 TABLET ORAL at 17:38

## 2023-12-05 RX ADMIN — ASPIRIN 81 MG: 81 TABLET, CHEWABLE ORAL at 08:30

## 2023-12-05 RX ADMIN — POTASSIUM CHLORIDE AND SODIUM CHLORIDE 100 ML/HR: 900; 150 INJECTION, SOLUTION INTRAVENOUS at 08:45

## 2023-12-05 RX ADMIN — DOCUSATE SODIUM 50MG AND SENNOSIDES 8.6MG 2 TABLET: 8.6; 5 TABLET, FILM COATED ORAL at 20:58

## 2023-12-05 RX ADMIN — HYDROCODONE BITARTRATE AND HOMATROPINE METHYLBROMIDE ORAL SOLUTION 5 ML: 5; 1.5 LIQUID ORAL at 20:59

## 2023-12-05 NOTE — PROGRESS NOTES
[facility] Clinical Pharmacy Services: Warfarin Dosing/Monitoring Consult    Danni Aguilar is a 89 y.o. male, estimated creatinine clearance is 44.1 mL/min (by C-G formula based on SCr of 0.81 mg/dL). weighing 58.5 kg (128 lb 15.5 oz).    Results from last 7 days   Lab Units 12/05/23  0743 12/04/23  1646 12/04/23  1145   INR  2.87* 2.47* 2.64*   HEMOGLOBIN g/dL 11.8*  --  12.4*   HEMATOCRIT % 35.9*  --  39.5   PLATELETS 10*3/mm3 598*  --  703*     Prior to admission anticoagulation: warfarin 6 mg daily    Hospital Anticoagulation:  Consulting provider: Dr. Weiss  Start date: 12/4  Indication: A Fib - requiring full anticoagulation  Target INR: 2 - 3  Expected duration: tbd   Bridge Therapy: No      Potential food or drug interactions: none at this time    Education complete?/Date: No; plan for follow up TBD    Assessment/Plan:  INR slightly increased today, was therapeutic at admission. Regular diet, ate 100% breakfast.  Dose: Continue home regimen 6mg daily for now.   Monitor for any signs or symptoms of bleeding  Follow up daily INRs and dose adjustments    Pharmacy will continue to follow until discharge or discontinuation of warfarin.     Aga Jones, PharmD  Clinical Pharmacist

## 2023-12-05 NOTE — PLAN OF CARE
Goal Outcome Evaluation:  Plan of Care Reviewed With: patient, daughter           Outcome Evaluation: Pt is a 89 yo male adm from home with weakness, falls, cough, dx with RSV. Pt lives with his spouse in a home with one step to enter, can stay on one level, daughter reports that he usually uses a cane, but has a rwx if needed. Pt's spouse also uses a cane. Pt has family that come every day but can't stay 24/7 and are limited in their ability to provide physical assist. Pt presents with weakness and impaired functional mobility and activity tolerance, he will benefit from PT to address. Pt required min assist for bed mobility, sit to stand, and to walk 50 ft with rolling walker, gait was unsteady and pt fatigued quickly with activity, at current level would rec SNF at discharge      Anticipated Discharge Disposition (PT): skilled nursing facility

## 2023-12-05 NOTE — CASE MANAGEMENT/SOCIAL WORK
Discharge Planning Assessment  [facility]     Patient Name: Danni Aguilar  MRN: [id number]  Today's Date: 12/5/2023    Admit Date: 12/4/2023    Plan: SNF pending bed availability.   Discharge Needs Assessment       Row Name 12/05/23 1545       Living Environment    People in Home spouse    Current Living Arrangements home    Potentially Unsafe Housing Conditions none    Primary Care Provided by self;child(tiny)    Provides Primary Care For no one, unable/limited ability to care for self    Caregiving Concerns wife    Family Caregiver if Needed child(tiny), adult    Quality of Family Relationships involved;helpful    Able to Return to Prior Arrangements yes       Resource/Environmental Concerns    Resource/Environmental Concerns none       Transition Planning    Patient/Family Anticipates Transition to inpatient rehabilitation facility    Patient/Family Anticipated Services at Transition rehabilitation services    Transportation Anticipated family or friend will provide       Discharge Needs Assessment    Readmission Within the Last 30 Days no previous admission in last 30 days    Equipment Currently Used at Home walker, rolling;rollator    Concerns to be Addressed adjustment to diagnosis/illness    Anticipated Changes Related to Illness inability to care for self    Equipment Needed After Discharge none                   Discharge Plan       Row Name 12/05/23 1545       Plan    Plan SNF pending bed availability.    Patient/Family in Agreement with Plan yes    Plan Comments Spoke to pt wife daughter Cheyenne, at bedside, introduced self and explained CCP role, face sheet and pharmacy information verified. Pt lives with wife Ginna who is also confused in 1 level home with 1 step to enter. Cheyenne states there are 4 children who check in on and manage their care during the day. They manage meds and help as needed, pt has no HH or SNF history, he uses rollator or rwx. We discussed therapy recs, she is agreeable to SNF placement,  would like Reg[facility] Nava, Jani Chong or In[facility] Nava referrals sent and pending, per Medicare guidelines, needs 3 midnights, will be able to dc Friday, updated RN, and Dr. Ladd, she believes pt should be ready for dc then. Cheyenne will transport if appropriate.  CCP will follow - Smita CHAN                  Continued Care and Services - Admitted Since 12/4/2023    Coordination has not been started for this encounter.       Expected Discharge Date and Time       Expected Discharge Date Expected Discharge Time    Dec 5, 2023            Demographic Summary       Row Name 12/05/23 1544       General Information    Admission Type inpatient                   Functional Status       Row Name 12/05/23 1544       Functional Status    Usual Activity Tolerance good    Current Activity Tolerance moderate       Assessment of Health Literacy    Health Literacy Moderate       Functional Status, IADL    Medications assistive person    Meal Preparation assistive person    Housekeeping assistive person    Laundry assistive person    Shopping assistive person       Mental Status    General Appearance WDL WDL       Mental Status Summary    Recent Changes in Mental Status/Cognitive Functioning unable to assess                   Psychosocial    No documentation.                  Abuse/Neglect    No documentation.                  Legal       Row Name 12/05/23 1545       Financial/Legal    Who Manages Finances if Patient Unable children                   Substance Abuse    No documentation.                  Patient Forms    No documentation.                     Smita Mckay RN

## 2023-12-05 NOTE — PLAN OF CARE
Goal Outcome Evaluation:  Plan of Care Reviewed With: patient           Outcome Evaluation: Admitted from ER.  Resting comfortably.  Pleasantly confused.  On RA.

## 2023-12-05 NOTE — NURSING NOTE
Patient is calm but continues to remove tele monitor/leads. [facility] aware and wants staff to continue to try to keep monitor on patient

## 2023-12-05 NOTE — NURSING NOTE
Patient removed his own IV. Patient is oriented to place and person, but not time. Re-educated patient about not removing IV sites and Tele monitor leads. Patient verbalizes understanding, but continues to remove leads. Patient is not aggressive or irritable- just not compliant with leaving IV sites and tele leads in place. Paged [facility] and notified monitor tech.

## 2023-12-05 NOTE — PLAN OF CARE
Goal Outcome Evaluation:  Plan of Care Reviewed With: patient           Outcome Evaluation: Up in chair today.  No complaints.  Pleasantly confused.  Pulls at heart monitor and IV at times.  Family at bedside.  Plans for SNF at discharge

## 2023-12-05 NOTE — THERAPY EVALUATION
Patient Name: Danni Aguilar  : 1926    MRN: [id number]                              Today's Date: 2023       Admit Date: 2023    Visit Dx:     ICD-10-CM ICD-9-CM   1. RSV infection  B33.8 079.6   2. Hyponatremia  E87.1 276.1   3. Generalized weakness  R53.1 780.79   4. Frequent falls  R29.6 V15.88     Patient Active Problem List   Diagnosis    Acute CVA (cerebrovascular accident)    Atrial fibrillation    Long term (current) use of anticoagulants    Carotid stenosis    Urinary retention    Cerebrovascular accident (CVA) due to stenosis of right carotid artery    Hyperlipidemia    Cerebrovascular disease    Recent cerebrovascular accident (CVA)    RSV infection    Hyponatremia    Thrombocytosis    RSV (respiratory syncytial virus infection)     Past Medical History:   Diagnosis Date    A-fib     Carotid artery stenosis     Hyperlipidemia     Irregular heart beat      Past Surgical History:   Procedure Laterality Date    BACK SURGERY      CAROTID ARTERY ANGIOPLASTY      CEREBRAL ANGIOGRAM N/A 2017    Procedure: DIAGNOSTIC CEREBRAL ANGIOGRAM AND RIGHT CAROTID STENT PLACEMENT;  Surgeon: Mauricio Yung MD;  Location: [facility] ;  Service:     RECTAL SURGERY        General Information       Row Name 23 1520          Physical Therapy Time and Intention    Document Type evaluation  -PC     Mode of Treatment physical therapy  -PC       Row Name 23 1520          General Information    Prior Level of Function independent:;all household mobility;ADL's  uses a cane, has a rolling walker available if needed  -PC     Existing Precautions/Restrictions fall  -PC       Row Name 23 1520          Living Environment    People in Home spouse  children come by everyday but not able to stay /  -       Row Name 23 1520          Cognition    Orientation Status (Cognition) oriented to;person;place  -       Row Name 23 1520          Safety Issues, Functional Mobility     Safety Issues Affecting Function (Mobility) judgment;safety precaution awareness;safety precautions follow-through/compliance  -PC     Impairments Affecting Function (Mobility) balance;cognition;endurance/activity tolerance;strength  -PC               User Key  (r) = Recorded By, (t) = Taken By, (c) = Cosigned By      Initials Name Provider Type    PC Ivis Juares PT Physical Therapist                   Mobility       Row Name 12/05/23 1522          Bed Mobility    Supine-Sit [place] (Bed Mobility) minimum assist (75% patient effort)  -PC     Assistive Device (Bed Mobility) head of bed elevated;bed rails  -PC       Row Name 12/05/23 1522          Sit-Stand Transfer    Sit-Stand [place] (Transfers) minimum assist (75% patient effort);1 person assist;1 person to manage equipment  -PC     Assistive Device (Sit-Stand Transfers) walker, front-wheeled  -PC       Row Name 12/05/23 1522          Gait/Stairs (Locomotion)    [place] Level (Gait) minimum assist (75% patient effort)  -PC     Assistive Device (Gait) walker, front-wheeled  -PC     Distance in Feet (Gait) 50 ft  -PC     Deviations/Abnormal Patterns (Gait) [place] decreased;festinating/shuffling;gait speed decreased;stride length decreased  -PC     Bilateral Gait Deviations forward flexed posture  -PC               User Key  (r) = Recorded By, (t) = Taken By, (c) = Cosigned By      Initials Name Provider Type    PC Ivis Juares PT Physical Therapist                   Obj/Interventions       Row Name 12/05/23 1524          Range of Motion Comprehensive    General Range of Motion bilateral lower extremity ROM WNL  -PC       Row Name 12/05/23 1524          Strength Comprehensive (MMT)    Comment, General Manual Muscle Testing (MMT) Assessment general weakness B LEs grossly 4/5  -PC       Row Name 12/05/23 1524          Motor Skills    Motor Skills functional endurance  -PC     Functional Endurance fair  -       Row Name 12/05/23 1524           Balance    Static Sitting Balance supervision  -PC     Position, Sitting Balance sitting edge of bed  -PC     Static Standing Balance contact guard  -PC     Dynamic Standing Balance minimal assist  -PC     Position/Device Used, Standing Balance walker, rolling  -PC               User Key  (r) = Recorded By, (t) = Taken By, (c) = Cosigned By      Initials Name Provider Type    PC Ivis Juares, PT Physical Therapist                   Goals/Plan       Row Name 12/05/23 1537          Bed Mobility Goal 1 (PT)    Activity/Assistive Device (Bed Mobility Goal 1, PT) sit to supine/supine to sit  -PC     [place] Level/Cues Needed (Bed Mobility Goal 1, PT) contact guard required  -PC     Time Frame (Bed Mobility Goal 1, PT) 2 weeks  -PC       Row Name 12/05/23 1537          Transfer Goal 1 (PT)    Activity/Assistive Device (Transfer Goal 1, PT) sit-to-stand/stand-to-sit  -PC     [place] Level/Cues Needed (Transfer Goal 1, PT) standby assist  -PC     Time Frame (Transfer Goal 1, PT) 2 weeks  -PC       Row Name 12/05/23 1537          Gait Training Goal 1 (PT)    Activity/Assistive Device (Gait Training Goal 1, PT) gait (walking locomotion);assistive device use  -PC     [place] Level (Gait Training Goal 1, PT) contact guard required  -PC     Distance (Gait Training Goal 1, PT) 75 ft  -PC     Time Frame (Gait Training Goal 1, PT) 2 weeks  -PC       Row Name 12/05/23 1537          Therapy Assessment/Plan (PT)    Planned Therapy Interventions (PT) bed mobility training;gait training;transfer training;strengthening;balance training  -PC               User Key  (r) = Recorded By, (t) = Taken By, (c) = Cosigned By      Initials Name Provider Type    PC Ivis Juares, PT Physical Therapist                   Clinical Impression       Row Name 12/05/23 1532          Pain    Pretreatment Pain Rating 0/10 - no pain  -PC       Row Name 12/05/23 1532          Plan of Care Review    Plan of Care Reviewed With  patient;daughter  -PC     Outcome Evaluation Pt is a 89 yo male adm from home with weakness, falls, cough, dx with RSV. Pt lives with his spouse in a home with one step to enter, can stay on one level, daughter reports that he usually uses a cane, but has a rwx if needed. Pt's spouse also uses a cane. Pt has family that come every day but can't stay 24/7 and are limited in their ability to provide physical assist. Pt presents with weakness and impaired functional mobility and activity tolerance, he will benefit from PT to address. Pt required min assist for bed mobility, sit to stand, and to walk 50 ft with rolling walker, gait was unsteady and pt fatigued quickly with activity, at current level would rec SNF at discharge  -PC       Row Name 12/05/23 1532          Therapy Assessment/Plan (PT)    Rehab Potential (PT) good, to achieve stated therapy goals  -PC     Criteria for Skilled Interventions Met (PT) yes;meets criteria  -PC     Therapy Frequency (PT) 6 times/wk  -PC       Row Name 12/05/23 1532          Positioning and Restraints    Pre-Treatment Position in bed  -PC     Post Treatment Position bed  -PC     In Bed supine;call light within reach;encouraged to call for assist;exit alarm on;with family/caregiver  -PC               User Key  (r) = Recorded By, (t) = Taken By, (c) = Cosigned By      Initials Name Provider Type    PC Ivis Juares, PT Physical Therapist                   Outcome Measures       Row Name 12/05/23 1538 12/05/23 0820       How much help from another person do you currently need...    Turning from your back to your side while in flat bed without using bedrails? 3  -PC 3  -SM    Moving from lying on back to sitting on the side of a flat bed without bedrails? 3  -PC 3  -SM    Moving to and from a bed to a chair (including a wheelchair)? 3  -PC 3  -SM    Standing up from a chair using your arms (e.g., wheelchair, bedside chair)? 3  -PC 3  -SM    Climbing 3-5 steps with a railing? 2  -PC 2   -    To walk in hospital room? 3  -PC 3  -SM    AM-PAC 6 Clicks Score (PT) 17  - 17  -SM    Highest Level of Mobility Goal 5 --> Static standing  - 5 --> Static standing  -      Row Name 12/05/23 1252          Functional Assessment    Outcome Measure Options AM-PAC 6 Clicks Daily Activity (OT)  -[facility]               User Key  (r) = Recorded By, (t) = Taken By, (c) = Cosigned By      Initials Name Provider Type    PC Ivis Juares, PT Physical Therapist    Kathy Otero RN Registered Nurse    Smita Parham, OT Occupational Therapist                                 Physical Therapy Education       Title: PT OT SLP Therapies (In Progress)       Topic: Physical Therapy (In Progress)       Point: Mobility training (Done)       Learning Progress Summary             Patient Acceptance, E,D, DU by PC at 12/5/2023 8850                         Point: Home exercise program (Not Started)       Learner Progress:  Not documented in this visit.              Point: Body mechanics (Not Started)       Learner Progress:  Not documented in this visit.              Point: Precautions (Not Started)       Learner Progress:  Not documented in this visit.                              User Key       Initials Effective Dates Name Provider Type Discipline     06/16/21 -  Ivis Juares, PT Physical Therapist PT                  PT Recommendation and Plan  Planned Therapy Interventions (PT): bed mobility training, gait training, transfer training, strengthening, balance training  Plan of Care Reviewed With: patient, daughter  Outcome Evaluation: Pt is a 89 yo male adm from home with weakness, falls, cough, dx with RSV. Pt lives with his spouse in a home with one step to enter, can stay on one level, daughter reports that he usually uses a cane, but has a rwx if needed. Pt's spouse also uses a cane. Pt has family that come every day but can't stay 24/7 and are limited in their ability to provide physical assist. Pt  presents with weakness and impaired functional mobility and activity tolerance, he will benefit from PT to address. Pt required min assist for bed mobility, sit to stand, and to walk 50 ft with rolling walker, gait was unsteady and pt fatigued quickly with activity, at current level would rec SNF at discharge     Time Calculation:         PT Charges       Row Name 12/05/23 1539             Time Calculation    Start Time 1500  -PC      Stop Time 1515  -PC      Time Calculation (min) 15 min  -PC      PT Received On 12/05/23  -PC      PT - Next Appointment 12/06/23  -PC      PT Goal Re-Cert Due Date 12/19/23  -PC                User Key  (r) = Recorded By, (t) = Taken By, (c) = Cosigned By      Initials Name Provider Type    PC Ivis Juares PT Physical Therapist                  Therapy Charges for Today       Code Description Service Date Service Provider Modifiers Qty    07777182929  PT EVAL MOD COMPLEXITY 2 12/5/2023 Ivis Juares, PT GP 1    31806128798 HC PT THER PROC EA 15 MIN 12/5/2023 Ivis Juares, PT GP 1    17043547068 HC PT THER SUPP EA 15 MIN 12/5/2023 Ivis Juares, PT GP 1            PT G-Codes  Outcome Measure Options: AM-PAC 6 Clicks Daily Activity (OT)  AM-PAC 6 Clicks Score (PT): 17  AM-PAC 6 Clicks Score (OT): 14  PT Discharge Summary  Anticipated Discharge Disposition (PT): skilled nursing facility    Ivis Juares PT  12/5/2023

## 2023-12-05 NOTE — PROGRESS NOTES
Name: Danni Aguilar ADMIT: 2023   : 1926  PCP: Justin Longoria MD    MRN: [id number] LOS: 0 days   AGE/SEX: 89 y.o. male  ROOM: [facility]     Subjective   Subjective   Sitting up in bed, eating breakfast; accompanied by RN. No complaints, per nursing documentation- was pulling at leads/wires overnight.   I was able to call and speak to daughter Cheyenne later today, she is interested in rehab for her father at discharge.        Objective   Objective   Vital Signs  Temp:  [97.3 °F (36.3 °C)-100.4 °F (38 °C)] 98.1 °F (36.7 °C)  Heart Rate:  [] 28  Resp:  [18] 18  BP: (104-151)/(46-87) 140/72  SpO2:  [91 %-98 %] 93 %  on  Flow (L/min):  [2] 2;   Device (Oxygen Therapy): room air  Body mass index is 19.61 kg/m².  Physical Exam  Constitutional:       General: He is not in acute distress.     Appearance: He is not toxic-appearing.      Comments: Elderly, frail   Cardiovascular:      Rate and Rhythm: Normal rate and regular rhythm.      Heart sounds: No murmur heard.  Pulmonary:      Effort: Pulmonary effort is normal. No respiratory distress.      Breath sounds: Wheezing and rhonchi present.      Comments: Diminished  Abdominal:      General: Abdomen is flat.      Palpations: Abdomen is soft.      Tenderness: There is no abdominal tenderness.   Musculoskeletal:         General: No tenderness.      Right lower leg: No edema.      Left lower leg: No edema.   Skin:     General: Skin is warm and dry.   Neurological:      General: No focal deficit present.      Mental Status: He is alert. Mental status is at baseline. He is disoriented.      Motor: Weakness (Generalized) present.         Results Review     I reviewed the patient's new clinical results.  Results from last 7 days   Lab Units 23  1145   WBC 10*3/mm3 11.71*   HEMOGLOBIN g/dL 12.4*   PLATELETS 10*3/mm3 703*     Results from last 7 days   Lab Units 23  1145   SODIUM mmol/L 129*   POTASSIUM mmol/L 4.6   CHLORIDE mmol/L 98   CO2 mmol/L  "22.0   BUN mg/dL 19   CREATININE mg/dL 0.83   GLUCOSE mg/dL 119*   Estimated Creatinine Clearance: 43.1 mL/min (by C-G formula based on SCr of 0.83 mg/dL).  Results from last 7 days   Lab Units 12/04/23  1145   ALBUMIN g/dL 3.3*   BILIRUBIN mg/dL 0.9   ALK PHOS U/L 53   AST (SGOT) U/L 43*   ALT (SGPT) U/L 25     Results from last 7 days   Lab Units 12/04/23  1145   CALCIUM mg/dL 8.0*   ALBUMIN g/dL 3.3*     Results from last 7 days   Lab Units 12/04/23  1145   PROCALCITONIN ng/mL 0.51*   LACTATE mmol/L 1.7     COVID19   Date Value Ref Range Status   12/04/2023 Not Detected Not Detected - Ref. Range Final   12/09/2020 Not Detected Not Detected - Ref. Range Final     No results found for: \"HGBA1C\", \"POCGLU\"    XR Chest AP  Narrative: EXAM: XR CHEST AP-     INDICATION: Cough and fever     COMPARISON: 12/9/2020        Impression: No focal consolidation. Left lower lobe linear  atelectasis/scarring. No pleural effusion or pneumothorax. Normal size  cardiomediastinal silhouette. Dilated central pulmonary vasculature  without findings of redistribution/edema. No focal osseous abnormality.           This report was finalized on 12/4/2023 12:49 PM by Dr. Jean-Pierre Palmer M.D on Workstation: [id number]       Scheduled Medications  aspirin, 81 mg, Oral, Daily  atorvastatin, 80 mg, Oral, Daily  senna-docusate sodium, 2 tablet, Oral, BID  sodium chloride, 10 mL, Intravenous, Q12H  warfarin, 6 mg, Oral, Daily    Infusions  Pharmacy to dose warfarin,   sodium chloride 0.9 % with KCl 20 mEq, 100 mL/hr, Last Rate: 100 mL/hr (12/04/23 2033)    Diet  Diet: Cardiac Diets; Healthy Heart (2-3 Na+); Texture: Regular Texture (IDDSI 7); Fluid Consistency: Thin (IDDSI 0)         I have personally reviewed:  [x]  Laboratory   []  Microbiology   [x]  Radiology   [x]  EKG/Telemetry   [x]  Cardiology/Vascular   []  Pathology   [x]  Records    Assessment/Plan     Active Hospital Problems    Diagnosis  POA    **RSV infection [B33.8]  Yes    " Hyponatremia [E87.1]  Yes    Thrombocytosis [D75.839]  Unknown    Recent cerebrovascular accident (CVA) [Z86.73]  Yes    Atrial fibrillation [I48.91]  Yes    Long term (current) use of anticoagulants [Z79.01]  Not Applicable      Resolved Hospital Problems   No resolved problems to display.       89 y.o. male admitted with RSV infection.    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  Full code.  Discussed with patient, family, nursing staff, and CCP.  Anticipate discharge to SNU facility  Friday      Yanelis Ladd MD  [facility]  12/05/23  06:51 EST    I wore protective equipment throughout this patient encounter including gloves.  Hand hygiene was performed before donning protective equipment and after removal when leaving the room.    Expected Discharge Date and Time       Expected Discharge Date Expected Discharge Time    Dec 5, 2023              Copied text in this note has been reviewed and is accurate as of 12/05/23.

## 2023-12-05 NOTE — THERAPY EVALUATION
Patient Name: Danni Aguilar  : 1926    MRN: [id number]                              Today's Date: 2023       Admit Date: 2023    Visit Dx:     ICD-10-CM ICD-9-CM   1. RSV infection  B33.8 079.6   2. Hyponatremia  E87.1 276.1   3. Generalized weakness  R53.1 780.79   4. Frequent falls  R29.6 V15.88     Patient Active Problem List   Diagnosis    Acute CVA (cerebrovascular accident)    Atrial fibrillation    Long term (current) use of anticoagulants    Carotid stenosis    Urinary retention    Cerebrovascular accident (CVA) due to stenosis of right carotid artery    Hyperlipidemia    Cerebrovascular disease    Recent cerebrovascular accident (CVA)    RSV infection    Hyponatremia    Thrombocytosis    RSV (respiratory syncytial virus infection)     Past Medical History:   Diagnosis Date    A-fib     Carotid artery stenosis     Hyperlipidemia     Irregular heart beat      Past Surgical History:   Procedure Laterality Date    BACK SURGERY      CAROTID ARTERY ANGIOPLASTY      CEREBRAL ANGIOGRAM N/A 2017    Procedure: DIAGNOSTIC CEREBRAL ANGIOGRAM AND RIGHT CAROTID STENT PLACEMENT;  Surgeon: Mauricio Yung MD;  Location: [facility] ;  Service:     RECTAL SURGERY        General Information       Row Name 23 1244          OT Time and Intention    Document Type evaluation  -SM     Mode of Treatment occupational therapy;individual therapy  -       Row Name 23 1244          General Information    Patient Profile Reviewed yes  -SM     Prior Level of Function independent:;ADL's;all household mobility  Pt uses a rwx  -     Existing Precautions/Restrictions fall  -SM       Row Name 23 1244          Living Environment    People in Home spouse  -       Row Name 23 1244          Cognition    Orientation Status (Cognition) oriented to;person  knows he is in the hospital, disoriented to year, some insight into situation but appears confused  -SM       Row Name 23  1244          Safety Issues, Functional Mobility    Safety Issues Affecting Function (Mobility) safety precaution awareness;judgment  -     Impairments Affecting Function (Mobility) balance;cognition;endurance/activity tolerance;strength  -               User Key  (r) = Recorded By, (t) = Taken By, (c) = Cosigned By      Initials Name Provider Type     Smita Bledsoe OT Occupational Therapist                     Mobility/ADL's       Row Name 12/05/23 [facility]          Bed Mobility    Bed Mobility supine-sit  -     Supine-Sit [place] (Bed Mobility) minimum assist (75% patient effort);verbal cues  -[facility] Name 12/05/23 124          Transfers    Transfers sit-stand transfer;toilet transfer  -[facility] Name 12/05/23 [facility]          Sit-Stand Transfer    Sit-Stand [place] (Transfers) minimum assist (75% patient effort);verbal cues  -[facility] Name 12/05/23 [facility]          Toilet Transfer    [place] Level (Toilet Transfer) minimum assist (75% patient effort);verbal cues  -     Assistive Device (Toilet Transfer) grab bars/safety frame  -[facility] Name 12/05/23 [facility]          Functional Mobility    Functional Mobility- Ind. Level contact guard assist;minimum assist (75% patient effort)  -     Functional Mobility- Device walker, front-wheeled  -     Functional Mobility-Distance (Feet) --  15+15  -     Functional Mobility- Comment unsteady, cues for safety  -[facility] Name 12/05/23 [facility]          Activities of Daily Living    BADL Assessment/Intervention lower body dressing;toileting;grooming;upper body dressing  -[facility] Name 12/05/23 [facility]          Lower Body Dressing Assessment/Training    [place] Level (Lower Body Dressing) don;shoes/slippers;moderate assist (50% patient effort)  -[facility] Name 12/05/23 [facility]          Toileting Assessment/Training    [place] Level (Toileting) moderate assist (50% patient effort)  -     Comment, (Toileting) pt with incontinent  BM in brief, continent BM in toilet, pt with difficulty wiping and managing brief  -       Row Name 12/05/23 1245          Grooming Assessment/Training    [place] Level (Grooming) wash face, hands;standby assist  -     Position (Grooming) supported sitting  -       Row Name 12/05/23 1245          Upper Body Dressing Assessment/Training    [place] Level (Upper Body Dressing) don;moderate assist (50% patient effort)  gown  -     Comment, (Upper Body Dressing) pt with poor attention to task due to pulling at heart monitor despite multiple cues to leave wires alone  -               User Key  (r) = Recorded By, (t) = Taken By, (c) = Cosigned By      Initials Name Provider Type    Smita Patel OT Occupational Therapist                   Obj/Interventions       Row Name 12/05/23 1247          Range of Motion Comprehensive    General Range of Motion bilateral upper extremity ROM WFL  -[facility] Name 12/05/23 1247          Strength Comprehensive (MMT)    Comment, General Manual Muscle Testing (MMT) Assessment BUE MMT 4-/5  -       Row Name 12/05/23 1247          Motor Skills    Motor Skills functional endurance  -     Functional Endurance fair  -[facility] Name 12/05/23 1247          Balance    Balance Assessment sitting static balance;standing static balance;standing dynamic balance  -     Static Sitting Balance supervision  -     Position, Sitting Balance sitting edge of bed  -     Static Standing Balance contact guard  -     Dynamic Standing Balance minimal assist;contact guard  -     Position/Device Used, Standing Balance supported;walker, rolling  -               User Key  (r) = Recorded By, (t) = Taken By, (c) = Cosigned By      Initials Name Provider Type    Smita Patel OT Occupational Therapist                   Goals/Plan       Row Name 12/05/23 1251          Transfer Goal 1 (OT)    Activity/Assistive Device (Transfer Goal 1, OT)  toilet;sit-to-stand/stand-to-sit  -SM     [place] Level/Cues Needed (Transfer Goal 1, OT) standby assist  -SM     Time Frame (Transfer Goal 1, OT) short term goal (STG);2 weeks  -SM     Progress/Outcome (Transfer Goal 1, OT) goal ongoing  -       Row Name 12/05/23 1251          Bathing Goal 1 (OT)    Activity/Device (Bathing Goal 1, OT) bathing skills, all  -SM     [place] Level/Cues Needed (Bathing Goal 1, OT) standby assist  -SM     Time Frame (Bathing Goal 1, OT) short term goal (STG);2 weeks  -SM     Progress/Outcomes (Bathing Goal 1, OT) goal ongoing  -       Row Name 12/05/23 1251          Dressing Goal 1 (OT)    Activity/Device (Dressing Goal 1, OT) dressing skills, all  -SM     [place]/Cues Needed (Dressing Goal 1, OT) standby assist  -SM     Time Frame (Dressing Goal 1, OT) short term goal (STG);2 weeks  -SM     Progress/Outcome (Dressing Goal 1, OT) goal ongoing  -       Row Name 12/05/23 1251          Toileting Goal 1 (OT)    Activity/Device (Toileting Goal 1, OT) toileting skills, all  -SM     [place] Level/Cues Needed (Toileting Goal 1, OT) standby assist  -SM     Time Frame (Toileting Goal 1, OT) short term goal (STG);2 weeks  -SM     Progress/Outcome (Toileting Goal 1, OT) goal ongoing  -       Row Name 12/05/23 1252          Therapy Assessment/Plan (OT)    Planned Therapy Interventions (OT) activity tolerance training;BADL retraining;functional balance retraining;occupation/activity based interventions;patient/caregiver education/training;transfer/mobility retraining;strengthening exercise;ROM/therapeutic exercise  -SM               User Key  (r) = Recorded By, (t) = Taken By, (c) = Cosigned By      Initials Name Provider Type    Smita Patel OT Occupational Therapist                   Clinical Impression       Row Name 12/05/23 1248          Pain Assessment    Pretreatment Pain Rating 0/10 - no pain  -SM     Posttreatment Pain Rating 0/10 - no pain  -SM       Row  Name 12/05/23 1248          Plan of Care Review    Plan of Care Reviewed With patient  -     Outcome Evaluation Pt is a 89 y.o male admitted to [facility] with generalized weakness, cough, multiple falls in 1 day due to weakness. Pt is found to have RSV. Today he is found in bed with some confusion. Pt is unsteady on his feet when mobilizing to the bathroom, he needs cues for safety throughout. Pt figiting at lines/wires/monitors throughout eval. Pt would benefit from continued OT to increase ADL independence and safety. Pt may benefit from SNF at [place] to improve strength and balance in prep to dc home.  -       Row Name 12/05/23 1248          Therapy Assessment/Plan (OT)    Rehab Potential (OT) good, to achieve stated therapy goals  -     Criteria for Skilled Therapeutic Interventions Met (OT) yes;skilled treatment is necessary  -     Therapy Frequency (OT) 5 times/wk  -       Row Name 12/05/23 1248          Therapy Plan Review/Discharge Plan (OT)    Anticipated Discharge Disposition (OT) skilled nursing facility  -       Row Name 12/05/23 1248          Vital Signs    O2 Delivery Pre Treatment room air  -     O2 Delivery Intra Treatment room air  -     O2 Delivery Post Treatment room air  -       Row Name 12/05/23 1248          Positioning and Restraints    Pre-Treatment Position in bed  -     Post Treatment Position chair  -SM     In Chair reclined;call light within reach;encouraged to call for assist;exit alarm on;notified [facility]  -               User Key  (r) = Recorded By, (t) = Taken By, (c) = Cosigned By      Initials Name Provider Type     Smita Bledsoe, OT Occupational Therapist                   Outcome Measures       Row Name 12/05/23 1252          How much help from another is currently needed...    Putting on and taking off regular lower body clothing? 2  -SM     Bathing (including washing, rinsing, and drying) 2  -SM     Toileting (which includes using toilet bed pan or urinal) 2  -SM      Putting on and taking off regular upper body clothing 2  -SM     Taking care of personal grooming (such as brushing teeth) 3  -SM     Eating meals 3  -SM     AM-PAC 6 Clicks Score (OT) 14  -       Row Name 12/05/23 0820          How much help from another person do you currently need...    Turning from your back to your side while in flat bed without using bedrails? 3  -SMA     Moving from lying on back to sitting on the side of a flat bed without bedrails? 3  -SMA     Moving to and from a bed to a chair (including a wheelchair)? 3  -SMA     Standing up from a chair using your arms (e.g., wheelchair, bedside chair)? 3  -SMA     Climbing 3-5 steps with a railing? 2  -SMA     To walk in hospital room? 3  -SMA     AM-PAC 6 Clicks Score (PT) 17  -[facility]     Highest Level of Mobility Goal 5 --> Static standing  -[facility]       Row Name 12/05/23 1252          Functional Assessment    Outcome Measure Options AM-PAC 6 Clicks Daily Activity (OT)  -               User Key  (r) = Recorded By, (t) = Taken By, (c) = Cosigned By      Initials Name Provider Type    Kathy Eaton RN Registered Nurse    Smita Patel OT Occupational Therapist                    Occupational Therapy Education       Title: PT OT SLP Therapies (In Progress)       Topic: Occupational Therapy (In Progress)       Point: ADL training (Done)       Description:   Instruct learner(s) on proper safety adaptation and remediation techniques during self care or transfers.   Instruct in proper use of assistive devices.                  Learning Progress Summary             Patient Acceptance, E, VU by  at 12/5/2023 1252    Comment: OT goals/POC, falls precautions and call light use for assist                         Point: Home exercise program (Not Started)       Description:   Instruct learner(s) on appropriate technique for monitoring, assisting and/or progressing therapeutic exercises/activities.                  Learner Progress:  Not  documented in this visit.              Point: Precautions (Not Started)       Description:   Instruct learner(s) on prescribed precautions during self-care and functional transfers.                  Learner Progress:  Not documented in this visit.              Point: Body mechanics (Not Started)       Description:   Instruct learner(s) on proper positioning and spine alignment during self-care, functional mobility activities and/or exercises.                  Learner Progress:  Not documented in this visit.                              User Key       Initials Effective Dates Name Provider Type Discipline     04/02/20 -  Smita Bledsoe OT Occupational Therapist OT                  OT Recommendation and Plan  Planned Therapy Interventions (OT): activity tolerance training, BADL retraining, functional balance retraining, occupation/activity based interventions, patient/caregiver education/training, transfer/mobility retraining, strengthening exercise, ROM/therapeutic exercise  Therapy Frequency (OT): 5 times/wk  Plan of Care Review  Plan of Care Reviewed With: patient  Outcome Evaluation: Pt is a 89 y.o male admitted to [facility] with generalized weakness, cough, multiple falls in 1 day due to weakness. Pt is found to have RSV. Today he is found in bed with some confusion. Pt is unsteady on his feet when mobilizing to the bathroom, he needs cues for safety throughout. Pt figiting at lines/wires/monitors throughout eval. Pt would benefit from continued OT to increase ADL independence and safety. Pt may benefit from SNF at [place] to improve strength and balance in prep to dc home.     Time Calculation:   Evaluation Complexity (OT)  Review Occupational Profile/Medical/Therapy History Complexity: expanded/moderate complexity  Assessment, Occupational Performance/Identification of Deficit Complexity: 3-5 performance deficits  Clinical Decision Making Complexity (OT): detailed assessment/moderate complexity  Overall Complexity  of Evaluation (OT): moderate complexity     Time Calculation- OT       Row Name 12/05/23 1253             Time Calculation- OT    OT Start Time 1044  -SM      OT Stop Time 1110  -      OT Time Calculation (min) 26 min  -SM      Total Timed Code Minutes- OT 18 minute(s)  -SM      OT Received On 12/05/23  -      OT - Next Appointment 12/06/23  -      OT Goal Re-Cert Due Date 12/19/23  -         Timed Charges    84674 - OT Self Care/Mgmt Minutes 18  -SM         Untimed Charges    OT Eval/Re-eval Minutes 8  -SM         Total Minutes    Timed Charges Total Minutes 18  -SM      Untimed Charges Total Minutes 8  -SM       Total Minutes 26  -SM                User Key  (r) = Recorded By, (t) = Taken By, (c) = Cosigned By      Initials Name Provider Type     Smita Bledsoe OT Occupational Therapist                  Therapy Charges for Today       Code Description Service Date Service Provider Modifiers Qty    98588474267 HC OT SELF CARE/MGMT/TRAIN EA 15 MIN 12/5/2023 Smita Bledsoe OT GO 1    31572773439 HC OT EVAL MOD COMPLEXITY 2 12/5/2023 Smita Bledsoe OT GO 1                 Smita Bledsoe OT  12/5/2023

## 2023-12-05 NOTE — PLAN OF CARE
Goal Outcome Evaluation:  Plan of Care Reviewed With: patient           Outcome Evaluation: Pt is a 89 y.o male admitted to [facility] with generalized weakness, cough, multiple falls in 1 day due to weakness. Pt is found to have RSV. Today he is found in bed with some confusion. Pt is unsteady on his feet when mobilizing to the bathroom, he needs cues for safety throughout. Pt figiting at lines/wires/monitors throughout eval. Pt would benefit from continued OT to increase ADL independence and safety. Pt may benefit from SNF at [place] to improve strength and balance in prep to dc home.      Anticipated Discharge Disposition (OT): skilled nursing facility

## 2023-12-06 LAB
ANION GAP SERPL CALCULATED.3IONS-SCNC: 8 MMOL/L (ref 5–15)
BASOPHILS # BLD AUTO: 0.07 10*3/MM3 (ref 0–0.2)
BASOPHILS NFR BLD AUTO: 0.6 % (ref 0–1.5)
BUN SERPL-MCNC: 18 MG/DL (ref 8–23)
BUN/CREAT SERPL: 26.5 (ref 7–25)
CALCIUM SPEC-SCNC: 8 MG/DL (ref 8.2–9.6)
CHLORIDE SERPL-SCNC: 96 MMOL/L (ref 98–107)
CO2 SERPL-SCNC: 21 MMOL/L (ref 22–29)
CREAT SERPL-MCNC: 0.68 MG/DL (ref 0.76–1.27)
DEPRECATED RDW RBC AUTO: 43.3 FL (ref 37–54)
EGFRCR SERPLBLD CKD-EPI 2021: 85.1 ML/MIN/1.73
EOSINOPHIL # BLD AUTO: 0.1 10*3/MM3 (ref 0–0.4)
EOSINOPHIL NFR BLD AUTO: 0.8 % (ref 0.3–6.2)
ERYTHROCYTE [DISTWIDTH] IN BLOOD BY AUTOMATED COUNT: 14.9 % (ref 12.3–15.4)
GLUCOSE SERPL-MCNC: 109 MG/DL (ref 65–99)
HCT VFR BLD AUTO: 32.1 % (ref 37.5–51)
HGB BLD-MCNC: 10.2 G/DL (ref 13–17.7)
IMM GRANULOCYTES # BLD AUTO: 0.06 10*3/MM3 (ref 0–0.05)
IMM GRANULOCYTES NFR BLD AUTO: 0.5 % (ref 0–0.5)
INR PPP: 3.17 (ref 0.9–1.1)
LYMPHOCYTES # BLD AUTO: 1.06 10*3/MM3 (ref 0.7–3.1)
LYMPHOCYTES NFR BLD AUTO: 8.8 % (ref 19.6–45.3)
MAGNESIUM SERPL-MCNC: 1.6 MG/DL (ref 1.7–2.3)
MCH RBC QN AUTO: 25.7 PG (ref 26.6–33)
MCHC RBC AUTO-ENTMCNC: 31.8 G/DL (ref 31.5–35.7)
MCV RBC AUTO: 80.9 FL (ref 79–97)
MONOCYTES # BLD AUTO: 1.12 10*3/MM3 (ref 0.1–0.9)
MONOCYTES NFR BLD AUTO: 9.3 % (ref 5–12)
NEUTROPHILS NFR BLD AUTO: 80 % (ref 42.7–76)
NEUTROPHILS NFR BLD AUTO: 9.66 10*3/MM3 (ref 1.7–7)
NRBC BLD AUTO-RTO: 0 /100 WBC (ref 0–0.2)
PHOSPHATE SERPL-MCNC: 2.1 MG/DL (ref 2.5–4.5)
PHOSPHATE SERPL-MCNC: 2.9 MG/DL (ref 2.5–4.5)
PLATELET # BLD AUTO: 599 10*3/MM3 (ref 140–450)
PMV BLD AUTO: 8.9 FL (ref 6–12)
POTASSIUM SERPL-SCNC: 4.2 MMOL/L (ref 3.5–5.2)
PROTHROMBIN TIME: 33.2 SECONDS (ref 11.7–14.2)
RBC # BLD AUTO: 3.97 10*6/MM3 (ref 4.14–5.8)
SODIUM SERPL-SCNC: 125 MMOL/L (ref 136–145)
TSH SERPL DL<=0.05 MIU/L-ACNC: 1.92 UIU/ML (ref 0.27–4.2)
URATE SERPL-MCNC: 2.7 MG/DL (ref 3.4–7)
WBC NRBC COR # BLD AUTO: 12.07 10*3/MM3 (ref 3.4–10.8)

## 2023-12-06 PROCEDURE — 97530 THERAPEUTIC ACTIVITIES: CPT

## 2023-12-06 PROCEDURE — 83735 ASSAY OF MAGNESIUM: CPT | Performed by: STUDENT IN AN ORGANIZED HEALTH CARE EDUCATION/TRAINING PROGRAM

## 2023-12-06 PROCEDURE — 84100 ASSAY OF PHOSPHORUS: CPT | Performed by: STUDENT IN AN ORGANIZED HEALTH CARE EDUCATION/TRAINING PROGRAM

## 2023-12-06 PROCEDURE — 84133 ASSAY OF URINE POTASSIUM: CPT | Performed by: STUDENT IN AN ORGANIZED HEALTH CARE EDUCATION/TRAINING PROGRAM

## 2023-12-06 PROCEDURE — 80048 BASIC METABOLIC PNL TOTAL CA: CPT | Performed by: STUDENT IN AN ORGANIZED HEALTH CARE EDUCATION/TRAINING PROGRAM

## 2023-12-06 PROCEDURE — 82570 ASSAY OF URINE CREATININE: CPT | Performed by: STUDENT IN AN ORGANIZED HEALTH CARE EDUCATION/TRAINING PROGRAM

## 2023-12-06 PROCEDURE — 84540 ASSAY OF URINE/UREA-N: CPT | Performed by: STUDENT IN AN ORGANIZED HEALTH CARE EDUCATION/TRAINING PROGRAM

## 2023-12-06 PROCEDURE — 85610 PROTHROMBIN TIME: CPT | Performed by: INTERNAL MEDICINE

## 2023-12-06 PROCEDURE — 85025 COMPLETE CBC W/AUTO DIFF WBC: CPT | Performed by: STUDENT IN AN ORGANIZED HEALTH CARE EDUCATION/TRAINING PROGRAM

## 2023-12-06 PROCEDURE — 84550 ASSAY OF BLOOD/URIC ACID: CPT | Performed by: STUDENT IN AN ORGANIZED HEALTH CARE EDUCATION/TRAINING PROGRAM

## 2023-12-06 PROCEDURE — 84443 ASSAY THYROID STIM HORMONE: CPT | Performed by: STUDENT IN AN ORGANIZED HEALTH CARE EDUCATION/TRAINING PROGRAM

## 2023-12-06 PROCEDURE — 83935 ASSAY OF URINE OSMOLALITY: CPT | Performed by: STUDENT IN AN ORGANIZED HEALTH CARE EDUCATION/TRAINING PROGRAM

## 2023-12-06 PROCEDURE — 25810000003 SODIUM CHLORIDE 0.9 % SOLUTION: Performed by: STUDENT IN AN ORGANIZED HEALTH CARE EDUCATION/TRAINING PROGRAM

## 2023-12-06 PROCEDURE — 84300 ASSAY OF URINE SODIUM: CPT | Performed by: STUDENT IN AN ORGANIZED HEALTH CARE EDUCATION/TRAINING PROGRAM

## 2023-12-06 PROCEDURE — 25010000002 SODIUM CHLORIDE 0.9 % WITH KCL 20 MEQ 20-0.9 MEQ/L-% SOLUTION: Performed by: INTERNAL MEDICINE

## 2023-12-06 RX ORDER — SODIUM PHOSPHATE IN 0.9 % NACL 15MMOL/100
15 PLASTIC BAG, INJECTION (ML) INTRAVENOUS ONCE
Qty: 250 ML | Refills: 0 | Status: COMPLETED | OUTPATIENT
Start: 2023-12-06 | End: 2023-12-06

## 2023-12-06 RX ADMIN — POTASSIUM CHLORIDE AND SODIUM CHLORIDE 100 ML/HR: 900; 150 INJECTION, SOLUTION INTRAVENOUS at 03:06

## 2023-12-06 RX ADMIN — Medication 10 ML: at 09:15

## 2023-12-06 RX ADMIN — DOCUSATE SODIUM 50MG AND SENNOSIDES 8.6MG 2 TABLET: 8.6; 5 TABLET, FILM COATED ORAL at 09:10

## 2023-12-06 RX ADMIN — ATORVASTATIN CALCIUM 80 MG: 80 TABLET, FILM COATED ORAL at 09:10

## 2023-12-06 RX ADMIN — ASPIRIN 81 MG: 81 TABLET, CHEWABLE ORAL at 09:10

## 2023-12-06 RX ADMIN — Medication 10 ML: at 20:52

## 2023-12-06 RX ADMIN — HYDROCODONE BITARTRATE AND HOMATROPINE METHYLBROMIDE ORAL SOLUTION 5 ML: 5; 1.5 LIQUID ORAL at 20:53

## 2023-12-06 RX ADMIN — GUAIFENESIN 1200 MG: 600 TABLET, EXTENDED RELEASE ORAL at 09:10

## 2023-12-06 RX ADMIN — GUAIFENESIN 1200 MG: 600 TABLET, EXTENDED RELEASE ORAL at 20:52

## 2023-12-06 RX ADMIN — POTASSIUM CHLORIDE AND SODIUM CHLORIDE 100 ML/HR: 900; 150 INJECTION, SOLUTION INTRAVENOUS at 15:37

## 2023-12-06 RX ADMIN — SODIUM PHOSPHATE, MONOBASIC, MONOHYDRATE AND SODIUM PHOSPHATE, DIBASIC, ANHYDROUS 15 MMOL: 142; 276 INJECTION, SOLUTION INTRAVENOUS at 13:51

## 2023-12-06 NOTE — PROGRESS NOTES
Name: Danni Aguilar ADMIT: 2023   : 1926  PCP: Justin Longoria MD    MRN: [id number] LOS: 1 days   AGE/SEX: 89 y.o. male  ROOM: [facility]     Subjective   Subjective   Patient seen and examined this morning. Hospital day 2. At time of my examination, patient is awake, resting in bed. Continues to have dyspnea, but slightly improved. No acute events overnight.     Objective   Objective   Vital Signs  Temp:  [97.5 °F (36.4 °C)-98.4 °F (36.9 °C)] 98.4 °F (36.9 °C)  Heart Rate:  [] 88  Resp:  [18] 18  BP: (108-150)/(58-78) 146/58  SpO2:  [92 %-93 %] 93 %  on   ;   Device (Oxygen Therapy): room air  Body mass index is 19.61 kg/m².  Physical Exam  Vitals and nursing note reviewed.   Constitutional:       General: He is awake. He is not in acute distress.     Comments: Elderly/frail, chronically ill appearing   Cardiovascular:      Rate and Rhythm: Normal rate and regular rhythm.      Pulses: Normal pulses.      Heart sounds: Normal heart sounds.   Pulmonary:      Effort: Pulmonary effort is normal. No respiratory distress.      Breath sounds: Normal breath sounds. Decreased breath sounds and wheezing present.   Abdominal:      General: Bowel sounds are normal.      Palpations: Abdomen is soft.      Tenderness: There is no abdominal tenderness.   Skin:     General: Skin is warm and dry.   Neurological:      Mental Status: He is alert. He is disoriented.   Psychiatric:         Behavior: Behavior is cooperative.       Results Review     I reviewed the patient's new clinical results.  Results from last 7 days   Lab Units 23  0636 23  0743 23  1145   WBC 10*3/mm3 12.07* 12.02* 11.71*   HEMOGLOBIN g/dL 10.2* 11.8* 12.4*   PLATELETS 10*3/mm3 599* 598* 703*     Results from last 7 days   Lab Units 23  0636 23  0743 23  1145   SODIUM mmol/L 125* 131* 129*   POTASSIUM mmol/L 4.2 3.8 4.6   CHLORIDE mmol/L 96* 96* 98   CO2 mmol/L 21.0* 26.5 22.0   BUN mg/dL 18 18 19  "  CREATININE mg/dL 0.68* 0.81 0.83   GLUCOSE mg/dL 109* 102* 119*   EGFR mL/min/1.73 85.1 80.7 80.1     Results from last 7 days   Lab Units 12/05/23  0743 12/04/23  1145   ALBUMIN g/dL 3.4* 3.3*   BILIRUBIN mg/dL 0.7 0.9   ALK PHOS U/L 56 53   AST (SGOT) U/L 50* 43*   ALT (SGPT) U/L 29 25     Results from last 7 days   Lab Units 12/06/23  0636 12/05/23  0743 12/04/23  1145   CALCIUM mg/dL 8.0* 8.8 8.0*   ALBUMIN g/dL  --  3.4* 3.3*   MAGNESIUM mg/dL 1.6*  --   --    PHOSPHORUS mg/dL 2.1*  --   --      Results from last 7 days   Lab Units 12/04/23  1145   PROCALCITONIN ng/mL 0.51*   LACTATE mmol/L 1.7     No results found for: \"HGBA1C\", \"POCGLU\"    No radiology results for the last day    I have personally reviewed all medications:  Scheduled Medications  aspirin, 81 mg, Oral, Daily  atorvastatin, 80 mg, Oral, Daily  guaiFENesin, 1,200 mg, Oral, Q12H  senna-docusate sodium, 2 tablet, Oral, BID  sodium chloride, 10 mL, Intravenous, Q12H  sodium phosphate, 15 mmol, Intravenous, Once    Infusions  Pharmacy to dose warfarin,   sodium chloride 0.9 % with KCl 20 mEq, 100 mL/hr, Last Rate: 100 mL/hr (12/06/23 0306)    Diet  Diet: Cardiac Diets; Healthy Heart (2-3 Na+); Texture: Regular Texture (IDDSI 7); Fluid Consistency: Thin (IDDSI 0)    I have personally reviewed:  [x]  Laboratory   [x]  Microbiology   [x]  Radiology   [x]  EKG/Telemetry  [x]  Cardiology/Vascular   []  Pathology    []  Records       Assessment/Plan     Active Hospital Problems    Diagnosis  POA    **RSV infection [B33.8]  Yes    RSV (respiratory syncytial virus infection) [B33.8]  Yes    Hyponatremia [E87.1]  Yes    Thrombocytosis [D75.839]  Unknown    Recent cerebrovascular accident (CVA) [Z86.73]  Yes    Atrial fibrillation [I48.91]  Yes    Long term (current) use of anticoagulants [Z79.01]  Not Applicable      Resolved Hospital Problems   No resolved problems to display.       89 y.o. male admitted with RSV infection.    Acute RSV " infection  Weakness/age related physical debility  - Patient currently on room air, symptoms appear to be improving. WBC remains elevated, but stable from prior. CXR negative for focal consolidation.   - Continue with supportive treatments as ordered.  - PT/OT, recommending SNF, will discuss with CCP.   - Continue close monitoring of respiratory status, pulse oximetry, telemetry.     Hyponatremia  - Na 129 on admission, felt to be related to hypovolemia as values improved with IVF to 131.  - Most recent sodium worse from prior, despite IVF. Will order TSH, uric acid, urine sodium, urine osm.     Hypophosphatemia  Hypomagnesemia  - Phos and Mag low on most recent labs; Will replete via electrolyte protocol. Follow up repeat labs to guide ongoing management decisions. Replete PRN per protocol. Continue to monitor    Anemia  - Hemoglobin low on most recent labs, however, stable from prior. No evidence of overt blood loss. No indications for acute intervention at this time.    - Order repeat CBC in AM for reassessment. Continue to monitor, transfuse for hemoglobin <7.    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 First day taking care of patient.      Warfarin (home med) for DVT prophylaxis.  Full code.  Discussed with patient and nursing staff.  Anticipate discharge to SNU facility timing yet to be determined.      Larry Solis DO  [facility]  12/06/23

## 2023-12-06 NOTE — THERAPY TREATMENT NOTE
Patient Name: Danni Aguilar  : 1926    MRN: [id number]                              Today's Date: 2023       Admit Date: 2023    Visit Dx:     ICD-10-CM ICD-9-CM   1. RSV infection  B33.8 079.6   2. Hyponatremia  E87.1 276.1   3. Generalized weakness  R53.1 780.79   4. Frequent falls  R29.6 V15.88     Patient Active Problem List   Diagnosis    Acute CVA (cerebrovascular accident)    Atrial fibrillation    Long term (current) use of anticoagulants    Carotid stenosis    Urinary retention    Cerebrovascular accident (CVA) due to stenosis of right carotid artery    Hyperlipidemia    Cerebrovascular disease    Recent cerebrovascular accident (CVA)    RSV infection    Hyponatremia    Thrombocytosis    RSV (respiratory syncytial virus infection)     Past Medical History:   Diagnosis Date    A-fib     Carotid artery stenosis     Hyperlipidemia     Irregular heart beat      Past Surgical History:   Procedure Laterality Date    BACK SURGERY      CAROTID ARTERY ANGIOPLASTY      CEREBRAL ANGIOGRAM N/A 2017    Procedure: DIAGNOSTIC CEREBRAL ANGIOGRAM AND RIGHT CAROTID STENT PLACEMENT;  Surgeon: Mauricio Yung MD;  Location: [facility] ;  Service:     RECTAL SURGERY        General Information       Row Name 23 1149          Physical Therapy Time and Intention    Document Type therapy note (daily note)  -PH     Mode of Treatment physical therapy  -       Row Name 23 1149          General Information    Existing Precautions/Restrictions fall  -       Row Name 23 1149          Cognition    Orientation Status (Cognition) oriented to;person;place;verbal cues/prompts needed for orientation  -PH       Row Name 23 1149          Safety Issues, Functional Mobility    Impairments Affecting Function (Mobility) balance;cognition;endurance/activity tolerance;strength  -PH     Cognitive Impairments, Mobility Safety/Performance insight into  deficits/self-awareness;judgment;problem-solving/reasoning;safety precaution awareness;safety precaution follow-through;awareness, need for assistance;impulsivity  -PH               User Key  (r) = Recorded By, (t) = Taken By, (c) = Cosigned By      Initials Name Provider Type    PH MaikjoseCornelia vicente, PTA Physical Therapist Assistant                   Mobility       Row Name 12/06/23 1149          Bed Mobility    Bed Mobility supine-sit;sit-supine  -PH     Supine-Sit [place] (Bed Mobility) minimum assist (75% patient effort);verbal cues;nonverbal cues (demo/gesture)  -PH     Sit-Supine [place] (Bed Mobility) minimum assist (75% patient effort);verbal cues;nonverbal cues (demo/gesture)  -PH     Assistive Device (Bed Mobility) head of bed elevated  -PH     Comment, (Bed Mobility) cues for sequencing w/ use of HR; pt attempted to sit up severals times as this PTA getting IV, etc in place and req several cues to remain in bed. pt returned to bed instead of chair 2/2 impulsivity and lack of safety awareness.  -PH       Row Name 12/06/23 1149          Sit-Stand Transfer    Sit-Stand [place] (Transfers) minimum assist (75% patient effort);verbal cues;nonverbal cues (demo/gesture)  -PH     Assistive Device (Sit-Stand Transfers) walker, front-wheeled  -PH     Comment, (Sit-Stand Transfer) posterior lean noted w/ cues for correction  -PH       Row Name 12/06/23 1149          Gait/Stairs (Locomotion)    [place] Level (Gait) contact guard;minimum assist (75% patient effort)  -PH     Assistive Device (Gait) walker, front-wheeled  -PH     Distance in Feet (Gait) 50'  -PH     Deviations/Abnormal Patterns (Gait) [place] decreased;gait speed decreased;stride length decreased;festinating/shuffling  -PH     Bilateral Gait Deviations forward flexed posture;heel strike decreased  -PH     [place] Level (Stairs) not tested  -PH     Comment, (Gait/Stairs) pt was slow and mildly unsteady w/ no overt LOB  -PH                User Key  (r) = Recorded By, (t) = Taken By, (c) = Cosigned By      Initials Name Provider Type    PH Cornelia Grant PTA Physical Therapist Assistant                   Obj/Interventions       Row Name 12/06/23 1153          Balance    Balance Assessment sitting static balance;standing static balance  -PH     Static Sitting Balance supervision  -PH     Static Standing Balance minimal assist;contact guard;verbal cues;supervision  -PH     Position/Device Used, Standing Balance walker, front-wheeled  -PH     Comment, Balance pt stood for use of urinal and brief change by this PTA. Posterior lean initially although improved to SV over time.  -PH               User Key  (r) = Recorded By, (t) = Taken By, (c) = Cosigned By      Initials Name Provider Type    PH Cornelia Grant PTA Physical Therapist Assistant                   Goals/Plan    No documentation.                  Clinical Impression       Row Name 12/06/23 1154          Pain    Pretreatment Pain Rating 0/10 - no pain  -PH     Posttreatment Pain Rating 0/10 - no pain  -PH     Additional Documentation Pain Scale: Numbers Pre/Post-Treatment (Group)  -PH       Row Name 12/06/23 1154          Plan of Care Review    Plan of Care Reviewed With patient  -PH     Progress no change  -PH     Outcome Evaluation Pt was attempting to get out of bed at beg of PT session. Pt was impulsive and redirected from sitting up several times before PTA was ready w/ equipment. Pt sat up to EOB req min A and extra time. Pt stood req min A w/ posterior lean noted. Pt amb 50' req CGA and use of fww. Pt was slow and mildly unsteady w/  no overt LOB. Pt stood 5-6 for brief change and bedding change after amb req min A initially although improved to close SV w/ use of fww. Pt returned to bed req min A for B LE. PT will prog as pt nubia.  -PH       Row Name 12/06/23 1154          Vital Signs    O2 Delivery Pre Treatment room air  -PH     O2 Delivery Intra Treatment  room air  -PH     O2 Delivery Post Treatment room air  -PH       Row Name 12/06/23 1154          Positioning and Restraints    Pre-Treatment Position in bed  -PH     In Bed fowlers;call light within reach;encouraged to call for assist;exit alarm on;with nsg  -PH               User Key  (r) = Recorded By, (t) = Taken By, (c) = Cosigned By      Initials Name Provider Type     Cornelia Grant PTA Physical Therapist Assistant                   Outcome Measures       Row Name 12/06/23 1200 12/06/23 0109       How much help from another person do you currently need...    Turning from your back to your side while in flat bed without using bedrails? 3  -PH 2  -AB    Moving from lying on back to sitting on the side of a flat bed without bedrails? 3  -PH 2  -AB    Moving to and from a bed to a chair (including a wheelchair)? 3  -PH 2  -AB    Standing up from a chair using your arms (e.g., wheelchair, bedside chair)? 3  -PH 2  -AB    Climbing 3-5 steps with a railing? 2  -PH 2  -AB    To walk in hospital room? 3  -PH 2  -AB    AM-PAC 6 Clicks Score (PT) 17  -PH 12  -AB    Highest Level of Mobility Goal 5 --> Static standing  -PH 4 --> Transfer to chair/commode  -AB      Row Name 12/06/23 1200          Functional Assessment    Outcome Measure Options AM-PAC 6 Clicks Basic Mobility (PT)  -PH               User Key  (r) = Recorded By, (t) = Taken By, (c) = Cosigned By      Initials Name Provider Type     Cornelia Grant PTA Physical Therapist Assistant    Usha Armstrong, RN Registered Nurse                                 Physical Therapy Education       Title: PT OT SLP Therapies (In Progress)       Topic: Physical Therapy (In Progress)       Point: Mobility training (In Progress)       Learning Progress Summary             Patient Acceptance, E,TB, NR by  at 12/6/2023 1200    Acceptance, E, VU by AB at 12/6/2023 0129    Acceptance, E,D, DU by PC at 12/5/2023 1538   Family Acceptance, E, VU by AB at  12/6/2023 0129                         Point: Home exercise program (In Progress)       Learning Progress Summary             Patient Acceptance, E,TB, NR by  at 12/6/2023 1200    Acceptance, E, VU by AB at 12/6/2023 0129   Family Acceptance, E, VU by AB at 12/6/2023 0129                         Point: Body mechanics (In Progress)       Learning Progress Summary             Patient Acceptance, E,TB, NR by PH at 12/6/2023 1200    Acceptance, E, VU by AB at 12/6/2023 0129   Family Acceptance, E, VU by AB at 12/6/2023 0129                         Point: Precautions (In Progress)       Learning Progress Summary             Patient Acceptance, E,TB, NR by PH at 12/6/2023 1200    Acceptance, E, VU by AB at 12/6/2023 0129   Family Acceptance, E, VU by AB at 12/6/2023 0129                                         User Key       Initials Effective Dates Name Provider Type Discipline     06/16/21 -  Ivis Juares PT Physical Therapist PT    PH 06/16/21 -  Cornelia Grant PTA Physical Therapist Assistant PT    AB 10/17/23 -  Usha Diaz, RN Registered Nurse Nurse                  PT Recommendation and Plan     Plan of Care Reviewed With: patient  Progress: no change  Outcome Evaluation: Pt was attempting to get out of bed at beg of PT session. Pt was impulsive and redirected from sitting up several times before PTA was ready w/ equipment. Pt sat up to EOB req min A and extra time. Pt stood req min A w/ posterior lean noted. Pt amb 50' req CGA and use of fww. Pt was slow and mildly unsteady w/  no overt LOB. Pt stood 5-6 for brief change and bedding change after amb req min A initially although improved to close SV w/ use of fww. Pt returned to bed req min A for B LE. PT will prog as pt nubia.     Time Calculation:         PT Charges       Row Name 12/06/23 1201             Time Calculation    Start Time 1046  -PH      Stop Time 1110  -PH      Time Calculation (min) 24 min  -PH      PT Received On 12/06/23  -PH       PT - Next Appointment 12/07/23  -PH         Timed Charges    90992 - PT Therapeutic Activity Minutes 24  -PH         Total Minutes    Timed Charges Total Minutes 24  -PH       Total Minutes 24  -PH                User Key  (r) = Recorded By, (t) = Taken By, (c) = Cosigned By      Initials Name Provider Type    PH Cornelia Grant PTA Physical Therapist Assistant                  Therapy Charges for Today       Code Description Service Date Service Provider Modifiers Qty    87345785123 HC PT THERAPEUTIC ACT EA 15 MIN 12/6/2023 Cornelia Grant PTA GP 2            PT G-Codes  Outcome Measure Options: AM-PAC 6 Clicks Basic Mobility (PT)  AM-PAC 6 Clicks Score (PT): 17  AM-PAC 6 Clicks Score (OT): 14  PT Discharge Summary  Anticipated Discharge Disposition (PT): skilled nursing facility    Cornelia Grant PTA  12/6/2023

## 2023-12-06 NOTE — THERAPY TREATMENT NOTE
Patient Name: Danni Aguilar  : 1926    MRN: [id number]                              Today's Date: 2023       Admit Date: 2023    Visit Dx:     ICD-10-CM ICD-9-CM   1. RSV infection  B33.8 079.6   2. Hyponatremia  E87.1 276.1   3. Generalized weakness  R53.1 780.79   4. Frequent falls  R29.6 V15.88     Patient Active Problem List   Diagnosis    Acute CVA (cerebrovascular accident)    Atrial fibrillation    Long term (current) use of anticoagulants    Carotid stenosis    Urinary retention    Cerebrovascular accident (CVA) due to stenosis of right carotid artery    Hyperlipidemia    Cerebrovascular disease    Recent cerebrovascular accident (CVA)    RSV infection    Hyponatremia    Thrombocytosis    RSV (respiratory syncytial virus infection)     Past Medical History:   Diagnosis Date    A-fib     Carotid artery stenosis     Hyperlipidemia     Irregular heart beat      Past Surgical History:   Procedure Laterality Date    BACK SURGERY      CAROTID ARTERY ANGIOPLASTY      CEREBRAL ANGIOGRAM N/A 2017    Procedure: DIAGNOSTIC CEREBRAL ANGIOGRAM AND RIGHT CAROTID STENT PLACEMENT;  Surgeon: Mauricio Yung MD;  Location: [facility] ;  Service:     RECTAL SURGERY        General Information       Row Name 23 1451          OT Time and Intention    Document Type therapy note (daily note)  -PP     Mode of Treatment individual therapy;occupational therapy  -PP       Row Name 23 1451          General Information    Patient Profile Reviewed yes  -PP     Existing Precautions/Restrictions fall  -PP       Row Name 23 1451          Cognition    Orientation Status (Cognition) oriented to;person;place  -PP       Row Name 23 1451          Safety Issues, Functional Mobility    Impairments Affecting Function (Mobility) balance;cognition;endurance/activity tolerance;strength  -PP     Comment, Safety Issues/Impairments (Mobility) gait belt and non skid socks worn for safety  -PP                User Key  (r) = Recorded By, (t) = Taken By, (c) = Cosigned By      Initials Name Provider Type    PP Milad Rollins OT Occupational Therapist                     Mobility/ADL's       Row Name 12/06/23 1532          Bed Mobility    Supine-Sit [place] (Bed Mobility) minimum assist (75% patient effort);verbal cues;nonverbal cues (demo/gesture);2 person assist  -PP     Assistive Device (Bed Mobility) head of bed elevated  -PP     Comment, (Bed Mobility) Pt left sitting UIC at session end  -PP       Row Name 12/06/23 1532          Transfers    Transfers bed-chair transfer  -PP       Row Name 12/06/23 1532          Bed-Chair Transfer    Bed-Chair [place] (Transfers) minimum assist (75% patient effort);2 person assist  -PP     Assistive Device (Bed-Chair Transfers) --  HHA x2  -PP     Comment, (Bed-Chair Transfer) stand pivot xfer  -PP       Row Name 12/06/23 1532          Sit-Stand Transfer    Sit-Stand [place] (Transfers) minimum assist (75% patient effort);verbal cues;nonverbal cues (demo/gesture);2 person assist  -PP     Assistive Device (Sit-Stand Transfers) --  HHA x2  -PP     Comment, (Sit-Stand Transfer) Posterior lean noted, VC/TC for postural correction.  -PP       Row Name 12/06/23 1532          Functional Mobility    Functional Mobility- Comment Pt able to take a few steps w/ MIN A x2 and HHAx2 d/t posterior lean.  -PP       Row Name 12/06/23 1532          Lower Body Dressing Assessment/Training    [place] Level (Lower Body Dressing) don;socks;maximum assist (25% patient effort)  -PP     Position (Lower Body Dressing) edge of bed sitting  -PP     Comment, (Lower Body Dressing) Pt attempted but fell backwards posteriorly.  -PP               User Key  (r) = Recorded By, (t) = Taken By, (c) = Cosigned By      Initials Name Provider Type    PP Milad Rollins OT Occupational Therapist                   Obj/Interventions       Row Name 12/06/23 1542          Balance     Static Sitting Balance supervision  -PP     Position, Sitting Balance sitting edge of bed  -PP     Static Standing Balance minimal assist;2-person assist;contact guard  -PP     Dynamic Standing Balance minimal assist;2-person assist  -PP     Position/Device Used, Standing Balance --  HHA x2  -PP     Balance Interventions sitting;standing;static;dynamic;supported  -PP     Comment, Balance Pt stood briefly and req MAX A for postural correction while sitting EOB when attempting to don scoks but mostly sat EOB w/ SBA  -PP               User Key  (r) = Recorded By, (t) = Taken By, (c) = Cosigned By      Initials Name Provider Type    PP Milad Rollins OT Occupational Therapist                   Goals/Plan    No documentation.                  Clinical Impression       Row Name 12/06/23 1543          Pain Assessment    Pretreatment Pain Rating 0/10 - no pain  -PP     Posttreatment Pain Rating 0/10 - no pain  -PP       Row Name 12/06/23 1543          Plan of Care Review    Plan of Care Reviewed With patient  -PP     Progress no change  -PP     Outcome Evaluation Pt observed sitting up in bed positioned poorly and having difficulty eating lunch upon Ot's arrival. Pt agreeable to sitting UIC and continuing to eat lunch. He was oriented to person, place and situation. Pt MIN A x2 for supine to sit EOB. He sat EOB w/ SBA to MOD A for postural correction after LOB posteriorly when attempting to don socks. He was MAX A for donning socks. He was MIN A x2 for stand pivot xfer to chair from bed w/ HHA x2. Pt also able to take a couple steps w/ difficulty d/t heavy posterior lean. Pt left sitting UIC self feeding w/ Set UP. DC rec to SNF and OT will continue to monitor.  -PP       Row Name 12/06/23 1543          Therapy Plan Review/Discharge Plan (OT)    Anticipated Discharge Disposition (OT) skilled nursing facility  -PP       Row Name 12/06/23 1540          Vital Signs    Pre Patient Position Supine  -PP     Intra Patient  Position Standing  -PP     Post Patient Position Sitting  -PP       Row Name 12/06/23 1543          Positioning and Restraints    In Chair notified nsg;sitting;call light within reach;encouraged to call for assist;exit alarm on;with nsg  -PP               User Key  (r) = Recorded By, (t) = Taken By, (c) = Cosigned By      Initials Name Provider Type    PP Milad Rollins, OT Occupational Therapist                   Outcome Measures       Row Name 12/06/23 1544          How much help from another is currently needed...    Putting on and taking off regular lower body clothing? 2  -PP     Bathing (including washing, rinsing, and drying) 2  -PP     Toileting (which includes using toilet bed pan or urinal) 2  -PP     Putting on and taking off regular upper body clothing 2  -PP     Taking care of personal grooming (such as brushing teeth) 3  -PP     Eating meals 3  -PP     AM-PAC 6 Clicks Score (OT) 14  -PP       Row Name 12/06/23 1200          How much help from another person do you currently need...    Turning from your back to your side while in flat bed without using bedrails? 3  -PH     Moving from lying on back to sitting on the side of a flat bed without bedrails? 3  -PH     Moving to and from a bed to a chair (including a wheelchair)? 3  -PH     Standing up from a chair using your arms (e.g., wheelchair, bedside chair)? 3  -PH     Climbing 3-5 steps with a railing? 2  -PH     To walk in hospital room? 3  -PH     AM-PAC 6 Clicks Score (PT) 17  -PH     Highest Level of Mobility Goal 5 --> Static standing  -PH       Row Name 12/06/23 1544 12/06/23 1200       Functional Assessment    Outcome Measure Options AM-PAC 6 Clicks Daily Activity (OT)  -PP AM-PAC 6 Clicks Basic Mobility (PT)  -PH              User Key  (r) = Recorded By, (t) = Taken By, (c) = Cosigned By      Initials Name Provider Type    Cornelia Augustine PTA Physical Therapist Assistant    PP Milad oRllins, ALEXUS Occupational Therapist                     Occupational Therapy Education       Title: PT OT SLP Therapies (In Progress)       Topic: Occupational Therapy (In Progress)       Point: ADL training (Done)       Description:   Instruct learner(s) on proper safety adaptation and remediation techniques during self care or transfers.   Instruct in proper use of assistive devices.                  Learning Progress Summary             Patient Acceptance, E, VU by AB at 12/6/2023 0129    Acceptance, E, VU by  at 12/5/2023 1252    Comment: OT goals/POC, falls precautions and call light use for assist   Family Acceptance, E, VU by AB at 12/6/2023 0129                         Point: Home exercise program (Not Started)       Description:   Instruct learner(s) on appropriate technique for monitoring, assisting and/or progressing therapeutic exercises/activities.                  Learner Progress:  Not documented in this visit.              Point: Precautions (Not Started)       Description:   Instruct learner(s) on prescribed precautions during self-care and functional transfers.                  Learner Progress:  Not documented in this visit.              Point: Body mechanics (Not Started)       Description:   Instruct learner(s) on proper positioning and spine alignment during self-care, functional mobility activities and/or exercises.                  Learner Progress:  Not documented in this visit.                              User Key       Initials Effective Dates Name Provider Type Discipline     04/02/20 -  Smita Bledsoe OT Occupational Therapist OT    AB 10/17/23 -  Usha Diaz RN Registered Nurse Nurse                  OT Recommendation and Plan     Plan of Care Review  Plan of Care Reviewed With: patient  Progress: no change  Outcome Evaluation: Pt observed sitting up in bed positioned poorly and having difficulty eating lunch upon Ot's arrival. Pt agreeable to sitting UIC and continuing to eat lunch. He was oriented to person,  place and situation. Pt MIN A x2 for supine to sit EOB. He sat EOB w/ SBA to MOD A for postural correction after LOB posteriorly when attempting to don socks. He was MAX A for donning socks. He was MIN A x2 for stand pivot xfer to chair from bed w/ HHA x2. Pt also able to take a couple steps w/ difficulty d/t heavy posterior lean. Pt left sitting UIC self feeding w/ Set UP. DC rec to SNF and OT will continue to monitor.     Time Calculation:         Time Calculation- OT       Row Name 12/06/23 1548             Time Calculation- OT    OT Start Time 1332  -PP      OT Stop Time 1348  -PP      OT Time Calculation (min) 16 min  -PP      Total Timed Code Minutes- OT 16 minute(s)  -PP      OT Received On 12/06/23  -PP      OT - Next Appointment 12/07/23  -PP         Timed Charges    00805 - OT Therapeutic Activity Minutes 16  -PP         Total Minutes    Timed Charges Total Minutes 16  -PP       Total Minutes 16  -PP                User Key  (r) = Recorded By, (t) = Taken By, (c) = Cosigned By      Initials Name Provider Type    PP AlleghenyMilad sung, OT Occupational Therapist                  Therapy Charges for Today       Code Description Service Date Service Provider Modifiers Qty    44759659119 HC OT THERAPEUTIC ACT EA 15 MIN 12/6/2023 AlleghenyMilad, OT GO 1    62916205708 HC OT THER SUPP EA 15 MIN 12/6/2023 AlleghenyShirashia, OT GO 1                 Poranuradha Ria, OT  12/6/2023

## 2023-12-06 NOTE — PROGRESS NOTES
[facility] Clinical Pharmacy Services: Warfarin Dosing/Monitoring Consult    Danni Aguilar is a 89 y.o. male, estimated creatinine clearance is 52.6 mL/min (A) (by C-G formula based on SCr of 0.68 mg/dL (L)). weighing 58.5 kg (128 lb 15.5 oz).    Results from last 7 days   Lab Units 12/06/23  0636 12/05/23  0743 12/04/23  1646 12/04/23  1145   INR  3.17* 2.87* 2.47* 2.64*   HEMOGLOBIN g/dL 10.2* 11.8*  --  12.4*   HEMATOCRIT % 32.1* 35.9*  --  39.5   PLATELETS 10*3/mm3 599* 598*  --  703*     Prior to admission anticoagulation: warfarin 6 mg daily    Hospital Anticoagulation:  Consulting provider: Dr. Weiss  Start date: 12/4  Indication: A Fib - requiring full anticoagulation  Target INR: 2 - 3  Expected duration: tbd   Bridge Therapy: No      Potential food or drug interactions: none at this time    Education complete?/Date: No; plan for follow up TBD    Assessment/Plan:  INR supratherapeutic currently. Acute infection could increase sensitivity to warfarin due to increased catabolism of clotting factors.  Dose: Hold dose today. Assess benefits vs risk of anticoagulation, suffered mechanical fall in October 2023.  Monitor for any signs or symptoms of bleeding  Follow up daily INRs and dose adjustments    Pharmacy will continue to follow until discharge or discontinuation of warfarin.     Prabhjot Skinner [facility]  Clinical Pharmacist

## 2023-12-06 NOTE — PLAN OF CARE
Problem: Skin Injury Risk Increased  Goal: Skin Health and Integrity  Outcome: Ongoing, Progressing   Goal Outcome Evaluation:

## 2023-12-06 NOTE — PLAN OF CARE
Goal Outcome Evaluation:  Plan of Care Reviewed With: patient        Progress: no change  Outcome Evaluation: Pt observed sitting up in bed positioned poorly and having difficulty eating lunch upon Ot's arrival. Pt agreeable to sitting UIC and continuing to eat lunch. He was oriented to person, place and situation. Pt MIN A x2 for supine to sit EOB. He sat EOB w/ SBA to MOD A for postural correction after LOB posteriorly when attempting to don socks. He was MAX A for donning socks. He was MIN A x2 for stand pivot xfer to chair from bed w/ HHA x2. Pt also able to take a couple steps w/ difficulty d/t heavy posterior lean. Pt left sitting UIC self feeding w/ Set UP. DC rec to SNF and OT will continue to monitor.      Anticipated Discharge Disposition (OT): skilled nursing facility

## 2023-12-06 NOTE — PLAN OF CARE
Goal Outcome Evaluation:  Plan of Care Reviewed With: patient        Progress: no change  Outcome Evaluation: Pt was attempting to get out of bed at beg of PT session. Pt was impulsive and redirected from sitting up several times before PTA was ready w/ equipment. Pt sat up to EOB req min A and extra time. Pt stood req min A w/ posterior lean noted. Pt amb 50' req CGA and use of fww. Pt was slow and mildly unsteady w/  no overt LOB. Pt stood 5-6 for brief change and bedding change after amb req min A initially although improved to close SV w/ use of fww. Pt returned to bed req min A for B LE. PT will prog as pt nubia.      Anticipated Discharge Disposition (PT): skilled nursing facility

## 2023-12-06 NOTE — DISCHARGE PLACEMENT REQUEST
"Danni Aguilar (89 y.o. Male)       Date of Birth   12/12/1926    Social Security Number       Address   [address] [place] [address]    Home Phone   [phone]    MRN   [id number]       [facility]    Marital Status                               Admission Date   12/4/23    Admission Type   Emergency    Admitting Provider   Carlos Weiss MD    Attending Provider   Larry Solis DO    Department, Room/Bed   [address], [address]1       Discharge Date       Discharge Disposition       Discharge Destination                                 Attending Provider: Larry Solis DO    Allergies: No Known Allergies    Isolation: Contact   Infection: RSV (12/04/23)   Code Status: CPR    Ht: 172.7 cm (68\")   Wt: 58.5 kg (128 lb 15.5 oz)    Admission Cmt: None   Principal Problem: RSV infection [B33.8]                   Active Insurance as of 12/4/2023       Primary Coverage       Payor Plan Insurance Group Employer/Plan Group    MEDICARE MEDICARE A & B        Payor Plan Address Payor Plan Phone Number Payor Plan Fax Number Effective Dates    [address] [phone]  12/1/1991 - None Entered    [facility] [id number]         Subscriber Name Subscriber Birth Date Member ID       DANNI AGUILAR 12/12/1926 [id number]               Secondary Coverage       Payor Plan Insurance Group Employer/Plan Group    AARP MC SUP AARP HEALTH CARE OPTIONS        Payor Plan Address Payor Plan Phone Number Payor Plan Fax Number Effective Dates    [facility] [phone]  1/1/2017 - None Entered    [address]       [facility] [id number]         Subscriber Name Subscriber Birth Date Member ID       DANNI AGUILAR 12/12/1926 [id number]                     Emergency Contacts        (Rel.) Home Phone Work Phone Mobile Phone    DouglasCheyenne joyce (Daughter) -- -- [phone]    Bethany Baer (Daughter) -- -- [phone]                "

## 2023-12-07 LAB
ANION GAP SERPL CALCULATED.3IONS-SCNC: 7 MMOL/L (ref 5–15)
BASOPHILS # BLD AUTO: 0.09 10*3/MM3 (ref 0–0.2)
BASOPHILS NFR BLD AUTO: 1 % (ref 0–1.5)
BUN SERPL-MCNC: 12 MG/DL (ref 8–23)
BUN/CREAT SERPL: 16.4 (ref 7–25)
CALCIUM SPEC-SCNC: 8.5 MG/DL (ref 8.2–9.6)
CHLORIDE SERPL-SCNC: 99 MMOL/L (ref 98–107)
CO2 SERPL-SCNC: 24 MMOL/L (ref 22–29)
CREAT SERPL-MCNC: 0.73 MG/DL (ref 0.76–1.27)
CREAT UR-MCNC: 70.3 MG/DL
DEPRECATED RDW RBC AUTO: 43.7 FL (ref 37–54)
EGFRCR SERPLBLD CKD-EPI 2021: 83.3 ML/MIN/1.73
EOSINOPHIL # BLD AUTO: 0.22 10*3/MM3 (ref 0–0.4)
EOSINOPHIL NFR BLD AUTO: 2.4 % (ref 0.3–6.2)
ERYTHROCYTE [DISTWIDTH] IN BLOOD BY AUTOMATED COUNT: 15 % (ref 12.3–15.4)
GLUCOSE SERPL-MCNC: 99 MG/DL (ref 65–99)
HCT VFR BLD AUTO: 32.2 % (ref 37.5–51)
HGB BLD-MCNC: 10.2 G/DL (ref 13–17.7)
IMM GRANULOCYTES # BLD AUTO: 0.08 10*3/MM3 (ref 0–0.05)
IMM GRANULOCYTES NFR BLD AUTO: 0.9 % (ref 0–0.5)
INR PPP: 4.31 (ref 0.9–1.1)
LYMPHOCYTES # BLD AUTO: 0.99 10*3/MM3 (ref 0.7–3.1)
LYMPHOCYTES NFR BLD AUTO: 10.9 % (ref 19.6–45.3)
MAGNESIUM SERPL-MCNC: 1.7 MG/DL (ref 1.7–2.3)
MCH RBC QN AUTO: 25.7 PG (ref 26.6–33)
MCHC RBC AUTO-ENTMCNC: 31.7 G/DL (ref 31.5–35.7)
MCV RBC AUTO: 81.1 FL (ref 79–97)
MONOCYTES # BLD AUTO: 0.72 10*3/MM3 (ref 0.1–0.9)
MONOCYTES NFR BLD AUTO: 8 % (ref 5–12)
NEUTROPHILS NFR BLD AUTO: 6.95 10*3/MM3 (ref 1.7–7)
NEUTROPHILS NFR BLD AUTO: 76.8 % (ref 42.7–76)
NRBC BLD AUTO-RTO: 0 /100 WBC (ref 0–0.2)
OSMOLALITY UR: 399 MOSM/KG
PHOSPHATE SERPL-MCNC: 2.2 MG/DL (ref 2.5–4.5)
PLATELET # BLD AUTO: 685 10*3/MM3 (ref 140–450)
PMV BLD AUTO: 9 FL (ref 6–12)
POTASSIUM SERPL-SCNC: 4.1 MMOL/L (ref 3.5–5.2)
POTASSIUM UR-SCNC: 33.8 MMOL/L
PROTHROMBIN TIME: 42.4 SECONDS (ref 11.7–14.2)
RBC # BLD AUTO: 3.97 10*6/MM3 (ref 4.14–5.8)
SODIUM SERPL-SCNC: 130 MMOL/L (ref 136–145)
SODIUM UR-SCNC: 27 MMOL/L
UUN 24H UR-MCNC: 580 MG/DL
WBC NRBC COR # BLD AUTO: 9.05 10*3/MM3 (ref 3.4–10.8)

## 2023-12-07 PROCEDURE — 85610 PROTHROMBIN TIME: CPT | Performed by: INTERNAL MEDICINE

## 2023-12-07 PROCEDURE — 85025 COMPLETE CBC W/AUTO DIFF WBC: CPT | Performed by: STUDENT IN AN ORGANIZED HEALTH CARE EDUCATION/TRAINING PROGRAM

## 2023-12-07 PROCEDURE — 97530 THERAPEUTIC ACTIVITIES: CPT

## 2023-12-07 PROCEDURE — 25810000003 SODIUM CHLORIDE 0.9 % SOLUTION: Performed by: STUDENT IN AN ORGANIZED HEALTH CARE EDUCATION/TRAINING PROGRAM

## 2023-12-07 PROCEDURE — 80048 BASIC METABOLIC PNL TOTAL CA: CPT | Performed by: STUDENT IN AN ORGANIZED HEALTH CARE EDUCATION/TRAINING PROGRAM

## 2023-12-07 PROCEDURE — 83735 ASSAY OF MAGNESIUM: CPT | Performed by: STUDENT IN AN ORGANIZED HEALTH CARE EDUCATION/TRAINING PROGRAM

## 2023-12-07 PROCEDURE — 84100 ASSAY OF PHOSPHORUS: CPT | Performed by: STUDENT IN AN ORGANIZED HEALTH CARE EDUCATION/TRAINING PROGRAM

## 2023-12-07 RX ORDER — SODIUM PHOSPHATE IN 0.9 % NACL 15MMOL/100
15 PLASTIC BAG, INJECTION (ML) INTRAVENOUS ONCE
Status: COMPLETED | OUTPATIENT
Start: 2023-12-07 | End: 2023-12-07

## 2023-12-07 RX ADMIN — ACETAMINOPHEN 650 MG: 325 TABLET ORAL at 23:43

## 2023-12-07 RX ADMIN — GUAIFENESIN 1200 MG: 600 TABLET, EXTENDED RELEASE ORAL at 08:40

## 2023-12-07 RX ADMIN — SODIUM PHOSPHATE, MONOBASIC, MONOHYDRATE AND SODIUM PHOSPHATE, DIBASIC, ANHYDROUS 15 MMOL: 142; 276 INJECTION, SOLUTION INTRAVENOUS at 15:59

## 2023-12-07 RX ADMIN — ATORVASTATIN CALCIUM 80 MG: 80 TABLET, FILM COATED ORAL at 08:40

## 2023-12-07 RX ADMIN — Medication 10 ML: at 08:40

## 2023-12-07 RX ADMIN — Medication 10 ML: at 23:44

## 2023-12-07 RX ADMIN — DOCUSATE SODIUM 50MG AND SENNOSIDES 8.6MG 2 TABLET: 8.6; 5 TABLET, FILM COATED ORAL at 08:40

## 2023-12-07 RX ADMIN — ASPIRIN 81 MG: 81 TABLET, CHEWABLE ORAL at 08:40

## 2023-12-07 RX ADMIN — GUAIFENESIN 1200 MG: 600 TABLET, EXTENDED RELEASE ORAL at 23:43

## 2023-12-07 NOTE — THERAPY TREATMENT NOTE
Patient Name: Danni Aguilar  : 1926    MRN: [id number]                              Today's Date: 2023       Admit Date: 2023    Visit Dx:     ICD-10-CM ICD-9-CM   1. RSV infection  B33.8 079.6   2. Hyponatremia  E87.1 276.1   3. Generalized weakness  R53.1 780.79   4. Frequent falls  R29.6 V15.88     Patient Active Problem List   Diagnosis    Acute CVA (cerebrovascular accident)    Atrial fibrillation    Long term (current) use of anticoagulants    Carotid stenosis    Urinary retention    Cerebrovascular accident (CVA) due to stenosis of right carotid artery    Hyperlipidemia    Cerebrovascular disease    Recent cerebrovascular accident (CVA)    RSV infection    Hyponatremia    Thrombocytosis    RSV (respiratory syncytial virus infection)     Past Medical History:   Diagnosis Date    A-fib     Carotid artery stenosis     Hyperlipidemia     Irregular heart beat      Past Surgical History:   Procedure Laterality Date    BACK SURGERY      CAROTID ARTERY ANGIOPLASTY      CEREBRAL ANGIOGRAM N/A 2017    Procedure: DIAGNOSTIC CEREBRAL ANGIOGRAM AND RIGHT CAROTID STENT PLACEMENT;  Surgeon: Mauricio Yung MD;  Location: [facility] ;  Service:     RECTAL SURGERY        General Information       Row Name 23 1528          OT Time and Intention    Document Type therapy note (daily note)  -SM     Mode of Treatment occupational therapy;individual therapy  -       Row Name 23 1528          General Information    Patient Profile Reviewed yes  -SM     Existing Precautions/Restrictions fall  -       Row Name 23 1528          Cognition    Orientation Status (Cognition) oriented to;person;place  -       Row Name 23 1528          Safety Issues, Functional Mobility    Impairments Affecting Function (Mobility) balance;cognition;endurance/activity tolerance;postural/trunk control;strength  -               User Key  (r) = Recorded By, (t) = Taken By, (c) = Cosigned By       Initials Name Provider Type     Smita Bledsoe OT Occupational Therapist                     Mobility/ADL's       Row Name 12/07/23 1529          Bed Mobility    Supine-Sit [place] (Bed Mobility) standby assist  -       Row Name 12/07/23 1529          Sit-Stand Transfer    Sit-Stand [place] (Transfers) minimum assist (75% patient effort);verbal cues  X3 reps  -     Assistive Device (Sit-Stand Transfers) walker, front-wheeled  -       Row Name 12/07/23 1529          Functional Mobility    Functional Mobility- Ind. Level minimum assist (75% patient effort)  -     Functional Mobility- Device walker, front-wheeled  -     Functional Mobility-Distance (Feet) --  30  -     Functional Mobility- Comment to/from bathroom distance with unsteadiness  -       Row Name 12/07/23 1529          Activities of Daily Living    BADL Assessment/Intervention --  Pt declines to work on ADLs this session  -               User Key  (r) = Recorded By, (t) = Taken By, (c) = Cosigned By      Initials Name Provider Type     Smita Bledsoe OT Occupational Therapist                   Obj/Interventions       Row Name 12/07/23 1530          Shoulder (Therapeutic Exercise)    Shoulder (Therapeutic Exercise) AROM (active range of motion)  -     Shoulder AROM (Therapeutic Exercise) bilateral;flexion;extension;aBduction;aDduction  forward punches, sitting EOB, pt with posterior lean and requires CGA. Completed EOB to address core stability  -       Row Name 12/07/23 1530          Motor Skills    Therapeutic Exercise shoulder  -       Row Name 12/07/23 1530          Balance    Static Sitting Balance contact guard  -     Dynamic Sitting Balance minimal assist;contact guard  -     Position, Sitting Balance sitting edge of bed  -     Static Standing Balance contact guard;minimal assist  -     Dynamic Standing Balance minimal assist  -     Position/Device Used, Standing Balance supported;walker,  rolling  -     Comment, Balance Standing reaching from rwx 10 reps X2  -               User Key  (r) = Recorded By, (t) = Taken By, (c) = Cosigned By      Initials Name Provider Type    Smita Patel OT Occupational Therapist                   Goals/Plan    No documentation.                  Clinical Impression       Row Name 12/07/23 1532          Pain Assessment    Pretreatment Pain Rating 0/10 - no pain  -     Posttreatment Pain Rating 0/10 - no pain  -       Row Name 12/07/23 1532          Plan of Care Review    Plan of Care Reviewed With patient;daughter  -     Outcome Evaluation Pt is able to participate in OT today to address strengthening, endurance, balance for ADLs. Pt continues to have posterior leaning in sitting and standing increasing risk of falls for ADLs.  -       Row Name 12/07/23 1532          Therapy Plan Review/Discharge Plan (OT)    Anticipated Discharge Disposition (OT) skilled nursing facility  -       Row Name 12/07/23 1532          Positioning and Restraints    Pre-Treatment Position in bed  -     Post Treatment Position chair  -     In Chair reclined;call light within reach;encouraged to call for assist;exit alarm on;notified nsg;with family/caregiver  -               User Key  (r) = Recorded By, (t) = Taken By, (c) = Cosigned By      Initials Name Provider Type    Smita Patel OT Occupational Therapist                   Outcome Measures       Row Name 12/07/23 1533          How much help from another is currently needed...    Putting on and taking off regular lower body clothing? 2  -SM     Bathing (including washing, rinsing, and drying) 2  -SM     Toileting (which includes using toilet bed pan or urinal) 2  -SM     Putting on and taking off regular upper body clothing 3  -SM     Taking care of personal grooming (such as brushing teeth) 3  -SM     Eating meals 4  -SM     AM-PAC 6 Clicks Score (OT) 16  -       Row Name 12/07/23 1217 12/07/23 0837        How much help from another person do you currently need...    Turning from your back to your side while in flat bed without using bedrails? 3  -PH 3  -KH    Moving from lying on back to sitting on the side of a flat bed without bedrails? 3  -PH 2  -KH    Moving to and from a bed to a chair (including a wheelchair)? 3  -PH 2  -KH    Standing up from a chair using your arms (e.g., wheelchair, bedside chair)? 3  -PH 2  -KH    Climbing 3-5 steps with a railing? 2  -PH 2  -KH    To walk in hospital room? -- 2  -KH    AM-PAC 6 Clicks Score (PT) -- 13  -KH    Highest Level of Mobility Goal -- 4 --> Transfer to chair/commode  -      Row Name 12/07/23 1533 12/07/23 1217       Functional Assessment    Outcome Measure Options AM-PAC 6 Clicks Daily Activity (OT)  - AM-PAC 6 Clicks Basic Mobility (PT)  -              User Key  (r) = Recorded By, (t) = Taken By, (c) = Cosigned By      Initials Name Provider Type    PH Cornelia Grant, PTA Physical Therapist Assistant    Smita Patel, OT Occupational Therapist    Sarah Khan RN Registered Nurse                    Occupational Therapy Education       Title: PT OT SLP Therapies (In Progress)       Topic: Occupational Therapy (In Progress)       Point: ADL training (Done)       Description:   Instruct learner(s) on proper safety adaptation and remediation techniques during self care or transfers.   Instruct in proper use of assistive devices.                  Learning Progress Summary             Patient Acceptance, E, VU by AB at 12/6/2023 0129    Acceptance, E, VU by  at 12/5/2023 1252    Comment: OT goals/POC, falls precautions and call light use for assist   Family Acceptance, E, VU by AB at 12/6/2023 0129                         Point: Home exercise program (Not Started)       Description:   Instruct learner(s) on appropriate technique for monitoring, assisting and/or progressing therapeutic exercises/activities.                  Learner  Progress:  Not documented in this visit.              Point: Precautions (Not Started)       Description:   Instruct learner(s) on prescribed precautions during self-care and functional transfers.                  Learner Progress:  Not documented in this visit.              Point: Body mechanics (Not Started)       Description:   Instruct learner(s) on proper positioning and spine alignment during self-care, functional mobility activities and/or exercises.                  Learner Progress:  Not documented in this visit.                              User Key       Initials Effective Dates Name Provider Type Discipline     04/02/20 -  Smita Bledsoe OT Occupational Therapist OT    AB 10/17/23 -  Usha Diaz RN Registered Nurse Nurse                  OT Recommendation and Plan  Planned Therapy Interventions (OT): activity tolerance training, BADL retraining, functional balance retraining, occupation/activity based interventions, patient/caregiver education/training, transfer/mobility retraining, strengthening exercise, ROM/therapeutic exercise  Therapy Frequency (OT): 5 times/wk  Plan of Care Review  Plan of Care Reviewed With: patient, daughter  Outcome Evaluation: Pt is able to participate in OT today to address strengthening, endurance, balance for ADLs. Pt continues to have posterior leaning in sitting and standing increasing risk of falls for ADLs.     Time Calculation:   Evaluation Complexity (OT)  Review Occupational Profile/Medical/Therapy History Complexity: expanded/moderate complexity  Assessment, Occupational Performance/Identification of Deficit Complexity: 3-5 performance deficits  Clinical Decision Making Complexity (OT): detailed assessment/moderate complexity  Overall Complexity of Evaluation (OT): moderate complexity     Time Calculation- OT       Row Name 12/07/23 1533             Time Calculation- OT    OT Start Time 1341  -      OT Stop Time 1401  -      OT Time Calculation (min)  20 min  -SM      Total Timed Code Minutes- OT 20 minute(s)  -SM      OT Received On 12/07/23  -SM      OT - Next Appointment 12/08/23  -SM         Timed Charges    27250 - OT Therapeutic Activity Minutes 20  -SM         Total Minutes    Timed Charges Total Minutes 20  -SM       Total Minutes 20  -SM                User Key  (r) = Recorded By, (t) = Taken By, (c) = Cosigned By      Initials Name Provider Type     Smita Bledsoe OT Occupational Therapist                  Therapy Charges for Today       Code Description Service Date Service Provider Modifiers Qty    40137723310  OT THERAPEUTIC ACT EA 15 MIN 12/7/2023 Smita Bledsoe OT GO 1                 Smita Bledsoe OT  12/7/2023

## 2023-12-07 NOTE — PLAN OF CARE
Goal Outcome Evaluation:  Plan of Care Reviewed With: patient, daughter           Outcome Evaluation: Pt is able to participate in OT today to address strengthening, endurance, balance for ADLs. Pt continues to have posterior leaning in sitting and standing increasing risk of falls for ADLs.      Anticipated Discharge Disposition (OT): skilled nursing facility

## 2023-12-07 NOTE — THERAPY TREATMENT NOTE
Patient Name: Danni Aguilar  : 1926    MRN: [id number]                              Today's Date: 2023       Admit Date: 2023    Visit Dx:     ICD-10-CM ICD-9-CM   1. RSV infection  B33.8 079.6   2. Hyponatremia  E87.1 276.1   3. Generalized weakness  R53.1 780.79   4. Frequent falls  R29.6 V15.88     Patient Active Problem List   Diagnosis    Acute CVA (cerebrovascular accident)    Atrial fibrillation    Long term (current) use of anticoagulants    Carotid stenosis    Urinary retention    Cerebrovascular accident (CVA) due to stenosis of right carotid artery    Hyperlipidemia    Cerebrovascular disease    Recent cerebrovascular accident (CVA)    RSV infection    Hyponatremia    Thrombocytosis    RSV (respiratory syncytial virus infection)     Past Medical History:   Diagnosis Date    A-fib     Carotid artery stenosis     Hyperlipidemia     Irregular heart beat      Past Surgical History:   Procedure Laterality Date    BACK SURGERY      CAROTID ARTERY ANGIOPLASTY      CEREBRAL ANGIOGRAM N/A 2017    Procedure: DIAGNOSTIC CEREBRAL ANGIOGRAM AND RIGHT CAROTID STENT PLACEMENT;  Surgeon: Mauricio Yung MD;  Location: [facility] ;  Service:     RECTAL SURGERY        General Information       Row Name 23 1206          Physical Therapy Time and Intention    Document Type therapy note (daily note)  -PH     Mode of Treatment physical therapy  -PH       Row Name 23 1206          Safety Issues, Functional Mobility    Impairments Affecting Function (Mobility) balance;endurance/activity tolerance;strength  -PH     Comment, Safety Issues/Impairments (Mobility) gt belt and non skid socks donned  -PH               User Key  (r) = Recorded By, (t) = Taken By, (c) = Cosigned By      Initials Name Provider Type    PH Cornelia Grant PTA Physical Therapist Assistant                   Mobility       Row Name 23 1207          Bed Mobility    Bed Mobility supine-sit  -PH      Supine-Sit [place] (Bed Mobility) supervision;verbal cues  -PH     Sit-Supine [place] (Bed Mobility) supervision;verbal cues  -PH     Comment, (Bed Mobility) incr time to perform; 1 LOB posteriorly as pt scoot toward EOB  -PH       Row Name 12/07/23 1207          Sit-Stand Transfer    Sit-Stand [place] (Transfers) verbal cues;1 person assist;2 person assist;minimum assist (75% patient effort)  -PH     Assistive Device (Sit-Stand Transfers) walker, front-wheeled  -PH     Comment, (Sit-Stand Transfer) posterior lean noted w/ unsteadiness; vc/tc for postural correction  -PH       Row Name 12/07/23 1207          Gait/Stairs (Locomotion)    [place] Level (Gait) contact guard  -PH     Deviations/Abnormal Patterns (Gait) base of support, wide;[place] decreased;gait speed decreased;stride length decreased  -PH     Bilateral Gait Deviations forward flexed posture  -PH     [place] Level (Stairs) not tested  -PH     Comment, (Gait/Stairs) pt slow w/ mild unsteadiness at times - no overt LOB  -PH               User Key  (r) = Recorded By, (t) = Taken By, (c) = Cosigned By      Initials Name Provider Type    PH Cornelia Grant PTA Physical Therapist Assistant                   Obj/Interventions       Row Name 12/07/23 1210          Motor Skills    Therapeutic Exercise other (see comments)  BAP, LAQ, seated march; x 10 reps  -PH       Row Name 12/07/23 1210          Balance    Balance Assessment sitting static balance;standing static balance;sitting dynamic balance  -PH     Static Sitting Balance supervision;minimal assist;verbal cues  -PH     Dynamic Sitting Balance contact guard;minimal assist;verbal cues  -PH     Static Standing Balance minimal assist;contact guard;verbal cues  -PH     Position/Device Used, Standing Balance walker, front-wheeled  -PH     Comment, Balance Lob while scooting to EOB and req min A to CGA for sit bal  -PH               User Key  (r) = Recorded By, (t) = Taken By,  (c) = Cosigned By      Initials Name Provider Type    Cornelia Augustine PTA Physical Therapist Assistant                   Goals/Plan    No documentation.                  Clinical Impression       Row Name 12/07/23 1212          Pain    Pretreatment Pain Rating 0/10 - no pain  -PH     Posttreatment Pain Rating 0/10 - no pain  -PH     Additional Documentation Pain Scale: Numbers Pre/Post-Treatment (Group)  -PH       Row Name 12/07/23 1212          Plan of Care Review    Plan of Care Reviewed With patient  -PH     Outcome Evaluation Pt in bed sleeping at beg of PT session. Pt not impulsive as he was in 12/6 session and following most directions when provided. PT tech assist based on pt's impulsivity in prior session. Pt awoken easily and was agreeable to PT. Pt performed bed mobility w/ improved SV. pt stood req min A of 1-2 w/ use fww. Pt then amb approx 50' req CGA and use of fww. Pt performed ther ex at EOB then returned to bed w/ SV. PT will prog as pt nubia.  -PH       Row Name 12/07/23 1212          Vital Signs    O2 Delivery Pre Treatment room air  -PH     O2 Delivery Intra Treatment room air  -PH     O2 Delivery Post Treatment room air  -PH       Row Name 12/07/23 1212          Positioning and Restraints    Pre-Treatment Position in bed  -PH     Post Treatment Position bed  -PH     In Bed fowlers;call light within reach;encouraged to call for assist;exit alarm on;notified [facility]  -PH               User Key  (r) = Recorded By, (t) = Taken By, (c) = Cosigned By      Initials Name Provider Type     Cornelia Grant PTA Physical Therapist Assistant                   Outcome Measures       Row Name 12/07/23 1217 12/07/23 0845       How much help from another person do you currently need...    Turning from your back to your side while in flat bed without using bedrails? 3  -PH 3  -KH    Moving from lying on back to sitting on the side of a flat bed without bedrails? 3  -PH 2  -KH    Moving to and from  a bed to a chair (including a wheelchair)? 3  -PH 2  -KH    Standing up from a chair using your arms (e.g., wheelchair, bedside chair)? 3  -PH 2  -KH    Climbing 3-5 steps with a railing? 2  -PH 2  -KH    To walk in hospital room? -- 2  -KH    AM-PAC 6 Clicks Score (PT) -- 13  -KH    Highest Level of Mobility Goal -- 4 --> Transfer to chair/commode  -      Row Name 12/07/23 1217          Functional Assessment    Outcome Measure Options AM-PAC 6 Clicks Basic Mobility (PT)  -               User Key  (r) = Recorded By, (t) = Taken By, (c) = Cosigned By      Initials Name Provider Type     Cornelia Grant PTA Physical Therapist Assistant    Sarah Khan RN Registered Nurse                                 Physical Therapy Education       Title: PT OT SLP Therapies (In Progress)       Topic: Physical Therapy (Done)       Point: Mobility training (Done)       Learning Progress Summary             Patient Acceptance, E,TB,D, VU,NR by  at 12/7/2023 1217    Acceptance, E,TB, NR by  at 12/6/2023 1200    Acceptance, E, VU by AB at 12/6/2023 0129    Acceptance, E,D, DU by PC at 12/5/2023 1538   Family Acceptance, E, VU by AB at 12/6/2023 0129                         Point: Home exercise program (Done)       Learning Progress Summary             Patient Acceptance, E,TB,D, VU,NR by  at 12/7/2023 1217    Acceptance, E,TB, NR by  at 12/6/2023 1200    Acceptance, E, VU by AB at 12/6/2023 0129   Family Acceptance, E, VU by AB at 12/6/2023 0129                         Point: Body mechanics (Done)       Learning Progress Summary             Patient Acceptance, E,TB,D, VU,NR by  at 12/7/2023 1217    Acceptance, E,TB, NR by  at 12/6/2023 1200    Acceptance, E, VU by AB at 12/6/2023 0129   Family Acceptance, E, VU by AB at 12/6/2023 0129                         Point: Precautions (Done)       Learning Progress Summary             Patient Acceptance, E,TB,D, VU,NR by PH at 12/7/2023 1217    Acceptance,  E,TB, NR by  at 12/6/2023 1200    Acceptance, E, VU by AB at 12/6/2023 0129   Family Acceptance, E, VU by AB at 12/6/2023 0129                                         User Key       Initials Effective Dates Name Provider Type Discipline    PC 06/16/21 -  Ivis Juares PT Physical Therapist PT    PH 06/16/21 -  Cornelia Grant PTA Physical Therapist Assistant PT    AB 10/17/23 -  Usha Diaz, RN Registered Nurse Nurse                  PT Recommendation and Plan     Plan of Care Reviewed With: patient  Progress: no change  Outcome Evaluation: Pt in bed sleeping at beg of PT session. Pt not impulsive as he was in 12/6 session and following most directions when provided. PT tech assist based on pt's impulsivity in prior session. Pt awoken easily and was agreeable to PT. Pt performed bed mobility w/ improved SV. pt stood req min A of 1-2 w/ use fww. Pt then amb approx 50' req CGA and use of fww. Pt performed ther ex at EOB then returned to bed w/ SV. PT will prog as pt nubia.     Time Calculation:         PT Charges       Row Name 12/07/23 1218             Time Calculation    Start Time 1108  -PH      Stop Time 1121  -PH      Time Calculation (min) 13 min  -PH      PT Received On 12/07/23  -PH      PT - Next Appointment 12/08/23  -PH         Timed Charges    97272 - PT Therapeutic Exercise Minutes 5  -PH      76109 - PT Therapeutic Activity Minutes 8  -PH         Total Minutes    Timed Charges Total Minutes 13  -PH       Total Minutes 13  -PH                User Key  (r) = Recorded By, (t) = Taken By, (c) = Cosigned By      Initials Name Provider Type     Cornelia Grant PTA Physical Therapist Assistant                  Therapy Charges for Today       Code Description Service Date Service Provider Modifiers Qty    00967642955 HC PT THERAPEUTIC ACT EA 15 MIN 12/6/2023 Cornelia Grant PTA GP 2    98034536225 HC PT THERAPEUTIC ACT EA 15 MIN 12/7/2023 Cornelia Grant PTA GP 1     [id number]  PT THER SUPP EA 15 MIN 12/7/2023 Cornelia Grant, LEAH GP 1            PT G-Codes  Outcome Measure Options: AM-PAC 6 Clicks Basic Mobility (PT)  AM-PAC 6 Clicks Score (PT): 13  AM-PAC 6 Clicks Score (OT): 14  PT Discharge Summary  Anticipated Discharge Disposition (PT): skilled nursing facility    Cornelia Grant PTA  12/7/2023

## 2023-12-07 NOTE — PLAN OF CARE
Problem: Adult Inpatient Plan of Care  Goal: Plan of Care Review  12/7/2023 1817 by Sarah Robert, BONNY  Outcome: Ongoing, Progressing  Flowsheets (Taken 12/7/2023 1817)  Progress: improving  Plan of Care Reviewed With:   patient   daughter  Outcome Evaluation: Pt continues to be confused throughout shift, oriented x2. No further concerns. Daughter at [facility] & updated on plan of care  12/7/2023 1815 by Sarah Robert RN  Outcome: Ongoing, Progressing     Problem: Adult Inpatient Plan of Care  Goal: Absence of Hospital-Acquired Illness or Injury  12/7/2023 1817 by Sarah Robert, BONNY  Outcome: Ongoing, Progressing  12/7/2023 1815 by Sarah Robert RN  Outcome: Ongoing, Progressing  Intervention: Identify and Manage Fall Risk  Recent Flowsheet Documentation  Taken 12/7/2023 1802 by Sarah Robert, RN  Safety Promotion/Fall Prevention:   activity supervised   assistive device/personal items within reach   clutter free environment maintained   fall prevention program maintained   room organization consistent   safety round/check completed  Taken 12/7/2023 1620 by Sarah Robert RN  Safety Promotion/Fall Prevention:   activity supervised   assistive device/personal items within reach   clutter free environment maintained   fall prevention program maintained   room organization consistent   safety round/check completed  Taken 12/7/2023 1436 by Sarah Robert RN  Safety Promotion/Fall Prevention:   activity supervised   assistive device/personal items within reach   clutter free environment maintained   fall prevention program maintained   room organization consistent   safety round/check completed  Taken 12/7/2023 1229 by Sarah Robert RN  Safety Promotion/Fall Prevention:   activity supervised   assistive device/personal items within reach   clutter free environment maintained   fall prevention program maintained   room organization consistent   safety round/check completed  Taken 12/7/2023 1042  by Hegstad, Sarah M, RN  Safety Promotion/Fall Prevention:   activity supervised   assistive device/personal items within reach   clutter free environment maintained   fall prevention program maintained   room organization consistent   safety round/check completed  Taken 12/7/2023 0845 by Sarah Robert RN  Safety Promotion/Fall Prevention:   activity supervised   assistive device/personal items within reach   clutter free environment maintained   fall prevention program maintained   room organization consistent   safety round/check completed  Intervention: Prevent Skin Injury  Recent Flowsheet Documentation  Taken 12/7/2023 1802 by Sarah Robert RN  Body Position:   position changed independently   foot of bed elevated  Taken 12/7/2023 1620 by Sarah Robert RN  Body Position:   position changed independently   legs elevated  Taken 12/7/2023 1436 by Sarah Robert RN  Body Position:   position changed independently   legs elevated  Taken 12/7/2023 1229 by Sarah Robert RN  Body Position:   position changed independently   foot of bed elevated  Taken 12/7/2023 1042 by Sarah Robert RN  Body Position:   position changed independently   foot of bed elevated  Taken 12/7/2023 0845 by Sarah Robert RN  Body Position:   position changed independently   foot of bed elevated  Skin Protection:   adhesive use limited   incontinence pads utilized   skin-to-device areas padded   tubing/devices free from skin contact  Intervention: Prevent and Manage VTE (Venous Thromboembolism) Risk  Recent Flowsheet Documentation  Taken 12/7/2023 0845 by Sarah Robert RN  Activity Management: activity encouraged  Intervention: Prevent Infection  Recent Flowsheet Documentation  Taken 12/7/2023 1802 by Sarah Robert RN  Infection Prevention:   environmental surveillance performed   equipment surfaces disinfected   hand hygiene promoted   personal protective equipment utilized   rest/sleep promoted   single  patient room provided  Taken 12/7/2023 1620 by Sarah Robert RN  Infection Prevention:   environmental surveillance performed   hand hygiene promoted   equipment surfaces disinfected   personal protective equipment utilized   rest/sleep promoted   single patient room provided  Taken 12/7/2023 1436 by Sarah Robert RN  Infection Prevention:   environmental surveillance performed   equipment surfaces disinfected   hand hygiene promoted   personal protective equipment utilized   rest/sleep promoted   single patient room provided  Taken 12/7/2023 1229 by Sarah Robert RN  Infection Prevention:   environmental surveillance performed   equipment surfaces disinfected   hand hygiene promoted   personal protective equipment utilized   rest/sleep promoted   single patient room provided  Taken 12/7/2023 1042 by Sarah Robert RN  Infection Prevention:   environmental surveillance performed   equipment surfaces disinfected   hand hygiene promoted   personal protective equipment utilized   rest/sleep promoted   single patient room provided  Taken 12/7/2023 0845 by Sarah Robert RN  Infection Prevention:   environmental surveillance performed   hand hygiene promoted   rest/sleep promoted   single patient room provided     Problem: Adult Inpatient Plan of Care  Goal: Optimal Comfort and Wellbeing  12/7/2023 1817 by Sarah Robert RN  Outcome: Ongoing, Progressing  12/7/2023 1815 by Sarah Robert RN  Outcome: Ongoing, Progressing  Intervention: Provide Person-Centered Care  Recent Flowsheet Documentation  Taken 12/7/2023 0845 by Sarah Robert RN  Trust Relationship/Rapport:   care explained   choices provided   questions answered   questions encouraged     Problem: Fall Injury Risk  Goal: Absence of Fall and Fall-Related Injury  12/7/2023 1817 by Sarah Robert RN  Outcome: Ongoing, Progressing  12/7/2023 1815 by Sarah Robert RN  Outcome: Ongoing, Progressing  Intervention: Identify and Manage  Contributors  Recent Flowsheet Documentation  Taken 12/7/2023 1802 by Sarah Robert RN  Medication Review/Management: medications reviewed  Taken 12/7/2023 1620 by Sarah Robert RN  Medication Review/Management: medications reviewed  Taken 12/7/2023 1436 by Sarah Robert RN  Medication Review/Management: medications reviewed  Taken 12/7/2023 1229 by Sarah Robert RN  Medication Review/Management: medications reviewed  Taken 12/7/2023 0845 by Sarah Robert RN  Medication Review/Management: medications reviewed  Self-Care Promotion: independence encouraged  Intervention: Promote Injury-Free Environment  Recent Flowsheet Documentation  Taken 12/7/2023 1802 by Sarah Robert RN  Safety Promotion/Fall Prevention:   activity supervised   assistive device/personal items within reach   clutter free environment maintained   fall prevention program maintained   room organization consistent   safety round/check completed  Taken 12/7/2023 1620 by Sarah Robert RN  Safety Promotion/Fall Prevention:   activity supervised   assistive device/personal items within reach   clutter free environment maintained   fall prevention program maintained   room organization consistent   safety round/check completed  Taken 12/7/2023 1436 by Sarah Robert RN  Safety Promotion/Fall Prevention:   activity supervised   assistive device/personal items within reach   clutter free environment maintained   fall prevention program maintained   room organization consistent   safety round/check completed  Taken 12/7/2023 1229 by Sarah Robert RN  Safety Promotion/Fall Prevention:   activity supervised   assistive device/personal items within reach   clutter free environment maintained   fall prevention program maintained   room organization consistent   safety round/check completed  Taken 12/7/2023 1042 by Sarah Robert RN  Safety Promotion/Fall Prevention:   activity supervised   assistive device/personal items  within reach   clutter free environment maintained   fall prevention program maintained   room organization consistent   safety round/check completed  Taken 12/7/2023 0845 by Sarah Robert RN  Safety Promotion/Fall Prevention:   activity supervised   assistive device/personal items within reach   clutter free environment maintained   fall prevention program maintained   room organization consistent   safety round/check completed     Problem: Skin Injury Risk Increased  Goal: Skin Health and Integrity  12/7/2023 1817 by Sarah Robert RN  Outcome: Ongoing, Progressing  12/7/2023 1815 by Sarah Robert RN  Outcome: Ongoing, Progressing  Intervention: Optimize Skin Protection  Recent Flowsheet Documentation  Taken 12/7/2023 1802 by Sarah Robert RN  Head of Bed (HOB) Positioning: HOB elevated  Taken 12/7/2023 1620 by Sarah Robert RN  Head of Bed (HOB) Positioning: HOB elevated  Taken 12/7/2023 1436 by Sarah Robert RN  Head of Bed (HOB) Positioning: HOB elevated  Taken 12/7/2023 1229 by Sarah Robert RN  Head of Bed (HOB) Positioning: HOB elevated  Taken 12/7/2023 1042 by Sarah Robert RN  Head of Bed (HOB) Positioning: HOB elevated  Taken 12/7/2023 0845 by Sarah Robert RN  Pressure Reduction Techniques:   frequent weight shift encouraged   weight shift assistance provided  Head of Bed (HOB) Positioning: HOB elevated  Pressure Reduction Devices: positioning supports utilized  Skin Protection:   adhesive use limited   incontinence pads utilized   skin-to-device areas padded   tubing/devices free from skin contact   Goal Outcome Evaluation:  Plan of Care Reviewed With: patient, daughter        Progress: improving  Outcome Evaluation: Pt continues to be confused throughout shift, oriented x2. No further concerns. Daughter at [facility] & updated on plan of care

## 2023-12-07 NOTE — PROGRESS NOTES
[facility] Clinical Pharmacy Services: Warfarin Dosing/Monitoring Consult    Danni Aguilar is a 89 y.o. male, estimated creatinine clearance is 49 mL/min (A) (by C-G formula based on SCr of 0.73 mg/dL (L)). weighing 58.5 kg (128 lb 15.5 oz).    Results from last 7 days   Lab Units 12/07/23  1126 12/07/23  0725 12/06/23  0636 12/05/23  0743 12/04/23  1646 12/04/23  1145   INR   --  4.31* 3.17* 2.87* 2.47* 2.64*   HEMOGLOBIN g/dL 10.2*  --  10.2* 11.8*  --  12.4*   HEMATOCRIT % 32.2*  --  32.1* 35.9*  --  39.5   PLATELETS 10*3/mm3 685*  --  599* 598*  --  703*     Prior to admission anticoagulation: warfarin 6 mg daily    Hospital Anticoagulation:  Consulting provider: Dr. Weiss  Start date: 12/4  Indication: A Fib - requiring full anticoagulation  Target INR: 2 - 3  Expected duration: tbd   Bridge Therapy: No      Potential food or drug interactions: none at this time    Education complete?/Date: No; plan for follow up TBD    Assessment/Plan:  INR supratherapeutic. Acute infection could increase sensitivity to warfarin due to increased catabolism of clotting factors.  Dose: Continue to hold dose today. Assess benefits vs risk of anticoagulation, suffered mechanical fall in October 2023.  Monitor for any signs or symptoms of bleeding  Follow up daily INRs and dose adjustments    Pharmacy will continue to follow until discharge or discontinuation of warfarin.     Prabhjot Skinner, [facility]  Clinical Pharmacist

## 2023-12-07 NOTE — CASE MANAGEMENT/SOCIAL WORK
Continued Stay Note  [facility]     Patient Name: Danni Aguilar  MRN: [id number]  Today's Date: 12/7/2023    Admit Date: 12/4/2023    Plan: [facility]   Discharge Plan       Row Name 12/07/23 1640       Plan    Plan [facility]    Patient/Family in Agreement with Plan yes    Plan Comments Spoke with [facility], they can offer bed tomorrow, spoke with pt dtr [place], she is agreeable to d/c plan. She will transport and will need to be called when dc is certain to arrange timing. Pharmacy updated, pkt to [facility]. Updated Dr. Solis and RN, if pt labs better and pt looks good can d/c tomorrow. CCP will follow - Smita CHAN                   Discharge Codes    No documentation.                 Expected Discharge Date and Time       Expected Discharge Date Expected Discharge Time    Dec 8, 2023               Smita Mckay RN

## 2023-12-07 NOTE — PROGRESS NOTES
Name: Danni Aguilar ADMIT: 2023   : 1926  PCP: Justin Longoria MD    MRN: [id number] LOS: 2 days   AGE/SEX: 89 y.o. male  ROOM: [facility]     Subjective   Subjective   Patient seen and examined this morning. Hospital day 3. At time of my examination, patient is awake, resting in bed, on room air, breathing improved. No acute events overnight.     Objective   Objective   Vital Signs  Temp:  [97.3 °F (36.3 °C)-98.1 °F (36.7 °C)] 97.7 °F (36.5 °C)  Heart Rate:  [85-98] 91  Resp:  [18] 18  BP: (113-132)/(50-74) 132/71  SpO2:  [96 %-100 %] 97 %  on   ;   Device (Oxygen Therapy): room air  Body mass index is 19.61 kg/m².  Physical Exam  Vitals and nursing note reviewed.   Constitutional:       General: He is awake. He is not in acute distress.     Comments: Elderly/frail, chronically ill appearing   Cardiovascular:      Rate and Rhythm: Normal rate and regular rhythm.      Pulses: Normal pulses.      Heart sounds: Normal heart sounds.   Pulmonary:      Effort: Pulmonary effort is normal. No respiratory distress.      Breath sounds: Decreased breath sounds and wheezing present.   Abdominal:      General: Bowel sounds are normal.      Palpations: Abdomen is soft.      Tenderness: There is no abdominal tenderness.   Skin:     General: Skin is warm and dry.   Neurological:      Mental Status: He is alert. He is confused.   Psychiatric:         Behavior: Behavior is cooperative.       Results Review     I reviewed the patient's new clinical results.  Results from last 7 days   Lab Units 23  0636 23  0743 23  1145   WBC 10*3/mm3 12.07* 12.02* 11.71*   HEMOGLOBIN g/dL 10.2* 11.8* 12.4*   PLATELETS 10*3/mm3 599* 598* 703*     Results from last 7 days   Lab Units 23  0636 23  0743 23  1145   SODIUM mmol/L 125* 131* 129*   POTASSIUM mmol/L 4.2 3.8 4.6   CHLORIDE mmol/L 96* 96* 98   CO2 mmol/L 21.0* 26.5 22.0   BUN mg/dL 18 18 19   CREATININE mg/dL 0.68* 0.81 0.83   GLUCOSE mg/dL  "109* 102* 119*   EGFR mL/min/1.73 85.1 80.7 80.1     Results from last 7 days   Lab Units 12/05/23  0743 12/04/23  1145   ALBUMIN g/dL 3.4* 3.3*   BILIRUBIN mg/dL 0.7 0.9   ALK PHOS U/L 56 53   AST (SGOT) U/L 50* 43*   ALT (SGPT) U/L 29 25     Results from last 7 days   Lab Units 12/06/23  2140 12/06/23  0636 12/05/23  0743 12/04/23  1145   CALCIUM mg/dL  --  8.0* 8.8 8.0*   ALBUMIN g/dL  --   --  3.4* 3.3*   MAGNESIUM mg/dL  --  1.6*  --   --    PHOSPHORUS mg/dL 2.9 2.1*  --   --      Results from last 7 days   Lab Units 12/04/23  1145   PROCALCITONIN ng/mL 0.51*   LACTATE mmol/L 1.7     No results found for: \"HGBA1C\", \"POCGLU\"    No radiology results for the last day    I have personally reviewed all medications:  Scheduled Medications  aspirin, 81 mg, Oral, Daily  atorvastatin, 80 mg, Oral, Daily  guaiFENesin, 1,200 mg, Oral, Q12H  senna-docusate sodium, 2 tablet, Oral, BID  sodium chloride, 10 mL, Intravenous, Q12H    Infusions  Pharmacy to dose warfarin,   sodium chloride 0.9 % with KCl 20 mEq, 100 mL/hr, Last Rate: 100 mL/hr (12/06/23 1537)    Diet  Diet: Cardiac Diets; Healthy Heart (2-3 Na+); Texture: Regular Texture (IDDSI 7); Fluid Consistency: Thin (IDDSI 0)    I have personally reviewed:  [x]  Laboratory   [x]  Microbiology   [x]  Radiology   [x]  EKG/Telemetry  [x]  Cardiology/Vascular   []  Pathology    []  Records       Assessment/Plan     Active Hospital Problems    Diagnosis  POA    **RSV infection [B33.8]  Yes    RSV (respiratory syncytial virus infection) [B33.8]  Yes    Hyponatremia [E87.1]  Yes    Thrombocytosis [D75.839]  Unknown    Recent cerebrovascular accident (CVA) [Z86.73]  Yes    Atrial fibrillation [I48.91]  Yes    Long term (current) use of anticoagulants [Z79.01]  Not Applicable      Resolved Hospital Problems   No resolved problems to display.       89 y.o. male admitted with RSV infection.    Acute RSV infection  Weakness/age related physical debility  - Patient currently on room " air, symptoms appear to be improving. WBC remains elevated, but stable from prior. CXR negative for focal consolidation.   - Continue with supportive treatments as ordered.  - PT/OT, recommending SNF, will discuss with CCP.   - Continue close monitoring of respiratory status, pulse oximetry, telemetry.     Hyponatremia  - Na 129 on admission, felt to be related to hypovolemia as values improved with IVF to 131. However, values worsened to 125 on 12/06. Further workup ordered, urine studies likely skewed due to patient already receiving IVF, but uric acid low--concerning for possible SIADH. TSH normal. Patient could have had a component of hypovolemia on admission, which explains correction initially.  - Stop IVF. Follow up repeat labs pending this AM. If no improvement or worse, will start fluid restriction and consult Nephrology.     Anemia  - Hemoglobin low on most recent labs, however, stable from prior. No evidence of overt blood loss. No indications for acute intervention at this time.    - Order repeat CBC in AM for reassessment. Continue to monitor, transfuse for hemoglobin <7.    Permanent atrial fibrillation  Pulmonary HTN by echo- RVSP 44  Mod/severe aortic stenosis  - continue warfarin dosing per pharmacy, no rate control meds on home med list  - INR more elevated today, outside of target range, will warrant adjustment to treatment.  - Order daily INR, requires close monitoring to guide management.    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 Follow up repeat labs to guide ongoing management decisions. Replete PRN per protocol. Continue to monitor      Warfarin (home med) for DVT prophylaxis.  Full code.  Discussed with patient and nursing staff.  Anticipate discharge to SNU facility timing  yet to be determined.      Larry Solis DO  [facility]  12/07/23

## 2023-12-07 NOTE — PLAN OF CARE
Goal Outcome Evaluation:  Plan of Care Reviewed With: patient        Progress: no change  Outcome Evaluation: Pt in bed sleeping at beg of PT session. Pt not impulsive as he was in 12/6 session and following most directions when provided. PT tech assist based on pt's impulsivity in prior session. Pt awoken easily and was agreeable to PT. Pt performed bed mobility w/ improved SV. pt stood req min A of 1-2 w/ use fww. Pt then amb approx 50' req CGA and use of fww. Pt performed ther ex at EOB then returned to bed w/ SV. PT will prog as pt nubia.      Anticipated Discharge Disposition (PT): skilled nursing facility

## 2023-12-08 LAB
ANION GAP SERPL CALCULATED.3IONS-SCNC: 11.4 MMOL/L (ref 5–15)
BASOPHILS # BLD AUTO: 0.11 10*3/MM3 (ref 0–0.2)
BASOPHILS NFR BLD AUTO: 1.4 % (ref 0–1.5)
BUN SERPL-MCNC: 11 MG/DL (ref 8–23)
BUN/CREAT SERPL: 16.9 (ref 7–25)
CALCIUM SPEC-SCNC: 8.5 MG/DL (ref 8.2–9.6)
CHLORIDE SERPL-SCNC: 96 MMOL/L (ref 98–107)
CO2 SERPL-SCNC: 24.6 MMOL/L (ref 22–29)
CREAT SERPL-MCNC: 0.65 MG/DL (ref 0.76–1.27)
DEPRECATED RDW RBC AUTO: 44.5 FL (ref 37–54)
EGFRCR SERPLBLD CKD-EPI 2021: 86.2 ML/MIN/1.73
EOSINOPHIL # BLD AUTO: 0.15 10*3/MM3 (ref 0–0.4)
EOSINOPHIL NFR BLD AUTO: 1.9 % (ref 0.3–6.2)
ERYTHROCYTE [DISTWIDTH] IN BLOOD BY AUTOMATED COUNT: 15.3 % (ref 12.3–15.4)
GLUCOSE SERPL-MCNC: 101 MG/DL (ref 65–99)
HCT VFR BLD AUTO: 35.5 % (ref 37.5–51)
HGB BLD-MCNC: 11.7 G/DL (ref 13–17.7)
IMM GRANULOCYTES # BLD AUTO: 0.06 10*3/MM3 (ref 0–0.05)
IMM GRANULOCYTES NFR BLD AUTO: 0.8 % (ref 0–0.5)
INR PPP: 3.06 (ref 0.9–1.1)
LYMPHOCYTES # BLD AUTO: 0.82 10*3/MM3 (ref 0.7–3.1)
LYMPHOCYTES NFR BLD AUTO: 10.3 % (ref 19.6–45.3)
MAGNESIUM SERPL-MCNC: 1.7 MG/DL (ref 1.7–2.3)
MCH RBC QN AUTO: 26.8 PG (ref 26.6–33)
MCHC RBC AUTO-ENTMCNC: 33 G/DL (ref 31.5–35.7)
MCV RBC AUTO: 81.4 FL (ref 79–97)
MONOCYTES # BLD AUTO: 0.54 10*3/MM3 (ref 0.1–0.9)
MONOCYTES NFR BLD AUTO: 6.8 % (ref 5–12)
NEUTROPHILS NFR BLD AUTO: 6.25 10*3/MM3 (ref 1.7–7)
NEUTROPHILS NFR BLD AUTO: 78.8 % (ref 42.7–76)
NRBC BLD AUTO-RTO: 0 /100 WBC (ref 0–0.2)
PHOSPHATE SERPL-MCNC: 2.7 MG/DL (ref 2.5–4.5)
PLATELET # BLD AUTO: 873 10*3/MM3 (ref 140–450)
PMV BLD AUTO: 8.7 FL (ref 6–12)
POTASSIUM SERPL-SCNC: 3.5 MMOL/L (ref 3.5–5.2)
POTASSIUM SERPL-SCNC: 4.4 MMOL/L (ref 3.5–5.2)
PROTHROMBIN TIME: 32.3 SECONDS (ref 11.7–14.2)
RBC # BLD AUTO: 4.36 10*6/MM3 (ref 4.14–5.8)
SODIUM SERPL-SCNC: 132 MMOL/L (ref 136–145)
WBC NRBC COR # BLD AUTO: 7.93 10*3/MM3 (ref 3.4–10.8)

## 2023-12-08 PROCEDURE — 85610 PROTHROMBIN TIME: CPT | Performed by: INTERNAL MEDICINE

## 2023-12-08 PROCEDURE — 84100 ASSAY OF PHOSPHORUS: CPT | Performed by: STUDENT IN AN ORGANIZED HEALTH CARE EDUCATION/TRAINING PROGRAM

## 2023-12-08 PROCEDURE — 85025 COMPLETE CBC W/AUTO DIFF WBC: CPT | Performed by: STUDENT IN AN ORGANIZED HEALTH CARE EDUCATION/TRAINING PROGRAM

## 2023-12-08 PROCEDURE — 80048 BASIC METABOLIC PNL TOTAL CA: CPT | Performed by: STUDENT IN AN ORGANIZED HEALTH CARE EDUCATION/TRAINING PROGRAM

## 2023-12-08 PROCEDURE — 84132 ASSAY OF SERUM POTASSIUM: CPT | Performed by: STUDENT IN AN ORGANIZED HEALTH CARE EDUCATION/TRAINING PROGRAM

## 2023-12-08 PROCEDURE — 83735 ASSAY OF MAGNESIUM: CPT | Performed by: STUDENT IN AN ORGANIZED HEALTH CARE EDUCATION/TRAINING PROGRAM

## 2023-12-08 RX ORDER — UREA 10 %
1 LOTION (ML) TOPICAL ONCE
Status: COMPLETED | OUTPATIENT
Start: 2023-12-09 | End: 2023-12-08

## 2023-12-08 RX ORDER — POTASSIUM CHLORIDE 750 MG/1
40 TABLET, FILM COATED, EXTENDED RELEASE ORAL EVERY 4 HOURS
Status: COMPLETED | OUTPATIENT
Start: 2023-12-08 | End: 2023-12-08

## 2023-12-08 RX ORDER — WARFARIN SODIUM 2 MG/1
2 TABLET ORAL
Status: COMPLETED | OUTPATIENT
Start: 2023-12-08 | End: 2023-12-08

## 2023-12-08 RX ADMIN — ASPIRIN 81 MG: 81 TABLET, CHEWABLE ORAL at 08:16

## 2023-12-08 RX ADMIN — ATORVASTATIN CALCIUM 80 MG: 80 TABLET, FILM COATED ORAL at 08:15

## 2023-12-08 RX ADMIN — HYDROCODONE BITARTRATE AND HOMATROPINE METHYLBROMIDE ORAL SOLUTION 5 ML: 5; 1.5 LIQUID ORAL at 20:13

## 2023-12-08 RX ADMIN — POTASSIUM CHLORIDE 40 MEQ: 750 TABLET, EXTENDED RELEASE ORAL at 11:06

## 2023-12-08 RX ADMIN — WARFARIN 2 MG: 2 TABLET ORAL at 17:22

## 2023-12-08 RX ADMIN — Medication 10 ML: at 20:13

## 2023-12-08 RX ADMIN — DOCUSATE SODIUM 50MG AND SENNOSIDES 8.6MG 2 TABLET: 8.6; 5 TABLET, FILM COATED ORAL at 08:15

## 2023-12-08 RX ADMIN — Medication 10 ML: at 08:16

## 2023-12-08 RX ADMIN — GUAIFENESIN 1200 MG: 600 TABLET, EXTENDED RELEASE ORAL at 20:13

## 2023-12-08 RX ADMIN — POTASSIUM CHLORIDE 40 MEQ: 750 TABLET, EXTENDED RELEASE ORAL at 14:34

## 2023-12-08 RX ADMIN — Medication 1 MG: at 23:26

## 2023-12-08 RX ADMIN — GUAIFENESIN 1200 MG: 600 TABLET, EXTENDED RELEASE ORAL at 08:16

## 2023-12-08 NOTE — PROGRESS NOTES
Name: Danni Aguilar ADMIT: 2023   : 1926  PCP: Justin Longoria MD    MRN: [id number] LOS: 3 days   AGE/SEX: 89 y.o. male  ROOM: [facility]     Subjective   Subjective   Patient seen and examined this morning. Hospital day 4. At time of my examination, patient is awake, resting in bed, on room air, breathing stable. No acute events overnight.     Objective   Objective   Vital Signs  Temp:  [97.2 °F (36.2 °C)-97.7 °F (36.5 °C)] 97.7 °F (36.5 °C)  Heart Rate:  [] 102  Resp:  [18] 18  BP: (136-179)/() 172/88  SpO2:  [96 %-99 %] 99 %  on   ;   Device (Oxygen Therapy): room air  Body mass index is 19.61 kg/m².  Physical Exam  Vitals and nursing note reviewed.   Constitutional:       General: He is awake. He is not in acute distress.     Comments: Elderly/frail, chronically ill appearing   Cardiovascular:      Rate and Rhythm: Normal rate and regular rhythm.      Pulses: Normal pulses.      Heart sounds: Normal heart sounds.   Pulmonary:      Effort: Pulmonary effort is normal. No respiratory distress.      Breath sounds: Decreased breath sounds present. No wheezing.   Abdominal:      General: Bowel sounds are normal.      Palpations: Abdomen is soft.      Tenderness: There is no abdominal tenderness.   Skin:     General: Skin is warm and dry.   Neurological:      Mental Status: He is alert. He is confused.   Psychiatric:         Behavior: Behavior is cooperative.       Results Review     I reviewed the patient's new clinical results.  Results from last 7 days   Lab Units 23  0803 23  1126 23  0636 23  0743   WBC 10*3/mm3 7.93 9.05 12.07* 12.02*   HEMOGLOBIN g/dL 11.7* 10.2* 10.2* 11.8*   PLATELETS 10*3/mm3 873* 685* 599* 598*     Results from last 7 days   Lab Units 23  0803 23  0923 23  0636 23  0743   SODIUM mmol/L 132* 130* 125* 131*   POTASSIUM mmol/L 3.5 4.1 4.2 3.8   CHLORIDE mmol/L 96* 99 96* 96*   CO2 mmol/L 24.6 24.0 21.0* 26.5   BUN  "mg/dL 11 12 18 18   CREATININE mg/dL 0.65* 0.73* 0.68* 0.81   GLUCOSE mg/dL 101* 99 109* 102*   EGFR mL/min/1.73 86.2 83.3 85.1 80.7     Results from last 7 days   Lab Units 12/05/23  0743 12/04/23  1145   ALBUMIN g/dL 3.4* 3.3*   BILIRUBIN mg/dL 0.7 0.9   ALK PHOS U/L 56 53   AST (SGOT) U/L 50* 43*   ALT (SGPT) U/L 29 25     Results from last 7 days   Lab Units 12/08/23  0803 12/07/23  1126 12/07/23  0923 12/06/23  2140 12/06/23  0636 12/05/23  0743 12/04/23  1145   CALCIUM mg/dL 8.5  --  8.5  --  8.0* 8.8 8.0*   ALBUMIN g/dL  --   --   --   --   --  3.4* 3.3*   MAGNESIUM mg/dL 1.7 1.7  --   --  1.6*  --   --    PHOSPHORUS mg/dL 2.7 2.2*  --  2.9 2.1*  --   --      Results from last 7 days   Lab Units 12/04/23  1145   PROCALCITONIN ng/mL 0.51*   LACTATE mmol/L 1.7     No results found for: \"HGBA1C\", \"POCGLU\"    No radiology results for the last day    I have personally reviewed all medications:  Scheduled Medications  aspirin, 81 mg, Oral, Daily  atorvastatin, 80 mg, Oral, Daily  guaiFENesin, 1,200 mg, Oral, Q12H  potassium chloride ER, 40 mEq, Oral, Q4H  senna-docusate sodium, 2 tablet, Oral, BID  sodium chloride, 10 mL, Intravenous, Q12H  warfarin, 2 mg, Oral, Once    Infusions  Pharmacy to dose warfarin,     Diet  Diet: Cardiac Diets; Healthy Heart (2-3 Na+); Texture: Regular Texture (IDDSI 7); Fluid Consistency: Thin (IDDSI 0)    I have personally reviewed:  [x]  Laboratory   [x]  Microbiology   [x]  Radiology   [x]  EKG/Telemetry  [x]  Cardiology/Vascular   []  Pathology    []  Records       Assessment/Plan     Active Hospital Problems    Diagnosis  POA    **RSV infection [B33.8]  Yes    RSV (respiratory syncytial virus infection) [B33.8]  Yes    Hyponatremia [E87.1]  Yes    Thrombocytosis [D75.839]  Unknown    Recent cerebrovascular accident (CVA) [Z86.73]  Yes    Atrial fibrillation [I48.91]  Yes    Long term (current) use of anticoagulants [Z79.01]  Not Applicable      Resolved Hospital Problems   No " resolved problems to display.       89 y.o. male admitted with RSV infection.    Acute RSV infection  Weakness/age related physical debility  - Patient currently on room air, symptoms appear to be improving. WBC remains elevated, but stable from prior. CXR negative for focal consolidation.   - Continue with supportive treatments as ordered.  - PT/OT, recommending SNF, will discuss with CCP.   - Continue close monitoring of respiratory status, pulse oximetry, telemetry.     Hyponatremia  - Na 129 on admission, felt to be related to hypovolemia as values improved with IVF to 131. However, values worsened to 125 on 12/06. Further workup ordered, urine studies likely skewed due to patient already receiving IVF, but uric acid low--concerning for possible SIADH. TSH normal. Patient could have had a component of hypovolemia on admission, which explains correction initially.  - IVF stopped and sodium has improved. Continue to monitor.    Anemia  - Hemoglobin low on most recent labs, however, stable from prior. No evidence of overt blood loss. No indications for acute intervention at this time.    - Order repeat CBC in AM for reassessment. Continue to monitor, transfuse for hemoglobin <7.    Permanent atrial fibrillation  Pulmonary HTN by echo- RVSP 44  Mod/severe aortic stenosis  - continue warfarin dosing per pharmacy, no rate control meds on home med list  - INR remains elevated, pharmacy managing, recommending at discharge to use 4 mg daily and he will need close follow up with prescribing provider: Dr. Justin Longoria next week for repeat INR.   - Continue dosing per pharmacy while inpatient.  - Order daily INR, requires close monitoring to guide management.    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 Follow up repeat labs to guide ongoing management decisions. Replete PRN per protocol. Continue to monitor    Initial plan was D/C to rehab today but bed not available until tomorrow, will continue to monitor today and plan for discharge tomorrow if patient stable.    Warfarin (home med) for DVT prophylaxis.  Full code.  Discussed with patient and nursing staff.  Anticipate discharge to SNU facility timing yet to be determined.      Larry Solis DO  [facility]  12/08/23

## 2023-12-08 NOTE — PLAN OF CARE
Goal Outcome Evaluation:  Plan of Care Reviewed With: patient, daughter        Progress: no change  Outcome Evaluation:       Confused at baseline.   Many attempts to get up out of bed.   Continuously removing equipment.     BP elevated tonight. Afib on telemetry. Room air.       Assist x1 with Urinal.   Incontinent at times.       No complaints of pain.       No signs of distress.           Possible D/C today to SNF.   Labs pending.         Will continue to monitor.

## 2023-12-08 NOTE — PROGRESS NOTES
[facility] Clinical Pharmacy Services: Warfarin Dosing/Monitoring Consult    Danni Aguilar is a 89 y.o. male, estimated creatinine clearance is 49 mL/min (A) (by C-G formula based on SCr of 0.73 mg/dL (L)). weighing 58.5 kg (128 lb 15.5 oz).    Results from last 7 days   Lab Units 12/08/23  0803 12/07/23  1126 12/07/23  0725 12/06/23  0636 12/05/23  0743 12/04/23  1646 12/04/23  1145   INR  3.06*  --  4.31* 3.17* 2.87* 2.47* 2.64*   HEMOGLOBIN g/dL 11.7* 10.2*  --  10.2* 11.8*  --  12.4*   HEMATOCRIT % 35.5* 32.2*  --  32.1* 35.9*  --  39.5   PLATELETS 10*3/mm3 873* 685*  --  599* 598*  --  703*     Prior to admission anticoagulation: Per medication reconciliation, the patient's most recent warfarin regimen is 6 mg daily (42 mg/week).    Hospital Anticoagulation:  Consulting provider: Dr. Weiss  Start date: 12/4/23  Indication: Atrial fibrilation  Target INR: 2 - 3  Expected duration: Lifelong   Bridge Therapy: No      Potential new infection or drug interactions:     Acute infection/inflammation may cause an increased sensitivity to warfarin; patient tested positive for RSV on 12/4/23.    Education complete?/Date: No; plan for follow up TBD    INR Assessment:  Date INR Dose   12/5 2.87 6 mg   12/6 3.17 HELD   12/7 4.31 HELD   12/8 3.06      Assessment/Plan:  INR is still subtherapeutic at 3.06, but trending down from being held two days in a row. Concern would be that the patient would drop below 2 with h/o of CVA. Will give a dose of 2 mg tonight, then assess INR tomorrow morning to determine further dosing.   Monitor for any signs or symptoms of bleeding.  Follow up daily INRs and dose adjustments.    Pharmacy will continue to follow until discharge or discontinuation of warfarin.     Khushbu Bailey Pharm.D., [facility]   Clinical Pharmacist

## 2023-12-08 NOTE — CASE MANAGEMENT/SOCIAL WORK
Continued Stay Note  [facility]     Patient Name: Danni Aguilar  MRN: [id number]  Today's Date: 12/8/2023    Admit Date: 12/4/2023    Plan: [facility]   Discharge Plan       Row Name 12/08/23 1112       Plan    Plan [facility]    Patient/Family in Agreement with Plan yes    Plan Comments Pt dtr left voicemail that she has spoken with [facility] and they think bed will be available today, spoke with Tika/Volodymyr and she confirmed pt has  bed available today. Pt dtr would like pt to be d/c to [facility], pharmacy and pkt updated, updated Yudith at [facility] and RN and MD. Dtr will transport CCP will follow - Smita CHAN    Addendum- notified bed not ready until tomorrow, updated all. CCP will follow - Smita CHAN                    Discharge Codes    No documentation.                 Expected Discharge Date and Time       Expected Discharge Date Expected Discharge Time    Dec 8, 2023  8:25 AM              Smita Mckay RN

## 2023-12-09 VITALS
RESPIRATION RATE: 18 BRPM | WEIGHT: 128.97 LBS | SYSTOLIC BLOOD PRESSURE: 133 MMHG | TEMPERATURE: 97.5 F | HEIGHT: 68 IN | HEART RATE: 85 BPM | OXYGEN SATURATION: 95 % | BODY MASS INDEX: 19.55 KG/M2 | DIASTOLIC BLOOD PRESSURE: 65 MMHG

## 2023-12-09 LAB
ANION GAP SERPL CALCULATED.3IONS-SCNC: 12.2 MMOL/L (ref 5–15)
BACTERIA SPEC AEROBE CULT: NORMAL
BACTERIA SPEC AEROBE CULT: NORMAL
BASOPHILS # BLD AUTO: 0.13 10*3/MM3 (ref 0–0.2)
BASOPHILS NFR BLD AUTO: 1.3 % (ref 0–1.5)
BUN SERPL-MCNC: 12 MG/DL (ref 8–23)
BUN/CREAT SERPL: 20 (ref 7–25)
CALCIUM SPEC-SCNC: 8.2 MG/DL (ref 8.2–9.6)
CHLORIDE SERPL-SCNC: 96 MMOL/L (ref 98–107)
CO2 SERPL-SCNC: 21.8 MMOL/L (ref 22–29)
CREAT SERPL-MCNC: 0.6 MG/DL (ref 0.76–1.27)
DEPRECATED RDW RBC AUTO: 44.1 FL (ref 37–54)
EGFRCR SERPLBLD CKD-EPI 2021: 88.3 ML/MIN/1.73
EOSINOPHIL # BLD AUTO: 0.27 10*3/MM3 (ref 0–0.4)
EOSINOPHIL NFR BLD AUTO: 2.7 % (ref 0.3–6.2)
ERYTHROCYTE [DISTWIDTH] IN BLOOD BY AUTOMATED COUNT: 14.9 % (ref 12.3–15.4)
GLUCOSE SERPL-MCNC: 91 MG/DL (ref 65–99)
HCT VFR BLD AUTO: 32.7 % (ref 37.5–51)
HGB BLD-MCNC: 10.7 G/DL (ref 13–17.7)
IMM GRANULOCYTES # BLD AUTO: 0.11 10*3/MM3 (ref 0–0.05)
IMM GRANULOCYTES NFR BLD AUTO: 1.1 % (ref 0–0.5)
INR PPP: 2.49 (ref 0.9–1.1)
LYMPHOCYTES # BLD AUTO: 1.22 10*3/MM3 (ref 0.7–3.1)
LYMPHOCYTES NFR BLD AUTO: 12.3 % (ref 19.6–45.3)
MAGNESIUM SERPL-MCNC: 1.8 MG/DL (ref 1.7–2.3)
MCH RBC QN AUTO: 26.9 PG (ref 26.6–33)
MCHC RBC AUTO-ENTMCNC: 32.7 G/DL (ref 31.5–35.7)
MCV RBC AUTO: 82.2 FL (ref 79–97)
MONOCYTES # BLD AUTO: 0.83 10*3/MM3 (ref 0.1–0.9)
MONOCYTES NFR BLD AUTO: 8.4 % (ref 5–12)
NEUTROPHILS NFR BLD AUTO: 7.38 10*3/MM3 (ref 1.7–7)
NEUTROPHILS NFR BLD AUTO: 74.2 % (ref 42.7–76)
NRBC BLD AUTO-RTO: 0 /100 WBC (ref 0–0.2)
PHOSPHATE SERPL-MCNC: 3 MG/DL (ref 2.5–4.5)
PLATELET # BLD AUTO: 903 10*3/MM3 (ref 140–450)
PMV BLD AUTO: 8.7 FL (ref 6–12)
POTASSIUM SERPL-SCNC: 4.4 MMOL/L (ref 3.5–5.2)
PROTHROMBIN TIME: 27.4 SECONDS (ref 11.7–14.2)
RBC # BLD AUTO: 3.98 10*6/MM3 (ref 4.14–5.8)
SODIUM SERPL-SCNC: 130 MMOL/L (ref 136–145)
WBC NRBC COR # BLD AUTO: 9.94 10*3/MM3 (ref 3.4–10.8)

## 2023-12-09 PROCEDURE — 80048 BASIC METABOLIC PNL TOTAL CA: CPT | Performed by: STUDENT IN AN ORGANIZED HEALTH CARE EDUCATION/TRAINING PROGRAM

## 2023-12-09 PROCEDURE — 85610 PROTHROMBIN TIME: CPT | Performed by: INTERNAL MEDICINE

## 2023-12-09 PROCEDURE — 84100 ASSAY OF PHOSPHORUS: CPT | Performed by: STUDENT IN AN ORGANIZED HEALTH CARE EDUCATION/TRAINING PROGRAM

## 2023-12-09 PROCEDURE — 83735 ASSAY OF MAGNESIUM: CPT | Performed by: STUDENT IN AN ORGANIZED HEALTH CARE EDUCATION/TRAINING PROGRAM

## 2023-12-09 PROCEDURE — 85025 COMPLETE CBC W/AUTO DIFF WBC: CPT | Performed by: STUDENT IN AN ORGANIZED HEALTH CARE EDUCATION/TRAINING PROGRAM

## 2023-12-09 RX ORDER — BENZONATATE 100 MG/1
100 CAPSULE ORAL 3 TIMES DAILY PRN
Qty: 60 CAPSULE | Refills: 0 | Status: SHIPPED | OUTPATIENT
Start: 2023-12-09

## 2023-12-09 RX ORDER — GUAIFENESIN 600 MG/1
1200 TABLET, EXTENDED RELEASE ORAL EVERY 12 HOURS SCHEDULED
Qty: 60 TABLET | Refills: 0 | Status: SHIPPED | OUTPATIENT
Start: 2023-12-09

## 2023-12-09 RX ORDER — AMOXICILLIN 250 MG
2 CAPSULE ORAL 2 TIMES DAILY
Qty: 60 TABLET | Refills: 0 | Status: SHIPPED | OUTPATIENT
Start: 2023-12-09

## 2023-12-09 RX ORDER — POLYETHYLENE GLYCOL 3350 17 G/17G
17 POWDER, FOR SOLUTION ORAL DAILY PRN
Qty: 30 EACH | Refills: 0 | Status: SHIPPED | OUTPATIENT
Start: 2023-12-09

## 2023-12-09 RX ORDER — IPRATROPIUM BROMIDE AND ALBUTEROL SULFATE 2.5; .5 MG/3ML; MG/3ML
3 SOLUTION RESPIRATORY (INHALATION) EVERY 6 HOURS PRN
Start: 2023-12-09

## 2023-12-09 RX ORDER — WARFARIN SODIUM 2 MG/1
4 TABLET ORAL NIGHTLY
Qty: 60 TABLET | Refills: 0 | Status: SHIPPED | OUTPATIENT
Start: 2023-12-09

## 2023-12-09 RX ADMIN — Medication 10 ML: at 09:35

## 2023-12-09 RX ADMIN — DOCUSATE SODIUM 50MG AND SENNOSIDES 8.6MG 2 TABLET: 8.6; 5 TABLET, FILM COATED ORAL at 09:34

## 2023-12-09 RX ADMIN — GUAIFENESIN 1200 MG: 600 TABLET, EXTENDED RELEASE ORAL at 09:34

## 2023-12-09 RX ADMIN — ASPIRIN 81 MG: 81 TABLET, CHEWABLE ORAL at 09:34

## 2023-12-09 RX ADMIN — HYDROCODONE BITARTRATE AND HOMATROPINE METHYLBROMIDE ORAL SOLUTION 5 ML: 5; 1.5 LIQUID ORAL at 03:52

## 2023-12-09 RX ADMIN — ATORVASTATIN CALCIUM 80 MG: 80 TABLET, FILM COATED ORAL at 09:35

## 2023-12-09 NOTE — DISCHARGE SUMMARY
Patient Name: Danni Aguilar  : 1926  MRN: [id number]    Date of Admission: 2023  Date of Discharge:  2023  Primary Care Physician: Justin Longoria MD      Chief Complaint:   Weakness - Generalized, Fever, and Cough      Discharge Diagnoses     Active Hospital Problems    Diagnosis  POA    **RSV infection [B33.8]  Yes    RSV (respiratory syncytial virus infection) [B33.8]  Yes    Hyponatremia [E87.1]  Yes    Thrombocytosis [D75.839]  Unknown    Recent cerebrovascular accident (CVA) [Z86.73]  Yes    Atrial fibrillation [I48.91]  Yes    Long term (current) use of anticoagulants [Z79.01]  Not Applicable      Resolved Hospital Problems   No resolved problems to display.        Hospital Course     Mr. Aguilar is a 89 y.o. male with a history of atrial fibrillation on long-term anticoagulation, aortic stenosis, right MCA territory CVA and parietal CVA, right carotid stenosis s/p stenting () who presented to [facility] initially complaining of weakness and cough.  Please see the admitting history and physical for further details.  He was admitted to the hospital for further evaluation and treatment.     Acute RSV infection  Weakness/age related physical debility  - Patient noted to be + for RSV on admission. CXR obtained and was negative for focal consolidation. Also had elevation in WBC count which subsequently normalized over the course of his hospital stay. He was treated with supportive medications and symptoms improved. At time of discharge, patient was on room air with O2 sat >90%. Will prescribe Mucinex, Tessalon Perles, Duonebs PRN. Recommend close follow up with PCP within 1 week after discharge for reassessment to guide ongoing management decisions. Recommend close monitoring of patient's respiratory status at facility. Plan for discharge to SNF.     Hyponatremia  - Na 129 on admission, felt to be related to hypovolemia as values improved with IVF to 131. However, values  worsened to 125 on 12/06. Further workup ordered, urine studies likely skewed due to patient already receiving IVF, but uric acid low--concerning for possible SIADH. TSH normal. Patient could have had a component of hypovolemia on admission, which explains correction initially. IVF stopped, sodium subsequently improved. On most recent labs prior to discharge, sodium remains slightly low but improved from prior and stable at present. Recommend repeat BMP within 2 to 3 days at facility and again with PCP within 1 week for reassessment.     Anemia  - Hemoglobin low on most recent labs, however, stable from prior. No evidence of overt blood loss. No indications for acute intervention at this time, however, do recommend close monitoring after discharge.  Recommend repeat CBC within 2 to 3 days at facility and again with PCP within 1 week after discharge for reassessment    Thrombocytosis  - Noted on labs, likely reactive in setting of acute infection. Chart reviewed, he has had history of this in the past dating back to 2020. Recommend close monitoring at facility with repeat CBC in 2-3 days after discharge. Also recommend follow up with PCP or physician at facility within 3-5 days after discharge. Consider hematology evaluation if platelet count continues to rise, at discretion of PCP.     Permanent atrial fibrillation  Pulmonary HTN by echo- RVSP 44  Mod/severe aortic stenosis  -Patient continued on home warfarin during hospital stay, dosing per pharmacy.  His INR levels fluctuated throughout the course of his stay, he required dosing adjustments.  Most recent INR prior to discharge was within therapeutic window, discussed with pharmacy who recommended warfarin 4 mg daily after discharge and that patient will need repeat INR within 1 to 2 days at facility and close monitoring thereafter to help guide further dosage adjustments.  Recommend attention to this by patient's PCP and physician at facility to ensure that INR  is closely monitored.      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     Hypophosphatemia, resolved  Hypomagnesemia, resolved  - Phos and Mag low on prior labs, repleted via electrolyte protocol and improved on subsequent testing. Recommend repeat level checked within 2 to 3 days after discharge at facility and again with PCP within 1 week after discharge for reassessment.    Day of Discharge     Subjective:  Patient seen and examined this morning.  Hospital day 5.  At time of my examination, patient is resting comfortably in bed without evidence of acute distress.  He is currently on room air with O2 sats greater than 90%, he denies acute respiratory complaints.  Discussed with nursing, no acute events overnight.  Plan for discharge to SNF today.    Physical Exam:  Temp:  [97.2 °F (36.2 °C)-97.5 °F (36.4 °C)] 97.5 °F (36.4 °C)  Heart Rate:  [82-85] 85  Resp:  [18] 18  BP: (133-164)/(65-97) 133/65  Body mass index is 19.61 kg/m².  Physical Exam  Vitals and nursing note reviewed.   Constitutional:       General: He is awake. He is not in acute distress.     Comments: Elderly/frail, chronically ill appearing   Cardiovascular:      Rate and Rhythm: Normal rate and regular rhythm.      Pulses: Normal pulses.      Heart sounds: Normal heart sounds.   Pulmonary:      Effort: Pulmonary effort is normal. No respiratory distress.      Breath sounds: No wheezing.   Abdominal:      General: Bowel sounds are normal.      Palpations: Abdomen is soft.      Tenderness: There is no abdominal tenderness.   Skin:     General: Skin is warm and dry.   Neurological:      Mental Status: He is alert. He is confused.   Psychiatric:         Behavior: Behavior is cooperative.         Consultants     Consult Orders (all) (From admission, onward)       Start     Ordered    12/04/23 0651  Inpatient Case  Management  Consult  Once        Provider:  (Not yet assigned)    12/04/23 1635    12/04/23 1409  [facility] (on-call MD unless specified) Details  Once        Specialty:  Hospitalist  Provider:  (Not yet assigned)    12/04/23 1408                  Procedures     * Surgery not found *    Imaging Results (All)       Procedure Component Value Units Date/Time    XR Chest AP [[id number]] Collected: 12/04/23 1246     Updated: 12/04/23 1253    Narrative:      EXAM: XR CHEST AP-     INDICATION: Cough and fever     COMPARISON: 12/9/2020          Impression:      No focal consolidation. Left lower lobe linear  atelectasis/scarring. No pleural effusion or pneumothorax. Normal size  cardiomediastinal silhouette. Dilated central pulmonary vasculature  without findings of redistribution/edema. No focal osseous abnormality.           This report was finalized on 12/4/2023 12:49 PM by Dr. Jean-Pierre Palmer M.D on Workstation: [id number]             Results for orders placed during the hospital encounter of 08/14/20    Duplex Carotid Ultrasound CAR    Interpretation Summary  · Right carotid stent present without any evidence of hemodynamically significant in-stent restenosis.  · Left internal carotid artery plaque without significant stenosis.    Results for orders placed during the hospital encounter of 07/20/17    Adult transthoracic echo complete    Interpretation Summary  · Calculated EF = 71.1%.  · Left ventricular diastolic dysfunction (grade I) consistent with impaired relaxation.  · Right ventricular cavity is mild-to-moderately dilated.  · calcification of the aortic valve  · Mild mitral valve regurgitation is present  · Moderate tricuspid valve regurgitation is present.  · Estimated right ventricular systolic pressure from tricuspid regurgitation is mildly elevated (35-45 mmHg). Calculated right ventricular systolic pressure from tricuspid regurgitation is 41.2 mmHg.  · Calculated right ventricular systolic pressure  "from tricuspid regurgitation is 41.2 mmHg.  · Mild aortic valve regurgitation is present.  · Saline test results are negative.    Pertinent Labs     Results from last 7 days   Lab Units 12/09/23  0718 12/08/23  0803 12/07/23  1126 12/06/23  0636   WBC 10*3/mm3 9.94 7.93 9.05 12.07*   HEMOGLOBIN g/dL 10.7* 11.7* 10.2* 10.2*   PLATELETS 10*3/mm3 903* 873* 685* 599*     Results from last 7 days   Lab Units 12/09/23  0718 12/08/23  1807 12/08/23  0803 12/07/23  0923 12/06/23  0636   SODIUM mmol/L 130*  --  132* 130* 125*   POTASSIUM mmol/L 4.4 4.4 3.5 4.1 4.2   CHLORIDE mmol/L 96*  --  96* 99 96*   CO2 mmol/L 21.8*  --  24.6 24.0 21.0*   BUN mg/dL 12  --  11 12 18   CREATININE mg/dL 0.60*  --  0.65* 0.73* 0.68*   GLUCOSE mg/dL 91  --  101* 99 109*   EGFR mL/min/1.73 88.3  --  86.2 83.3 85.1     Results from last 7 days   Lab Units 12/05/23  0743 12/04/23  1145   ALBUMIN g/dL 3.4* 3.3*   BILIRUBIN mg/dL 0.7 0.9   ALK PHOS U/L 56 53   AST (SGOT) U/L 50* 43*   ALT (SGPT) U/L 29 25     Results from last 7 days   Lab Units 12/09/23  0718 12/08/23  0803 12/07/23  1126 12/07/23  0923 12/06/23  2140 12/06/23  0636 12/05/23  0743 12/05/23  0743 12/04/23  1145   CALCIUM mg/dL 8.2 8.5  --  8.5  --  8.0*  --  8.8 8.0*   ALBUMIN g/dL  --   --   --   --   --   --   --  3.4* 3.3*   MAGNESIUM mg/dL 1.8 1.7 1.7  --   --  1.6*  --   --   --    PHOSPHORUS mg/dL 3.0 2.7 2.2*  --  2.9 2.1*   < >  --   --     < > = values in this interval not displayed.       Results from last 7 days   Lab Units 12/04/23  1145   HSTROP T ng/L 43*   PROBNP pg/mL 3,817.0*     Results from last 7 days   Lab Units 12/06/23  2332 12/06/23  0636   SODIUM UR mmol/L 27  --    CREATININE UR mg/dL 70.3  --    OSMOLALITY UR mOsm/kg 399  --    URIC ACID mg/dL  --  2.7*         Invalid input(s): \"LDLCALC\"  Results from last 7 days   Lab Units 12/04/23  1346 12/04/23  1224   BLOODCX  No growth at 4 days No growth at 4 days     Results from last 7 days   Lab Units " 12/04/23  1223   COVID19  Not Detected       Test Results Pending at Discharge     Pending Labs       Order Current Status    Blood Culture - Blood, Arm, Left Preliminary result    Blood Culture - Blood, Arm, Left Preliminary result            Discharge Details        Discharge Medications        New Medications        Instructions Start Date   benzonatate 100 MG capsule  Commonly known as: Tessalon Perles   100 mg, Oral, 3 Times Daily PRN      guaiFENesin 600 MG 12 hr tablet  Commonly known as: MUCINEX   1,200 mg, Oral, Every 12 Hours Scheduled      ipratropium-albuterol 0.5-2.5 mg/3 ml nebulizer  Commonly known as: DUO-NEB   3 mL, Nebulization, Every 6 Hours PRN      polyethylene glycol 17 g packet  Commonly known as: MIRALAX   17 g, Oral, Daily PRN      sennosides-docusate 8.6-50 MG per tablet  Commonly known as: PERICOLACE   2 tablets, Oral, 2 Times Daily             Changes to Medications        Instructions Start Date   warfarin 2 MG tablet  Commonly known as: Coumadin  What changed:   medication strength  how much to take  when to take this   4 mg, Oral, Nightly             Continue These Medications        Instructions Start Date   aspirin 81 MG chewable tablet   81 mg, Oral, Daily      atorvastatin 80 MG tablet  Commonly known as: LIPITOR   80 mg, Oral, Daily               No Known Allergies    Discharge Disposition:  Skilled Nursing Facility (DC - External)      Discharge Diet:  Diet Order   Procedures    Diet: Cardiac Diets; Healthy Heart (2-3 Na+); Texture: Regular Texture (IDDSI 7); Fluid Consistency: Thin (IDDSI 0)       Discharge Activity:   Activity Instructions       Other Activity Instructions      Activity Instructions: Per PT at SNF            CODE STATUS:    Code Status and Medical Interventions:   Ordered at: 12/04/23 1534     Code Status (Patient has no pulse and is not breathing):    CPR (Attempt to Resuscitate)     Medical Interventions (Patient has pulse or is breathing):    Full Support        No future appointments.  Additional Instructions for the Follow-ups that You Need to Schedule       Discharge Follow-up with PCP   As directed       Currently Documented PCP:    Justin Longoria MD    PCP Phone Number:    [phone]     Follow Up Details: Within 1 week after discharge for reassessment, medication and symptom review, blood pressure check, BMP, CBC, magnesium and phosphorus levels, INR check        Discharge Follow-up with Specialty: Cardiology   As directed      Specialty: Cardiology   Follow Up Details: Recommend the patient follow-up with his primary cardiologist within 1 to 2 weeks after discharge or as scheduled for ongoing evaluation               Contact information for follow-up providers       Justin Longoria MD .    Specialty: Family Medicine  Why: Within 1 week after discharge for reassessment, medication and symptom review, blood pressure check, BMP, CBC, magnesium and phosphorus levels, INR check  Contact information:  [address] [facility] [address] [phone]                       Contact information for after-discharge care       Destination       [facility] .    Service: Skilled Nursing  Contact information:  [address]  [facility] [address] [phone]                                   Additional Instructions for the Follow-ups that You Need to Schedule       Discharge Follow-up with PCP   As directed       Currently Documented PCP:    Justin Longoria MD    PCP Phone Number:    [phone]     Follow Up Details: Within 1 week after discharge for reassessment, medication and symptom review, blood pressure check, BMP, CBC, magnesium and phosphorus levels, INR check        Discharge Follow-up with Specialty: Cardiology   As directed      Specialty: Cardiology   Follow Up Details: Recommend the patient follow-up with his primary cardiologist within 1 to 2 weeks after discharge or as scheduled for ongoing evaluation             Time Spent on Discharge:  Greater than 30 minutes      DO [facility]  12/09/23  09:17 EST

## 2023-12-09 NOTE — PLAN OF CARE
Goal Outcome Evaluation:  Plan of Care Reviewed With: patient        Progress: no change  Outcome Evaluation: Patient intermittent confusion. Multiple attempts to get up, moved from bed to chair and back. Patient assist x1 w urinal. Loose stool x4, multiple 200ml urine outputs. No pain complaints; intermittent coughing. Plan to d/c today . Daughter to transport at approx. 1pm 12/9 to facility.

## 2023-12-09 NOTE — CASE MANAGEMENT/SOCIAL WORK
Case Management Discharge Note      Final Note: dc snf         Selected Continued Care - Discharged on 12/9/2023 Admission date: 12/4/2023 - Discharge disposition: Skilled Nursing Facility (DC - External)      Destination Coordination complete.      Service Provider Selected Services Address Phone Fax Patient Preferred    [facility] [address] , [facility] [address] [phone] --              Durable Medical Equipment    No services have been selected for the patient.                Dialysis/Infusion    No services have been selected for the patient.                Home Medical Care    No services have been selected for the patient.                Therapy    No services have been selected for the patient.                Community Resources    No services have been selected for the patient.                Community & DME    No services have been selected for the patient.                         Final Discharge Disposition Code: 03 - skilled nursing facility (SNF)

## 2024-03-31 ENCOUNTER — HOSPITAL ENCOUNTER (EMERGENCY)
Facility: HOSPITAL | Age: 89
Discharge: HOME | End: 2024-03-31
Attending: EMERGENCY MEDICINE | Admitting: EMERGENCY MEDICINE
Payer: MEDICARE

## 2024-03-31 VITALS
SYSTOLIC BLOOD PRESSURE: 152 MMHG | HEIGHT: 68 IN | HEART RATE: 90 BPM | WEIGHT: 145 LBS | RESPIRATION RATE: 18 BRPM | BODY MASS INDEX: 21.98 KG/M2 | OXYGEN SATURATION: 98 % | TEMPERATURE: 97 F | DIASTOLIC BLOOD PRESSURE: 81 MMHG

## 2024-03-31 DIAGNOSIS — R04.0 EPISTAXIS: Primary | ICD-10-CM

## 2024-03-31 LAB
ANION GAP SERPL CALCULATED.3IONS-SCNC: 8 MMOL/L (ref 5–15)
BASOPHILS # BLD AUTO: 0.16 10*3/MM3 (ref 0–0.2)
BASOPHILS NFR BLD AUTO: 1.9 % (ref 0–1.5)
BUN SERPL-MCNC: 15 MG/DL (ref 8–23)
BUN/CREAT SERPL: 25.9 (ref 7–25)
CALCIUM SPEC-SCNC: 6.1 MG/DL (ref 8.2–9.6)
CHLORIDE SERPL-SCNC: 115 MMOL/L (ref 98–107)
CO2 SERPL-SCNC: 18 MMOL/L (ref 22–29)
CREAT SERPL-MCNC: 0.58 MG/DL (ref 0.76–1.27)
DEPRECATED RDW RBC AUTO: 41.4 FL (ref 37–54)
EGFRCR SERPLBLD CKD-EPI 2021: 88.7 ML/MIN/1.73
EOSINOPHIL # BLD AUTO: 0.22 10*3/MM3 (ref 0–0.4)
EOSINOPHIL NFR BLD AUTO: 2.6 % (ref 0.3–6.2)
ERYTHROCYTE [DISTWIDTH] IN BLOOD BY AUTOMATED COUNT: 15.1 % (ref 12.3–15.4)
GLUCOSE SERPL-MCNC: 74 MG/DL (ref 65–99)
HCT VFR BLD AUTO: 40.8 % (ref 37.5–51)
HGB BLD-MCNC: 12 G/DL (ref 13–17.7)
IMM GRANULOCYTES # BLD AUTO: 0.03 10*3/MM3 (ref 0–0.05)
IMM GRANULOCYTES NFR BLD AUTO: 0.4 % (ref 0–0.5)
INR PPP: 2.75 (ref 0.9–1.1)
LYMPHOCYTES # BLD AUTO: 1.1 10*3/MM3 (ref 0.7–3.1)
LYMPHOCYTES NFR BLD AUTO: 12.9 % (ref 19.6–45.3)
MCH RBC QN AUTO: 22.8 PG (ref 26.6–33)
MCHC RBC AUTO-ENTMCNC: 29.4 G/DL (ref 31.5–35.7)
MCV RBC AUTO: 77.4 FL (ref 79–97)
MONOCYTES # BLD AUTO: 0.65 10*3/MM3 (ref 0.1–0.9)
MONOCYTES NFR BLD AUTO: 7.6 % (ref 5–12)
NEUTROPHILS NFR BLD AUTO: 6.4 10*3/MM3 (ref 1.7–7)
NEUTROPHILS NFR BLD AUTO: 74.6 % (ref 42.7–76)
NRBC BLD AUTO-RTO: 0 /100 WBC (ref 0–0.2)
PLATELET # BLD AUTO: 817 10*3/MM3 (ref 140–450)
PMV BLD AUTO: 8.7 FL (ref 6–12)
POTASSIUM SERPL-SCNC: 3.6 MMOL/L (ref 3.5–5.2)
PROTHROMBIN TIME: 29.4 SECONDS (ref 11.7–14.2)
RBC # BLD AUTO: 5.27 10*6/MM3 (ref 4.14–5.8)
SODIUM SERPL-SCNC: 141 MMOL/L (ref 136–145)
WBC NRBC COR # BLD AUTO: 8.56 10*3/MM3 (ref 3.4–10.8)

## 2024-03-31 PROCEDURE — 80048 BASIC METABOLIC PNL TOTAL CA: CPT | Performed by: EMERGENCY MEDICINE

## 2024-03-31 PROCEDURE — 85025 COMPLETE CBC W/AUTO DIFF WBC: CPT | Performed by: EMERGENCY MEDICINE

## 2024-03-31 PROCEDURE — 85610 PROTHROMBIN TIME: CPT | Performed by: EMERGENCY MEDICINE

## 2024-03-31 PROCEDURE — 99283 EMERGENCY DEPT VISIT LOW MDM: CPT

## 2024-03-31 RX ORDER — LIDOCAINE HYDROCHLORIDE 20 MG/ML
JELLY TOPICAL AS NEEDED
Status: DISCONTINUED | OUTPATIENT
Start: 2024-03-31 | End: 2024-03-31 | Stop reason: HOSPADM

## 2024-03-31 RX ORDER — TRANEXAMIC ACID 100 MG/ML
500 INJECTION, SOLUTION INTRAVENOUS ONCE
Status: COMPLETED | OUTPATIENT
Start: 2024-03-31 | End: 2024-03-31

## 2024-03-31 RX ADMIN — PHENYLEPHRINE HYDROCHLORIDE 2 SPRAY: 0.5 SPRAY NASAL at 09:10

## 2024-03-31 RX ADMIN — LIDOCAINE HYDROCHLORIDE: 20 JELLY TOPICAL at 09:10

## 2024-03-31 RX ADMIN — TRANEXAMIC ACID 500 MG: 1 INJECTION, SOLUTION INTRAVENOUS at 09:10

## 2024-03-31 NOTE — DISCHARGE INSTRUCTIONS
You are advised to follow closely with Dr. Jimenez or ENT specialist of your choice in 2-3 days for recheck, final results of lab work and imaging testing, and further testing/treatment as needed.    Humidified air, hydrate well.  Do not pick, below, SNF, snort or otherwise interfere with nasal packing.    You are advised that discontinuing warfarin will decrease your chances of rebleeding and are advised to do so.    Follow-up with primary care provider, Dr. Longoria for follow-up of blood pressure, low calcium levels and other incidental findings on labs today.    Antibiotics are no longer recommended in the setting of nasal packing, especially given potential risk of increasing blood thinning levels, GI complications while on antibiotics.    Please return to the emergency department immediately with chest pain different than usual for you, shortness of air, abdominal pain, persistent vomiting/fever, blood in emesis or stool, lightheadedness/fainting, problems with speech, one sided weakness/numbness, new incontinence, problems with vision, altered mental status, persistent bleeding or for worsening of symptoms or other concerns.

## 2024-03-31 NOTE — ED PROVIDER NOTES
EMERGENCY DEPARTMENT ENCOUNTER    CHIEF COMPLAINT  Chief Complaint: Nosebleed  History given by: Patient and family at bedside  History limited by: Nothing  Room Number: 14/14  PMD: Justin Longoria MD      HPI:  Pt is a 89 y.o. male presents complaining of nosebleed that has been persistent since 4 AM today.  Family and patient report he takes warfarin for atrial fibrillation and history of TIAs, they deny mechanical heart valve or any history of PE/DVT or other clots.  Patient denies lightheadedness, family reports last time patient's INR levels were checked was approximately 2 weeks ago and that the patient has had significant difficulty regulating his blood thinning levels      Aggravating Factors: Unknown  Alleviating Factors: Nothing  Treatment before arrival: Patient held pressure on nose  Chronic or social conditions impacting care: Patient on warfarin    Additional sources:  Discussed/ obtained information from independent historians: Family at bedside    External (non-ED) record review:   Patient with admission 12/4 through 12/9/2023 with weakness, fever, cough, RSV infection, hyponatremia, noted to have atrial fibrillation and history of CVA during that admission.        PAST MEDICAL HISTORY  Active Ambulatory Problems     Diagnosis Date Noted    Acute CVA (cerebrovascular accident) 07/20/2017    Atrial fibrillation 07/20/2017    Long term (current) use of anticoagulants 07/20/2017    Carotid stenosis 07/20/2017    Urinary retention 07/25/2017    Cerebrovascular accident (CVA) due to stenosis of right carotid artery 08/25/2017    Hyperlipidemia 01/29/2018    Cerebrovascular disease 12/09/2020    Recent cerebrovascular accident (CVA) 03/10/2021    RSV infection 12/04/2023    Hyponatremia 12/04/2023    Thrombocytosis 12/04/2023    RSV (respiratory syncytial virus infection) 12/05/2023     Resolved Ambulatory Problems     Diagnosis Date Noted    No Resolved Ambulatory Problems     Past Medical History:    Diagnosis Date    A-fib     Carotid artery stenosis     Irregular heart beat        PAST SURGICAL HISTORY  Past Surgical History:   Procedure Laterality Date    BACK SURGERY      CAROTID ARTERY ANGIOPLASTY      CEREBRAL ANGIOGRAM N/A 07/24/2017    Procedure: DIAGNOSTIC CEREBRAL ANGIOGRAM AND RIGHT CAROTID STENT PLACEMENT;  Surgeon: Mauricio Yung MD;  Location: [facility] 18/19;  Service:     RECTAL SURGERY         FAMILY HISTORY  History reviewed. No pertinent family history.    SOCIAL HISTORY  Social History     Socioeconomic History    Marital status:    Tobacco Use    Smoking status: Never    Smokeless tobacco: Never   Vaping Use    Vaping status: Never Used   Substance and Sexual Activity    Alcohol use: No    Drug use: Defer    Sexual activity: Defer       ALLERGIES  Patient has no known allergies.    REVIEW OF SYSTEMS  Review of Systems    PHYSICAL EXAM  ED Triage Vitals [03/31/24 0749]   Temp Heart Rate Resp BP SpO2   97 °F (36.1 °C) 91 18 (!) 191/109 97 %      Temp src Heart Rate Source Patient Position BP Location FiO2 (%)   Tympanic Monitor Sitting Right arm --       Physical Exam  Vitals and nursing note reviewed.   Constitutional:       General: He is in acute distress.   HENT:      Head: Normocephalic and atraumatic.      Nose:      Comments: Epistaxis  Cardiovascular:      Rate and Rhythm: Normal rate and regular rhythm.      Pulses:           Posterior tibial pulses are 2+ on the right side and 2+ on the left side.      Heart sounds: Normal heart sounds. No murmur heard.  Pulmonary:      Effort: Pulmonary effort is normal. No respiratory distress.      Breath sounds: Normal breath sounds. No wheezing.   Abdominal:      General: Bowel sounds are normal.      Palpations: Abdomen is soft.      Tenderness: There is no abdominal tenderness. There is no guarding or rebound.   Musculoskeletal:         General: Normal range of motion.      Cervical back: Normal range of motion.   Skin:      General: Skin is warm and dry.   Neurological:      Mental Status: He is alert and oriented to person, place, and time.   Psychiatric:         Mood and Affect: Affect normal.         LAB RESULTS  No results found for this or any previous visit (from the past 24 hour(s)).    I ordered the above labs and reviewed the results    RADIOLOGY  No Radiology Exams Resulted Within Past 24 Hours          PROCEDURES  Epistaxis Management    Date/Time: 3/31/2024 9:05 AM    Performed by: Ariana Patel MD  Authorized by: Ariana Patel MD    Consent:     Consent obtained:  Verbal    Consent given by:  Patient    Risks discussed:  Bleeding and pain    Alternatives discussed:  No treatment  Universal protocol:     Patient identity confirmed:  Verbally with patient  Anesthesia:     Anesthesia method:  Topical application    Topical anesthesia: Lidocaine spray.  Procedure details:     Treatment site:  R anterior    Treatment method:  Anterior pack    Treatment complexity:  Extensive    Treatment episode: initial    Post-procedure details:     Assessment:  Bleeding decreased    Procedure completion:  Tolerated well, no immediate complications  Comments:      TXA with 550 mm pack, Rick-Synephrine, lidocaine spray utilized after clearing clots from right nasal cavity        MEDICATIONS GIVEN IN THE EMERGENCY DEPARTMENT  Medications - No data to display        MEDICAL DECISION MAKING  Results were reviewed/discussed with the patient and they were also made aware of online access. Pt also made aware that some labs, such as cultures, will not be resulted during ER visit and followup with PMD is necessary.   EKGs and labs independently viewed and interpreted by me.  Discussion below represents my analysis of pertinent findings related to patient's condition, differential diagnosis, treatment plan and final disposition.    Differential diagnosis includes but is not limited to;  Mucosal irritation, nasal polyp, nasal fracture, nasal septum  perforation, supratherapeutic INR, sinusitis, anterior nasal bleed, posterior nasal bleed, allergic rhinitis,    Independent interpretation of labs, radiology studies, and discussions with consultants:  ED Course as of 04/02/24 1644   Sun Mar 31, 2024   1000 Patient seen and reexamined, bleeding completely stopped, oropharynx clear, patient voices understanding of need to follow-up with PCP and ENT specialist for recheck.  Further testing, treatment as needed.  Family is aware INR is 2.75, again reiterated the patient is taking medications for A-fib and history of multiple TIAs, denies active thrombosis or history of.  Patient and family voiced understanding of option for discontinuing warfarin in setting of nosebleed and are encouraged to do so.  They are aware that patient will lose protection from stroke if he discontinues warfarin.  If patient chooses to continue warfarin his chances of rebleeding will be higher. [TO]      ED Course User Index  [TO] Ariana Patel MD               [facility]     Amount and/or Complexity of Data Reviewed  Clinical lab tests: reviewed           DIAGNOSIS  Final diagnoses:   Epistaxis       DISPOSITION  DISCHARGE    Patient discharged in stable condition.    Reviewed implications of results, diagnosis, meds, responsibility to follow up, warning signs and symptoms of possible worsening, potential complications and reasons to return to ER, including persistent or worsening bleeding, lightheadedness    Patient/Family voiced understanding of above instructions.    Discussed plan for discharge, as there is no emergent indication for admission. Patient referred to primary care provider for BP management due to today's BP. Pt/family is agreeable and understands need for follow up and repeat testing.  Pt is aware that discharge does not mean that nothing is wrong but it indicates no emergency is present that requires admission and they must continue care with follow-up as given below or physician  of their choice.     FOLLOW-UP  Justin Longoria MD  [address]  [facility] [id number] [phone]    Schedule an appointment as soon as possible for a visit in 3 days  EVEN IF WELL    Matteo Jimenez MD  [address]  [address]  [facility] [id number] [phone]    Schedule an appointment as soon as possible for a visit in 3 days  EVEN IF WELL         Medication List      No changes were made to your prescriptions during this visit.           Latest Documented Vital Signs:  As of 07:56 EDT  BP- 152/81 HR- 88 Temp- 97 °F (36.1 °C) (Tympanic) O2 sat- 98%    --      Please note that portions of this note were completed with a voice recognition program.       Note Disclaimer: At [facility], we believe that sharing information builds trust and better relationships. You are receiving this note because you are receiving care at [facility] or recently visited. It is possible you will see health information before a provider has talked with you about it. This kind of information can be easy to misunderstand. To help you fully understand what it means for your health, we urge you to discuss this note with your provider.     Ariana Patel MD  04/02/24 9442

## 2024-07-28 ENCOUNTER — APPOINTMENT (OUTPATIENT)
Dept: GENERAL RADIOLOGY | Facility: HOSPITAL | Age: 89
End: 2024-07-28
Payer: MEDICARE

## 2024-07-28 ENCOUNTER — APPOINTMENT (OUTPATIENT)
Dept: CT IMAGING | Facility: HOSPITAL | Age: 89
End: 2024-07-28
Payer: MEDICARE

## 2024-07-28 ENCOUNTER — HOSPITAL ENCOUNTER (INPATIENT)
Facility: HOSPITAL | Age: 89
LOS: 1 days | Discharge: HOME | End: 2024-07-31
Attending: EMERGENCY MEDICINE | Admitting: INTERNAL MEDICINE
Payer: MEDICARE

## 2024-07-28 ENCOUNTER — APPOINTMENT (OUTPATIENT)
Dept: MRI IMAGING | Facility: HOSPITAL | Age: 89
End: 2024-07-28
Payer: MEDICARE

## 2024-07-28 DIAGNOSIS — S22.41XA CLOSED FRACTURE OF MULTIPLE RIBS OF RIGHT SIDE, INITIAL ENCOUNTER: Primary | ICD-10-CM

## 2024-07-28 DIAGNOSIS — S51.011A SKIN TEAR OF RIGHT ELBOW WITHOUT COMPLICATION, INITIAL ENCOUNTER: ICD-10-CM
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
## 2024-07-28 PROBLEM — S22.31XA RIGHT RIB FRACTURE: Status: ACTIVE | Noted: 2024-07-28

## 2024-07-28 LAB
ALBUMIN SERPL-MCNC: 4 G/DL (ref 3.5–5.2)
ALBUMIN/GLOB SERPL: 1.2 G/DL
ALP SERPL-CCNC: 63 U/L (ref 39–117)
ALT SERPL W P-5'-P-CCNC: 15 U/L (ref 1–41)
ANION GAP SERPL CALCULATED.3IONS-SCNC: 13 MMOL/L (ref 5–15)
AST SERPL-CCNC: 24 U/L (ref 1–40)
B PARAPERT DNA SPEC QL NAA+PROBE: NOT DETECTED
B PERT DNA SPEC QL NAA+PROBE: NOT DETECTED
BACTERIA UR QL AUTO: ABNORMAL /HPF
BASOPHILS # BLD AUTO: 0.1 10*3/MM3 (ref 0–0.2)
BASOPHILS NFR BLD AUTO: 0.9 % (ref 0–1.5)
BILIRUB SERPL-MCNC: 0.9 MG/DL (ref 0–1.2)
BILIRUB UR QL STRIP: NEGATIVE
BUN SERPL-MCNC: 17 MG/DL (ref 8–23)
BUN/CREAT SERPL: 15.2 (ref 7–25)
C PNEUM DNA NPH QL NAA+NON-PROBE: NOT DETECTED
CALCIUM SPEC-SCNC: 9.2 MG/DL (ref 8.2–9.6)
CHLORIDE SERPL-SCNC: 95 MMOL/L (ref 98–107)
CLARITY UR: CLEAR
CO2 SERPL-SCNC: 22 MMOL/L (ref 22–29)
COLOR UR: YELLOW
CREAT SERPL-MCNC: 1.12 MG/DL (ref 0.76–1.27)
DEPRECATED RDW RBC AUTO: 44.5 FL (ref 37–54)
EGFRCR SERPLBLD CKD-EPI 2021: 59.7 ML/MIN/1.73
EOSINOPHIL # BLD AUTO: 0.04 10*3/MM3 (ref 0–0.4)
EOSINOPHIL NFR BLD AUTO: 0.3 % (ref 0.3–6.2)
ERYTHROCYTE [DISTWIDTH] IN BLOOD BY AUTOMATED COUNT: 15.4 % (ref 12.3–15.4)
FLUAV SUBTYP SPEC NAA+PROBE: NOT DETECTED
FLUBV RNA ISLT QL NAA+PROBE: NOT DETECTED
GLOBULIN UR ELPH-MCNC: 3.4 GM/DL
GLUCOSE BLDC GLUCOMTR-MCNC: 96 MG/DL (ref 70–130)
GLUCOSE SERPL-MCNC: 104 MG/DL (ref 65–99)
GLUCOSE UR STRIP-MCNC: NEGATIVE MG/DL
HADV DNA SPEC NAA+PROBE: NOT DETECTED
HCOV 229E RNA SPEC QL NAA+PROBE: NOT DETECTED
HCOV HKU1 RNA SPEC QL NAA+PROBE: NOT DETECTED
HCOV NL63 RNA SPEC QL NAA+PROBE: NOT DETECTED
HCOV OC43 RNA SPEC QL NAA+PROBE: NOT DETECTED
HCT VFR BLD AUTO: 42.2 % (ref 37.5–51)
HGB BLD-MCNC: 12.9 G/DL (ref 13–17.7)
HGB UR QL STRIP.AUTO: NEGATIVE
HMPV RNA NPH QL NAA+NON-PROBE: NOT DETECTED
HOLD SPECIMEN: NORMAL
HPIV1 RNA ISLT QL NAA+PROBE: NOT DETECTED
HPIV2 RNA SPEC QL NAA+PROBE: NOT DETECTED
HPIV3 RNA NPH QL NAA+PROBE: NOT DETECTED
HPIV4 P GENE NPH QL NAA+PROBE: NOT DETECTED
HYALINE CASTS UR QL AUTO: ABNORMAL /LPF
IMM GRANULOCYTES # BLD AUTO: 0.03 10*3/MM3 (ref 0–0.05)
IMM GRANULOCYTES NFR BLD AUTO: 0.3 % (ref 0–0.5)
KETONES UR QL STRIP: NEGATIVE
LEUKOCYTE ESTERASE UR QL STRIP.AUTO: ABNORMAL
LYMPHOCYTES # BLD AUTO: 0.85 10*3/MM3 (ref 0.7–3.1)
LYMPHOCYTES NFR BLD AUTO: 7.3 % (ref 19.6–45.3)
M PNEUMO IGG SER IA-ACNC: NOT DETECTED
MCH RBC QN AUTO: 24.5 PG (ref 26.6–33)
MCHC RBC AUTO-ENTMCNC: 30.6 G/DL (ref 31.5–35.7)
MCV RBC AUTO: 80.2 FL (ref 79–97)
MONOCYTES # BLD AUTO: 0.91 10*3/MM3 (ref 0.1–0.9)
MONOCYTES NFR BLD AUTO: 7.8 % (ref 5–12)
NEUTROPHILS NFR BLD AUTO: 83.4 % (ref 42.7–76)
NEUTROPHILS NFR BLD AUTO: 9.67 10*3/MM3 (ref 1.7–7)
NITRITE UR QL STRIP: NEGATIVE
NRBC BLD AUTO-RTO: 0 /100 WBC (ref 0–0.2)
PH UR STRIP.AUTO: 7.5 [PH] (ref 5–8)
PLATELET # BLD AUTO: 804 10*3/MM3 (ref 140–450)
PMV BLD AUTO: 8.3 FL (ref 6–12)
POTASSIUM SERPL-SCNC: 4.5 MMOL/L (ref 3.5–5.2)
PROT SERPL-MCNC: 7.4 G/DL (ref 6–8.5)
PROT UR QL STRIP: NEGATIVE
RBC # BLD AUTO: 5.26 10*6/MM3 (ref 4.14–5.8)
RBC # UR STRIP: ABNORMAL /HPF
REF LAB TEST METHOD: ABNORMAL
RHINOVIRUS RNA SPEC NAA+PROBE: NOT DETECTED
RSV RNA NPH QL NAA+NON-PROBE: NOT DETECTED
SARS-COV-2 RNA NPH QL NAA+NON-PROBE: NOT DETECTED
SODIUM SERPL-SCNC: 130 MMOL/L (ref 136–145)
SP GR UR STRIP: 1.01 (ref 1–1.03)
SQUAMOUS #/AREA URNS HPF: ABNORMAL /HPF
UROBILINOGEN UR QL STRIP: ABNORMAL
WBC # UR STRIP: ABNORMAL /HPF
WBC NRBC COR # BLD AUTO: 11.6 10*3/MM3 (ref 3.4–10.8)
WHOLE BLOOD HOLD COAG: NORMAL

## 2024-07-28 PROCEDURE — 85025 COMPLETE CBC W/AUTO DIFF WBC: CPT | Performed by: PHYSICIAN ASSISTANT

## 2024-07-28 PROCEDURE — 25510000001 IOPAMIDOL PER 1 ML: Performed by: STUDENT IN AN ORGANIZED HEALTH CARE EDUCATION/TRAINING PROGRAM

## 2024-07-28 PROCEDURE — 70496 CT ANGIOGRAPHY HEAD: CPT

## 2024-07-28 PROCEDURE — 72125 CT NECK SPINE W/O DYE: CPT

## 2024-07-28 PROCEDURE — G0378 HOSPITAL OBSERVATION PER HR: HCPCS

## 2024-07-28 PROCEDURE — 70498 CT ANGIOGRAPHY NECK: CPT

## 2024-07-28 PROCEDURE — 81001 URINALYSIS AUTO W/SCOPE: CPT | Performed by: PHYSICIAN ASSISTANT

## 2024-07-28 PROCEDURE — 80053 COMPREHEN METABOLIC PANEL: CPT | Performed by: PHYSICIAN ASSISTANT

## 2024-07-28 PROCEDURE — 71045 X-RAY EXAM CHEST 1 VIEW: CPT

## 2024-07-28 PROCEDURE — 73110 X-RAY EXAM OF WRIST: CPT

## 2024-07-28 PROCEDURE — 82948 REAGENT STRIP/BLOOD GLUCOSE: CPT

## 2024-07-28 PROCEDURE — 25810000003 SODIUM CHLORIDE 0.9 % SOLUTION: Performed by: NURSE PRACTITIONER

## 2024-07-28 PROCEDURE — 0202U NFCT DS 22 TRGT SARS-COV-2: CPT | Performed by: PHYSICIAN ASSISTANT

## 2024-07-28 PROCEDURE — 70450 CT HEAD/BRAIN W/O DYE: CPT

## 2024-07-28 PROCEDURE — 99285 EMERGENCY DEPT VISIT HI MDM: CPT

## 2024-07-28 PROCEDURE — 71250 CT THORAX DX C-: CPT

## 2024-07-28 PROCEDURE — 70551 MRI BRAIN STEM W/O DYE: CPT

## 2024-07-28 RX ORDER — SODIUM CHLORIDE 9 MG/ML
40 INJECTION, SOLUTION INTRAVENOUS AS NEEDED
Status: DISCONTINUED | OUTPATIENT
Start: 2024-07-28 | End: 2024-07-31 | Stop reason: HOSPADM

## 2024-07-28 RX ORDER — SODIUM CHLORIDE 0.9 % (FLUSH) 0.9 %
10 SYRINGE (ML) INJECTION AS NEEDED
Status: DISCONTINUED | OUTPATIENT
Start: 2024-07-28 | End: 2024-07-31 | Stop reason: HOSPADM

## 2024-07-28 RX ORDER — AMOXICILLIN 250 MG
2 CAPSULE ORAL 2 TIMES DAILY PRN
Status: DISCONTINUED | OUTPATIENT
Start: 2024-07-28 | End: 2024-07-31 | Stop reason: HOSPADM

## 2024-07-28 RX ORDER — ONDANSETRON 4 MG/1
4 TABLET, ORALLY DISINTEGRATING ORAL EVERY 6 HOURS PRN
Status: DISCONTINUED | OUTPATIENT
Start: 2024-07-28 | End: 2024-07-31 | Stop reason: HOSPADM

## 2024-07-28 RX ORDER — OXYCODONE HYDROCHLORIDE 5 MG/1
5 TABLET ORAL EVERY 4 HOURS PRN
Status: DISCONTINUED | OUTPATIENT
Start: 2024-07-28 | End: 2024-07-29

## 2024-07-28 RX ORDER — LIDOCAINE HYDROCHLORIDE AND EPINEPHRINE 10; 10 MG/ML; UG/ML
10 INJECTION, SOLUTION INFILTRATION; PERINEURAL ONCE
Status: DISCONTINUED | OUTPATIENT
Start: 2024-07-28 | End: 2024-07-31 | Stop reason: HOSPADM

## 2024-07-28 RX ORDER — NITROGLYCERIN 0.4 MG/1
0.4 TABLET SUBLINGUAL
Status: DISCONTINUED | OUTPATIENT
Start: 2024-07-28 | End: 2024-07-30

## 2024-07-28 RX ORDER — SODIUM CHLORIDE 0.9 % (FLUSH) 0.9 %
10 SYRINGE (ML) INJECTION EVERY 12 HOURS SCHEDULED
Status: DISCONTINUED | OUTPATIENT
Start: 2024-07-28 | End: 2024-07-31 | Stop reason: HOSPADM

## 2024-07-28 RX ORDER — BISACODYL 10 MG
10 SUPPOSITORY, RECTAL RECTAL DAILY PRN
Status: DISCONTINUED | OUTPATIENT
Start: 2024-07-28 | End: 2024-07-31 | Stop reason: HOSPADM

## 2024-07-28 RX ORDER — HYDROXYZINE HYDROCHLORIDE 25 MG/1
25 TABLET, FILM COATED ORAL NIGHTLY PRN
Status: DISCONTINUED | OUTPATIENT
Start: 2024-07-28 | End: 2024-07-29

## 2024-07-28 RX ORDER — CIPROFLOXACIN 500 MG/1
500 TABLET, FILM COATED ORAL 2 TIMES DAILY
Status: DISCONTINUED | OUTPATIENT
Start: 2024-07-28 | End: 2024-07-29

## 2024-07-28 RX ORDER — ONDANSETRON 2 MG/ML
4 INJECTION INTRAMUSCULAR; INTRAVENOUS EVERY 6 HOURS PRN
Status: DISCONTINUED | OUTPATIENT
Start: 2024-07-28 | End: 2024-07-31 | Stop reason: HOSPADM

## 2024-07-28 RX ORDER — LIDOCAINE 4 G/G
1 PATCH TOPICAL
Status: DISCONTINUED | OUTPATIENT
Start: 2024-07-28 | End: 2024-07-31 | Stop reason: HOSPADM

## 2024-07-28 RX ORDER — POLYETHYLENE GLYCOL 3350 17 G/17G
17 POWDER, FOR SOLUTION ORAL DAILY PRN
Status: DISCONTINUED | OUTPATIENT
Start: 2024-07-28 | End: 2024-07-31 | Stop reason: HOSPADM

## 2024-07-28 RX ORDER — CIPROFLOXACIN 500 MG/1
500 TABLET, FILM COATED ORAL 2 TIMES DAILY
COMMUNITY
Start: 2024-07-12 | End: 2024-07-31 | Stop reason: HOSPADM

## 2024-07-28 RX ORDER — ACETAMINOPHEN 325 MG/1
650 TABLET ORAL EVERY 6 HOURS PRN
Status: DISCONTINUED | OUTPATIENT
Start: 2024-07-28 | End: 2024-07-31 | Stop reason: HOSPADM

## 2024-07-28 RX ORDER — ATORVASTATIN CALCIUM 20 MG/1
80 TABLET, FILM COATED ORAL DAILY
Status: DISCONTINUED | OUTPATIENT
Start: 2024-07-28 | End: 2024-07-31 | Stop reason: HOSPADM

## 2024-07-28 RX ORDER — BISACODYL 5 MG/1
5 TABLET, DELAYED RELEASE ORAL DAILY PRN
Status: DISCONTINUED | OUTPATIENT
Start: 2024-07-28 | End: 2024-07-31 | Stop reason: HOSPADM

## 2024-07-28 RX ORDER — ASPIRIN 81 MG/1
81 TABLET, CHEWABLE ORAL DAILY
Status: DISCONTINUED | OUTPATIENT
Start: 2024-07-28 | End: 2024-07-31 | Stop reason: HOSPADM

## 2024-07-28 RX ADMIN — ASPIRIN 81 MG: 81 TABLET, CHEWABLE ORAL at 18:26

## 2024-07-28 RX ADMIN — IOPAMIDOL 95 ML: 755 INJECTION, SOLUTION INTRAVENOUS at 17:37

## 2024-07-28 RX ADMIN — SODIUM CHLORIDE 500 ML: 9 INJECTION, SOLUTION INTRAVENOUS at 16:17

## 2024-07-28 RX ADMIN — HYDROXYZINE HYDROCHLORIDE 25 MG: 25 TABLET ORAL at 23:24

## 2024-07-28 RX ADMIN — CIPROFLOXACIN HYDROCHLORIDE 500 MG: 500 TABLET, FILM COATED ORAL at 21:42

## 2024-07-28 RX ADMIN — LIDOCAINE 1 PATCH: 4 PATCH TOPICAL at 13:30

## 2024-07-28 RX ADMIN — ATORVASTATIN CALCIUM 80 MG: 20 TABLET, FILM COATED ORAL at 18:26

## 2024-07-28 RX ADMIN — Medication 10 ML: at 09:21

## 2024-07-28 RX ADMIN — SODIUM CHLORIDE 500 ML: 9 INJECTION, SOLUTION INTRAVENOUS at 20:29

## 2024-07-28 RX ADMIN — Medication 10 ML: at 19:23

## 2024-07-28 RX ADMIN — Medication 10 ML: at 14:34

## 2024-07-28 NOTE — ED NOTES
Pt arrives via ems from home for a fall last night. Pt normally walks with a walker and had a mechanical fall. Pt denies LOC. Pt is on eliquis. Pt has a skin tear to his right arm. Pt lives with his wife and daughter

## 2024-07-28 NOTE — PLAN OF CARE
Goal Outcome Evaluation:   89 yr old Patient arrived on the unit with acute pain r/t multiple right side rib fractures, abrasion on right elbow, and slight minor abrasion on right side of face from mechanical fall at home. Patient has noted new facial droop and has been sent for CT and MRI with results pending. Patient also has mild to moderate edema in each lower extremity. Otherwise, patient is Aox4, RA, assist x 1. Patient was originally given Reg diet but due to new droop, that is being held until results finalize.

## 2024-07-28 NOTE — PROGRESS NOTES
MD Attestation Note    I supervised care provided by the midlevel provider.    The ANDRAE and I have discussed this patient's history, physical exam, and treatment plan. I have reviewed the documentation and personally had a face to face interaction with the patient  I affirm the documentation and agree with the treatment and plan.       My personal findings are:        Subjective:  Patient doing well, eating dinner.  Denies any significant pain.        Objective:      PHYSICAL EXAM  Vitals:    07/28/24 1131 07/28/24 1201 07/28/24 1223 07/28/24 1231   BP: 168/94 164/82  163/69   BP Location: Left arm      Patient Position: Lying      Pulse: 75 71  75   Resp: 16 16  16   Temp:       TempSrc:       SpO2: 98% 97%  97%   Weight:   65.8 kg (145 lb 1 oz)    Height:             GENERAL: no acute distress  HENT: nares patent  EYES: no scleral icterus  CV: regular rhythm, normal rate  RESPIRATORY: normal effort  ABDOMEN: soft  MUSCULOSKELETAL: no deformity  NEURO: alert, moves all extremities, follows commands  PSYCH:  calm, cooperative  SKIN: warm, dry    Vital signs and nursing notes reviewed.      Assessment/ Plan:  Patient presenting status post fall, found to have multiple right-sided rib fractures.  Facial droop noted on arrival to the observation unit, MRI shows no evidence of acute infarct.  Plan for thoracic surgery evaluation for pain management options.  Etiology of fall is unclear however patient is currently being treated for urinary tract infection.  Had similar falls when he had an RSV infection several months ago.  Will continue to monitor.

## 2024-07-28 NOTE — ED NOTES
".Nursing report ED to floor  Danni Aguilar  89 y.o.  male    HPI :  HPI (Adult)  Stated Reason for Visit: fall this AM  History Obtained From: EMS, patient    Chief Complaint  Chief Complaint   Patient presents with    Fall       Admitting doctor:   Abran Sneed MD    Admitting diagnosis:   The primary encounter diagnosis was Closed fracture of multiple ribs of right side, initial encounter. Diagnoses of Skin tear of right elbow without complication, initial encounter and Fall, initial encounter were also pertinent to this visit.    Code status:   Current Code Status       Date Active Code Status Order ID Comments User Context       Prior            Allergies:   Patient has no known allergies.    Isolation:   No active isolations    Intake and Output  No intake or output data in the 24 hours ending 07/28/24 1228    Weight:       07/28/24  0910   Weight: 65.8 kg (145 lb 1 oz)       Most recent vitals:   Vitals:    07/28/24 0910 07/28/24 1001 07/28/24 1031 07/28/24 1131   BP: 171/85 167/86 162/73 168/94   BP Location: Left arm Left arm Left arm Left arm   Patient Position: Lying Lying Lying Lying   Pulse: 84 80 83 75   Resp: 16 16 16 16   Temp:       TempSrc:       SpO2: 96% 99% 98% 98%   Weight: 65.8 kg (145 lb 1 oz)      Height: 172.7 cm (68\")          Active LDAs/IV Access:   Lines, Drains & Airways       Active LDAs       Name Placement date Placement time Site Days    Peripheral IV 07/28/24 0921 Anterior;Left Forearm 07/28/24 0921  Forearm  less than 1                    Labs (abnormal labs have a star):   Labs Reviewed   COMPREHENSIVE METABOLIC PANEL - Abnormal; Notable for the following components:       Result Value    Glucose 104 (*)     Sodium 130 (*)     Chloride 95 (*)     eGFR 59.7 (*)     All other components within normal limits    Narrative:     GFR Normal >60  Chronic Kidney Disease <60  Kidney Failure <15    The GFR formula is only valid for adults with stable renal function between ages 18 and " 70.   URINALYSIS W/ MICROSCOPIC IF INDICATED (NO CULTURE) - Abnormal; Notable for the following components:    Leuk Esterase, UA Small (1+) (*)     All other components within normal limits   CBC WITH AUTO DIFFERENTIAL - Abnormal; Notable for the following components:    WBC 11.60 (*)     Hemoglobin 12.9 (*)     MCH 24.5 (*)     MCHC 30.6 (*)     Platelets 804 (*)     Neutrophil % 83.4 (*)     Lymphocyte % 7.3 (*)     Neutrophils, Absolute 9.67 (*)     Monocytes, Absolute 0.91 (*)     All other components within normal limits   URINALYSIS, MICROSCOPIC ONLY - Abnormal; Notable for the following components:    WBC, UA 6-10 (*)     Bacteria, UA 1+ (*)     All other components within normal limits   RESPIRATORY PANEL PCR W/ COVID-19 (SARS-COV-2), NP SWAB IN UTM/VTP, 2 HR TAT   CBC AND DIFFERENTIAL    Narrative:     The following orders were created for panel order CBC & Differential.  Procedure                               Abnormality         Status                     ---------                               -----------         ------                     CBC Auto Differential[[id number]]        Abnormal            Final result                 Please view results for these tests on the individual orders.   EXTRA TUBES    Narrative:     The following orders were created for panel order Extra Tubes.  Procedure                               Abnormality         Status                     ---------                               -----------         ------                     Acosta Top[[id number]]                                         Final result               Light Blue Top[[id number]]                                   Final result                 Please view results for these tests on the individual orders.   GRAY TOP   LIGHT BLUE TOP       EKG:   Telemetry Scan   Final Result      Telemetry Scan   Final Result          Meds given in ED:   Medications   lidocaine 1% - EPINEPHrine 1:151927 (XYLOCAINE W/EPI) 1 %-1:451975  injection 10 mL (has no administration in time range)   sodium chloride 0.9 % flush 10 mL (10 mL Intravenous Given 7/28/24 0921)   sodium chloride 0.9 % flush 10 mL (has no administration in time range)   sodium chloride 0.9 % flush 10 mL (has no administration in time range)   sodium chloride 0.9 % infusion 40 mL (has no administration in time range)   ondansetron ODT (ZOFRAN-ODT) disintegrating tablet 4 mg (has no administration in time range)     Or   ondansetron (ZOFRAN) injection 4 mg (has no administration in time range)   sennosides-docusate (PERICOLACE) 8.6-50 MG per tablet 2 tablet (has no administration in time range)     And   polyethylene glycol (MIRALAX) packet 17 g (has no administration in time range)     And   bisacodyl (DULCOLAX) EC tablet 5 mg (has no administration in time range)     And   bisacodyl (DULCOLAX) suppository 10 mg (has no administration in time range)       Imaging results:  CT Head Without Contrast    Result Date: 7/28/2024  1. Atrophy and small vessel ischemic disease. 2. No acute intracranial process. 3. 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   CT OF THE CERVICAL SPINE WITHOUT CONTRAST  Axial images were obtained from the skull base to the upper thoracic spine. Sagittal and coronal reconstruction images were reviewed.  There is C3-4, C4-5 and C5-6 spondylosis. Minimal (1-2 mm) anterolisthesis of C2 on C3 is seen. Minimal anterolisthesis of C7 on T1 is seen.  No cervical spine fractures are seen.  There is a right carotid stent. Bilateral carotid calcification is seen.  IMPRESSION: 1. Multilevel cervical degenerative disease. 2. No fractures are seen. 3. Right pleural effusion is seen. Please see additional dictation for today's CT of the chest.  Radiation dose reduction techniques were utilized, including automated exposure control and exposure modulation based on body size.       CT Cervical Spine Without Contrast    Result Date: 7/28/2024  1. Atrophy and small vessel  ischemic disease. 2. No acute intracranial process. 3. 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   CT OF THE CERVICAL SPINE WITHOUT CONTRAST  Axial images were obtained from the skull base to the upper thoracic spine. Sagittal and coronal reconstruction images were reviewed.  There is C3-4, C4-5 and C5-6 spondylosis. Minimal (1-2 mm) anterolisthesis of C2 on C3 is seen. Minimal anterolisthesis of C7 on T1 is seen.  No cervical spine fractures are seen.  There is a right carotid stent. Bilateral carotid calcification is seen.  IMPRESSION: 1. Multilevel cervical degenerative disease. 2. No fractures are seen. 3. Right pleural effusion is seen. Please see additional dictation for today's CT of the chest.  Radiation dose reduction techniques were utilized, including automated exposure control and exposure modulation based on body size.       CT Chest Without Contrast Diagnostic    Result Date: 7/28/2024  1. At least 3 right rib fractures as described. 2. Moderate size right pleural effusion with some mild right lower lobe atelectasis.  Radiation dose reduction techniques were utilized, including automated exposure control and exposure modulation based on body size.       XR Chest 1 View    Result Date: 7/28/2024  1. Borderline cardiomegaly. 2. Mild haziness of the right base as described.   This report was finalized on 7/28/2024 10:13 AM by Dr. Natanael Gutiérrez M.D on Workstation: [id number]       Ambulatory status:   - assist x1    Social issues:   Social History     Socioeconomic History    Marital status:    Tobacco Use    Smoking status: Never    Smokeless tobacco: Never   Vaping Use    Vaping status: Never Used   Substance and Sexual Activity    Alcohol use: No    Drug use: Defer    Sexual activity: Defer       Peripheral Neurovascular  Peripheral Neurovascular (Adult)  Peripheral Neurovascular WDL: neurovascular assessment upper, pulse assessment  LUE Neurovascular Assessment  Temperature LUE:  warm  Color LUE: no discoloration  Sensation LUE: no tenderness, no tingling, no numbness  RUE Neurovascular Assessment  Temperature RUE: warm  Color RUE: red  Sensation RUE: tenderness present    Neuro Cognitive  Neuro Cognitive (Adult)  Cognitive/Neuro/Behavioral WDL: WDL, orientation, arousability, level of consciousness  Level of Consciousness: Alert  Arousal Level: opens eyes spontaneously  Orientation: oriented x 4  Pupils  Pupil PERRLA: yes    Learning  Learning Assessment (Adult)  Learning Readiness and Ability: no barriers identified  Education Provided  Person Taught: family member/friend  Teaching Method: verbal instruction  Teaching Focus: symptom/problem overview    Respiratory  Respiratory WDL  Respiratory WDL: WDL, all  Rhythm/Pattern, Respiratory: no shortness of breath reported, depth regular, pattern regular, unlabored  Cough Frequency: no cough    Abdominal Pain       Pain Assessments  Pain (Adult)  (0-10) Pain Rating: Rest: 4  (0-10) Pain Rating: Activity: 4  Pain Location: extremity  Pain Side/Orientation: right, upper  Pain Description: sore    NIH Stroke Scale       Usha Berrios RN  07/28/24 12:28 EDT

## 2024-07-28 NOTE — PROGRESS NOTES
Chart reviewed.    Not a candidate for surgical fixation of rib fractures given age and other comorbidities.  Would recommend holding Eliquis for now.    Would recommend pain management for his rib fractures.    Formal consult to follow tomorrow.

## 2024-07-28 NOTE — H&P
[facility]   HISTORY AND PHYSICAL    Patient Name: Danni Aguilar  : 1926  MRN: [id number]  Primary Care Physician:  Justin Longoria MD  Date of admission: 2024    Subjective   Subjective     Chief Complaint:   Chief Complaint   Patient presents with    Fall         HPI:    Danni Aguilar is a pleasant afebrile 89 y.o.  male with a past medical history of atrial fibrillation on Eliquis anticoagulation, CVA, hyperlipidemia, and carotid stenosis.     He presents to the emergency department at [facility] today with complaint plaint of a mechanical fall and right rib pain.  He has been admitted to the ED observation unit for further testing and evaluation.    Patient states he is uncertain how he fell yesterday.  Reports he was in the bathroom around 4 PM when he fell striking his right elbow and right-sided ribs on the sink or the walker.  He does not think he hit his head or loss consciousness.  He states at rest his right chest feels well but when he gets up and moves he has some rib discomfort.  He is otherwise been feeling well recently without any fevers, chills, nausea, vomiting or diarrhea.    After arriving in ED observation unit patient had a noted left-sided patient droop, he is uncertain if this is new or old as he is quite a vague historian.      Review of Systems   All systems were reviewed and negative except for: What is mentioned above    Personal History     Past Medical History:   Diagnosis Date    A-fib     Carotid artery stenosis     Hyperlipidemia     Irregular heart beat        Past Surgical History:   Procedure Laterality Date    BACK SURGERY      CAROTID ARTERY ANGIOPLASTY      CEREBRAL ANGIOGRAM N/A 2017    Procedure: DIAGNOSTIC CEREBRAL ANGIOGRAM AND RIGHT CAROTID STENT PLACEMENT;  Surgeon: Mauricio Yung MD;  Location: [facility] ;  Service:     RECTAL SURGERY         Family History: family history is not on file. Otherwise  pertinent FHx was reviewed and not pertinent to current issue.    Social History:  reports that he has never smoked. He has never used smokeless tobacco. Drug use questions deferred to the physician. He reports that he does not drink alcohol.    Home Medications:  apixaban, aspirin, atorvastatin, benzonatate, ciprofloxacin, guaiFENesin, ipratropium-albuterol, polyethylene glycol, sennosides-docusate, and warfarin    Allergies:  No Known Allergies    Objective   Objective     Vitals:   Temp:  [98.2 °F (36.8 °C)] 98.2 °F (36.8 °C)  Heart Rate:  [71-87] 75  Resp:  [16-17] 16  BP: (162-174)/(69-94) 163/69  Physical Exam    Constitutional: Awake, alert, elderly   Eyes: PERRLA, sclerae anicteric, no conjunctival injection   HENT: NCAT, mucous membranes moist   Neck: Supple, no thyromegaly, no lymphadenopathy, trachea midline   Respiratory: Clear to auscultation bilaterally, nonlabored respirations    Cardiovascular: RRR, no murmurs, rubs, or gallops, palpable pedal pulses bilaterally  Chest wall: Reproducible tenderness to palpation to patient's right anterior lateral ribs without crepitus or flail chest appreciated   Gastrointestinal: Positive bowel sounds, soft, nontender, nondistended   Musculoskeletal: No bilateral ankle edema, no clubbing or cyanosis to extremities   Psychiatric: Appropriate affect, cooperative   Neurologic: Oriented x 3, strength symmetric in all extremities, Cranial Nerves grossly intact to confrontation, speech clear   Skin: Skin avulsion to right elbow with bruising and mild soft tissue swelling to patient's right dorsal forearm    Result Review    Result Review:  I have personally reviewed the results from the time of this admission to 7/28/2024 16:34 EDT and agree with these findings:  [x]  Laboratory list / accordion  []  Microbiology  [x]  Radiology  []  EKG/Telemetry   []  Cardiology/Vascular   []  Pathology  []  Old records  []  Other:  Most notable findings include: Serum sodium 130, blood  glucose 104, serum WBC 11.6, hemoglobin 12.9, urinalysis shows small leukocytes with 6-10 WBCs and 1+ bacteria, respiratory panel negative, right wrist x-ray shows arthritic changes, CT head negative for acute findings, CT chest shows right fifth, sixth and seventh fractures with a moderate-sized right pleural effusion, CT C-spine negative for acute findings      Assessment & Plan   Assessment / Plan     Brief Patient Summary:  Danni Aguilar is a 89 y.o. male who is being evaluated for right rib fractures status post fall yesterday.     Active Hospital Problems:  Active Hospital Problems    Diagnosis     **Right rib fracture      Plan:     Fall  Right rib fracturesconsult to thoracic surgery  Multi- modal pain control with lidocaine patch, Tylenol and oxycodone  Incentive spirometry  Repeat chest x-ray in a.m.    Atrial fibrillation  Hold Eliquis  Telemetry    Hyponatremia  Serum sodium 130  Normal saline 500 mL bolus  Trend with a.m. labs    Left-sided facial droop  CT head negative for acute findings  MRI brain pending  CTA head and neck pending  Consult to neurology    VTE Prophylaxis:  Pharmacologic & mechanical VTE prophylaxis orders are present.        CODE STATUS:    Medical Intervention Limits: No intubation (DNI)  Level Of Support Discussed With: Patient; Health Care Surrogate  Code Status (Patient has no pulse and is not breathing): No CPR (Do Not Attempt to Resuscitate)  Medical Interventions (Patient has pulse or is breathing): Limited Support    Admission Status:  I believe this patient meets observation status.    Electronically signed by LINDEN Sadler, 07/28/24, 12:30 PM EDT.      80 minutes has been spent by [facility] providers in the care of this patient while under observation status      I have worn appropriate PPE during this patient encounter, sanitized my hands both with entering and exiting patient's room.    I have discussed plan of care with patient  including advance care plan and/or surrogate decision maker.  Patient advises that their daughter Cheyenne will be their primary surrogate decision maker

## 2024-07-28 NOTE — ED TRIAGE NOTES
Pt to ER from home via [facility]; pt had mechanical fall this AM, lac to right forearm, denies hitting head or LOC, takes eliquis. Pt arrives aaox4, abc's intact, NAD noted. Currently being treated for UTI.

## 2024-07-28 NOTE — ED PROVIDER NOTES
MD ATTESTATION NOTE    SHARED VISIT: This visit was performed by BOTH a physician and an APC. The substantive portion of the medical decision making was performed by this attesting physician who made or approved the management plan and takes responsibility for patient management. All studies in the APC note (if performed) were independently interpreted by me.     The ANDRAE and I have discussed this patient's history, physical exam, and treatment plan.  I have reviewed the documentation and affirm the documentation and agree with the treatment and plan.  The attached note describes my personal findings.      Independent Historians: Patient and family    A complete HPI/ROS/PMH/PSH/SH/FH are unobtainable due to: Dementia    Chronic or social conditions impacting patient care (social determinants of health): None    Danni Aguilar is a 89 y.o. male who presents to the ED c/o acute fall at home.  Patient has had 2 falls in the last 2 days and has seemed generally weak over the past week and a half being treated for urinary tract infection.  Patient does have a tear/laceration to right elbow.  Patient uncertain if he hit his head.  Has no other complaints currently.          On exam:  GENERAL: Conversant male of advanced age, alert, no acute distress  SKIN: Warm, dry  HENT: Normocephalic, atraumatic  EYES: no scleral icterus, EOMI  CV: regular rhythm, regular rate  RESPIRATORY: normal effort, diminished at the bases with no wheezing  ABDOMEN: soft, nontender, nondistended  MUSCULOSKELETAL: no deformity, right elbow with skin tear with active oozing of blood  NEURO: alert, moves all extremities, follows commands                                                             Labs  Recent Results (from the past 24 hour(s))   Comprehensive Metabolic Panel    Collection Time: 07/28/24  9:22 AM    Specimen: Blood   Result Value Ref Range    Glucose 104 (H) 65 - 99 mg/dL    BUN 17 8 - 23 mg/dL    Creatinine 1.12 0.76 - 1.27 mg/dL     Sodium 130 (L) 136 - 145 mmol/L    Potassium 4.5 3.5 - 5.2 mmol/L    Chloride 95 (L) 98 - 107 mmol/L    CO2 22.0 22.0 - 29.0 mmol/L    Calcium 9.2 8.2 - 9.6 mg/dL    Total Protein 7.4 6.0 - 8.5 g/dL    Albumin 4.0 3.5 - 5.2 g/dL    ALT (SGPT) 15 1 - 41 U/L    AST (SGOT) 24 1 - 40 U/L    Alkaline Phosphatase 63 39 - 117 U/L    Total Bilirubin 0.9 0.0 - 1.2 mg/dL    Globulin 3.4 gm/dL    A/G Ratio 1.2 g/dL    BUN/Creatinine Ratio 15.2 7.0 - 25.0    Anion Gap 13.0 5.0 - 15.0 mmol/L    eGFR 59.7 (L) >60.0 mL/min/1.73   CBC Auto Differential    Collection Time: 07/28/24  9:22 AM    Specimen: Blood   Result Value Ref Range    WBC 11.60 (H) 3.40 - 10.80 10*3/mm3    RBC 5.26 4.14 - 5.80 10*6/mm3    Hemoglobin 12.9 (L) 13.0 - 17.7 g/dL    Hematocrit 42.2 37.5 - 51.0 %    MCV 80.2 79.0 - 97.0 fL    MCH 24.5 (L) 26.6 - 33.0 pg    MCHC 30.6 (L) 31.5 - 35.7 g/dL    RDW 15.4 12.3 - 15.4 %    RDW-SD 44.5 37.0 - 54.0 fl    MPV 8.3 6.0 - 12.0 fL    Platelets 804 (H) 140 - 450 10*3/mm3    Neutrophil % 83.4 (H) 42.7 - 76.0 %    Lymphocyte % 7.3 (L) 19.6 - 45.3 %    Monocyte % 7.8 5.0 - 12.0 %    Eosinophil % 0.3 0.3 - 6.2 %    Basophil % 0.9 0.0 - 1.5 %    Immature Grans % 0.3 0.0 - 0.5 %    Neutrophils, Absolute 9.67 (H) 1.70 - 7.00 10*3/mm3    Lymphocytes, Absolute 0.85 0.70 - 3.10 10*3/mm3    Monocytes, Absolute 0.91 (H) 0.10 - 0.90 10*3/mm3    Eosinophils, Absolute 0.04 0.00 - 0.40 10*3/mm3    Basophils, Absolute 0.10 0.00 - 0.20 10*3/mm3    Immature Grans, Absolute 0.03 0.00 - 0.05 10*3/mm3    nRBC 0.0 0.0 - 0.2 /100 WBC   Urinalysis With Microscopic If Indicated (No Culture) - Urine, Clean Catch    Collection Time: 07/28/24  9:34 AM    Specimen: Urine, Clean Catch   Result Value Ref Range    Color, UA Yellow Yellow, Straw    Appearance, UA Clear Clear    pH, UA 7.5 5.0 - 8.0    Specific Gravity, UA 1.015 1.005 - 1.030    Glucose, UA Negative Negative    Ketones, UA Negative Negative    Bilirubin, UA Negative Negative    Blood,  UA Negative Negative    Protein, UA Negative Negative    Leuk Esterase, UA Small (1+) (A) Negative    Nitrite, UA Negative Negative    Urobilinogen, UA 1.0 E.U./dL 0.2 - 1.0 E.U./dL   Urinalysis, Microscopic Only - Urine, Clean Catch    Collection Time: 07/28/24  9:34 AM    Specimen: Urine, Clean Catch   Result Value Ref Range    RBC, UA None Seen None Seen, 0-2 /HPF    WBC, UA 6-10 (A) None Seen, 0-2 /HPF    Bacteria, UA 1+ (A) None Seen /HPF    Squamous Epithelial Cells, UA 0-2 None Seen, 0-2 /HPF    Hyaline Casts, UA 0-2 None Seen /LPF    Methodology Manual Light Microscopy    Gray Top    Collection Time: 07/28/24  9:42 AM   Result Value Ref Range    Extra Tube Hold for add-ons.    Light Blue Top    Collection Time: 07/28/24  9:42 AM   Result Value Ref Range    Extra Tube Hold for add-ons.    Respiratory Panel PCR w/COVID-19(SARS-CoV-2) JENNIFER/ANGELA/JANET/PAD/COR/EKATERINA In-House, NP Swab in UT/VTM, 2 HR TAT - Swab, Nasopharynx    Collection Time: 07/28/24 11:39 AM    Specimen: Nasopharynx; Swab   Result Value Ref Range    ADENOVIRUS, PCR Not Detected Not Detected    Coronavirus 229E Not Detected Not Detected    Coronavirus HKU1 Not Detected Not Detected    Coronavirus NL63 Not Detected Not Detected    Coronavirus OC43 Not Detected Not Detected    COVID19 Not Detected Not Detected - Ref. Range    Human Metapneumovirus Not Detected Not Detected    Human Rhinovirus/Enterovirus Not Detected Not Detected    Influenza A PCR Not Detected Not Detected    Influenza B PCR Not Detected Not Detected    Parainfluenza Virus 1 Not Detected Not Detected    Parainfluenza Virus 2 Not Detected Not Detected    Parainfluenza Virus 3 Not Detected Not Detected    Parainfluenza Virus 4 Not Detected Not Detected    RSV, PCR Not Detected Not Detected    Bordetella pertussis pcr Not Detected Not Detected    Bordetella parapertussis PCR Not Detected Not Detected    Chlamydophila pneumoniae PCR Not Detected Not Detected    Mycoplasma pneumo by PCR  Not Detected Not Detected   POC Glucose Once    Collection Time: 07/28/24  1:28 PM    Specimen: Blood   Result Value Ref Range    Glucose 96 70 - 130 mg/dL       Radiology  XR Wrist 3+ View Right    Result Date: 7/28/2024  RIGHT WRIST  HISTORY: Fall, pain.  COMPARISON: None.  FINDINGS: 3 views of the right wrist were obtained. The study is hampered somewhat by patient positioning. There is moderate radiocarpal joint space narrowing. Chondrocalcinosis and extensive vascular calcification is appreciated. There is no evidence of fracture. If the patient's symptoms persist, a follow-up study in 7 to 10 days would be suggested.  This report was finalized on 7/28/2024 11:27 AM by Dr. Ramez Lind M.D on Workstation: [id number]      CT Head Without Contrast, CT Cervical Spine Without Contrast    Result Date: 7/28/2024  HISTORY: Fell. Head injury. Neck pain.  CT OF THE BRAIN WITHOUT CONTRAST 07/28/2024  Axial images were obtained through the brain without intravenous contrast. There is moderately severe diffuse atrophy. There is decreased attenuation of the periventricular white matter bilaterally consistent with small vessel white matter ischemic disease. Small low-density subdural hygromas are seen over both frontal lobes similar to the 10/08/2023 study.  There is no evidence of acute infarction, hemorrhage, midline shift or mass effect.  No skull fractures are seen.      1. Atrophy and small vessel ischemic disease. 2. No acute intracranial process. 3. 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   CT OF THE CERVICAL SPINE WITHOUT CONTRAST  Axial images were obtained from the skull base to the upper thoracic spine. Sagittal and coronal reconstruction images were reviewed.  There is C3-4, C4-5 and C5-6 spondylosis. Minimal (1-2 mm) anterolisthesis of C2 on C3 is seen. Minimal anterolisthesis of C7 on T1 is seen.  No cervical spine fractures are seen.  There is a right carotid stent. Bilateral carotid  calcification is seen.  IMPRESSION: 1. Multilevel cervical degenerative disease. 2. No fractures are seen. 3. Right pleural effusion is seen. Please see additional dictation for today's CT of the chest.  Radiation dose reduction techniques were utilized, including automated exposure control and exposure modulation based on body size.       CT Chest Without Contrast Diagnostic    Result Date: 7/28/2024  CT OF THE CHEST WITHOUT CONTRAST 07/28/2024  HISTORY: Fell. Right side rib injury. Axial images were obtained from the lung apices to the upper abdomen. No intravenous contrast was given.  There is a mildly displaced fracture of the right 6th rib and there appear to be nondisplaced fractures of the right 5th and 7th ribs. No pneumothorax is seen. There is a moderate size right pleural effusion with some mild right lower lobe atelectasis. This demonstrates low density fluid. Tiny subpleural probable calcified granuloma is seen in the left lower lobe on image 48.  There is some aortic and coronary calcification. No pathologically enlarged lymph nodes are seen. At least 2 probable hepatic cysts are seen. A small calcified nodule is seen posterior to the right hepatic lobe.      1. At least 3 right rib fractures as described. 2. Moderate size right pleural effusion with some mild right lower lobe atelectasis.  Radiation dose reduction techniques were utilized, including automated exposure control and exposure modulation based on body size.       XR Chest 1 View    Result Date: 7/28/2024  XR CHEST 1 VW-7/28/2024  HISTORY: Weakness.  Heart size is at the upper limits of normal. There is mild haziness of the right base which may indicate a minimal right pleural effusion with slight blunting of the right costophrenic sulcus as well. There is some aortic calcification. No pneumothorax is seen.      1. Borderline cardiomegaly. 2. Mild haziness of the right base as described.   This report was finalized on 7/28/2024 10:13 AM by  Dr. Natanael Gutiérrez M.D on Workstation: [id number]       Medical Decision Making:  ED Course as of 07/28/24 1611   Sun Jul 28, 2024   0918 Patient presents with generalized weakness, 2 recent falls.  Most recent fall was last night.  Patient has a laceration to his right forearm.  Uncertain of any head trauma.  He is at his neurological baseline.  He is on Eliquis.  He is being treated for UTI at this time.  Will check basic labs, head CT, repair right arm laceration. [EE]   0931 WBC(!): 11.60  This was 8.6, on 7/25/2024 [EE]   1012 Creatinine: 1.12 [EE]   1029 Chest x-ray independently interpreted myself shows small right pleural effusion. [EE]   1048 I discussed CT chest findings with Dr. Gutiérrez.  Patient appears to have fractures of the fifth, sixth, seventh ribs on the right without displacement or pneumothorax. [EE]   1155 Bacteria, UA(!): 1+  Patient currently being treated for UTI. [EE]   1222 Given patient's 3 rib fractures I would prefer to admit the patient.  Family in agreement.  He has stable vitals.    I discussed the case with Tesha Isidro NP with the observation unit.  She agrees to admit. [EE]      ED Course User Index  [EE] Wm Morales, PA       MDM: The differential diagnosis for generalized weakness or even near syncope/lightheadedness is quite broad and includes but is not limited to: hypoglycemia, orthostasis, vasovagal syncope, seizure, cardiac syncope, PE, electrolyte disturbances, sepsis, profound anemia, aortic dissection, severe aortic stenosis, stroke, sah, gi bleeding, intoxication and medication effects.      Procedures        PPE: I followed hospital protocols for proper PPE based on patient presentation including use of N95 mask for suspected infectious respiratory conditions.  Proper hand hygiene was performed both before and after the patient encounter.          Diagnosis  Final diagnoses:   Closed fracture of multiple ribs of right side, initial encounter   Skin tear of right  elbow without complication, initial encounter   Fall, initial encounter       Note Disclaimer: At [facility], we believe that sharing information builds trust and better relationships. You are receiving this note because you recently visited [facility]. It is possible you will see health information before a provider has talked with you about it. This kind of information can be easy to misunderstand. To help you fully understand what it means for your health, we urge you to discuss this note with your provider.       Lisa East MD  07/31/24 0250

## 2024-07-28 NOTE — ED PROVIDER NOTES
EMERGENCY DEPARTMENT ENCOUNTER    Room Number:  101/1  Date of encounter:  7/28/2024  PCP: Justin Longoria MD  Historian: Patient, daughter, EMS  Chronic or social conditions impacting care (social determinants of health): From home    HPI:  Chief Complaint: Falls, weakness  A complete HPI/ROS/PMH/PSH/SH/FH are unobtainable due to: Nothing    Context: Danni Aguilar is a 89 y.o. male with a history of A-fib, hypertension, hyperlipidemia, who presents to the ED c/o acute injuries sustained in 2 recent falls.  Patient reportedly fell 2 days ago and fell again yesterday.  Unknown mechanism.  Family reports that the patient has been mildly weaker over the past 2 weeks.  He is currently being treated for a UTI.  Patient himself states he feels normal.  He does have a laceration to his right elbow.  No recent known head trauma.    Review of prior external notes (non-ED):   I reviewed primary care office visit from 7/25/2024.  Patient being followed for generalized weakness, UTI.  He was started on Bactrim.    Review of prior external test results outside of this encounter:  I reviewed a CMP from 7/25/2024.  Creatinine 0.95, potassium 5.3    PAST MEDICAL HISTORY  Active Ambulatory Problems     Diagnosis Date Noted    Acute CVA (cerebrovascular accident) 07/20/2017    Atrial fibrillation 07/20/2017    Long term (current) use of anticoagulants 07/20/2017    Carotid stenosis 07/20/2017    Urinary retention 07/25/2017    Cerebrovascular accident (CVA) due to stenosis of right carotid artery 08/25/2017    Hyperlipidemia 01/29/2018    Cerebrovascular disease 12/09/2020    Recent cerebrovascular accident (CVA) 03/10/2021    RSV infection 12/04/2023    Hyponatremia 12/04/2023    Thrombocytosis 12/04/2023    RSV (respiratory syncytial virus infection) 12/05/2023     Resolved Ambulatory Problems     Diagnosis Date Noted    No Resolved Ambulatory Problems     Past Medical History:   Diagnosis Date    A-fib     Carotid artery  stenosis     Irregular heart beat          PAST SURGICAL HISTORY  Past Surgical History:   Procedure Laterality Date    BACK SURGERY      CAROTID ARTERY ANGIOPLASTY      CEREBRAL ANGIOGRAM N/A 07/24/2017    Procedure: DIAGNOSTIC CEREBRAL ANGIOGRAM AND RIGHT CAROTID STENT PLACEMENT;  Surgeon: Mauricio Yung MD;  Location: [facility] 18/19;  Service:     RECTAL SURGERY           FAMILY HISTORY  History reviewed. No pertinent family history.      SOCIAL HISTORY  Social History     Socioeconomic History    Marital status:    Tobacco Use    Smoking status: Never    Smokeless tobacco: Never   Vaping Use    Vaping status: Never Used   Substance and Sexual Activity    Alcohol use: No    Drug use: Defer    Sexual activity: Defer         ALLERGIES  Patient has no known allergies.        REVIEW OF SYSTEMS  All systems reviewed and negative except for those discussed in HPI.       PHYSICAL EXAM    I have reviewed the triage vital signs and nursing notes.    ED Triage Vitals   Temp Heart Rate Resp BP SpO2   07/28/24 0900 07/28/24 0900 07/28/24 0900 07/28/24 0900 07/28/24 0900   98.2 °F (36.8 °C) 87 17 174/84 97 %      Temp src Heart Rate Source Patient Position BP Location FiO2 (%)   07/28/24 0900 07/28/24 0910 07/28/24 0910 07/28/24 0910 --   Tympanic Monitor Lying Left arm        Physical Exam  GENERAL: Alert, oriented, not distressed  HENT: head atraumatic, no nuchal rigidity  EYES: no scleral icterus, EOMI  CV: regular rhythm, regular rate  RESPIRATORY: normal effort, CTA.  Moderate tenderness to the right lateral ribs without flail segment or deformity.  ABDOMEN: soft, nontender  MUSCULOSKELETAL: Mild tenderness of the right elbow with a small skin tear.  Preserved range of motion.  Mild tenderness over the right wrist without deformity.  Preserved range of motion.  NEURO: alert, moves all extremities, follows commands  SKIN: warm, dry        LAB RESULTS  Recent Results (from the past 24 hour(s))    Comprehensive Metabolic Panel    Collection Time: 07/28/24  9:22 AM    Specimen: Blood   Result Value Ref Range    Glucose 104 (H) 65 - 99 mg/dL    BUN 17 8 - 23 mg/dL    Creatinine 1.12 0.76 - 1.27 mg/dL    Sodium 130 (L) 136 - 145 mmol/L    Potassium 4.5 3.5 - 5.2 mmol/L    Chloride 95 (L) 98 - 107 mmol/L    CO2 22.0 22.0 - 29.0 mmol/L    Calcium 9.2 8.2 - 9.6 mg/dL    Total Protein 7.4 6.0 - 8.5 g/dL    Albumin 4.0 3.5 - 5.2 g/dL    ALT (SGPT) 15 1 - 41 U/L    AST (SGOT) 24 1 - 40 U/L    Alkaline Phosphatase 63 39 - 117 U/L    Total Bilirubin 0.9 0.0 - 1.2 mg/dL    Globulin 3.4 gm/dL    A/G Ratio 1.2 g/dL    BUN/Creatinine Ratio 15.2 7.0 - 25.0    Anion Gap 13.0 5.0 - 15.0 mmol/L    eGFR 59.7 (L) >60.0 mL/min/1.73   CBC Auto Differential    Collection Time: 07/28/24  9:22 AM    Specimen: Blood   Result Value Ref Range    WBC 11.60 (H) 3.40 - 10.80 10*3/mm3    RBC 5.26 4.14 - 5.80 10*6/mm3    Hemoglobin 12.9 (L) 13.0 - 17.7 g/dL    Hematocrit 42.2 37.5 - 51.0 %    MCV 80.2 79.0 - 97.0 fL    MCH 24.5 (L) 26.6 - 33.0 pg    MCHC 30.6 (L) 31.5 - 35.7 g/dL    RDW 15.4 12.3 - 15.4 %    RDW-SD 44.5 37.0 - 54.0 fl    MPV 8.3 6.0 - 12.0 fL    Platelets 804 (H) 140 - 450 10*3/mm3    Neutrophil % 83.4 (H) 42.7 - 76.0 %    Lymphocyte % 7.3 (L) 19.6 - 45.3 %    Monocyte % 7.8 5.0 - 12.0 %    Eosinophil % 0.3 0.3 - 6.2 %    Basophil % 0.9 0.0 - 1.5 %    Immature Grans % 0.3 0.0 - 0.5 %    Neutrophils, Absolute 9.67 (H) 1.70 - 7.00 10*3/mm3    Lymphocytes, Absolute 0.85 0.70 - 3.10 10*3/mm3    Monocytes, Absolute 0.91 (H) 0.10 - 0.90 10*3/mm3    Eosinophils, Absolute 0.04 0.00 - 0.40 10*3/mm3    Basophils, Absolute 0.10 0.00 - 0.20 10*3/mm3    Immature Grans, Absolute 0.03 0.00 - 0.05 10*3/mm3    nRBC 0.0 0.0 - 0.2 /100 WBC   Urinalysis With Microscopic If Indicated (No Culture) - Urine, Clean Catch    Collection Time: 07/28/24  9:34 AM    Specimen: Urine, Clean Catch   Result Value Ref Range    Color, UA Yellow Yellow, Straw     Appearance, UA Clear Clear    pH, UA 7.5 5.0 - 8.0    Specific Gravity, UA 1.015 1.005 - 1.030    Glucose, UA Negative Negative    Ketones, UA Negative Negative    Bilirubin, UA Negative Negative    Blood, UA Negative Negative    Protein, UA Negative Negative    Leuk Esterase, UA Small (1+) (A) Negative    Nitrite, UA Negative Negative    Urobilinogen, UA 1.0 E.U./dL 0.2 - 1.0 E.U./dL   Urinalysis, Microscopic Only - Urine, Clean Catch    Collection Time: 07/28/24  9:34 AM    Specimen: Urine, Clean Catch   Result Value Ref Range    RBC, UA None Seen None Seen, 0-2 /HPF    WBC, UA 6-10 (A) None Seen, 0-2 /HPF    Bacteria, UA 1+ (A) None Seen /HPF    Squamous Epithelial Cells, UA 0-2 None Seen, 0-2 /HPF    Hyaline Casts, UA 0-2 None Seen /LPF    Methodology Manual Light Microscopy    Gray Top    Collection Time: 07/28/24  9:42 AM   Result Value Ref Range    Extra Tube Hold for add-ons.    Light Blue Top    Collection Time: 07/28/24  9:42 AM   Result Value Ref Range    Extra Tube Hold for add-ons.    Respiratory Panel PCR w/COVID-19(SARS-CoV-2) JENNIFER/ANGELA/JANET/PAD/COR/EKATERINA In-House, NP Swab in UTM/VTM, 2 HR TAT - Swab, Nasopharynx    Collection Time: 07/28/24 11:39 AM    Specimen: Nasopharynx; Swab   Result Value Ref Range    ADENOVIRUS, PCR Not Detected Not Detected    Coronavirus 229E Not Detected Not Detected    Coronavirus HKU1 Not Detected Not Detected    Coronavirus NL63 Not Detected Not Detected    Coronavirus OC43 Not Detected Not Detected    COVID19 Not Detected Not Detected - Ref. Range    Human Metapneumovirus Not Detected Not Detected    Human Rhinovirus/Enterovirus Not Detected Not Detected    Influenza A PCR Not Detected Not Detected    Influenza B PCR Not Detected Not Detected    Parainfluenza Virus 1 Not Detected Not Detected    Parainfluenza Virus 2 Not Detected Not Detected    Parainfluenza Virus 3 Not Detected Not Detected    Parainfluenza Virus 4 Not Detected Not Detected    RSV, PCR Not Detected Not  Detected    Bordetella pertussis pcr Not Detected Not Detected    Bordetella parapertussis PCR Not Detected Not Detected    Chlamydophila pneumoniae PCR Not Detected Not Detected    Mycoplasma pneumo by PCR Not Detected Not Detected   POC Glucose Once    Collection Time: 07/28/24  1:28 PM    Specimen: Blood   Result Value Ref Range    Glucose 96 70 - 130 mg/dL       Ordered the above labs and independently reviewed the results.        RADIOLOGY  MRI Brain Without Contrast    Result Date: 7/28/2024  MRI BRAIN WO CONTRAST-  HISTORY:  new left facial droop; S22.41XA-Multiple fractures of ribs, right side, initial encounter for closed fracture; S51.011A-Laceration without foreign body of right elbow, initial encounter; W19.XXXA-Unspecified fall, initial encounter  COMPARISON: CT head 7/28/2024 and MRI brain 12/9/2020  FINDINGS: There is moderate diffuse atrophy. There is no evidence of restricted diffusion to suggest acute infarction. 2 small remote infarcts involve the right cerebellar hemisphere laterally are appreciated which were present on 12/9/2020. The right vertebral artery is dominant. There is expected flow void in the basilar artery and in the distal aspect of the internal carotid arteries bilaterally. Prominent CSF is appreciated overlying the frontal lobes anteriorly (12 mm in thickness on the right and 10 mm in thickness on the left similar in appearance as compared to the MRI examination of 12/9/2020. There is no evidence of mass or mass effect on this noncontrasted MRI examination of the brain.      There is no evidence of an acute infarct. Atrophy, small vessel ischemic disease and bifrontal hygromas are appreciated similar appearances compared to 12/9/2020.  This report was finalized on 7/28/2024 4:11 PM by Dr. Ramez Lind M.D on Workstation: [id number]      XR Wrist 3+ View Right    Result Date: 7/28/2024  RIGHT WRIST  HISTORY: Fall, pain.  COMPARISON: None.  FINDINGS: 3 views of the right wrist  were obtained. The study is hampered somewhat by patient positioning. There is moderate radiocarpal joint space narrowing. Chondrocalcinosis and extensive vascular calcification is appreciated. There is no evidence of fracture. If the patient's symptoms persist, a follow-up study in 7 to 10 days would be suggested.  This report was finalized on 7/28/2024 11:27 AM by Dr. Ramez Lind M.D on Workstation: [id number]      CT Head Without Contrast, CT Cervical Spine Without Contrast    Result Date: 7/28/2024  HISTORY: Fell. Head injury. Neck pain.  CT OF THE BRAIN WITHOUT CONTRAST 07/28/2024  Axial images were obtained through the brain without intravenous contrast. There is moderately severe diffuse atrophy. There is decreased attenuation of the periventricular white matter bilaterally consistent with small vessel white matter ischemic disease. Small low-density subdural hygromas are seen over both frontal lobes similar to the 10/08/2023 study.  There is no evidence of acute infarction, hemorrhage, midline shift or mass effect.  No skull fractures are seen.      1. Atrophy and small vessel ischemic disease. 2. No acute intracranial process. 3. 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   CT OF THE CERVICAL SPINE WITHOUT CONTRAST  Axial images were obtained from the skull base to the upper thoracic spine. Sagittal and coronal reconstruction images were reviewed.  There is C3-4, C4-5 and C5-6 spondylosis. Minimal (1-2 mm) anterolisthesis of C2 on C3 is seen. Minimal anterolisthesis of C7 on T1 is seen.  No cervical spine fractures are seen.  There is a right carotid stent. Bilateral carotid calcification is seen.  IMPRESSION: 1. Multilevel cervical degenerative disease. 2. No fractures are seen. 3. Right pleural effusion is seen. Please see additional dictation for today's CT of the chest.  Radiation dose reduction techniques were utilized, including automated exposure control and exposure modulation based on  body size.       CT Chest Without Contrast Diagnostic    Result Date: 7/28/2024  CT OF THE CHEST WITHOUT CONTRAST 07/28/2024  HISTORY: Fell. Right side rib injury. Axial images were obtained from the lung apices to the upper abdomen. No intravenous contrast was given.  There is a mildly displaced fracture of the right 6th rib and there appear to be nondisplaced fractures of the right 5th and 7th ribs. No pneumothorax is seen. There is a moderate size right pleural effusion with some mild right lower lobe atelectasis. This demonstrates low density fluid. Tiny subpleural probable calcified granuloma is seen in the left lower lobe on image 48.  There is some aortic and coronary calcification. No pathologically enlarged lymph nodes are seen. At least 2 probable hepatic cysts are seen. A small calcified nodule is seen posterior to the right hepatic lobe.      1. At least 3 right rib fractures as described. 2. Moderate size right pleural effusion with some mild right lower lobe atelectasis.  Radiation dose reduction techniques were utilized, including automated exposure control and exposure modulation based on body size.       XR Chest 1 View    Result Date: 7/28/2024  XR CHEST 1 VW-7/28/2024  HISTORY: Weakness.  Heart size is at the upper limits of normal. There is mild haziness of the right base which may indicate a minimal right pleural effusion with slight blunting of the right costophrenic sulcus as well. There is some aortic calcification. No pneumothorax is seen.      1. Borderline cardiomegaly. 2. Mild haziness of the right base as described.   This report was finalized on 7/28/2024 10:13 AM by Dr. Natanael Gutiérrez M.D on Workstation: [id number]       I ordered the above noted radiological studies. Reviewed by me and discussed with radiologist.  See dictation for official radiology interpretation.      MEDICATIONS GIVEN IN ER    Medications   lidocaine 1% - EPINEPHrine 1:005201 (XYLOCAINE W/EPI) 1 %-1:900285  injection 10 mL (10 mL Injection Not Given 7/28/24 1625)   sodium chloride 0.9 % flush 10 mL (10 mL Intravenous Given 7/28/24 0921)   sodium chloride 0.9 % flush 10 mL (10 mL Intravenous Given 7/28/24 1434)   sodium chloride 0.9 % flush 10 mL (has no administration in time range)   sodium chloride 0.9 % infusion 40 mL (has no administration in time range)   ondansetron ODT (ZOFRAN-ODT) disintegrating tablet 4 mg (has no administration in time range)     Or   ondansetron (ZOFRAN) injection 4 mg (has no administration in time range)   sennosides-docusate (PERICOLACE) 8.6-50 MG per tablet 2 tablet (has no administration in time range)     And   polyethylene glycol (MIRALAX) packet 17 g (has no administration in time range)     And   bisacodyl (DULCOLAX) EC tablet 5 mg (has no administration in time range)     And   bisacodyl (DULCOLAX) suppository 10 mg (has no administration in time range)   Tetanus-Diphth-Acell Pertussis (BOOSTRIX) injection 0.5 mL (has no administration in time range)   Lidocaine 4 % 1 patch (1 patch Transdermal Medication Applied 7/28/24 1330)   acetaminophen (TYLENOL) tablet 650 mg (has no administration in time range)   oxyCODONE (ROXICODONE) immediate release tablet 5 mg (has no administration in time range)   sodium chloride 0.9 % bolus 500 mL (500 mL Intravenous New Bag 7/28/24 1617)   sodium chloride 0.9 % bolus 500 mL (has no administration in time range)   apixaban (ELIQUIS) tablet 5 mg ( Oral Dose Auto Held 10/13/24 0900)   atorvastatin (LIPITOR) tablet 80 mg (has no administration in time range)   aspirin chewable tablet 81 mg (has no administration in time range)         ADDITIONAL ORDERS CONSIDERED BUT NOT ORDERED:  Nothing    PROGRESS, DATA ANALYSIS, CONSULTS, AND MEDICAL DECISION MAKING    All labs have been independently interpreted by myself.  All radiology studies have been independently interpreted by myself and discussed with radiologist dictating the report.   EKG's  independently interpreted by myself.  Discussion below represents my analysis of pertinent findings related to patient's condition, differential diagnosis, treatment plan and final disposition.    I have discussed case with Dr. East, emergency room physician.  She has performed her own bedside examination and agrees with treatment plan.    ED Course as of 07/28/24 1629   Sun Jul 28, 2024   0918 Patient presents with generalized weakness, 2 recent falls.  Most recent fall was last night.  Patient has a laceration to his right forearm.  Uncertain of any head trauma.  He is at his neurological baseline.  He is on Eliquis.  He is being treated for UTI at this time.  Will check basic labs, head CT, repair right arm laceration. [EE]   0931 WBC(!): 11.60  This was 8.6, on 7/25/2024 [EE]   1012 Creatinine: 1.12 [EE]   1029 Chest x-ray independently interpreted myself shows small right pleural effusion. [EE]   1048 I discussed CT chest findings with Dr. Gutiérrez.  Patient appears to have fractures of the fifth, sixth, seventh ribs on the right without displacement or pneumothorax. [EE]   1155 Bacteria, UA(!): 1+  Patient currently being treated for UTI. [EE]   1222 Given patient's 3 rib fractures I would prefer to admit the patient.  Family in agreement.  He has stable vitals.    I discussed the case with Tesha Isidro NP with the observation unit.  She agrees to admit. [EE]      ED Course User Index  [EE] Wm Morales PA       AS OF 16:29 EDT VITALS:    BP - 163/69  HR - 75  TEMP - 98.2 °F (36.8 °C) (Tympanic)  O2 SATS - 97%        DIAGNOSIS  Final diagnoses:   Closed fracture of multiple ribs of right side, initial encounter   Skin tear of right elbow without complication, initial encounter   Fall, initial encounter         DISPOSITION  Admitted    Admission was considered but after careful review of the patient's presentation, physical examination, diagnostic results, and response to treatment the patient may be  safely discharged with outpatient follow-up.         Dictated utilizing Maria D dictation     Wm Morales PA  07/28/24 6625

## 2024-07-29 ENCOUNTER — APPOINTMENT (OUTPATIENT)
Dept: GENERAL RADIOLOGY | Facility: HOSPITAL | Age: 89
End: 2024-07-29
Payer: MEDICARE

## 2024-07-29 PROBLEM — R31.0 GROSS HEMATURIA: Status: ACTIVE | Noted: 2024-07-29

## 2024-07-29 PROBLEM — D72.829 LEUKOCYTOSIS: Status: ACTIVE | Noted: 2024-07-29
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
## 2024-07-29 LAB
ALBUMIN SERPL-MCNC: 3.9 G/DL (ref 3.5–5.2)
ALBUMIN/GLOB SERPL: 1 G/DL
ALP SERPL-CCNC: 66 U/L (ref 39–117)
ALT SERPL W P-5'-P-CCNC: 15 U/L (ref 1–41)
ANION GAP SERPL CALCULATED.3IONS-SCNC: 12 MMOL/L (ref 5–15)
ANION GAP SERPL CALCULATED.3IONS-SCNC: 13 MMOL/L (ref 5–15)
AST SERPL-CCNC: 33 U/L (ref 1–40)
BILIRUB SERPL-MCNC: 1.3 MG/DL (ref 0–1.2)
BUN SERPL-MCNC: 12 MG/DL (ref 8–23)
BUN SERPL-MCNC: 15 MG/DL (ref 8–23)
BUN/CREAT SERPL: 13.3 (ref 7–25)
BUN/CREAT SERPL: 16.7 (ref 7–25)
CALCIUM SPEC-SCNC: 8.6 MG/DL (ref 8.2–9.6)
CALCIUM SPEC-SCNC: 9.2 MG/DL (ref 8.2–9.6)
CHLORIDE SERPL-SCNC: 92 MMOL/L (ref 98–107)
CHLORIDE SERPL-SCNC: 94 MMOL/L (ref 98–107)
CHLORIDE UR-SCNC: 146 MMOL/L
CO2 SERPL-SCNC: 19 MMOL/L (ref 22–29)
CO2 SERPL-SCNC: 21 MMOL/L (ref 22–29)
CREAT SERPL-MCNC: 0.9 MG/DL (ref 0.76–1.27)
CREAT SERPL-MCNC: 0.9 MG/DL (ref 0.76–1.27)
DEPRECATED RDW RBC AUTO: 41.3 FL (ref 37–54)
DEPRECATED RDW RBC AUTO: 43.8 FL (ref 37–54)
EGFRCR SERPLBLD CKD-EPI 2021: 77.7 ML/MIN/1.73
EGFRCR SERPLBLD CKD-EPI 2021: 77.7 ML/MIN/1.73
ERYTHROCYTE [DISTWIDTH] IN BLOOD BY AUTOMATED COUNT: 14.6 % (ref 12.3–15.4)
ERYTHROCYTE [DISTWIDTH] IN BLOOD BY AUTOMATED COUNT: 15.2 % (ref 12.3–15.4)
GLOBULIN UR ELPH-MCNC: 3.8 GM/DL
GLUCOSE BLDC GLUCOMTR-MCNC: 93 MG/DL (ref 70–130)
GLUCOSE SERPL-MCNC: 101 MG/DL (ref 65–99)
GLUCOSE SERPL-MCNC: 90 MG/DL (ref 65–99)
HCT VFR BLD AUTO: 39.7 % (ref 37.5–51)
HCT VFR BLD AUTO: 41.5 % (ref 37.5–51)
HGB BLD-MCNC: 12.4 G/DL (ref 13–17.7)
HGB BLD-MCNC: 13 G/DL (ref 13–17.7)
MCH RBC QN AUTO: 24.8 PG (ref 26.6–33)
MCH RBC QN AUTO: 24.8 PG (ref 26.6–33)
MCHC RBC AUTO-ENTMCNC: 31.2 G/DL (ref 31.5–35.7)
MCHC RBC AUTO-ENTMCNC: 31.3 G/DL (ref 31.5–35.7)
MCV RBC AUTO: 79 FL (ref 79–97)
MCV RBC AUTO: 79.6 FL (ref 79–97)
OSMOLALITY SERPL: 261 MOSM/KG (ref 280–301)
OSMOLALITY UR: 550 MOSM/KG
PLATELET # BLD AUTO: 630 10*3/MM3 (ref 140–450)
PLATELET # BLD AUTO: 711 10*3/MM3 (ref 140–450)
PMV BLD AUTO: 8.5 FL (ref 6–12)
PMV BLD AUTO: 8.6 FL (ref 6–12)
POTASSIUM SERPL-SCNC: 4.2 MMOL/L (ref 3.5–5.2)
POTASSIUM SERPL-SCNC: 4.4 MMOL/L (ref 3.5–5.2)
PROT SERPL-MCNC: 7.7 G/DL (ref 6–8.5)
QT INTERVAL: 427 MS
QTC INTERVAL: 517 MS
RBC # BLD AUTO: 4.99 10*6/MM3 (ref 4.14–5.8)
RBC # BLD AUTO: 5.25 10*6/MM3 (ref 4.14–5.8)
SODIUM SERPL-SCNC: 124 MMOL/L (ref 136–145)
SODIUM SERPL-SCNC: 127 MMOL/L (ref 136–145)
SODIUM UR-SCNC: 180 MMOL/L
TROPONIN T SERPL HS-MCNC: 26 NG/L
WBC NRBC COR # BLD AUTO: 10.04 10*3/MM3 (ref 3.4–10.8)
WBC NRBC COR # BLD AUTO: 12.24 10*3/MM3 (ref 3.4–10.8)

## 2024-07-29 PROCEDURE — 84300 ASSAY OF URINE SODIUM: CPT | Performed by: INTERNAL MEDICINE

## 2024-07-29 PROCEDURE — G0378 HOSPITAL OBSERVATION PER HR: HCPCS

## 2024-07-29 PROCEDURE — 92610 EVALUATE SWALLOWING FUNCTION: CPT

## 2024-07-29 PROCEDURE — 82436 ASSAY OF URINE CHLORIDE: CPT | Performed by: INTERNAL MEDICINE

## 2024-07-29 PROCEDURE — 93010 ELECTROCARDIOGRAM REPORT: CPT | Performed by: INTERNAL MEDICINE

## 2024-07-29 PROCEDURE — 25010000002 ONDANSETRON PER 1 MG: Performed by: NURSE PRACTITIONER

## 2024-07-29 PROCEDURE — 99214 OFFICE O/P EST MOD 30 MIN: CPT | Performed by: STUDENT IN AN ORGANIZED HEALTH CARE EDUCATION/TRAINING PROGRAM

## 2024-07-29 PROCEDURE — 71045 X-RAY EXAM CHEST 1 VIEW: CPT

## 2024-07-29 PROCEDURE — 99221 1ST HOSP IP/OBS SF/LOW 40: CPT | Performed by: NURSE PRACTITIONER

## 2024-07-29 PROCEDURE — 83935 ASSAY OF URINE OSMOLALITY: CPT | Performed by: INTERNAL MEDICINE

## 2024-07-29 PROCEDURE — 85027 COMPLETE CBC AUTOMATED: CPT | Performed by: NURSE PRACTITIONER

## 2024-07-29 PROCEDURE — 85027 COMPLETE CBC AUTOMATED: CPT | Performed by: EMERGENCY MEDICINE

## 2024-07-29 PROCEDURE — 84484 ASSAY OF TROPONIN QUANT: CPT | Performed by: EMERGENCY MEDICINE

## 2024-07-29 PROCEDURE — 80053 COMPREHEN METABOLIC PANEL: CPT | Performed by: NURSE PRACTITIONER

## 2024-07-29 PROCEDURE — 83930 ASSAY OF BLOOD OSMOLALITY: CPT | Performed by: INTERNAL MEDICINE

## 2024-07-29 PROCEDURE — 94799 UNLISTED PULMONARY SVC/PX: CPT

## 2024-07-29 PROCEDURE — 97110 THERAPEUTIC EXERCISES: CPT

## 2024-07-29 PROCEDURE — 93005 ELECTROCARDIOGRAM TRACING: CPT | Performed by: EMERGENCY MEDICINE

## 2024-07-29 PROCEDURE — 97535 SELF CARE MNGMENT TRAINING: CPT

## 2024-07-29 PROCEDURE — 25010000002 ONDANSETRON PER 1 MG: Performed by: EMERGENCY MEDICINE

## 2024-07-29 PROCEDURE — 97166 OT EVAL MOD COMPLEX 45 MIN: CPT

## 2024-07-29 PROCEDURE — 25010000002 CEFTRIAXONE PER 250 MG: Performed by: INTERNAL MEDICINE

## 2024-07-29 PROCEDURE — 97162 PT EVAL MOD COMPLEX 30 MIN: CPT

## 2024-07-29 PROCEDURE — 82948 REAGENT STRIP/BLOOD GLUCOSE: CPT

## 2024-07-29 RX ORDER — ALPRAZOLAM 0.25 MG/1
0.25 TABLET ORAL ONCE
Status: COMPLETED | OUTPATIENT
Start: 2024-07-29 | End: 2024-07-29

## 2024-07-29 RX ORDER — SODIUM CHLORIDE 1 G/1
1 TABLET ORAL 2 TIMES DAILY WITH MEALS
Status: DISCONTINUED | OUTPATIENT
Start: 2024-07-29 | End: 2024-07-29

## 2024-07-29 RX ORDER — SODIUM CHLORIDE 1 G/1
1 TABLET ORAL DAILY
COMMUNITY
Start: 2024-07-01 | End: 2024-07-31 | Stop reason: HOSPADM

## 2024-07-29 RX ORDER — ACETAMINOPHEN 500 MG
1000 TABLET ORAL 3 TIMES DAILY
Status: DISCONTINUED | OUTPATIENT
Start: 2024-07-29 | End: 2024-07-30

## 2024-07-29 RX ORDER — SODIUM CHLORIDE 1 G/1
1 TABLET ORAL
Status: DISCONTINUED | OUTPATIENT
Start: 2024-07-29 | End: 2024-07-31 | Stop reason: HOSPADM

## 2024-07-29 RX ORDER — FUROSEMIDE 20 MG/1
20 TABLET ORAL DAILY
Status: DISCONTINUED | OUTPATIENT
Start: 2024-07-29 | End: 2024-07-31 | Stop reason: HOSPADM

## 2024-07-29 RX ORDER — ONDANSETRON 2 MG/ML
4 INJECTION INTRAMUSCULAR; INTRAVENOUS ONCE
Status: COMPLETED | OUTPATIENT
Start: 2024-07-29 | End: 2024-07-29

## 2024-07-29 RX ORDER — SODIUM CHLORIDE 1 G/1
1 TABLET ORAL DAILY
Status: DISCONTINUED | OUTPATIENT
Start: 2024-07-29 | End: 2024-07-29 | Stop reason: DRUGHIGH

## 2024-07-29 RX ORDER — GABAPENTIN 100 MG/1
100 CAPSULE ORAL NIGHTLY
Status: DISCONTINUED | OUTPATIENT
Start: 2024-07-29 | End: 2024-07-30

## 2024-07-29 RX ORDER — OXYCODONE HYDROCHLORIDE 5 MG/1
2.5 TABLET ORAL EVERY 4 HOURS PRN
Status: DISCONTINUED | OUTPATIENT
Start: 2024-07-29 | End: 2024-07-31 | Stop reason: HOSPADM

## 2024-07-29 RX ADMIN — ACETAMINOPHEN 1000 MG: 500 TABLET ORAL at 11:45

## 2024-07-29 RX ADMIN — GABAPENTIN 100 MG: 100 CAPSULE ORAL at 20:59

## 2024-07-29 RX ADMIN — ACETAMINOPHEN 1000 MG: 500 TABLET ORAL at 20:59

## 2024-07-29 RX ADMIN — SODIUM CHLORIDE TAB 1 GM 1 G: 1 TAB at 11:45

## 2024-07-29 RX ADMIN — LIDOCAINE 1 PATCH: 4 PATCH TOPICAL at 08:48

## 2024-07-29 RX ADMIN — ONDANSETRON 4 MG: 2 INJECTION INTRAMUSCULAR; INTRAVENOUS at 19:00

## 2024-07-29 RX ADMIN — Medication 2.5 MG: at 20:59

## 2024-07-29 RX ADMIN — Medication 10 ML: at 21:53

## 2024-07-29 RX ADMIN — FUROSEMIDE 20 MG: 20 TABLET ORAL at 11:45

## 2024-07-29 RX ADMIN — ASPIRIN 81 MG: 81 TABLET, CHEWABLE ORAL at 08:48

## 2024-07-29 RX ADMIN — ACETAMINOPHEN 1000 MG: 500 TABLET ORAL at 16:49

## 2024-07-29 RX ADMIN — Medication 10 ML: at 08:48

## 2024-07-29 RX ADMIN — CEFTRIAXONE SODIUM 1000 MG: 1 INJECTION, POWDER, FOR SOLUTION INTRAMUSCULAR; INTRAVENOUS at 15:16

## 2024-07-29 RX ADMIN — CIPROFLOXACIN HYDROCHLORIDE 500 MG: 500 TABLET, FILM COATED ORAL at 08:48

## 2024-07-29 RX ADMIN — SODIUM CHLORIDE TAB 1 GM 1 G: 1 TAB at 08:48

## 2024-07-29 RX ADMIN — ALPRAZOLAM 0.25 MG: 0.25 TABLET ORAL at 00:34

## 2024-07-29 RX ADMIN — SODIUM CHLORIDE TAB 1 GM 1 G: 1 TAB at 18:45

## 2024-07-29 RX ADMIN — ONDANSETRON 4 MG: 2 INJECTION INTRAMUSCULAR; INTRAVENOUS at 09:05

## 2024-07-29 NOTE — CONSULTS
FIRST UROLOGY CONSULT      Patient Identification:  NAME:  Danni Aguilar  Age:  89 y.o.   Sex:  male   :  1926   MRN:  [id number]       Chief complaint: Urinary Retention     History of present illness:  Danni Aguilar is a 89 y.o. male admitted through ER with bleeding from arm after fall, found to have rib fracture, urinary retention. Newman catheter placed with development of gross hematuria. History of BPH and urinary retention in the past.  Last seen in Urology clinic in 2018. Typically on anticoagulation for a fib.       Cr 0.90  WBC 12.24  UA Blood neg, LE 1+, otherwise neg on admission     No imaging of bladder       Past medical history:  Past Medical History:   Diagnosis Date    A-fib     Carotid artery stenosis     Hyperlipidemia     Irregular heart beat        Past surgical history:  Past Surgical History:   Procedure Laterality Date    BACK SURGERY      CAROTID ARTERY ANGIOPLASTY      CEREBRAL ANGIOGRAM N/A 2017    Procedure: DIAGNOSTIC CEREBRAL ANGIOGRAM AND RIGHT CAROTID STENT PLACEMENT;  Surgeon: Mauricio Yung MD;  Location: [facility] ;  Service:     RECTAL SURGERY         Allergies:  Patient has no known allergies.    Home medications:  Medications Prior to Admission   Medication Sig Dispense Refill Last Dose    apixaban (ELIQUIS) 5 MG tablet tablet Take 1 tablet by mouth 2 (Two) Times a Day.   2024    aspirin 81 MG chewable tablet Chew 1 tablet Daily.   2024    atorvastatin (LIPITOR) 80 MG tablet Take 1 tablet by mouth Daily. 30 tablet 0 2024    ciprofloxacin (CIPRO) 500 MG tablet Take 1 tablet by mouth 2 (Two) Times a Day.   2024    sennosides-docusate (PERICOLACE) 8.6-50 MG per tablet Take 2 tablets by mouth 2 (Two) Times a Day. 60 tablet 0 2024    sodium chloride 1 g tablet Take 1 tablet by mouth Daily. The go to reconcile meds has 1g daily, but pt home med list says 10mg daily       benzonatate (Tessalon Perles) 100 MG capsule Take 1  capsule by mouth 3 (Three) Times a Day As Needed for Cough. 60 capsule 0     guaiFENesin (MUCINEX) 600 MG 12 hr tablet Take 2 tablets by mouth Every 12 (Twelve) Hours. 60 tablet 0 Unknown    ipratropium-albuterol (DUO-NEB) 0.5-2.5 mg/3 ml nebulizer Take 3 mL by nebulization Every 6 (Six) Hours As Needed for Wheezing or Shortness of Air.       polyethylene glycol (MIRALAX) 17 g packet Take 17 g by mouth Daily As Needed (Use if senna-docusate is ineffective). 30 each 0 Unknown    warfarin (Coumadin) 2 MG tablet Take 2 tablets by mouth Every Night. 60 tablet 0         Hospital medications:  acetaminophen, 1,000 mg, Oral, TID  [Held by provider] apixaban, 5 mg, Oral, BID  aspirin, 81 mg, Oral, Daily  atorvastatin, 80 mg, Oral, Daily  ciprofloxacin, 500 mg, Oral, BID  furosemide, 20 mg, Oral, Daily  lidocaine 1% - EPINEPHrine 1:[id number], 10 mL, Injection, Once  Lidocaine, 1 patch, Transdermal, Q24H  sodium chloride, 10 mL, Intravenous, Q12H  sodium chloride, 1 g, Oral, TID With Meals           acetaminophen    senna-docusate sodium **AND** polyethylene glycol **AND** bisacodyl **AND** bisacodyl    nitroglycerin    ondansetron ODT **OR** ondansetron    oxyCODONE    [COMPLETED] Insert Peripheral IV **AND** sodium chloride    sodium chloride    sodium chloride    Tetanus-Diphth-Acell Pertussis    Family history:  History reviewed. No pertinent family history.    Social history:  Social History     Tobacco Use    Smoking status: Never    Smokeless tobacco: Never   Vaping Use    Vaping status: Never Used   Substance Use Topics    Alcohol use: No    Drug use: Defer       REVIEW OF SYSTEMS:  Constitutional - Negative for fevers/chills  Eyes/Ears/Nose/Mouth/Throat - Negative for changes in vision  Cardiovascular - Negative for chest pain, dysrhythmia  Respiratory - Negative for dyspnea  Gastrointestinal - Negative for nausea or vomiting  Genitourinary - Negative for dysuria  Hematologic/Lymphatic - Negative for bruising  Skin -  Negative for erythema  Endocrine - Negative for history of diabetes    Objective:  TMax 24 hours:   Temp (24hrs), Av.9 °F (36.6 °C), Min:97.3 °F (36.3 °C), Max:98.6 °F (37 °C)      Vitals Ranges:   Temp:  [97.3 °F (36.3 °C)-98.6 °F (37 °C)] 97.9 °F (36.6 °C)  Heart Rate:  [] 83  Resp:  [16-18] 18  BP: (144-195)/(64-94) 173/68    Intake/Output Last 3 shifts:  I/O last 3 completed shifts:  In: 1240 [P.O.:240; IV Piggyback:1000]  Out: 2500 [Urine:2500]     Physical Exam:    General Appearance:    Alert, cooperative, NAD   HEENT:    No trauma, pupils reactive, hearing intact   Back:     No CVA tenderness   Lungs:     Respirations unlabored, no wheezing    Heart:    RRR, intact peripheral pulses   Abdomen:     Soft, NDNT, no masses, no guarding   :    Testes descended bilaterally, no nodules.  Penis normal.      No scrotal or penile rashes noted   Extremities:   No edema, no deformity   Lymphatic:   No neck or groin LAD   Skin:   No bleeding, bruising or rashes   Neuro/Psych:   Orientation intact, mood/affect pleasant, no focal findings       Results review:   I reviewed the patient's new clinical results.    Data review:  Lab Results (last 24 hours)       Procedure Component Value Units Date/Time    High Sensitivity Troponin T [[id number]]  (Abnormal) Collected: 24    Specimen: Blood Updated: 24 1023     HS Troponin T 26 ng/L     Narrative:      High Sensitive Troponin T Reference Range:  <14.0 ng/L- Negative Female for AMI  <22.0 ng/L- Negative Male for AMI  >=14 - Abnormal Female indicating possible myocardial injury.  >=22 - Abnormal Male indicating possible myocardial injury.   Clinicians would have to utilize clinical acumen, EKG, Troponin, and serial changes to determine if it is an Acute Myocardial Infarction or myocardial injury due to an underlying chronic condition.         Comprehensive Metabolic Panel [[id number]]  (Abnormal) Collected: 24 0938    Specimen: Blood Updated:  07/29/24 1023     Glucose 90 mg/dL      BUN 12 mg/dL      Creatinine 0.90 mg/dL      Sodium 124 mmol/L      Potassium 4.4 mmol/L      Comment: Slight hemolysis detected by analyzer. Result may be falsely elevated.        Chloride 92 mmol/L      CO2 19.0 mmol/L      Calcium 9.2 mg/dL      Total Protein 7.7 g/dL      Albumin 3.9 g/dL      ALT (SGPT) 15 U/L      AST (SGOT) 33 U/L      Alkaline Phosphatase 66 U/L      Total Bilirubin 1.3 mg/dL      Globulin 3.8 gm/dL      A/G Ratio 1.0 g/dL      BUN/Creatinine Ratio 13.3     Anion Gap 13.0 mmol/L      eGFR 77.7 mL/min/1.73     Narrative:      GFR Normal >60  Chronic Kidney Disease <60  Kidney Failure <15    The GFR formula is only valid for adults with stable renal function between ages 18 and 70.    Osmolality, Serum [[id number]]  (Abnormal) Collected: 07/29/24 0938    Specimen: Blood Updated: 07/29/24 1014     Osmolality 261 mOsm/kg     Sodium, Urine, Random - Indwelling Urethral Catheter [[id number]] Collected: 07/29/24 0938    Specimen: Urine from Indwelling Urethral Catheter Updated: 07/29/24 1008     Sodium, Urine 180 mmol/L     Narrative:      Reference intervals for random urine have not been established.  Clinical usage is dependent upon physician's interpretation in combination with other laboratory tests.       Chloride, Urine, Random - Indwelling Urethral Catheter [[id number]] Collected: 07/29/24 0938    Specimen: Urine from Indwelling Urethral Catheter Updated: 07/29/24 1008     Chloride, Urine 146 mmol/L     Narrative:      Reference intervals for random urine have not been established.  Clinical usage is dependent upon physician's interpretation in combination with other laboratory tests.       CBC (No Diff) [[id number]]  (Abnormal) Collected: 07/29/24 0938    Specimen: Blood Updated: 07/29/24 1000     WBC 12.24 10*3/mm3      RBC 5.25 10*6/mm3      Hemoglobin 13.0 g/dL      Hematocrit 41.5 %      MCV 79.0 fL      MCH 24.8 pg      MCHC 31.3 g/dL      RDW 14.6  %      RDW-SD 41.3 fl      MPV 8.5 fL      Platelets 711 10*3/mm3     Osmolality, Urine - Urine, Clean Catch [[id number]] Collected: 07/29/24 0938    Specimen: Urine, Clean Catch Updated: 07/29/24 0959     Osmolality, Urine 550 mOsm/kg     Narrative:      Osmo Normal Reference Ranges:    Random:  mOsm/kg H2O, depending on fluid intake.  Random: >850 mOsm/kg H20, after 12 hour fluid restriction.    24 Hour: 300-900 mOsm/kg H2O.    POC Glucose Once [[id number]]  (Normal) Collected: 07/29/24 0922    Specimen: Blood Updated: 07/29/24 0924     Glucose 93 mg/dL     Basic Metabolic Panel [[id number]]  (Abnormal) Collected: 07/29/24 0054    Specimen: Blood from Arm, Left Updated: 07/29/24 0131     Glucose 101 mg/dL      BUN 15 mg/dL      Creatinine 0.90 mg/dL      Sodium 127 mmol/L      Potassium 4.2 mmol/L      Chloride 94 mmol/L      CO2 21.0 mmol/L      Calcium 8.6 mg/dL      BUN/Creatinine Ratio 16.7     Anion Gap 12.0 mmol/L      eGFR 77.7 mL/min/1.73     Narrative:      GFR Normal >60  Chronic Kidney Disease <60  Kidney Failure <15    The GFR formula is only valid for adults with stable renal function between ages 18 and 70.    CBC (No Diff) [[id number]]  (Abnormal) Collected: 07/29/24 0054    Specimen: Blood from Arm, Left Updated: 07/29/24 0114     WBC 10.04 10*3/mm3      RBC 4.99 10*6/mm3      Hemoglobin 12.4 g/dL      Hematocrit 39.7 %      MCV 79.6 fL      MCH 24.8 pg      MCHC 31.2 g/dL      RDW 15.2 %      RDW-SD 43.8 fl      MPV 8.6 fL      Platelets 630 10*3/mm3     POC Glucose Once [[id number]]  (Normal) Collected: 07/28/24 1328    Specimen: Blood Updated: 07/28/24 1330     Glucose 96 mg/dL     Respiratory Panel PCR w/COVID-19(SARS-CoV-2) JENNIFER/ANGELA/JANET/PAD/COR/EKATERINA In-House, NP Swab in UTM/VTM, 2 HR TAT - Swab, Nasopharynx [[id number]]  (Normal) Collected: 07/28/24 1139    Specimen: Swab from Nasopharynx Updated: 07/28/24 1245     ADENOVIRUS, PCR Not Detected     Coronavirus 229E Not Detected     Coronavirus  HKU1 Not Detected     Coronavirus NL63 Not Detected     Coronavirus OC43 Not Detected     COVID19 Not Detected     Human Metapneumovirus Not Detected     Human Rhinovirus/Enterovirus Not Detected     Influenza A PCR Not Detected     Influenza B PCR Not Detected     Parainfluenza Virus 1 Not Detected     Parainfluenza Virus 2 Not Detected     Parainfluenza Virus 3 Not Detected     Parainfluenza Virus 4 Not Detected     RSV, PCR Not Detected     Bordetella pertussis pcr Not Detected     Bordetella parapertussis PCR Not Detected     Chlamydophila pneumoniae PCR Not Detected     Mycoplasma pneumo by PCR Not Detected    Narrative:      In the setting of a positive respiratory panel with a viral infection PLUS a negative procalcitonin without other underlying concern for bacterial infection, consider observing off antibiotics or discontinuation of antibiotics and continue supportive care. If the respiratory panel is positive for atypical bacterial infection (Bordetella pertussis, Chlamydophila pneumoniae, or Mycoplasma pneumoniae), consider antibiotic de-escalation to target atypical bacterial infection.             Imaging:  Imaging Results (Last 24 Hours)       Procedure Component Value Units Date/Time    XR Chest 1 View [[id number]] Collected: 07/29/24 0746     Updated: 07/29/24 0751    Narrative:      XR CHEST 1 VW-     Clinical: Rib fracture, rule out pneumothorax     Correlation with chest radiograph 7/28/2024 and chest CT 7/28/2024     FINDINGS: Hairline right rib fractures seen on CT cannot be appreciated  on the current chest radiograph. Vague opacity right mid to lower lung  zone corresponds to the pleural effusion. No pneumothorax. The left lung  is clear. The cardiomediastinal silhouette is stable. The remainder is  unremarkable.     CONCLUSION: Right-sided pleural effusion, no pneumothorax has developed.     This report was finalized on 7/29/2024 7:48 AM by Dr. Boris Lester M.D  on Workstation:  [id number]       CT Chest Without Contrast Diagnostic [[id number]] Collected: 07/28/24 1056     Updated: 07/28/24 2355    Narrative:      CT OF THE CHEST WITHOUT CONTRAST 07/28/2024     HISTORY: Fell. Right side rib injury. Axial images were obtained from  the lung apices to the upper abdomen. No intravenous contrast was given.     There is a mildly displaced fracture of the right 6th rib and there  appear to be nondisplaced fractures of the right 5th and 7th ribs. No  pneumothorax is seen. There is a moderate size right pleural effusion  with some mild right lower lobe atelectasis. This demonstrates low  density fluid. Tiny subpleural probable calcified granuloma is seen in  the left lower lobe on image 48.     There is some aortic and coronary calcification. No pathologically  enlarged lymph nodes are seen. At least 2 probable hepatic cysts are  seen. A small calcified nodule is seen posterior to the right hepatic  lobe.       Impression:      1. At least 3 right rib fractures as described.  2. Moderate size right pleural effusion with some mild right lower lobe  atelectasis.     Radiation dose reduction techniques were utilized, including automated  exposure control and exposure modulation based on body size.        This report was finalized on 7/28/2024 11:52 PM by Dr. Natanael Gutiérrez M.D on Workstation: [id number]       CT Head Without Contrast [[id number]] Collected: 07/28/24 1104     Updated: 07/28/24 2342    Narrative:      HISTORY: Fell. Head injury. Neck pain.     CT OF THE BRAIN WITHOUT CONTRAST 07/28/2024     Axial images were obtained through the brain without intravenous  contrast. There is moderately severe diffuse atrophy. There is decreased  attenuation of the periventricular white matter bilaterally consistent  with small vessel white matter ischemic disease. Small low-density  subdural hygromas are seen over both frontal lobes similar to the  10/08/2023 study.     There is no evidence of acute  infarction, hemorrhage, midline shift or  mass effect.     No skull fractures are seen.       Impression:      1. Atrophy and small vessel ischemic disease.  2. No acute intracranial process.  3. 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        CT OF THE CERVICAL SPINE WITHOUT CONTRAST     Axial images were obtained from the skull base to the upper thoracic  spine. Sagittal and coronal reconstruction images were reviewed.     There is C3-4, C4-5 and C5-6 spondylosis. Minimal (1-2 mm)  anterolisthesis of C2 on C3 is seen. Minimal anterolisthesis of C7 on T1  is seen.     No cervical spine fractures are seen.     There is a right carotid stent. Bilateral carotid calcification is seen.     IMPRESSION:  1. Multilevel cervical degenerative disease.  2. No fractures are seen.  3. Right pleural effusion is seen. Please see additional dictation for  today's CT of the chest.     Radiation dose reduction techniques were utilized, including automated  exposure control and exposure modulation based on body size.        This report was finalized on 7/28/2024 11:39 PM by Dr. Natanael Gutiérrez M.D on Workstation: [id number]       CT Cervical Spine Without Contrast [[id number]] Collected: 07/28/24 1104     Updated: 07/28/24 2342    Narrative:      HISTORY: Fell. Head injury. Neck pain.     CT OF THE BRAIN WITHOUT CONTRAST 07/28/2024     Axial images were obtained through the brain without intravenous  contrast. There is moderately severe diffuse atrophy. There is decreased  attenuation of the periventricular white matter bilaterally consistent  with small vessel white matter ischemic disease. Small low-density  subdural hygromas are seen over both frontal lobes similar to the  10/08/2023 study.     There is no evidence of acute infarction, hemorrhage, midline shift or  mass effect.     No skull fractures are seen.       Impression:      1. Atrophy and small vessel ischemic disease.  2. No acute intracranial process.  3.  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        CT OF THE CERVICAL SPINE WITHOUT CONTRAST     Axial images were obtained from the skull base to the upper thoracic  spine. Sagittal and coronal reconstruction images were reviewed.     There is C3-4, C4-5 and C5-6 spondylosis. Minimal (1-2 mm)  anterolisthesis of C2 on C3 is seen. Minimal anterolisthesis of C7 on T1  is seen.     No cervical spine fractures are seen.     There is a right carotid stent. Bilateral carotid calcification is seen.     IMPRESSION:  1. Multilevel cervical degenerative disease.  2. No fractures are seen.  3. Right pleural effusion is seen. Please see additional dictation for  today's CT of the chest.     Radiation dose reduction techniques were utilized, including automated  exposure control and exposure modulation based on body size.        This report was finalized on 7/28/2024 11:39 PM by Dr. Natanael Gutiérrez M.D on Workstation: [id number]       CT Angiogram Head [[id number]] Collected: 07/28/24 1816     Updated: 07/28/24 1926    Narrative:      CT ANGIOGRAM NECK AND HEAD WITH CONTRAST     HISTORY: Slurred speech, left facial droop.     COMPARISON: MRI brain 07/28/2024, CT head 07/28/2024 and CT angiogram of  the neck and head 12/09/2020.     FINDINGS: Initially, a noncontrasted CT examination of the brain was  performed. 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 Prominent CSF is appreciated overlying the anterior aspect of  the frontal lobes bilaterally, slightly more prominent on the left  consistent with hygromas.     A CT angiogram of the neck and head was then performed. Multiplanar as  well as 3-dimensional reconstructions were generated.     A moderate-to-large pleural fluid collection is present on the right,  only partially visualized. Calcified plaque is appreciated involving the  aortic arch and origins of the great vessels.  There is a bovine  configuration of the great vessels. There is mild irregularity and  stenosis involving the left subclavian artery proximally. A carotid  stent is appreciated involving the right carotid bifurcation. Decreased  attenuation is appreciated involving the posterior aspect of the stent  suggesting mild endothelial hyperplasia or mural thrombus similar in  appearance as compared to the prior study. The stent is widely patent.  Eccentric calcified plaque is appreciated involving the left internal  carotid artery proximally but without stenosis. The mid cervical ICAs  are tortuous. There is mild irregularity involving the cervical ICAs  bilaterally consistent with fibromuscular dysplasia, slightly more  prominent on the left. There is fusiform enlargement of the cervical ICA  on the left which measures 8 mm in diameter similar in appearance as  compared to the prior examination. Mild-to-moderate vascular  calcifications involving the carotid siphons are noted bilaterally. The  right A1 segment is mildly hypoplastic. There is moderate stenosis  involving the left M1 segment, similar in appearance as compared to the  prior examination. The proximal aspects of the anterior and middle  cerebral arteries appear unremarkable.     Both vertebral arteries were opacified. The right vertebral artery is  larger than that of the left. The prior study demonstrated a markedly  hypoplastic V4 segment. The V4 segment on the left appears to be  occluded on the current examination. A focal eccentric calcification is  appreciated involving the basilar artery proximally, present previously  and contributing to mild-to-moderate stenosis. Otherwise, the basilar  artery and left posterior cerebral artery appear unremarkable. There is  a fetal origin of the right posterior cerebral artery.       Impression:      1.  There is no evidence of acute infarction or of intracranial  hemorrhage. Atrophy, small vessel ischemic disease,  vascular  calcification and bifrontal hygromas are noted. A carotid stent is  present on the right. There is eccentric mural thrombus or endothelial  hyperplasia appreciated, mild, unchanged versus 12/09/2020. Irregularity  involving the distal aspects of the cervical ICAs is appreciated  bilaterally, more prominent on the left consistent with fibromuscular  dysplasia, unchanged. There is mild fusiform enlargement of the distal  cervical ICA on the left (8 mm), unchanged. The right vertebral artery  is dominant. The hypoplastic left vertebral artery distally is not  opacified and likely is occluded. Mild-to-moderate stenosis involving  the basilar artery proximally is noted, similar in appearance as  compared to the prior study. There is moderate stenosis involving the  mid left M1 segment similar in appearance as compared to the prior  examination. See above.  2.  There is partial visualization of a moderate-to-large pleural fluid  collection on the right.        Radiation dose reduction techniques were utilized, including automated  exposure control and exposure modulation based on body size.        This report was finalized on 7/28/2024 7:23 PM by Dr. Ramez Lind M.D  on Workstation: [id number]       CT Angiogram Neck [[id number]] Collected: 07/28/24 1816     Updated: 07/28/24 1926    Narrative:      CT ANGIOGRAM NECK AND HEAD WITH CONTRAST     HISTORY: Slurred speech, left facial droop.     COMPARISON: MRI brain 07/28/2024, CT head 07/28/2024 and CT angiogram of  the neck and head 12/09/2020.     FINDINGS: Initially, a noncontrasted CT examination of the brain was  performed. 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 Prominent CSF is appreciated overlying the anterior aspect of  the frontal lobes bilaterally, slightly more prominent on the left  consistent with hygromas.     A CT angiogram of the neck and head  was then performed. Multiplanar as  well as 3-dimensional reconstructions were generated.     A moderate-to-large pleural fluid collection is present on the right,  only partially visualized. Calcified plaque is appreciated involving the  aortic arch and origins of the great vessels. There is a bovine  configuration of the great vessels. There is mild irregularity and  stenosis involving the left subclavian artery proximally. A carotid  stent is appreciated involving the right carotid bifurcation. Decreased  attenuation is appreciated involving the posterior aspect of the stent  suggesting mild endothelial hyperplasia or mural thrombus similar in  appearance as compared to the prior study. The stent is widely patent.  Eccentric calcified plaque is appreciated involving the left internal  carotid artery proximally but without stenosis. The mid cervical ICAs  are tortuous. There is mild irregularity involving the cervical ICAs  bilaterally consistent with fibromuscular dysplasia, slightly more  prominent on the left. There is fusiform enlargement of the cervical ICA  on the left which measures 8 mm in diameter similar in appearance as  compared to the prior examination. Mild-to-moderate vascular  calcifications involving the carotid siphons are noted bilaterally. The  right A1 segment is mildly hypoplastic. There is moderate stenosis  involving the left M1 segment, similar in appearance as compared to the  prior examination. The proximal aspects of the anterior and middle  cerebral arteries appear unremarkable.     Both vertebral arteries were opacified. The right vertebral artery is  larger than that of the left. The prior study demonstrated a markedly  hypoplastic V4 segment. The V4 segment on the left appears to be  occluded on the current examination. A focal eccentric calcification is  appreciated involving the basilar artery proximally, present previously  and contributing to mild-to-moderate stenosis.  Otherwise, the basilar  artery and left posterior cerebral artery appear unremarkable. There is  a fetal origin of the right posterior cerebral artery.       Impression:      1.  There is no evidence of acute infarction or of intracranial  hemorrhage. Atrophy, small vessel ischemic disease, vascular  calcification and bifrontal hygromas are noted. A carotid stent is  present on the right. There is eccentric mural thrombus or endothelial  hyperplasia appreciated, mild, unchanged versus 12/09/2020. Irregularity  involving the distal aspects of the cervical ICAs is appreciated  bilaterally, more prominent on the left consistent with fibromuscular  dysplasia, unchanged. There is mild fusiform enlargement of the distal  cervical ICA on the left (8 mm), unchanged. The right vertebral artery  is dominant. The hypoplastic left vertebral artery distally is not  opacified and likely is occluded. Mild-to-moderate stenosis involving  the basilar artery proximally is noted, similar in appearance as  compared to the prior study. There is moderate stenosis involving the  mid left M1 segment similar in appearance as compared to the prior  examination. See above.  2.  There is partial visualization of a moderate-to-large pleural fluid  collection on the right.        Radiation dose reduction techniques were utilized, including automated  exposure control and exposure modulation based on body size.        This report was finalized on 7/28/2024 7:23 PM by Dr. Ramez Lind M.D  on Workstation: [id number]       MRI Brain Without Contrast [[id number]] Collected: 07/28/24 1606     Updated: 07/28/24 1614    Narrative:      MRI BRAIN WO CONTRAST-     HISTORY:  new left facial droop; S22.41XA-Multiple fractures of ribs,  right side, initial encounter for closed fracture; S51.011A-Laceration  without foreign body of right elbow, initial encounter;  W19.XXXA-Unspecified fall, initial encounter     COMPARISON: CT head 7/28/2024 and MRI brain  12/9/2020     FINDINGS: There is moderate diffuse atrophy. There is no evidence of  restricted diffusion to suggest acute infarction. 2 small remote  infarcts involve the right cerebellar hemisphere laterally are  appreciated which were present on 12/9/2020. The right vertebral artery  is dominant. There is expected flow void in the basilar artery and in  the distal aspect of the internal carotid arteries bilaterally.  Prominent CSF is appreciated overlying the frontal lobes anteriorly (12  mm in thickness on the right and 10 mm in thickness on the left similar  in appearance as compared to the MRI examination of 12/9/2020. There is  no evidence of mass or mass effect on this noncontrasted MRI examination  of the brain.       Impression:      There is no evidence of an acute infarct. Atrophy, small  vessel ischemic disease and bifrontal hygromas are appreciated similar  appearances compared to 12/9/2020.     This report was finalized on 7/28/2024 4:11 PM by Dr. Ramez Lind M.D  on Workstation: [id number]       XR Wrist 3+ View Right [[id number]] Collected: 07/28/24 1126     Updated: 07/28/24 1130    Narrative:      RIGHT WRIST     HISTORY: Fall, pain.     COMPARISON: None.     FINDINGS: 3 views of the right wrist were obtained. The study is  hampered somewhat by patient positioning. There is moderate radiocarpal  joint space narrowing. Chondrocalcinosis and extensive vascular  calcification is appreciated. There is no evidence of fracture. If the  patient's symptoms persist, a follow-up study in 7 to 10 days would be  suggested.     This report was finalized on 7/28/2024 11:27 AM by Dr. Ramez Lind M.D on Workstation: [id number]                  Assessment:       Right rib fracture    History of BPH  Urinary Retention  Gross hematuria    - Urine output is thin, no visible clots  - Favor watching urine for a day or two and hopefully hematuria should resolved  - Looks like he stopped seeing Urology, we will  get him hooked back up in clinic.    Plan:   - Monitory quality of urine output   - If worsening hematuria or clots, will need CT Abd/Pelvis, but can probably hold off.  - Recommend voiding trial early AM of day of Discharge  - If unable to void, PVR > 300 mL, then replace Newman  - Follow-up with Dr. Brandon Harris ([facility]) 1-2 weeks after Discharge - Schedulers messaged.  - Urology will follow through stability    Brandon Harris MD  07/29/24  11:05 EDT

## 2024-07-29 NOTE — THERAPY EVALUATION
Patient Name: Danni Aguilar  : 1926    MRN: [id number]                              Today's Date: 2024       Admit Date: 2024    Visit Dx:     ICD-10-CM ICD-9-CM   1. Closed fracture of multiple ribs of right side, initial encounter  S22.41XA 807.09   2. Skin tear of right elbow without complication, initial encounter  S51.011A 881.01   3. Fall, initial encounter  W19.XXXA E888.9     Patient Active Problem List   Diagnosis    Acute CVA (cerebrovascular accident)    Atrial fibrillation    Long term (current) use of anticoagulants    Carotid stenosis    Urinary retention    Cerebrovascular accident (CVA) due to stenosis of right carotid artery    Hyperlipidemia    Cerebrovascular disease    Recent cerebrovascular accident (CVA)    RSV infection    Hyponatremia    Thrombocytosis    RSV (respiratory syncytial virus infection)    Right rib fracture     Past Medical History:   Diagnosis Date    A-fib     Carotid artery stenosis     Hyperlipidemia     Irregular heart beat      Past Surgical History:   Procedure Laterality Date    BACK SURGERY      CAROTID ARTERY ANGIOPLASTY      CEREBRAL ANGIOGRAM N/A 2017    Procedure: DIAGNOSTIC CEREBRAL ANGIOGRAM AND RIGHT CAROTID STENT PLACEMENT;  Surgeon: Mauricio Yung MD;  Location: [facility] ;  Service:     RECTAL SURGERY        General Information       Row Name 24 1052          Physical Therapy Time and Intention    Document Type evaluation  -MS     Mode of Treatment physical therapy  -MS       Row Name 24 1052          General Information    Patient Profile Reviewed yes  -MS     Prior Level of Function min assist:;mod assist:;bed mobility;transfer;ADL's  ambulates with a walker, both hired assist and family assist, + fall history  -MS     Existing Precautions/Restrictions fall  -MS     Barriers to Rehab cognitive status  -MS       Row Name 24 1052          Living Environment    People in Home spouse  family and caregivers  assist  -MS       Row Name 07/29/24 1052          Home Main Entrance    Number of Stairs, Main Entrance none  -MS       Row Name 07/29/24 1052          Stairs Within Home, Primary    Number of Stairs, Within Home, Primary none  -MS       Row Name 07/29/24 1052          Cognition    Orientation Status (Cognition) oriented to;person;place;time  incr time, lethargic upon arrival- RN later staffed medicated for agitation last night  -MS       Row Name 07/29/24 1052          Safety Issues, Functional Mobility    Safety Issues Affecting Function (Mobility) ability to follow commands;insight into deficits/self-awareness;judgment;positioning of assistive device;sequencing abilities  -MS     Impairments Affecting Function (Mobility) balance;strength;endurance/activity tolerance;cognition;range of motion (ROM)  -MS     Comment, Safety Issues/Impairments (Mobility) Gait belt and non skid socks donned.  -MS               User Key  (r) = Recorded By, (t) = Taken By, (c) = Cosigned By      Initials Name Provider Type    MS Alysia Mason, PT Physical Therapist                   Mobility       Row Name 07/29/24 1056          Bed Mobility    Supine-Sit [place] (Bed Mobility) moderate assist (50% patient effort);verbal cues;nonverbal cues (demo/gesture)  -MS     Sit-Supine [place] (Bed Mobility) minimum assist (75% patient effort);verbal cues;nonverbal cues (demo/gesture)  -MS     Assistive Device (Bed Mobility) bed rails;head of bed elevated  -MS     Comment, (Bed Mobility) SBA-CGA for sitting balance. Incr time needed.  -MS       Row Name 07/29/24 1056          Transfers    Comment, (Transfers) Sequencing and hand placement cues.  -MS       Row Name 07/29/24 1056          Sit-Stand Transfer    Sit-Stand [place] (Transfers) contact guard;verbal cues;nonverbal cues (demo/gesture)  -MS     Assistive Device (Sit-Stand Transfers) walker, front-wheeled  -MS       Row Name 07/29/24 1056          Gait/Stairs  (Locomotion)    [place] Level (Gait) contact guard;verbal cues;nonverbal cues (demo/gesture)  -MS     Assistive Device (Gait) walker, front-wheeled  -MS     Patient was able to Ambulate yes  -MS     Distance in Feet (Gait) 65  -MS     Deviations/Abnormal Patterns (Gait) [place] decreased;gait speed decreased  -MS     Bilateral Gait Deviations forward flexed posture  -MS     Comment, (Gait/Stairs) Slowed [place]. No overt LOB or veering noted. Walker placement and directional cues.  -MS               User Key  (r) = Recorded By, (t) = Taken By, (c) = Cosigned By      Initials Name Provider Type    Alysia Singleton, PT Physical Therapist                   Obj/Interventions       Row Name 07/29/24 1059          Range of Motion Comprehensive    Comment, General Range of Motion B LEs grossly WFL  -MS       Row Name 07/29/24 1059          Strength Comprehensive (MMT)    Comment, General Manual Muscle Testing (MMT) Assessment B LEs grossly 4-/5  -MS       Row Name 07/29/24 1059          Balance    Static Sitting Balance standby assist  -MS     Static Standing Balance standby assist  -MS     Position/Device Used, Standing Balance walker, front-wheeled  -MS       Row Name 07/29/24 1059          Sensory Assessment (Somatosensory)    Sensory Assessment (Somatosensory) sensation intact  -MS               User Key  (r) = Recorded By, (t) = Taken By, (c) = Cosigned By      Initials Name Provider Type    Alysia Singleton, PT Physical Therapist                   Goals/Plan       Row Name 07/29/24 1100          Bed Mobility Goal 1 (PT)    Activity/Assistive Device (Bed Mobility Goal 1, PT) bed mobility activities, all  -MS     [place] Level/Cues Needed (Bed Mobility Goal 1, PT) contact guard required  -MS     Time Frame (Bed Mobility Goal 1, PT) 1 week  -MS       Row Name 07/29/24 1100          Transfer Goal 1 (PT)    Activity/Assistive Device (Transfer Goal 1, PT) transfers, all;walker, rolling  -MS      [place] Level/Cues Needed (Transfer Goal 1, PT) supervision required  -MS     Time Frame (Transfer Goal 1, PT) 1 week  -MS       Row Name 07/29/24 1100          Gait Training Goal 1 (PT)    Activity/Assistive Device (Gait Training Goal 1, PT) gait (walking locomotion);walker, rolling  -MS     [place] Level (Gait Training Goal 1, PT) supervision required  -MS     Distance (Gait Training Goal 1, PT) 75  -MS     Time Frame (Gait Training Goal 1, PT) 1 week  -MS       Row Name 07/29/24 1100          Therapy Assessment/Plan (PT)    Planned Therapy Interventions (PT) balance training;bed mobility training;gait training;home exercise program;postural re-education;patient/family education;ROM (range of motion);stair training;strengthening;transfer training  -MS               User Key  (r) = Recorded By, (t) = Taken By, (c) = Cosigned By      Initials Name Provider Type    Alysia Singleton, PT Physical Therapist                   Clinical Impression       Row Name 07/29/24 1100          Pain    Pretreatment Pain Rating 0/10 - no pain  -MS     Posttreatment Pain Rating 0/10 - no pain  -MS       Row Name 07/29/24 1100          Therapy Assessment/Plan (PT)    Rehab Potential (PT) fair, will monitor progress closely  -MS     Criteria for Skilled Interventions Met (PT) yes;meets criteria  -MS     Therapy Frequency (PT) 3 times/wk  -MS       Row Name 07/29/24 1100          Vital Signs    O2 Delivery Pre Treatment room air  -MS       Row Name 07/29/24 1100          Positioning and Restraints    Pre-Treatment Position in bed  -MS     Post Treatment Position bed  -MS     In Bed notified nsg;fowlers;call light within reach;encouraged to call for assist;exit alarm on;with family/caregiver  -MS               User Key  (r) = Recorded By, (t) = Taken By, (c) = Cosigned By      Initials Name Provider Type    Alysia Singleton, PT Physical Therapist                   Outcome Measures       Row Name 07/29/24 1100 07/29/24  0800       How much help from another person do you currently need...    Turning from your back to your side while in flat bed without using bedrails? 2  -MS 2  -ES    Moving from lying on back to sitting on the side of a flat bed without bedrails? 2  -MS 2  -ES    Moving to and from a bed to a chair (including a wheelchair)? 3  -MS 2  -ES    Standing up from a chair using your arms (e.g., wheelchair, bedside chair)? 3  -MS 2  -ES    Climbing 3-5 steps with a railing? 1  -MS 2  -ES    To walk in hospital room? 3  -MS 2  -ES    AM-PAC 6 Clicks Score (PT) 14  -MS 12  -ES    Highest Level of Mobility Goal 4 --> Transfer to chair/commode  -MS 4 --> Transfer to chair/commode  -ES      Row Name 07/29/24 0855          Modified [place] Scale    Modified [place] Scale 4 - Moderately severe disability.  Unable to walk without assistance, and unable to attend to own bodily needs without assistance.  -SHANELL       Row Name 07/29/24 0855          Functional Assessment    Outcome Measure Options AM-PAC 6 Clicks Daily Activity (OT);Modified [place]  -LE               User Key  (r) = Recorded By, (t) = Taken By, (c) = Cosigned By      Initials Name Provider Type    Michelle Irizarry OTR Occupational Therapist    Alysia Singleton, PT Physical Therapist    Gabriela Ambrocio, RN Registered Nurse                                 Physical Therapy Education       Title: PT OT SLP Therapies (In Progress)       Topic: Physical Therapy (In Progress)       Point: Mobility training (In Progress)       Learning Progress Summary             Patient Acceptance, E,TB, NR by MS at 7/29/2024 1101                         Point: Home exercise program (In Progress)       Learning Progress Summary             Patient Acceptance, E,TB, NR by MS at 7/29/2024 1101                         Point: Body mechanics (In Progress)       Learning Progress Summary             Patient Acceptance, E,TB, NR by MS at 7/29/2024 1101                         Point: Precautions  (In Progress)       Learning Progress Summary             Patient Acceptance, E,TB, NR by MS at 7/29/2024 1101                                         User Key       Initials Effective Dates Name Provider Type Discipline    MS 06/16/21 -  Alysia Mason PT Physical Therapist PT                  PT Recommendation and Plan  Planned Therapy Interventions (PT): balance training, bed mobility training, gait training, home exercise program, postural re-education, patient/family education, ROM (range of motion), stair training, strengthening, transfer training        Time Calculation:         PT Charges       Row Name 07/29/24 1050             Time Calculation    Start Time 0948  -MS      Stop Time 1008  -MS      Time Calculation (min) 20 min  -MS      PT Received On 07/29/24  -MS      PT - Next Appointment 07/31/24  -MS      PT Goal Re-Cert Due Date 08/05/24  -MS                User Key  (r) = Recorded By, (t) = Taken By, (c) = Cosigned By      Initials Name Provider Type    MS MasonAlysia, PT Physical Therapist                  Therapy Charges for Today       Code Description Service Date Service Provider Modifiers Qty    68091545114 HC PT EVAL MOD COMPLEXITY 3 7/29/2024 Alysia Mason, PT GP 1    35624933526 HC PT THER PROC EA 15 MIN 7/29/2024 Alysia Mason, PT GP 1            PT G-Codes  Outcome Measure Options: AM-PAC 6 Clicks Daily Activity (OT), Modified [place]  AM-PAC 6 Clicks Score (PT): 14  AM-PAC 6 Clicks Score (OT): 14  Modified [place] Scale: 4 - Moderately severe disability.  Unable to walk without assistance, and unable to attend to own bodily needs without assistance.  PT Discharge Summary  Anticipated Discharge Disposition (PT): home with 24/7 care, home with supervision    Alysia Mason, PT  7/29/2024

## 2024-07-29 NOTE — PLAN OF CARE
Goal Outcome Evaluation:      Patient is a 89 y.o. male admitted to [facility] on 7/28/2024 for fall at home with resulting (+) rib fractures. PMHx includes atrial fibrillation, CVA, hyperlipidemia, and carotid stenosis. Patient's daughter at bedside and assisted with PLOF information. Patient requires 24/7 assist and this is rotated between both family and hired caregivers. Daughter reports good home setup. Patient ambulates with a RW. Today, patient performed bed mobility with modA, required CGA for transfers, and ambulated 65' CGA with a RW. Based on conversation with daughter, patient is likely near his baseline from a mobility standpoint. Patient may benefit from skilled PT services acutely to address functional deficits as well as improve level of independence prior to discharge. Anticipate returning back home with 24/7 SV upon DC. PT will follow peripherally.      Anticipated Discharge Disposition (PT): home with 24/7 care, home with supervision

## 2024-07-29 NOTE — THERAPY EVALUATION
Acute Care - Speech Language Pathology   Swallow Initial Evaluation [facility]     Patient Name: Danni Aguilar  : 1926  MRN: [id number]  Today's Date: 2024               Admit Date: 2024    Visit Dx:     ICD-10-CM ICD-9-CM   1. Closed fracture of multiple ribs of right side, initial encounter  S22.41XA 807.09   2. Skin tear of right elbow without complication, initial encounter  S51.011A 881.01   3. Fall, initial encounter  W19.XXXA E888.9     Patient Active Problem List   Diagnosis    Acute CVA (cerebrovascular accident)    Atrial fibrillation    Long term (current) use of anticoagulants    Carotid stenosis    Urinary retention    Cerebrovascular accident (CVA) due to stenosis of right carotid artery    Hyperlipidemia    Cerebrovascular disease    Recent cerebrovascular accident (CVA)    RSV infection    Hyponatremia    Thrombocytosis    RSV (respiratory syncytial virus infection)    Right rib fracture    Leukocytosis    Fall    Gross hematuria     Past Medical History:   Diagnosis Date    A-fib     Carotid artery stenosis     Hyperlipidemia     Irregular heart beat      Past Surgical History:   Procedure Laterality Date    BACK SURGERY      CAROTID ARTERY ANGIOPLASTY      CEREBRAL ANGIOGRAM N/A 2017    Procedure: DIAGNOSTIC CEREBRAL ANGIOGRAM AND RIGHT CAROTID STENT PLACEMENT;  Surgeon: Mauricio Yung MD;  Location: [facility] ;  Service:     RECTAL SURGERY         SLP Recommendation and Plan  SLP Swallowing Diagnosis: R/O pharyngeal dysphagia (24 1300)  SLP Diet Recommendation: regular textures, thin liquids (24 1300)  Recommended Precautions and Strategies: upright posture during/after eating, small bites of food and sips of liquid (24 1300)  SLP Rec. for Method of Medication Administration: meds whole, as tolerated (24 1300)     Monitor for Signs of Aspiration: yes, notify SLP if any concerns (24 1300)     Swallow Criteria for Skilled  Therapeutic Interventions Met: demonstrates skilled criteria (07/29/24 1300)  Anticipated Discharge Disposition (SLP): unknown (07/29/24 1300)  Rehab Potential/Prognosis, Swallowing: good, to achieve stated therapy goals (07/29/24 1300)  Therapy Frequency (Swallow): PRN (07/29/24 1300)  Predicted Duration Therapy Intervention (Days): until discharge (07/29/24 1300)  Oral Care Recommendations: Oral Care BID/PRN (07/29/24 1300)                                        Plan of Care Reviewed With: patient  Outcome Evaluation: Clinical swallow eval completed. Pt observed with lunch tray (regular diet) where he took trials of thin (straw), pureed, soft, and regular solids. Pt fed self and ate 100%. No overt s/s were noted. Swallow was inconsistently audible with thins. No change in vocal quality noted during the eval. Pt ate fast. Lower dentures are loose, suggested Fixadent. Pt was encouraged to eat slowly and clear mouth before next bite. Pt often used liquid wash to clear oral cavity, creating a mixed consistency. Laryngeal elevation was age appropriate with palpation. Recommend continue regular and thin; meds as tolerated; upright for meals and 30 min after; slow rate; small bites/sips. Family reported pt was confused last night, but appears at baseline today with speech and cognition (short term memory problems at baseline). ST to follow.      SWALLOW EVALUATION (Last 72 Hours)       SLP Adult Swallow Evaluation       Row Name 07/29/24 1300                   Rehab Evaluation    Document Type evaluation  -AW        Subjective Information no complaints  -AW        Patient Observations alert;cooperative;agree to therapy  -AW        Patient/Family/Caregiver Comments/Observations Daughters present.  -AW        Patient Effort good  -AW        Symptoms Noted During/After Treatment none  -AW           General Information    Patient Profile Reviewed yes  -AW        Pertinent History Of Current Problem Pt admitted with 3 Right  rib fractures after fallingat home, hyponatremia; recent tx for UTI. CT and MRI negative for acute CVA. PMH: CVA, A fib. Chest xray showed R pleural effusion and R rib fractures.  -AW        Current Method of Nutrition regular textures;thin liquids  -AW        Precautions/Limitations, Vision WFL with corrective lenses  -AW        Precautions/Limitations, Hearing WFL;for purposes of eval  -AW        Prior Level of Function-Communication cognitive-linguistic impairment  -AW        Prior Level of Function-Swallowing no diet consistency restrictions  -AW        Plans/Goals Discussed with patient;family;agreed upon  -AW        Barriers to Rehab none identified  -AW        Patient's Goals for Discharge return home  -AW        Family Goals for Discharge family did not state  -AW           Pain    Additional Documentation Pain Scale: Numbers Pre/Post-Treatment (Group)  -AW           Pain Scale: Numbers Pre/Post-Treatment    Pretreatment Pain Rating 0/10 - no pain  -AW        Posttreatment Pain Rating 0/10 - no pain  -AW           Oral Motor Structure and Function    Dentition Assessment natural, present and adequate;lower dentures/partial in place;missing teeth  -AW        Secretion Management WNL/WFL  -AW        Mucosal Quality moist, healthy  -AW           Oral Musculature and Cranial Nerve Assessment    Oral Labial or Buccal Impairment, Detail, Cranial Nerve VII (Facial): left labial droop;other (see comments)  slight  -AW           General Eating/Swallowing Observations    Respiratory Support Currently in Use room air  -AW        Eating/Swallowing Skills self-fed  -AW        Positioning During Eating upright in bed  -AW        Utensils Used spoon;straw  -AW        Consistencies Trialed soft to chew textures;pureed;thin liquids;regular textures  -AW           Clinical Swallow Eval    Clinical Swallow Evaluation Summary Clinical swallow eval completed. Pt observed with lunch tray (regular diet) where he took trials of  thin (straw), pureed, soft, and regular solids. Pt fed self and ate 100%. No overt s/s were noted.  Swallow was inconsistently audible with thins. No change in vocal quality noted during the eval. Pt ate fast. Lower dentures are loose, suggested Fixadent. Pt was encouraged to eat slowly and clear mouth before next bite. Pt often used liquid wash to clear oral cavity, creating a mixed consistency. Laryngeal elevation was age appropriate with palpation. Recommend continue regular and thin; meds as tolerated; upright for meals and 30 min after; slow rate; small bites/sips. Family reported pt was confused last night, but appears at baseline today with speech and cognition (short term memory problems at baseline). ST to follow.  -AW           SLP Evaluation Clinical Impression    SLP Swallowing Diagnosis R/O pharyngeal dysphagia  -AW        Functional Impact risk of aspiration/pneumonia  -AW        Rehab Potential/Prognosis, Swallowing good, to achieve stated therapy goals  -AW        Swallow Criteria for Skilled Therapeutic Interventions Met demonstrates skilled criteria  -AW           Recommendations    Therapy Frequency (Swallow) PRN  -AW        Predicted Duration Therapy Intervention (Days) until discharge  -AW        SLP Diet Recommendation regular textures;thin liquids  -AW        Recommended Precautions and Strategies upright posture during/after eating;small bites of food and sips of liquid  -AW        Oral Care Recommendations Oral Care BID/PRN  -AW        SLP Rec. for Method of Medication Administration meds whole;as tolerated  -AW        Monitor for Signs of Aspiration yes;notify SLP if any concerns  -AW        Anticipated Discharge Disposition (SLP) unknown  -AW           (STG) Patient will tolerate trials of    Consistencies Trialed (Tolerate trials) regular textures;thin liquids  -AW        Desired Outcome (Tolerate trials) without signs/symptoms of aspiration  -AW        [place] (Tolerate trials)  independently (over 90% accuracy)  -AW        Time Frame (Tolerate trials) by discharge  -AW                  User Key  (r) = Recorded By, (t) = Taken By, (c) = Cosigned By      Initials Name Effective Dates    Natacha Aguilera SLP 08/28/23 -                     EDUCATION  The patient has been educated in the following areas:   Dysphagia (Swallowing Impairment) Oral Care/Hydration.        SLP GOALS       Row Name 07/29/24 1300             (STG) Patient will tolerate trials of    Consistencies Trialed (Tolerate trials) regular textures;thin liquids  -AW      Desired Outcome (Tolerate trials) without signs/symptoms of aspiration  -AW      [place] (Tolerate trials) independently (over 90% accuracy)  -AW      Time Frame (Tolerate trials) by discharge  -AW                User Key  (r) = Recorded By, (t) = Taken By, (c) = Cosigned By      Initials Name Provider Type    Natacha Aguilera SLP Speech and Language Pathologist                         Time Calculation:    Time Calculation- SLP       Row Name 07/29/24 1352             Time Calculation- SLP    SLP Start Time 1230  -AW      SLP Received On 07/29/24  -AW                User Key  (r) = Recorded By, (t) = Taken By, (c) = Cosigned By      Initials Name Provider Type    Natacha Aguilera SLP Speech and Language Pathologist                    Therapy Charges for Today       Code Description Service Date Service Provider Modifiers Qty    [id number]  ST EVAL ORAL PHARYNG SWALLOW 4 7/29/2024 Natacha Marques SLP GN 1                 DEXTER Love  7/29/2024

## 2024-07-29 NOTE — CONSULTS
[facility] Consult Note      Patient Name: Danni Aguilar  : 1926  MRN: [id number]  Primary Care Physician:  Justin Longoria MD  Referring Physician: No ref. provider found  Date of admission: 2024    Subjective     Reason for Consult: Hyponatremia    HPI:   Danni Aguilar is a 89 y.o. male patient had a fall with rib fracture also he had Newman catheter currently with gross hematuria, he has prior stroke, chronic atrial fibrillation, carotid disease and dyslipidemia he has a chronic hyponatremia has been on salt tablets prior to admission  The patient is weak, denies any shortness of breath he has pain when taking deep breath, no nausea or vomiting, has Newman catheter anchored in place.  He has chronic hyponatremia    Review of Systems:   14 point review of systems is otherwise negative except for mentioned above on HPI    Personal History     Past Medical History:   Diagnosis Date    A-fib     Carotid artery stenosis     Hyperlipidemia     Irregular heart beat        Past Surgical History:   Procedure Laterality Date    BACK SURGERY      CAROTID ARTERY ANGIOPLASTY      CEREBRAL ANGIOGRAM N/A 2017    Procedure: DIAGNOSTIC CEREBRAL ANGIOGRAM AND RIGHT CAROTID STENT PLACEMENT;  Surgeon: Mauricio Yung MD;  Location: [facility] ;  Service:     RECTAL SURGERY         Family History: family history is not on file.    Social History:  reports that he has never smoked. He has never used smokeless tobacco. Drug use questions deferred to the physician. He reports that he does not drink alcohol.    Home Medications:  Prior to Admission medications    Medication Sig Start Date End Date Taking? Authorizing Provider   apixaban (ELIQUIS) 5 MG tablet tablet Take 1 tablet by mouth 2 (Two) Times a Day. 7/15/24 10/13/24 Yes ProviderDarci MD   aspirin 81 MG chewable tablet Chew 1 tablet Daily. 20  Yes Luz Marina Saunders APRN   atorvastatin (LIPITOR) 80 MG  tablet Take 1 tablet by mouth Daily. 7/26/17  Yes Jean-Pierre Turner MD   ciprofloxacin (CIPRO) 500 MG tablet Take 1 tablet by mouth 2 (Two) Times a Day. 7/12/24  Yes ProviderDarci MD   sennosides-docusate (PERICOLACE) 8.6-50 MG per tablet Take 2 tablets by mouth 2 (Two) Times a Day. 12/9/23  Yes Larry Solis DO   sodium chloride 1 g tablet Take 1 tablet by mouth Daily. The go to reconcile meds has 1g daily, but pt home med list says 10mg daily 7/1/24  Yes Provider, MD Darci   benzonatate (Tessalon Perles) 100 MG capsule Take 1 capsule by mouth 3 (Three) Times a Day As Needed for Cough. 12/9/23   Larry Solis DO   guaiFENesin (MUCINEX) 600 MG 12 hr tablet Take 2 tablets by mouth Every 12 (Twelve) Hours. 12/9/23   Larry Solis DO   ipratropium-albuterol (DUO-NEB) 0.5-2.5 mg/3 ml nebulizer Take 3 mL by nebulization Every 6 (Six) Hours As Needed for Wheezing or Shortness of Air. 12/9/23   Larry Solis DO   polyethylene glycol (MIRALAX) 17 g packet Take 17 g by mouth Daily As Needed (Use if senna-docusate is ineffective). 12/9/23   Larry Solis DO   warfarin (Coumadin) 2 MG tablet Take 2 tablets by mouth Every Night. 12/9/23   Larry Solis DO       Allergies:  No Known Allergies    Objective     Vitals:   Temp:  [97.3 °F (36.3 °C)-98.6 °F (37 °C)] 97.9 °F (36.6 °C)  Heart Rate:  [] 83  Resp:  [16-18] 18  BP: (144-195)/(64-94) 173/68    Intake/Output Summary (Last 24 hours) at 7/29/2024 1039  Last data filed at 7/29/2024 0336  Gross per 24 hour   Intake 1240 ml   Output 2500 ml   Net -1260 ml       Physical Exam:   Constitutional: Awake, alert, no acute distress.  Chronically ill and frail  HEENT: Sclera anicteric, no conjunctival injection  Neck: No JVD  Respiratory: Bilateral rhonchi, nonlabored respiration  Cardiovascular: Irregularly irregular, no rub, positive carotid bruit  Gastrointestinal: Positive bowel sounds, abdomen is soft, nontender and nondistended  : Newman catheter anchored  in  Musculoskeletal: No edema, no clubbing or cyanosis  Psychiatric: Flat affect, cooperative  Neurologic: Moving all extremities, normal speech and mental status  Skin: Warm and dry       Scheduled Meds:     acetaminophen, 1,000 mg, Oral, TID  [Held by provider] apixaban, 5 mg, Oral, BID  aspirin, 81 mg, Oral, Daily  atorvastatin, 80 mg, Oral, Daily  ciprofloxacin, 500 mg, Oral, BID  lidocaine 1% - EPINEPHrine 1:251294, 10 mL, Injection, Once  Lidocaine, 1 patch, Transdermal, Q24H  sodium chloride, 10 mL, Intravenous, Q12H  sodium chloride, 1 g, Oral, BID With Meals      IV Meds:        Results Reviewed:   I have personally reviewed the results from the time of this admission to 7/29/2024 10:39 EDT     Lab Results   Component Value Date    GLUCOSE 90 07/29/2024    CALCIUM 9.2 07/29/2024     (L) 07/29/2024    K 4.4 07/29/2024    CO2 19.0 (L) 07/29/2024    CL 92 (L) 07/29/2024    BUN 12 07/29/2024    CREATININE 0.90 07/29/2024    EGFRIFNONA 62 12/10/2020    BCR 13.3 07/29/2024    ANIONGAP 13.0 07/29/2024      Lab Results   Component Value Date    MG 1.8 12/09/2023    PHOS 3.0 12/09/2023    ALBUMIN 3.9 07/29/2024           Assessment / Plan       Right rib fracture      ASSESSMENT:  Chronic hyponatremia treated with salt tablets prior to admission sodium was 130 on admission and is down to 124.  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    Thank you for involving us in the care of Danni Aguilar.  Please feel free to call with any questions.    Andres Shepherd MD  07/29/24  10:39 EDT    [facility]  [phone]      Please note that  portions of this note were completed with a voice recognition program.

## 2024-07-29 NOTE — CONSULTS
[facility] Medicine Services  CONSULT NOTE      Patient Name: Danni Aguilar  : 1926  MRN: [id number]    Primary Care Physician: Justin Longoria MD  Provider requesting consultation: No ref. provider found    Subjective   Subjective     Reason for Consultation:  Hyponatremia, falls, possible TIA and mural thrombus, medical management    HPI:  Danni Aguilar is a 89 y.o. male with past medical history of atrial fibrillation who is chronically anticoagulated on Eliquis and history of prior MCA stroke in 2017 with previous right ICA stenosis and stenting as well as dementia presents to the hospital after a traumatic fall to his right side at home.  He reports some mild right rib pain that is worse with palpation.  His family notes some intermittent chronic issues with low sodium.  Patient is a poor historian.  Some of the history was provided from the other providers as well as family.  Patient denies any new complaint currently.  He has been hospitalized further for hyponatremia.  He notes some recent urine retention and is tolerating Newman catheter right now.  He notes that there was some blood in his urine after catheterization.  His family plans to take him home after he discharge.  He reports very good appetite and is eating his lunch well during my evaluation      Review of Systems  No current fevers or chills  No current nausea, vomiting, or diarrhea  No current chest pain or palpitations  All other systems reviewed and are negative.     Personal History     Past Medical History:   Diagnosis Date    A-fib     Carotid artery stenosis     Hyperlipidemia     Irregular heart beat        Past Surgical History:   Procedure Laterality Date    BACK SURGERY      CAROTID ARTERY ANGIOPLASTY      CEREBRAL ANGIOGRAM N/A 2017    Procedure: DIAGNOSTIC CEREBRAL ANGIOGRAM AND RIGHT CAROTID STENT PLACEMENT;  Surgeon: Mauricio Yung MD;  Location: [facility] ;  Service:     RECTAL SURGERY          Family History: family history is not on file. Other pertinent FHx was reviewed and unremarkable.     Social History:  reports that he has never smoked. He has never used smokeless tobacco. Drug use questions deferred to the physician. He reports that he does not drink alcohol.    Medications:  apixaban, aspirin, atorvastatin, benzonatate, ciprofloxacin, guaiFENesin, ipratropium-albuterol, polyethylene glycol, sennosides-docusate, sodium chloride, and warfarin    Scheduled Meds:acetaminophen, 1,000 mg, Oral, TID  [Held by provider] apixaban, 5 mg, Oral, BID  aspirin, 81 mg, Oral, Daily  atorvastatin, 80 mg, Oral, Daily  ciprofloxacin, 500 mg, Oral, BID  furosemide, 20 mg, Oral, Daily  lidocaine 1% - EPINEPHrine 1:[id number], 10 mL, Injection, Once  Lidocaine, 1 patch, Transdermal, Q24H  melatonin, 2.5 mg, Oral, Nightly  sodium chloride, 10 mL, Intravenous, Q12H  sodium chloride, 1 g, Oral, TID With Meals      Continuous Infusions:   PRN Meds:.  acetaminophen    senna-docusate sodium **AND** polyethylene glycol **AND** bisacodyl **AND** bisacodyl    nitroglycerin    ondansetron ODT **OR** ondansetron    oxyCODONE    [COMPLETED] Insert Peripheral IV **AND** sodium chloride    sodium chloride    sodium chloride    Tetanus-Diphth-Acell Pertussis    No Known Allergies    Objective   Objective     Vital Signs:   Temp:  [97.3 °F (36.3 °C)-98.6 °F (37 °C)] 97.9 °F (36.6 °C)  Heart Rate:  [] 74  Resp:  [16-18] 18  BP: (142-195)/(63-88) 142/63    Physical Exam  Constitutional:Awake, alert, elderly appearing  HENT: NCAT, mucous membranes moist, neck supple  Respiratory: No cough or wheezes, normal respirations, nonlabored breathing   Cardiovascular: Pulse rate is normal, palpable radial pulses  Gastrointestinal:  soft, nontender, nondistended  Musculoskeletal: Right chest wall with some tenderness, thin and frail in appearance, mild lower extremity edema  Psychiatric: Appropriate affect, cooperative,  conversational  Neurologic: Poor historian, no slurred speech or facial droop, follows commands  Skin: No rashes or jaundice, warm       LAB RESULTS:      Lab 07/29/24  0938 07/29/24  0054 07/28/24  0922   WBC 12.24* 10.04 11.60*   HEMOGLOBIN 13.0 12.4* 12.9*   HEMATOCRIT 41.5 39.7 42.2   PLATELETS 711* 630* 804*   NEUTROS ABS  --   --  9.67*   IMMATURE GRANS (ABS)  --   --  0.03   LYMPHS ABS  --   --  0.85   MONOS ABS  --   --  0.91*   EOS ABS  --   --  0.04   MCV 79.0 79.6 80.2         Lab 07/29/24  0938 07/29/24  0054 07/28/24  0922   SODIUM 124* 127* 130*   POTASSIUM 4.4 4.2 4.5   CHLORIDE 92* 94* 95*   CO2 19.0* 21.0* 22.0   ANION GAP 13.0 12.0 13.0   BUN 12 15 17   CREATININE 0.90 0.90 1.12   EGFR 77.7 77.7 59.7*   GLUCOSE 90 101* 104*   CALCIUM 9.2 8.6 9.2         Lab 07/29/24  0938 07/28/24  0922   TOTAL PROTEIN 7.7 7.4   ALBUMIN 3.9 4.0   GLOBULIN 3.8 3.4   ALT (SGPT) 15 15   AST (SGOT) 33 24   BILIRUBIN 1.3* 0.9   ALK PHOS 66 63         Lab 07/29/24  0938   HSTROP T 26*                 Brief Urine Lab Results  (Last result in the past 365 days)        Color   Clarity   Blood   Leuk Est   Nitrite   Protein   CREAT   Urine HCG        07/28/24 0934 Yellow   Clear   Negative   Small (1+)   Negative   Negative                 Microbiology Results (last 10 days)       Procedure Component Value - Date/Time    Respiratory Panel PCR w/COVID-19(SARS-CoV-2) JENNIFER/ANGELA/JANET/PAD/COR/EKATERINA In-House, NP Swab in UTM/VTM, 2 HR TAT - Swab, Nasopharynx [[id number]]  (Normal) Collected: 07/28/24 1135    Lab Status: Final result Specimen: Swab from Nasopharynx Updated: 07/28/24 1245     ADENOVIRUS, PCR Not Detected     Coronavirus 229E Not Detected     Coronavirus HKU1 Not Detected     Coronavirus NL63 Not Detected     Coronavirus OC43 Not Detected     COVID19 Not Detected     Human Metapneumovirus Not Detected     Human Rhinovirus/Enterovirus Not Detected     Influenza A PCR Not Detected     Influenza B PCR Not Detected      Parainfluenza Virus 1 Not Detected     Parainfluenza Virus 2 Not Detected     Parainfluenza Virus 3 Not Detected     Parainfluenza Virus 4 Not Detected     RSV, PCR Not Detected     Bordetella pertussis pcr Not Detected     Bordetella parapertussis PCR Not Detected     Chlamydophila pneumoniae PCR Not Detected     Mycoplasma pneumo by PCR Not Detected    Narrative:      In the setting of a positive respiratory panel with a viral infection PLUS a negative procalcitonin without other underlying concern for bacterial infection, consider observing off antibiotics or discontinuation of antibiotics and continue supportive care. If the respiratory panel is positive for atypical bacterial infection (Bordetella pertussis, Chlamydophila pneumoniae, or Mycoplasma pneumoniae), consider antibiotic de-escalation to target atypical bacterial infection.            XR Chest 1 View    Result Date: 7/29/2024  XR CHEST 1 VW-  Clinical: Rib fracture, rule out pneumothorax  Correlation with chest radiograph 7/28/2024 and chest CT 7/28/2024  FINDINGS: Hairline right rib fractures seen on CT cannot be appreciated on the current chest radiograph. Vague opacity right mid to lower lung zone corresponds to the pleural effusion. No pneumothorax. The left lung is clear. The cardiomediastinal silhouette is stable. The remainder is unremarkable.  CONCLUSION: Right-sided pleural effusion, no pneumothorax has developed.  This report was finalized on 7/29/2024 7:48 AM by Dr. Boris Lester M.D on Workstation: [id number]      CT Chest Without Contrast Diagnostic    Result Date: 7/28/2024  CT OF THE CHEST WITHOUT CONTRAST 07/28/2024  HISTORY: Fell. Right side rib injury. Axial images were obtained from the lung apices to the upper abdomen. No intravenous contrast was given.  There is a mildly displaced fracture of the right 6th rib and there appear to be nondisplaced fractures of the right 5th and 7th ribs. No pneumothorax is seen. There is a  moderate size right pleural effusion with some mild right lower lobe atelectasis. This demonstrates low density fluid. Tiny subpleural probable calcified granuloma is seen in the left lower lobe on image 48.  There is some aortic and coronary calcification. No pathologically enlarged lymph nodes are seen. At least 2 probable hepatic cysts are seen. A small calcified nodule is seen posterior to the right hepatic lobe.      Impression: 1. At least 3 right rib fractures as described. 2. Moderate size right pleural effusion with some mild right lower lobe atelectasis.  Radiation dose reduction techniques were utilized, including automated exposure control and exposure modulation based on body size.   This report was finalized on 7/28/2024 11:52 PM by Dr. Natanael Gutiérrez M.D on Workstation: [id number]      CT Head Without Contrast    Result Date: 7/28/2024  HISTORY: Fell. Head injury. Neck pain.  CT OF THE BRAIN WITHOUT CONTRAST 07/28/2024  Axial images were obtained through the brain without intravenous contrast. There is moderately severe diffuse atrophy. There is decreased attenuation of the periventricular white matter bilaterally consistent with small vessel white matter ischemic disease. Small low-density subdural hygromas are seen over both frontal lobes similar to the 10/08/2023 study.  There is no evidence of acute infarction, hemorrhage, midline shift or mass effect.  No skull fractures are seen.      Impression: 1. Atrophy and small vessel ischemic disease. 2. No acute intracranial process. 3. 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   CT OF THE CERVICAL SPINE WITHOUT CONTRAST  Axial images were obtained from the skull base to the upper thoracic spine. Sagittal and coronal reconstruction images were reviewed.  There is C3-4, C4-5 and C5-6 spondylosis. Minimal (1-2 mm) anterolisthesis of C2 on C3 is seen. Minimal anterolisthesis of C7 on T1 is seen.  No cervical spine fractures are seen.  There is a  right carotid stent. Bilateral carotid calcification is seen.  IMPRESSION: 1. Multilevel cervical degenerative disease. 2. No fractures are seen. 3. Right pleural effusion is seen. Please see additional dictation for today's CT of the chest.  Radiation dose reduction techniques were utilized, including automated exposure control and exposure modulation based on body size.   This report was finalized on 7/28/2024 11:39 PM by Dr. Natanael Gutiérrez M.D on Workstation: [id number]      CT Cervical Spine Without Contrast    Result Date: 7/28/2024  HISTORY: Fell. Head injury. Neck pain.  CT OF THE BRAIN WITHOUT CONTRAST 07/28/2024  Axial images were obtained through the brain without intravenous contrast. There is moderately severe diffuse atrophy. There is decreased attenuation of the periventricular white matter bilaterally consistent with small vessel white matter ischemic disease. Small low-density subdural hygromas are seen over both frontal lobes similar to the 10/08/2023 study.  There is no evidence of acute infarction, hemorrhage, midline shift or mass effect.  No skull fractures are seen.      Impression: 1. Atrophy and small vessel ischemic disease. 2. No acute intracranial process. 3. 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   CT OF THE CERVICAL SPINE WITHOUT CONTRAST  Axial images were obtained from the skull base to the upper thoracic spine. Sagittal and coronal reconstruction images were reviewed.  There is C3-4, C4-5 and C5-6 spondylosis. Minimal (1-2 mm) anterolisthesis of C2 on C3 is seen. Minimal anterolisthesis of C7 on T1 is seen.  No cervical spine fractures are seen.  There is a right carotid stent. Bilateral carotid calcification is seen.  IMPRESSION: 1. Multilevel cervical degenerative disease. 2. No fractures are seen. 3. Right pleural effusion is seen. Please see additional dictation for today's CT of the chest.  Radiation dose reduction techniques were utilized, including automated  exposure control and exposure modulation based on body size.   This report was finalized on 7/28/2024 11:39 PM by Dr. Natanael Gutiérrez M.D on Workstation: [id number]      CT Angiogram Head    Result Date: 7/28/2024  CT ANGIOGRAM NECK AND HEAD WITH CONTRAST  HISTORY: Slurred speech, left facial droop.  COMPARISON: MRI brain 07/28/2024, CT head 07/28/2024 and CT angiogram of the neck and head 12/09/2020.  FINDINGS: Initially, a noncontrasted CT examination of the brain was performed. Again identified is diffuse atrophy, moderate small vessel ischemic disease and small remote cerebellar infarcts on the right laterally. Prominent CSF is appreciated overlying the anterior aspect of the frontal lobes bilaterally, slightly more prominent on the left consistent with hygromas.  A CT angiogram of the neck and head was then performed. Multiplanar as well as 3-dimensional reconstructions were generated.  A moderate-to-large pleural fluid collection is present on the right, only partially visualized. Calcified plaque is appreciated involving the aortic arch and origins of the great vessels. There is a bovine configuration of the great vessels. There is mild irregularity and stenosis involving the left subclavian artery proximally. A carotid stent is appreciated involving the right carotid bifurcation. Decreased attenuation is appreciated involving the posterior aspect of the stent suggesting mild endothelial hyperplasia or mural thrombus similar in appearance as compared to the prior study. The stent is widely patent. Eccentric calcified plaque is appreciated involving the left internal carotid artery proximally but without stenosis. The mid cervical ICAs are tortuous. There is mild irregularity involving the cervical ICAs bilaterally consistent with fibromuscular dysplasia, slightly more prominent on the left. There is fusiform enlargement of the cervical ICA on the left which measures 8 mm in diameter similar in appearance as  compared to the prior examination. Mild-to-moderate vascular calcifications involving the carotid siphons are noted bilaterally. The right A1 segment is mildly hypoplastic. There is moderate stenosis involving the left M1 segment, similar in appearance as compared to the prior examination. The proximal aspects of the anterior and middle cerebral arteries appear unremarkable.  Both vertebral arteries were opacified. The right vertebral artery is larger than that of the left. The prior study demonstrated a markedly hypoplastic V4 segment. The V4 segment on the left appears to be occluded on the current examination. A focal eccentric calcification is appreciated involving the basilar artery proximally, present previously and contributing to mild-to-moderate stenosis. Otherwise, the basilar artery and left posterior cerebral artery appear unremarkable. There is a fetal origin of the right posterior cerebral artery.      Impression: 1.  There is no evidence of acute infarction or of intracranial hemorrhage. Atrophy, small vessel ischemic disease, vascular calcification and bifrontal hygromas are noted. A carotid stent is present on the right. There is eccentric mural thrombus or endothelial hyperplasia appreciated, mild, unchanged versus 12/09/2020. Irregularity involving the distal aspects of the cervical ICAs is appreciated bilaterally, more prominent on the left consistent with fibromuscular dysplasia, unchanged. There is mild fusiform enlargement of the distal cervical ICA on the left (8 mm), unchanged. The right vertebral artery is dominant. The hypoplastic left vertebral artery distally is not opacified and likely is occluded. Mild-to-moderate stenosis involving the basilar artery proximally is noted, similar in appearance as compared to the prior study. There is moderate stenosis involving the mid left M1 segment similar in appearance as compared to the prior examination. See above. 2.  There is partial  visualization of a moderate-to-large pleural fluid collection on the right.   Radiation dose reduction techniques were utilized, including automated exposure control and exposure modulation based on body size.   This report was finalized on 7/28/2024 7:23 PM by Dr. Ramez Lind M.D on Workstation: [id number]      CT Angiogram Neck    Result Date: 7/28/2024  CT ANGIOGRAM NECK AND HEAD WITH CONTRAST  HISTORY: Slurred speech, left facial droop.  COMPARISON: MRI brain 07/28/2024, CT head 07/28/2024 and CT angiogram of the neck and head 12/09/2020.  FINDINGS: Initially, a noncontrasted CT examination of the brain was performed. Again identified is diffuse atrophy, moderate small vessel ischemic disease and small remote cerebellar infarcts on the right laterally. Prominent CSF is appreciated overlying the anterior aspect of the frontal lobes bilaterally, slightly more prominent on the left consistent with hygromas.  A CT angiogram of the neck and head was then performed. Multiplanar as well as 3-dimensional reconstructions were generated.  A moderate-to-large pleural fluid collection is present on the right, only partially visualized. Calcified plaque is appreciated involving the aortic arch and origins of the great vessels. There is a bovine configuration of the great vessels. There is mild irregularity and stenosis involving the left subclavian artery proximally. A carotid stent is appreciated involving the right carotid bifurcation. Decreased attenuation is appreciated involving the posterior aspect of the stent suggesting mild endothelial hyperplasia or mural thrombus similar in appearance as compared to the prior study. The stent is widely patent. Eccentric calcified plaque is appreciated involving the left internal carotid artery proximally but without stenosis. The mid cervical ICAs are tortuous. There is mild irregularity involving the cervical ICAs bilaterally consistent with fibromuscular dysplasia, slightly  more prominent on the left. There is fusiform enlargement of the cervical ICA on the left which measures 8 mm in diameter similar in appearance as compared to the prior examination. Mild-to-moderate vascular calcifications involving the carotid siphons are noted bilaterally. The right A1 segment is mildly hypoplastic. There is moderate stenosis involving the left M1 segment, similar in appearance as compared to the prior examination. The proximal aspects of the anterior and middle cerebral arteries appear unremarkable.  Both vertebral arteries were opacified. The right vertebral artery is larger than that of the left. The prior study demonstrated a markedly hypoplastic V4 segment. The V4 segment on the left appears to be occluded on the current examination. A focal eccentric calcification is appreciated involving the basilar artery proximally, present previously and contributing to mild-to-moderate stenosis. Otherwise, the basilar artery and left posterior cerebral artery appear unremarkable. There is a fetal origin of the right posterior cerebral artery.      Impression: 1.  There is no evidence of acute infarction or of intracranial hemorrhage. Atrophy, small vessel ischemic disease, vascular calcification and bifrontal hygromas are noted. A carotid stent is present on the right. There is eccentric mural thrombus or endothelial hyperplasia appreciated, mild, unchanged versus 12/09/2020. Irregularity involving the distal aspects of the cervical ICAs is appreciated bilaterally, more prominent on the left consistent with fibromuscular dysplasia, unchanged. There is mild fusiform enlargement of the distal cervical ICA on the left (8 mm), unchanged. The right vertebral artery is dominant. The hypoplastic left vertebral artery distally is not opacified and likely is occluded. Mild-to-moderate stenosis involving the basilar artery proximally is noted, similar in appearance as compared to the prior study. There is  moderate stenosis involving the mid left M1 segment similar in appearance as compared to the prior examination. See above. 2.  There is partial visualization of a moderate-to-large pleural fluid collection on the right.   Radiation dose reduction techniques were utilized, including automated exposure control and exposure modulation based on body size.   This report was finalized on 7/28/2024 7:23 PM by Dr. Ramez Lind M.D on Workstation: [id number]      MRI Brain Without Contrast    Result Date: 7/28/2024  MRI BRAIN WO CONTRAST-  HISTORY:  new left facial droop; S22.41XA-Multiple fractures of ribs, right side, initial encounter for closed fracture; S51.011A-Laceration without foreign body of right elbow, initial encounter; W19.XXXA-Unspecified fall, initial encounter  COMPARISON: CT head 7/28/2024 and MRI brain 12/9/2020  FINDINGS: There is moderate diffuse atrophy. There is no evidence of restricted diffusion to suggest acute infarction. 2 small remote infarcts involve the right cerebellar hemisphere laterally are appreciated which were present on 12/9/2020. The right vertebral artery is dominant. There is expected flow void in the basilar artery and in the distal aspect of the internal carotid arteries bilaterally. Prominent CSF is appreciated overlying the frontal lobes anteriorly (12 mm in thickness on the right and 10 mm in thickness on the left similar in appearance as compared to the MRI examination of 12/9/2020. There is no evidence of mass or mass effect on this noncontrasted MRI examination of the brain.      Impression: There is no evidence of an acute infarct. Atrophy, small vessel ischemic disease and bifrontal hygromas are appreciated similar appearances compared to 12/9/2020.  This report was finalized on 7/28/2024 4:11 PM by Dr. Ramez Lind M.D on Workstation: [id number]      XR Wrist 3+ View Right    Result Date: 7/28/2024  RIGHT WRIST  HISTORY: Fall, pain.  COMPARISON: None.  FINDINGS: 3  views of the right wrist were obtained. The study is hampered somewhat by patient positioning. There is moderate radiocarpal joint space narrowing. Chondrocalcinosis and extensive vascular calcification is appreciated. There is no evidence of fracture. If the patient's symptoms persist, a follow-up study in 7 to 10 days would be suggested.  This report was finalized on 7/28/2024 11:27 AM by Dr. Ramez Lind M.D on Workstation: [id number]      XR Chest 1 View    Result Date: 7/28/2024  XR CHEST 1 VW-7/28/2024  HISTORY: Weakness.  Heart size is at the upper limits of normal. There is mild haziness of the right base which may indicate a minimal right pleural effusion with slight blunting of the right costophrenic sulcus as well. There is some aortic calcification. No pneumothorax is seen.      Impression: 1. Borderline cardiomegaly. 2. Mild haziness of the right base as described.   This report was finalized on 7/28/2024 10:13 AM by Dr. Natanael Gutiérrez M.D on Workstation: [id number]       Results for orders placed during the hospital encounter of 07/20/17    Adult transthoracic echo complete    Interpretation Summary  · Calculated EF = 71.1%.  · Left ventricular diastolic dysfunction (grade I) consistent with impaired relaxation.  · Right ventricular cavity is mild-to-moderately dilated.  · calcification of the aortic valve  · Mild mitral valve regurgitation is present  · Moderate tricuspid valve regurgitation is present.  · Estimated right ventricular systolic pressure from tricuspid regurgitation is mildly elevated (35-45 mmHg). Calculated right ventricular systolic pressure from tricuspid regurgitation is 41.2 mmHg.  · Calculated right ventricular systolic pressure from tricuspid regurgitation is 41.2 mmHg.  · Mild aortic valve regurgitation is present.  · Saline test results are negative.      Assessment & Plan   Assessment & Plan     Active Hospital Problems    Diagnosis  POA    **Right rib fracture  [S22.31XA]  Yes    Leukocytosis [D72.829]  Yes    Fall [W19.XXXA]  Yes    Gross hematuria [R31.0]  Clinically Undetermined    Hyponatremia [E87.1]  Yes    Thrombocytosis [D75.839]  Yes    Hyperlipidemia [E78.5]  Yes    Urinary retention [R33.9]  Yes    Atrial fibrillation [I48.91]  Yes    Long term (current) use of anticoagulants [Z79.01]  Not Applicable      Resolved Hospital Problems   No resolved problems to display.     89-year-old male presents the hospital with mechanical fall and secondary right rib fractures.  He was found to have minimal leukocytosis, thrombocytosis, hyponatremia that is worsening as well as urine retention requiring Newman catheter with gross hematuria.    Discussion/plan for today:  Scheduled Tylenol for pain control.  Decrease as needed Roxicodone and try to control pain with Tylenol primarily.  Plan to follow-up on thoracic recommendations and plan for multimodal pain control.    Sodium tablet per nephrology.  Patient also on low-dose furosemide.  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  Patient currently denying any polydipsia.    Newman catheter management and monitor hematuria.  Eliquis currently held but perhaps can restart tomorrow if hematuria improves.    Possible TIA continue aspirin and atorvastatin as well as restart Eliquis when possible.    Patient with possible urinary tract infection.  Unfortunately we are unable to add culture on urinalysis.  Due to recent bacterial resistant rate plan to stop ciprofloxacin and transition to ceftriaxone per my preference.  Patient can likely discharge on the Omnicef if needed.    Thrombocytosis noted.  Previous records were reviewed and notably platelets significantly elevated going back to December 2020.    PT OT for debility and recent fall.    Speech therapy evaluation for possible mild pharyngeal dysphagia.  Regular and thins and upright posture and small bites with sips.    Thank  you for allowing [facility] to provide consultative care for your patient, we will continue to follow while clinically appropriate.    Ramez Paredes MD  07/29/24

## 2024-07-29 NOTE — PLAN OF CARE
Goal Outcome Evaluation:  Plan of Care Reviewed With: patient           Outcome Evaluation: Clinical swallow eval completed. Pt observed with lunch tray (regular diet) where he took trials of thin (straw), pureed, soft, and regular solids. Pt fed self and ate 100%. No overt s/s were noted. Swallow was inconsistently audible with thins. No change in vocal quality noted during the eval. Pt ate fast. Lower dentures are loose, suggested Fixadent. Pt was encouraged to eat slowly and clear mouth before next bite. Pt often used liquid wash to clear oral cavity, creating a mixed consistency. Laryngeal elevation was age appropriate with palpation. Recommend continue regular and thin; meds as tolerated; upright for meals and 30 min after; slow rate; small bites/sips. Family reported pt was confused last night, but appears at baseline today with speech and cognition (short term memory problems at baseline). ST to follow.      Anticipated Discharge Disposition (SLP): unknown          SLP Swallowing Diagnosis: R/O pharyngeal dysphagia (07/29/24 1300)

## 2024-07-29 NOTE — PLAN OF CARE
Goal Outcome Evaluation:   Pt admitted with right rib fracture. Labs are indicative of a UTI. He was on cipro it has since been changed to rocephin. Due to urinary retention he has a vinson in place. Site is clean and intact. He is having increased confusion as the night goes on. Will continue to monitor.

## 2024-07-29 NOTE — CONSULTS
"Neurology Consult Note    Consult Date: 7/29/2024    Referring MD: No ref. provider found    Reason for Consult I have been asked to see the patient in neurological consultation to render advice and opinion regarding left facial droop and slurred speech.    Danni Aguilar is a 89 y.o. white male with known diagnosis of atrial fibrillation on Eliquis, prior right MCA stroke in 2017 at that time he was found to have significant right ICA stenosis status post stenting, dementia presented to the hospital after traumatic fall resulting in right rib fracture and right arm wound with bleeding his Eliquis currently held.  Stroke consulted due to left facial droop and slurred speech.  Most of the history obtained from the daughter at the bedside she stated that his slurred speech was at baseline for several years after denture for his teeth but she was not aware of the subtle right facial droop.  On this admission he had a brain MRI which showed diffuse brain atrophy but no acute stroke, CT angio head and neck showed same finding compared to prior exam including right ICA stenting, possible mural thrombus versus hyperplasia which is stable compared to prior exam in 2020, intracranial atherosclerosis patient on aspirin in addition to Eliquis.  He is also on statin at baseline.  Patient uses a walker at baseline for several years.    Past Medical History:   Diagnosis Date    A-fib     Carotid artery stenosis     Hyperlipidemia     Irregular heart beat        Exam  /68 (BP Location: Left arm, Patient Position: Lying)   Pulse 83   Temp 97.9 °F (36.6 °C) (Oral)   Resp 18   Ht 172.7 cm (68\")   Wt 65.8 kg (145 lb 1 oz)   SpO2 100%   BMI 22.06 kg/m²   Gen: NAD, vitals reviewed  MS: oriented x1 to self only at baseline, recent/remote memory impaired, normal attention/concentration, language intact, no neglect.  CN: visual acuity grossly normal, PERRL, EOMI, left lower facial droop, mild dysarthria  Motor: 5/5 throughout " upper and lower extremities, normal tone  Sensory exam: Normal to light touch throughout  Coordination: Normal finger-nose-finger bilaterally  Gait: Not assessed    NIH Stroke Scale :    (0_) 1a. Level of consciousness (LOC): 0=alert;1=arousable by minor stimulation;2=obtunded or needs strong stimulation to attend;3=unresponsive or reflex responses only  (2_) 1b. LOC Questions: 0=answers both;1=answers one;2=answers neither  (0_) 1c. LOC Commands: 0=performs both tasks;1=performs one task;2=performs neither task  (0_) 2. Best Gaze: 0=normal;1=partial gaze palsy;2=forced deviation or total gaze paresis  (0_) 3. Visual: 0=normal;1=partial hemianopia;2=complete hemianopia;3=blind  (1_) 4. Facial palsy: 0=normal;1=minor paresis;2=partial paralysis;3=complete paralysis  FOR 5 AND 6 BELOW: 0=normal;1=drifts but maintains in air;2=unable to maintain in air;3=moves but unable to lift against gravity;4=no movement  (0_)0 (_)1 (_)2 (_)3 (_)4 (_)NA 5a. Motor arm-left  (0_)0 (_)1 (_)2 (_)3 (_)4 (_)NA 5b. Motor arm-right  (0_)0 (_)1 (_)2 (_)3 (_)4 (_)NA 6a. Motor leg-left  (0_)0 (_)1 (_)2 (_)3 (_)4 (_)NA 6ba. Motor leg-right  (0_) 7. Limb ataxia:0=absent;1=unilateral;2=bilateral; NA=unable to test  (0_) 8. Sensory: 0=normal;1=mild-moderate loss;2-severe or total loss  (0_) 9. Best language: 0=normal;1=mild-moderate aphasia, some deficits apparent but able to communicate;2=severe aphasia, fragmentary expression only, unable to communicate well;3=global aphasia, mute and no comprehension  (1_)10. Dysarthria: 0=normal;1=mild-moderate, slurs some words;2=severe, speech mostly unintelligible; NA=unable to test (e.g.,intubation)  (0_)11. Extinction/Inattention: 0=normal;1=visual,tactile,auditory or other extinction to bilateral simultaneous stimulation, but no severe neglect;2=answers neither      NIH Score: Complete  4      DATA:    Lab Results   Component Value Date    GLUCOSE 101 (H) 07/29/2024    CALCIUM 8.6 07/29/2024      (L) 07/29/2024    K 4.2 07/29/2024    CO2 21.0 (L) 07/29/2024    CL 94 (L) 07/29/2024    BUN 15 07/29/2024    CREATININE 0.90 07/29/2024    EGFRIFNONA 62 12/10/2020    BCR 16.7 07/29/2024    ANIONGAP 12.0 07/29/2024     Lab Results   Component Value Date    WBC 10.04 07/29/2024    HGB 12.4 (L) 07/29/2024    HCT 39.7 07/29/2024    MCV 79.6 07/29/2024     (H) 07/29/2024       Lab review: Above labs reviewed    Imaging review: Personally viewed the MRI brain which showed no acute stroke, diffuse brain atrophy, I personally reviewed CT angio head and neck and agreed with radiology report as detailed below did show intracranial atherosclerosis, right ICA stent with mural thrombus versus hyperplasia between the stent and the endothelium.    Diagnoses:  -Transient ischemic attack versus residual symptoms from prior right MCA stroke  -Traumatic fall resulting in rib fracture and bleeding from right arm  -Atrial fibrillation on Eliquis prior to admission currently on hold    Pre-stroke MRS: 4  NIHSS: 4      PLAN:   -MRI brain negative for acute ischemic stroke, CT angio head and neck stable compared to prior exam  -Recommend to put the patient back on Eliquis and aspirin when stable from bleeding standpoint to prevent further strokes.  -I discussed with the patient and daughter at the bedside the risk and benefits of anticoagulation which include more strokes if we hold and risk bleeding from falls if we continue, at this juncture it is a tough decision, in the future if the patient continues to have falls and bleeds then holding anticoagulation not a bad idea.    No further stroke workup needed at this time will sign off and see again as needed.    Medical Decision Making for this neurology consultation consists of the following:  Review of previous chart, including H/P, provider and nursing notes as applicable.  Review of medications and vital signs.  Review of previous labs, neuroimaging, and additional relevant  "diagnostics.   Interpretation of laboratory, imaging, and other diagnostic results  Discussion with other providers and family   Total face-to-face/floor time: 30-75 minutes.     \"Dictated utilizing Dragon dictation\".       "

## 2024-07-29 NOTE — PROGRESS NOTES
[facility] Progress Note  7/29/2024  10:54 EDT      Urology Consult Pending       Brandon Harris MD  formerly [facility]  General & Reconstructive Urology  Office: [phone]  Fax: [phone]

## 2024-07-29 NOTE — PROGRESS NOTES
YEIMY CATHERINE ATTESTATION NOTE    SHARED VISIT: This visit was performed by BOTH a physician and an APC. The substantive portion of the medical decision making was performed by this attesting physician who made or approved the management plan and takes responsibility for patient management. All studies in the APC note (if performed) were independently interpreted by me.     The ANDRAE and I have discussed this patient's history, physical exam, and treatment plan.  I have reviewed the documentation and personally had a face to face interaction with the patient. I provided a substantive portion of the care of the patient.  I affirm the documentation and agree with the treatment and plan.  The note below describes my personal findings:         S: Pt with no complaints this am but notable gross hematuria overnight and this am.  Family at bedside reports patient seems even more confused than his baseline.    During my evaluation, patient became diaphoretic and started with dry heaves then began vomiting.  Pt has been npo except for meds and had just gotten am meds prior to my entering room.  Pt denies abdominal pain, sob, and says his rib pain is controlled.     O:  Exam  General : diaphoretic M of advanced age actively vomiting and diaphoretic  HEENT: NCAT, eomi, no scleral icterus  Neck: supple, trachea midline  CV: normal rate and rhythm, systolic murmur heard best at left lower sternal border  Resp: diminished at the bases with no wheezes nor rhonchi on auscultation  Abd: soft, nt, nd, no rebound, no guarding  Exts: right elbow with dressing in place--no blood through dressing  Neuro: alert but confused, limited history from patient, persistent left facial droop, speech slurred at baseline    Diagnostic tests: Sodium overnight dec to 127, Hb stable at 12.4  Repeat cxr this am with right pleural effusion unchanged     Assessment and Plan: 89 yo M with h/o afib (eliquis held), CVA, HLD admitted to obs unit after a fall at home  due to rib fractures and right elbow injury.    Rib fractures - cont pulm toilet, cxr stable, thoracic surg consulted, pt and ot, mult modal pain control  Hyponatremia - dec at midnight from 130 to 127--stat labs now due to inc confusion and vomiting, nephrology consult, on record review patient with SIADH during last admission for RSV in Dec 2023--on salt tabs at home  Afib - rate controlled, new diaphoretic episode and vomiting so stat EKG and labs with poc glucose ordered, eliquis still held  Facial droop - h/o CVA, neurology evaluated, imaging with ctas and MRI reassuring for no new stroke  Urine retentiongross hematuria - vinson cath placed with difficulty, now with gross hematuria that has not cleared even this am, no ab pain, plan urology eval    Further assessment and plan pending repeat labs and EKG this am.

## 2024-07-29 NOTE — PLAN OF CARE
Goal Outcome Evaluation:  Plan of Care Reviewed With: patient        Progress: no change  Outcome Evaluation: pt remains in obs. Pt here with R rib fracture. Pt has been HTN this shift 150- 190's. At start of shift pt restless, attempting to get OOB and requesting to go to BR. pt taken to BR 3x within first 1.5 hour of shift. Pt voided very minimal (not able to be measured pt to actual BR). This RN bladder scanned and showed 717 ml. Notified provider for order for straight cath. Attempted straight cath and was unsuccessful, this rn was meeting resistance. Provider notified and orders were obtained for vinson. A coude vinson was easily able to be inserted, big clot noted and bloody urine maintained thoughout this R shift. Pt has had >1,800ml out this shift. After vinson placed pt was more comfortable in bed for about 1h until pt became restless again and pulling at vinson and ECG leads and SPO2 monitor. Provider updated and pt was give Atarax x1 per orders with no relief. Provider updated again and pt was given Xanax x1 with relief. AM labs requested to be drawn early by this rn to assess NA level which was previously 130 which provider was okay with, NA was 127 and provider notified. While updating family at bedside they notified to this RN that pt takes Sodium tablet 1g PO daily which is not on MAR and was not on pt home med list (it was on outside med rec list) so this RN updated med rec to reflect this med and notified provider. Pt is on PO cipro. Pt has been NPO since midnight, pt has had a couple sips with PO meds. Neurology and Thoracic sug have been consulted.

## 2024-07-29 NOTE — THERAPY EVALUATION
Patient Name: Danni Aguilar  : 1926    MRN: [id number]                              Today's Date: 2024       Admit Date: 2024    Visit Dx:     ICD-10-CM ICD-9-CM   1. Closed fracture of multiple ribs of right side, initial encounter  S22.41XA 807.09   2. Skin tear of right elbow without complication, initial encounter  S51.011A 881.01   3. Fall, initial encounter  W19.XXXA E888.9     Patient Active Problem List   Diagnosis    Acute CVA (cerebrovascular accident)    Atrial fibrillation    Long term (current) use of anticoagulants    Carotid stenosis    Urinary retention    Cerebrovascular accident (CVA) due to stenosis of right carotid artery    Hyperlipidemia    Cerebrovascular disease    Recent cerebrovascular accident (CVA)    RSV infection    Hyponatremia    Thrombocytosis    RSV (respiratory syncytial virus infection)    Right rib fracture     Past Medical History:   Diagnosis Date    A-fib     Carotid artery stenosis     Hyperlipidemia     Irregular heart beat      Past Surgical History:   Procedure Laterality Date    BACK SURGERY      CAROTID ARTERY ANGIOPLASTY      CEREBRAL ANGIOGRAM N/A 2017    Procedure: DIAGNOSTIC CEREBRAL ANGIOGRAM AND RIGHT CAROTID STENT PLACEMENT;  Surgeon: Mauricio Yung MD;  Location: [facility] ;  Service:     RECTAL SURGERY        General Information       Row Name 24 0844          OT Time and Intention    Document Type evaluation;therapy note (daily note)  -LE     Mode of Treatment individual therapy;occupational therapy  -LE       Row Name 24 0844          General Information    Patient Profile Reviewed yes  -LE     Prior Level of Function transfer;ADL's;mod assist:  assist with ADL.  increase effort to get OOB.  -LE     Existing Precautions/Restrictions fall  catheter.  -LE     Barriers to Rehab cognitive status  -LE       Row Name 24 0844          Living Environment    People in Home spouse  family and 2 caregivers provide  24/7 assist.  -LE       Row Name 07/29/24 0844          Cognition    Orientation Status (Cognition) oriented to;person  not aware in hospital.  Counts include 234 beds at the [facility] reports at home they have to cue pt to find the bathroom. follow basic commands.  -LE       Row Name 07/29/24 0844          Safety Issues, Functional Mobility    Comment, Safety Issues/Impairments (Mobility) non skid socks and gait belt worn.  -LE               User Key  (r) = Recorded By, (t) = Taken By, (c) = Cosigned By      Initials Name Provider Type    Michelle Irizarry OTR Occupational Therapist                     Mobility/ADL's       Row Name 07/29/24 0846          Bed Mobility    Bed Mobility supine-sit;sit-supine;scooting/bridging  -LE     Scooting/Bridging [place] (Bed Mobility) verbal cues;set up;minimum assist (75% patient effort)  scoots minimally up in bed with OT bracing feet.  -LE     Supine-Sit [place] (Bed Mobility) standby assist;set up;contact guard;minimum assist (75% patient effort)  -LE     Sit-Supine [place] (Bed Mobility) set up;standby assist;minimum assist (75% patient effort)  -LE     Bed Mobility, Safety Issues decreased use of arms for pushing/pulling;decreased use of legs for bridging/pushing;impaired trunk control for bed mobility  -LE     Assistive Device (Bed Mobility) bed rails;head of bed elevated  -LE     Comment, (Bed Mobility) increase effort.   assist with legs back in bed.  pt lays nearly sideways when return to bed.  Counts include 234 beds at the [facility] reports pt has difficuly getting out of bed at home.  -SHANELL       Row Name 07/29/24 0846          Transfers    Transfers sit-stand transfer;stand-sit transfer;bed-chair transfer;toilet transfer  -LE       Row Name 07/29/24 0846          Sit-Stand Transfer    Sit-Stand [place] (Transfers) set up;standby assist  -LE     Assistive Device (Sit-Stand Transfers) walker, front-wheeled  -SHANELL       Row Name 07/29/24 0846          Stand-Sit Transfer    Stand-Sit [place] (Transfers) set up;standby  assist  -LE     Assistive Device (Stand-Sit Transfers) walker, front-wheeled  -LE       Row Name 07/29/24 0846          Toilet Transfer    Type (Toilet Transfer) stand pivot/stand step  -LE     [place] Level (Toilet Transfer) set up;standby assist;verbal cues  -LE     Assistive Device (Toilet Transfer) walker, front-wheeled;grab bars/safety frame  -LE     Comment, (Toilet Transfer) VC for hand placement.  -       Row Name 07/29/24 0846          Functional Mobility    Functional Mobility- Ind. Level set up required;standby assist  -LE     Functional Mobility- Device walker, front-wheeled  -LE     Functional Mobility- Comment close SBA.  [facility] reports pt may have had second fall this week at home. no overt wobbles or LOB noted but recommend SBA with mobility.  -[facility] Name 07/29/24 0846          Activities of Daily Living    BADL Assessment/Intervention toileting;feeding;grooming;lower body dressing;upper body dressing;bathing  -[facility] Name 07/29/24 0846          Lower Body Dressing Assessment/Training    Comment, (Lower Body Dressing) pt unable to reach feet to adjust socks .  [facility] reports pt gets Assist with dressing and bathing at home.  -[facility] Name 07/29/24 0846          Self-Feeding Assessment/Training    Comment, (Feeding) currently NPO but should have UE function to self feed.  -[facility] Name 07/29/24 0846          Toileting Assessment/Training    [place] Level (Toileting) dependent (less than 25% patient effort)  -SHANELL     Comment, (Toileting) catheter.  per RN did walk to BR several times yesterday before putting in catheter and pt moves impulsively due to urgency.  -SHANELL               User Key  (r) = Recorded By, (t) = Taken By, (c) = Cosigned By      Initials Name Provider Type    Michelle Irizarry OTR Occupational Therapist                   Obj/Interventions       Row Name 07/29/24 0851          Range of Motion Comprehensive    Comment, General Range of Motion R UE 2/3 AROM.   L shld 1/2 and distal 2/3-7/8  -LE       Row Name 07/29/24 0851          Strength Comprehensive (MMT)    Comment, General Manual Muscle Testing (MMT) Assessment B elbow and hands with good and equal resistance.  -       Row Name 07/29/24 0851          Balance    Balance Assessment sitting static balance;standing static balance;standing dynamic balance  -LE     Static Sitting Balance independent  -LE     Static Standing Balance standby assist  -LE     Position/Device Used, Standing Balance walker, front-wheeled  -LE               User Key  (r) = Recorded By, (t) = Taken By, (c) = Cosigned By      Initials Name Provider Type    Michelle Irizarry OTR Occupational Therapist                   Goals/Plan       Row Name 07/29/24 0854          Transfer Goal 1 (OT)    Activity/Assistive Device (Transfer Goal 1, OT) sit-to-stand/stand-to-sit;bed-to-chair/chair-to-bed;toilet;commode, 3-in-1;walker, rolling  -LE     [place] Level/Cues Needed (Transfer Goal 1, OT) set-up required;standby assist;modified independence  -LE     Time Frame (Transfer Goal 1, OT) 2 weeks  -LE     Progress/Outcome (Transfer Goal 1, OT) goal ongoing  -LE       Row Name 07/29/24 0854          Dressing Goal 1 (OT)    Activity/Device (Dressing Goal 1, OT) upper body dressing  -LE     [place]/Cues Needed (Dressing Goal 1, OT) standby assist;set-up required  -LE     Time Frame (Dressing Goal 1, OT) 2 weeks  -LE     Progress/Outcome (Dressing Goal 1, OT) goal ongoing  -       Row Name 07/29/24 0854          Toileting Goal 1 (OT)    Activity/Device (Toileting Goal 1, OT) toileting skills, all;commode, 3-in-1;grab bar/safety frame  -LE     [place] Level/Cues Needed (Toileting Goal 1, OT) set-up required;standby assist  -LE     Time Frame (Toileting Goal 1, OT) 2 weeks  -LE     Progress/Outcome (Toileting Goal 1, OT) goal ongoing  -LE       Row Name 07/29/24 0854          Grooming Goal 1 (OT)    Activity/Device (Grooming Goal 1, OT) oral  care;wash face, hands  -LE     [place] (Grooming Goal 1, OT) standby assist;set-up required  -LE     Time Frame (Grooming Goal 1, OT) 2 weeks  -LE     Progress/Outcome (Grooming Goal 1, OT) goal ongoing  -LE       Row Name 07/29/24 0854          Strength Goal 1 (OT)    Strength Goal 1 (OT) Supervision for  B UE exercises to increase sustained reach and hold during ADL and mobility efforts.  -       Row Name 07/29/24 0854          Problem Specific Goal 1 (OT)    Problem Specific Goal 1 (OT) Mod I ambulation to/from bathroom with walker.  -LE     Time Frame (Problem Specific Goal 1, OT) 2 weeks  -LE     Progress/Outcome (Problem Specific Goal 1, OT) goal ongoing  -       Row Name 07/29/24 0854          Therapy Assessment/Plan (OT)    Planned Therapy Interventions (OT) activity tolerance training;adaptive equipment training;BADL retraining;functional balance retraining;patient/caregiver education/training;strengthening exercise;transfer/mobility retraining  -LE               User Key  (r) = Recorded By, (t) = Taken By, (c) = Cosigned By      Initials Name Provider Type    Michelle Irizarry, OTR Occupational Therapist                   Clinical Impression       Row Name 07/29/24 0852          Pain Assessment    Pretreatment Pain Rating 0/10 - no pain  -LE     Posttreatment Pain Rating 0/10 - no pain  -LE     Pre/Posttreatment Pain Comment denies pain throughout session.   specifically denies rib pain.  -       Row Name 07/29/24 0852          Plan of Care Review    Plan of Care Reviewed With patient;daughter  -LE     Outcome Evaluation Pt admit with fall, (+) rib fractures.  Current with treatment for UTI. History includes CVA 2017, confusion since came home from rehab in Dec 2023.  Pt lives with spouse (who has dementia) and family and caregivers provide 24/7 assist at home. At baseline pt uses walker and at times can ambulate to bathroom on own.  Pt gets assist with ADL.  Pt presents in OBS bed with dght who  provides history and PLOF. Pt agreeable to getting OOB with assist and increase time.  Pt is close SBA to walk to BR for toilet xfer.  Pt need assist to adjust socks.  Review bathroom DME used at home.   Daughter feels patient moving at/near baseline and OT recommend SBA with mobility at home.  At this time rec cont with nsg to bathroom with walker.  Will follow for skilled OT if admitted.   Anticipate return home with family/CG 24/7 assist and recommend HH OT for continued safety and activity tolerance due to recent falls.  -LE       Row Name 07/29/24 0852          Therapy Assessment/Plan (OT)    Patient/Family Therapy Goal Statement (OT) Return to prior level of function.  -LE     Rehab Potential (OT) fair, will monitor progress closely  -LE     Criteria for Skilled Therapeutic Interventions Met (OT) meets criteria;yes  -LE     Therapy Frequency (OT) 3 times/wk  -SHANELL       Row Name 07/29/24 0852          Therapy Plan Review/Discharge Plan (OT)    Equipment Needs Upon Discharge (OT) gait belt;walker, rolling;shower chair  has grab bars, shower chair, walker at home.  -LE     Anticipated Discharge Disposition (OT) home with 24/7 care;home with home health  -SHANELL       Row Name 07/29/24 0852          Vital Signs    Intratreatment Heart Rate (beats/min) 90  -LE     Pre SpO2 (%) 100  -LE     O2 Delivery Pre Treatment room air  -LE     Pre Patient Position Supine  -LE     Intra Patient Position Standing  -LE     Post Patient Position Supine  -LE       Row Name 07/29/24 0852          Positioning and Restraints    Pre-Treatment Position in bed  -LE     Post Treatment Position bed  -LE     In Bed notified nsg;fowlers;call light within reach;encouraged to call for assist;exit alarm on;with family/caregiver;heels elevated  -LE               User Key  (r) = Recorded By, (t) = Taken By, (c) = Cosigned By      Initials Name Provider Type    Michelle Irizarry OTR Occupational Therapist                   Outcome Measures       [place]  Name 07/29/24 0855          How much help from another is currently needed...    Putting on and taking off regular lower body clothing? 2  -LE     Bathing (including washing, rinsing, and drying) 2  -LE     Toileting (which includes using toilet bed pan or urinal) 1  -LE     Putting on and taking off regular upper body clothing 3  -LE     Taking care of personal grooming (such as brushing teeth) 3  -LE     Eating meals 3  -LE     AM-PAC 6 Clicks Score (OT) 14  -LE       Row Name 07/29/24 0855          Modified [place]unez Scale    Modified [place] Scale 4 - Moderately severe disability.  Unable to walk without assistance, and unable to attend to own bodily needs without assistance.  -LE       Row Name 07/29/24 0855          Functional Assessment    Outcome Measure Options AM-PAC 6 Clicks Daily Activity (OT);Modified [place]  -LE               User Key  (r) = Recorded By, (t) = Taken By, (c) = Cosigned By      Initials Name Provider Type    Michelle Irizarry OTR Occupational Therapist                    Occupational Therapy Education       Title: PT OT SLP Therapies (In Progress)       Topic: Occupational Therapy (In Progress)       Point: ADL training (Done)       Description:   Instruct learner(s) on proper safety adaptation and remediation techniques during self care or transfers.   Instruct in proper use of assistive devices.                  Learning Progress Summary             Patient Acceptance, E, Bed IU by SHANELL at 7/29/2024 0855    Comment: role of OT, eval results, xfer tech, review AD   Family Acceptance, E, Bed IU by SHANELL at 7/29/2024 0855    Comment: role of OT, eval results, xfer tech, review AD                         Point: Home exercise program (Not Started)       Description:   Instruct learner(s) on appropriate technique for monitoring, assisting and/or progressing therapeutic exercises/activities.                  Learner Progress:  Not documented in this visit.              Point: Precautions (Done)        Description:   Instruct learner(s) on prescribed precautions during self-care and functional transfers.                  Learning Progress Summary             Patient Acceptance, E, Bed IU by SHANELL at 7/29/2024 0855    Comment: role of OT, eval results, xfer tech, review AD   Family Acceptance, E, Bed IU by SHANELL at 7/29/2024 0855    Comment: role of OT, eval results, xfer tech, review AD                         Point: Body mechanics (Not Started)       Description:   Instruct learner(s) on proper positioning and spine alignment during self-care, functional mobility activities and/or exercises.                  Learner Progress:  Not documented in this visit.                              User Key       Initials Effective Dates Name Provider Type Discipline    SHANELL 06/16/21 -  Michelle Morales OTR Occupational Therapist OT                  OT Recommendation and Plan  Planned Therapy Interventions (OT): activity tolerance training, adaptive equipment training, BADL retraining, functional balance retraining, patient/caregiver education/training, strengthening exercise, transfer/mobility retraining  Therapy Frequency (OT): 3 times/wk  Plan of Care Review  Plan of Care Reviewed With: patient, daughter  Outcome Evaluation: Pt admit with fall, (+) rib fractures.  Current with treatment for UTI. History includes CVA 2017, confusion since came home from rehab in Dec 2023.  Pt lives with spouse (who has dementia) and family and caregivers provide 24/7 assist at home. At baseline pt uses walker and at times can ambulate to bathroom on own.  Pt gets assist with ADL.  Pt presents in OBS bed with dght who provides history and PLOF. Pt agreeable to getting OOB with assist and increase time.  Pt is close SBA to walk to BR for toilet xfer.  Pt need assist to adjust socks.  Review bathroom DME used at home.   Daughter feels patient moving at/near baseline and OT recommend SBA with mobility at home.  At this time rec cont with nsg to bathroom  with walker.  Will follow for skilled OT if admitted.   Anticipate return home with family/CG 24/7 assist and recommend  OT for continued safety and activity tolerance due to recent falls.     Time Calculation:   Evaluation Complexity (OT)  Review Occupational Profile/Medical/Therapy History Complexity: expanded/moderate complexity  Assessment, Occupational Performance/Identification of Deficit Complexity: 3-5 performance deficits  Clinical Decision Making Complexity (OT): detailed assessment/moderate complexity  Overall Complexity of Evaluation (OT): moderate complexity     Time Calculation- OT       Row Name 07/29/24 0856             Time Calculation- OT    OT Start Time 0808  -LE      OT Stop Time 0838  -LE      OT Time Calculation (min) 30 min  -LE      Total Timed Code Minutes- OT 23 minute(s)  -LE      OT Received On 07/29/24  -LE      OT - Next Appointment 07/30/24  -LE         Timed Charges    13187 - OT Self Care/Mgmt Minutes 23  -LE         Total Minutes    Timed Charges Total Minutes 23  -LE       Total Minutes 23  -LE                User Key  (r) = Recorded By, (t) = Taken By, (c) = Cosigned By      Initials Name Provider Type    Michelle Irizarry OTR Occupational Therapist                  Therapy Charges for Today       Code Description Service Date Service Provider Modifiers Qty    [id number]  OT SELF CARE/MGMT/TRAIN EA 15 MIN 7/29/2024 Michelle Morales OTR GO 2    [id number]  OT EVAL MOD COMPLEXITY 2 7/29/2024 Michelle Morales OTR GO 1                 JAYDEN Medley  7/29/2024

## 2024-07-29 NOTE — PROGRESS NOTES
ED OBSERVATION PROGRESS/DISCHARGE SUMMARY    Date of Admission: 7/28/2024   LOS: 0 days   PCP: Justin Longoria MD      Subjective: Patient seems more confused than he did yesterday    Hospital Outcome:   89-year-old male with a history of atrial fibrillation on Eliquis, CVA, hyperlipidemia, and carotid stenosis who was admitted to the observation unit after mechanical fall with multiple right rib fractures.  Upon arrival to the ED observation unit patient had noted a left-sided facial droop, patient uncertain if this is new or old given he is quite a vague historian.  CT imaging in the ER showed showed at least 3 right rib fractures including a mildly displaced fracture of the right sixth rib, nondisplaced fractures of the right fifth and seventh ribs with no evidence of pneumothorax and a moderate-sized right pleural effusion with some mild right lower lobe atelectasis. Patient was started on multimodal pain control and incentive spirometry.  Eliquis held.    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    Patient reported some difficulty voiding having minimal output.  Bladder scan revealed 717 mL in the bladder and straight cath was attempted but unsuccessful meeting resistance.  A coude vinson was easily inserted did note a clot and some bloody urine with output.  Overnight patient was restless and did not sleep well despite being given a dose of Atarax and Xanax.  Consultations to thoracic surgery and neurology pending.      7/29: This AM patient has some worsening hematuria to his catheter bag.  He had a urinary tract infection treated prior to presenting to the hospital with oral ciprofloxacin which was  continued yesterday.  He does have a worsening leukocytosis from 10-12 this morning.  He has been n.p.o. since midnight for thoracic surgery evaluation and was given home meds this morning had an episode of nonbilious nonbloody emesis.  He seems more altered than he did last night and his sodium is trending downward. Seen by nephrology and started on oral sodium tablets.  Due to his worsening hyponatremia I think it would be best to transition him to the medicine team for further monitoring and management of his hyponatremia.    I have discussed case with Dr. Paredes on call for [facility] who has graciously accepted to admit to a med surg bed    ROS:  General: no fevers, chills  Respiratory: no cough, dyspnea  Cardiovascular: no chest pain, palpitations  Abdomen: No abdominal pain, nausea, vomiting, or diarrhea  Neurologic: No focal weakness,+ confusion    Objective   Physical Exam:  I have reviewed the vital signs.  Temp:  [97.3 °F (36.3 °C)-98.6 °F (37 °C)] 97.9 °F (36.6 °C)  Heart Rate:  [] 83  Resp:  [16-18] 18  BP: (144-195)/(64-94) 173/68  General Appearance:    Alert, cooperative, elderly/frail  Head:    Normocephalic, atraumatic  Eyes:    Sclerae anicteric  Neck:   Supple, no mass  Lungs: Clear to auscultation bilaterally, respirations unlabored  Heart: Regular rate and rhythm, S1 and S2 normal, no murmur, rub or gallop  Abdomen:  Soft, nontender, bowel sounds active, nondistended  Genitourinary: Indwelling urinary catheter with grade 2-3 hematuria present  Extremities: No clubbing, cyanosis, or edema to lower extremities  Pulses:  2+ and symmetric in distal lower extremities  Skin: No rashes, no breakdown  Neurologic: Oriented x3, Normal strength to extremities    Results Review:    I have reviewed the labs, radiology results and diagnostic studies.    Results from last 7 days   Lab Units 07/29/24  0938   WBC 10*3/mm3 12.24*   HEMOGLOBIN g/dL 13.0   HEMATOCRIT % 41.5   PLATELETS 10*3/mm3 711*     Results  from last 7 days   Lab Units 07/29/24  0938 07/29/24  0054 07/28/24  0922   SODIUM mmol/L 124* 127* 130*   POTASSIUM mmol/L 4.4 4.2 4.5   CHLORIDE mmol/L 92* 94* 95*   CO2 mmol/L 19.0* 21.0* 22.0   BUN mg/dL 12 15 17   CREATININE mg/dL 0.90 0.90 1.12   CALCIUM mg/dL 9.2 8.6 9.2   BILIRUBIN mg/dL 1.3*  --  0.9   ALK PHOS U/L 66  --  63   ALT (SGPT) U/L 15  --  15   AST (SGOT) U/L 33  --  24   GLUCOSE mg/dL 90 101* 104*     Imaging Results (Last 24 Hours)       Procedure Component Value Units Date/Time    XR Chest 1 View [[id number]] Collected: 07/29/24 0746     Updated: 07/29/24 0751    Narrative:      XR CHEST 1 VW-     Clinical: Rib fracture, rule out pneumothorax     Correlation with chest radiograph 7/28/2024 and chest CT 7/28/2024     FINDINGS: Hairline right rib fractures seen on CT cannot be appreciated  on the current chest radiograph. Vague opacity right mid to lower lung  zone corresponds to the pleural effusion. No pneumothorax. The left lung  is clear. The cardiomediastinal silhouette is stable. The remainder is  unremarkable.     CONCLUSION: Right-sided pleural effusion, no pneumothorax has developed.     This report was finalized on 7/29/2024 7:48 AM by Dr. Boris Lester M.D  on Workstation: [id number]       CT Chest Without Contrast Diagnostic [[id number]] Collected: 07/28/24 1056     Updated: 07/28/24 2355    Narrative:      CT OF THE CHEST WITHOUT CONTRAST 07/28/2024     HISTORY: Fell. Right side rib injury. Axial images were obtained from  the lung apices to the upper abdomen. No intravenous contrast was given.     There is a mildly displaced fracture of the right 6th rib and there  appear to be nondisplaced fractures of the right 5th and 7th ribs. No  pneumothorax is seen. There is a moderate size right pleural effusion  with some mild right lower lobe atelectasis. This demonstrates low  density fluid. Tiny subpleural probable calcified granuloma is seen in  the left lower lobe on image 48.      There is some aortic and coronary calcification. No pathologically  enlarged lymph nodes are seen. At least 2 probable hepatic cysts are  seen. A small calcified nodule is seen posterior to the right hepatic  lobe.       Impression:      1. At least 3 right rib fractures as described.  2. Moderate size right pleural effusion with some mild right lower lobe  atelectasis.     Radiation dose reduction techniques were utilized, including automated  exposure control and exposure modulation based on body size.        This report was finalized on 7/28/2024 11:52 PM by Dr. Natanael Gutiérrez M.D on Workstation: [id number]       CT Head Without Contrast [[id number]] Collected: 07/28/24 1104     Updated: 07/28/24 2342    Narrative:      HISTORY: Fell. Head injury. Neck pain.     CT OF THE BRAIN WITHOUT CONTRAST 07/28/2024     Axial images were obtained through the brain without intravenous  contrast. There is moderately severe diffuse atrophy. There is decreased  attenuation of the periventricular white matter bilaterally consistent  with small vessel white matter ischemic disease. Small low-density  subdural hygromas are seen over both frontal lobes similar to the  10/08/2023 study.     There is no evidence of acute infarction, hemorrhage, midline shift or  mass effect.     No skull fractures are seen.       Impression:      1. Atrophy and small vessel ischemic disease.  2. No acute intracranial process.  3. 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        CT OF THE CERVICAL SPINE WITHOUT CONTRAST     Axial images were obtained from the skull base to the upper thoracic  spine. Sagittal and coronal reconstruction images were reviewed.     There is C3-4, C4-5 and C5-6 spondylosis. Minimal (1-2 mm)  anterolisthesis of C2 on C3 is seen. Minimal anterolisthesis of C7 on T1  is seen.     No cervical spine fractures are seen.     There is a right carotid stent. Bilateral carotid calcification is seen.     IMPRESSION:  1.  Multilevel cervical degenerative disease.  2. No fractures are seen.  3. Right pleural effusion is seen. Please see additional dictation for  today's CT of the chest.     Radiation dose reduction techniques were utilized, including automated  exposure control and exposure modulation based on body size.        This report was finalized on 7/28/2024 11:39 PM by Dr. Natanael Gutiérrez M.D on Workstation: [id number]       CT Cervical Spine Without Contrast [[id number]] Collected: 07/28/24 1104     Updated: 07/28/24 2342    Narrative:      HISTORY: Fell. Head injury. Neck pain.     CT OF THE BRAIN WITHOUT CONTRAST 07/28/2024     Axial images were obtained through the brain without intravenous  contrast. There is moderately severe diffuse atrophy. There is decreased  attenuation of the periventricular white matter bilaterally consistent  with small vessel white matter ischemic disease. Small low-density  subdural hygromas are seen over both frontal lobes similar to the  10/08/2023 study.     There is no evidence of acute infarction, hemorrhage, midline shift or  mass effect.     No skull fractures are seen.       Impression:      1. Atrophy and small vessel ischemic disease.  2. No acute intracranial process.  3. 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        CT OF THE CERVICAL SPINE WITHOUT CONTRAST     Axial images were obtained from the skull base to the upper thoracic  spine. Sagittal and coronal reconstruction images were reviewed.     There is C3-4, C4-5 and C5-6 spondylosis. Minimal (1-2 mm)  anterolisthesis of C2 on C3 is seen. Minimal anterolisthesis of C7 on T1  is seen.     No cervical spine fractures are seen.     There is a right carotid stent. Bilateral carotid calcification is seen.     IMPRESSION:  1. Multilevel cervical degenerative disease.  2. No fractures are seen.  3. Right pleural effusion is seen. Please see additional dictation for  today's CT of the chest.     Radiation dose reduction  techniques were utilized, including automated  exposure control and exposure modulation based on body size.        This report was finalized on 7/28/2024 11:39 PM by Dr. Natanael Gutiérrez M.D on Workstation: [id number]       CT Angiogram Head [[id number]] Collected: 07/28/24 1816     Updated: 07/28/24 1926    Narrative:      CT ANGIOGRAM NECK AND HEAD WITH CONTRAST     HISTORY: Slurred speech, left facial droop.     COMPARISON: MRI brain 07/28/2024, CT head 07/28/2024 and CT angiogram of  the neck and head 12/09/2020.     FINDINGS: Initially, a noncontrasted CT examination of the brain was  performed. 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 Prominent CSF is appreciated overlying the anterior aspect of  the frontal lobes bilaterally, slightly more prominent on the left  consistent with hygromas.     A CT angiogram of the neck and head was then performed. Multiplanar as  well as 3-dimensional reconstructions were generated.     A moderate-to-large pleural fluid collection is present on the right,  only partially visualized. Calcified plaque is appreciated involving the  aortic arch and origins of the great vessels. There is a bovine  configuration of the great vessels. There is mild irregularity and  stenosis involving the left subclavian artery proximally. A carotid  stent is appreciated involving the right carotid bifurcation. Decreased  attenuation is appreciated involving the posterior aspect of the stent  suggesting mild endothelial hyperplasia or mural thrombus similar in  appearance as compared to the prior study. The stent is widely patent.  Eccentric calcified plaque is appreciated involving the left internal  carotid artery proximally but without stenosis. The mid cervical ICAs  are tortuous. There is mild irregularity involving the cervical ICAs  bilaterally consistent with fibromuscular dysplasia, slightly  more  prominent on the left. There is fusiform enlargement of the cervical ICA  on the left which measures 8 mm in diameter similar in appearance as  compared to the prior examination. Mild-to-moderate vascular  calcifications involving the carotid siphons are noted bilaterally. The  right A1 segment is mildly hypoplastic. There is moderate stenosis  involving the left M1 segment, similar in appearance as compared to the  prior examination. The proximal aspects of the anterior and middle  cerebral arteries appear unremarkable.     Both vertebral arteries were opacified. The right vertebral artery is  larger than that of the left. The prior study demonstrated a markedly  hypoplastic V4 segment. The V4 segment on the left appears to be  occluded on the current examination. A focal eccentric calcification is  appreciated involving the basilar artery proximally, present previously  and contributing to mild-to-moderate stenosis. Otherwise, the basilar  artery and left posterior cerebral artery appear unremarkable. There is  a fetal origin of the right posterior cerebral artery.       Impression:      1.  There is no evidence of acute infarction or of intracranial  hemorrhage. Atrophy, small vessel ischemic disease, vascular  calcification and bifrontal hygromas are noted. A carotid stent is  present on the right. There is eccentric mural thrombus or endothelial  hyperplasia appreciated, mild, unchanged versus 12/09/2020. Irregularity  involving the distal aspects of the cervical ICAs is appreciated  bilaterally, more prominent on the left consistent with fibromuscular  dysplasia, unchanged. There is mild fusiform enlargement of the distal  cervical ICA on the left (8 mm), unchanged. The right vertebral artery  is dominant. The hypoplastic left vertebral artery distally is not  opacified and likely is occluded. Mild-to-moderate stenosis involving  the basilar artery proximally is noted, similar in appearance as  compared  to the prior study. There is moderate stenosis involving the  mid left M1 segment similar in appearance as compared to the prior  examination. See above.  2.  There is partial visualization of a moderate-to-large pleural fluid  collection on the right.        Radiation dose reduction techniques were utilized, including automated  exposure control and exposure modulation based on body size.        This report was finalized on 7/28/2024 7:23 PM by Dr. Ramez Lind M.D  on Workstation: [id number]       CT Angiogram Neck [[id number]] Collected: 07/28/24 1816     Updated: 07/28/24 1926    Narrative:      CT ANGIOGRAM NECK AND HEAD WITH CONTRAST     HISTORY: Slurred speech, left facial droop.     COMPARISON: MRI brain 07/28/2024, CT head 07/28/2024 and CT angiogram of  the neck and head 12/09/2020.     FINDINGS: Initially, a noncontrasted CT examination of the brain was  performed. 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 Prominent CSF is appreciated overlying the anterior aspect of  the frontal lobes bilaterally, slightly more prominent on the left  consistent with hygromas.     A CT angiogram of the neck and head was then performed. Multiplanar as  well as 3-dimensional reconstructions were generated.     A moderate-to-large pleural fluid collection is present on the right,  only partially visualized. Calcified plaque is appreciated involving the  aortic arch and origins of the great vessels. There is a bovine  configuration of the great vessels. There is mild irregularity and  stenosis involving the left subclavian artery proximally. A carotid  stent is appreciated involving the right carotid bifurcation. Decreased  attenuation is appreciated involving the posterior aspect of the stent  suggesting mild endothelial hyperplasia or mural thrombus similar in  appearance as compared to the prior study. The stent is widely  patent.  Eccentric calcified plaque is appreciated involving the left internal  carotid artery proximally but without stenosis. The mid cervical ICAs  are tortuous. There is mild irregularity involving the cervical ICAs  bilaterally consistent with fibromuscular dysplasia, slightly more  prominent on the left. There is fusiform enlargement of the cervical ICA  on the left which measures 8 mm in diameter similar in appearance as  compared to the prior examination. Mild-to-moderate vascular  calcifications involving the carotid siphons are noted bilaterally. The  right A1 segment is mildly hypoplastic. There is moderate stenosis  involving the left M1 segment, similar in appearance as compared to the  prior examination. The proximal aspects of the anterior and middle  cerebral arteries appear unremarkable.     Both vertebral arteries were opacified. The right vertebral artery is  larger than that of the left. The prior study demonstrated a markedly  hypoplastic V4 segment. The V4 segment on the left appears to be  occluded on the current examination. A focal eccentric calcification is  appreciated involving the basilar artery proximally, present previously  and contributing to mild-to-moderate stenosis. Otherwise, the basilar  artery and left posterior cerebral artery appear unremarkable. There is  a fetal origin of the right posterior cerebral artery.       Impression:      1.  There is no evidence of acute infarction or of intracranial  hemorrhage. Atrophy, small vessel ischemic disease, vascular  calcification and bifrontal hygromas are noted. A carotid stent is  present on the right. There is eccentric mural thrombus or endothelial  hyperplasia appreciated, mild, unchanged versus 12/09/2020. Irregularity  involving the distal aspects of the cervical ICAs is appreciated  bilaterally, more prominent on the left consistent with fibromuscular  dysplasia, unchanged. There is mild fusiform enlargement of the  distal  cervical ICA on the left (8 mm), unchanged. The right vertebral artery  is dominant. The hypoplastic left vertebral artery distally is not  opacified and likely is occluded. Mild-to-moderate stenosis involving  the basilar artery proximally is noted, similar in appearance as  compared to the prior study. There is moderate stenosis involving the  mid left M1 segment similar in appearance as compared to the prior  examination. See above.  2.  There is partial visualization of a moderate-to-large pleural fluid  collection on the right.        Radiation dose reduction techniques were utilized, including automated  exposure control and exposure modulation based on body size.        This report was finalized on 7/28/2024 7:23 PM by Dr. Ramez Lind M.D  on Workstation: [id number]       MRI Brain Without Contrast [[id number]] Collected: 07/28/24 1606     Updated: 07/28/24 1614    Narrative:      MRI BRAIN WO CONTRAST-     HISTORY:  new left facial droop; S22.41XA-Multiple fractures of ribs,  right side, initial encounter for closed fracture; S51.011A-Laceration  without foreign body of right elbow, initial encounter;  W19.XXXA-Unspecified fall, initial encounter     COMPARISON: CT head 7/28/2024 and MRI brain 12/9/2020     FINDINGS: There is moderate diffuse atrophy. There is no evidence of  restricted diffusion to suggest acute infarction. 2 small remote  infarcts involve the right cerebellar hemisphere laterally are  appreciated which were present on 12/9/2020. The right vertebral artery  is dominant. There is expected flow void in the basilar artery and in  the distal aspect of the internal carotid arteries bilaterally.  Prominent CSF is appreciated overlying the frontal lobes anteriorly (12  mm in thickness on the right and 10 mm in thickness on the left similar  in appearance as compared to the MRI examination of 12/9/2020. There is  no evidence of mass or mass effect on this noncontrasted MRI  examination  of the brain.       Impression:      There is no evidence of an acute infarct. Atrophy, small  vessel ischemic disease and bifrontal hygromas are appreciated similar  appearances compared to 12/9/2020.     This report was finalized on 7/28/2024 4:11 PM by Dr. Ramez Lind M.D  on Workstation: [id number]       XR Wrist 3+ View Right [[id number]] Collected: 07/28/24 1126     Updated: 07/28/24 1130    Narrative:      RIGHT WRIST     HISTORY: Fall, pain.     COMPARISON: None.     FINDINGS: 3 views of the right wrist were obtained. The study is  hampered somewhat by patient positioning. There is moderate radiocarpal  joint space narrowing. Chondrocalcinosis and extensive vascular  calcification is appreciated. There is no evidence of fracture. If the  patient's symptoms persist, a follow-up study in 7 to 10 days would be  suggested.     This report was finalized on 7/28/2024 11:27 AM by Dr. Ramez Lind M.D on Workstation: [id number]               I have reviewed the medications.  ---------------------------------------------------------------------------------------------  Assessment & Plan   Assessment/Problem List    Right rib fracture    Hyponatremia      Plan:    Multiple right rib fractures  Mechanical fall  Consult to thoracic surgery  Multi- modal pain control with lidocaine patch, Tylenol and oxycodone  Incentive spirometry  Repeat chest x-ray this a.m. shows hairline right rib fracture seen on CT with vague opacity right mid lower lung corresponding to pleural effusion, no pneumothorax, left lung clear  PT consult     Atrial fibrillation  Hold Eliquis for thoracic surgery evaluation  Telemetry     Hyponatremia  Serum sodium 130, 127,124  Continue home sodium chloride tablet    Urinary retention  Patient reported difficulty voiding noted with very minimal output  Straight cath unsuccessful  Coudé Newman placed after straight cath unsuccessful  Patient currently on Cipro for UTI,  continue  Consider urology consult      Left-sided facial droop  CT head negative for acute findings  MRI brain negative acute  CTA head and neck negative acute  Consult to neurology      Disposition: Admitted to [facility], Dr. Paredes, [facility] bed      This note will serve as progress and transfer note      59 minutes have been spent by [facility] providers in the care of this patient while under observation status.    Appropriate PPE worn during patient encounter.  Hand hygeine performed before and after seeing the patient.      LINDEN Sadler 07/29/24 11:28 EDT

## 2024-07-29 NOTE — CONSULTS
Inpatient Thoracic Surgery Consult  Consult performed by: Wendy Knapp, SOULEYMANE, APRN  Consult ordered by: Tesha Isidro APRN  Reason for consult: Multiple right rib fractures, right pleural effusion          Patient Care Team:  Justin Longoria MD as PCP - General (Family Medicine)    Chief Complaint   Patient presents with    Fall       Subjective     History of Present Illness    Mr. Aguilar is a very pleasant 89-year-old gentleman with past medical history of A-fib anticoagulated on Eliquis at home.  He was at [facility] ER on 7/28/2024 after a fall at home.  Complained of right chest wall pain, denied loss of consciousness.  Unsure of how he fell, he uses a walker to ambulate at home, where he lives with his wife.  Most of the HPI was obtained via chart review and from his daughter as patient is a poor historian.    He is resting relatively comfortably on exam today on room air.  Has some increased chest wall discomfort with movement.    Review of Systems   HENT: Negative.     Eyes: Negative.    Respiratory:  Negative for shortness of breath.    Cardiovascular:  Positive for chest pain.   Gastrointestinal: Negative.    Endocrine: Negative.    Genitourinary: Negative.    Musculoskeletal: Negative.    Skin: Negative.    Allergic/Immunologic: Negative.    Neurological:  Positive for weakness.   Hematological:  Bruises/bleeds easily.   Psychiatric/Behavioral: Negative.          Past Medical History:   Diagnosis Date    A-fib     Carotid artery stenosis     Hyperlipidemia     Irregular heart beat      Past Surgical History:   Procedure Laterality Date    BACK SURGERY      CAROTID ARTERY ANGIOPLASTY      CEREBRAL ANGIOGRAM N/A 07/24/2017    Procedure: DIAGNOSTIC CEREBRAL ANGIOGRAM AND RIGHT CAROTID STENT PLACEMENT;  Surgeon: Mauricio Yung MD;  Location: [facility] OR 18/19;  Service:     RECTAL SURGERY       History reviewed. No pertinent family history.  Social History     Socioeconomic History     Marital status:    Tobacco Use    Smoking status: Never    Smokeless tobacco: Never   Vaping Use    Vaping status: Never Used   Substance and Sexual Activity    Alcohol use: No    Drug use: Defer    Sexual activity: Defer     Medications Prior to Admission   Medication Sig Dispense Refill Last Dose    apixaban (ELIQUIS) 5 MG tablet tablet Take 1 tablet by mouth 2 (Two) Times a Day.   7/27/2024    aspirin 81 MG chewable tablet Chew 1 tablet Daily.   7/27/2024    atorvastatin (LIPITOR) 80 MG tablet Take 1 tablet by mouth Daily. 30 tablet 0 7/27/2024    ciprofloxacin (CIPRO) 500 MG tablet Take 1 tablet by mouth 2 (Two) Times a Day.   7/27/2024    sennosides-docusate (PERICOLACE) 8.6-50 MG per tablet Take 2 tablets by mouth 2 (Two) Times a Day. 60 tablet 0 7/27/2024    sodium chloride 1 g tablet Take 1 tablet by mouth Daily. The go to reconcile meds has 1g daily, but pt home med list says 10mg daily       benzonatate (Tessalon Perles) 100 MG capsule Take 1 capsule by mouth 3 (Three) Times a Day As Needed for Cough. 60 capsule 0     guaiFENesin (MUCINEX) 600 MG 12 hr tablet Take 2 tablets by mouth Every 12 (Twelve) Hours. 60 tablet 0 Unknown    ipratropium-albuterol (DUO-NEB) 0.5-2.5 mg/3 ml nebulizer Take 3 mL by nebulization Every 6 (Six) Hours As Needed for Wheezing or Shortness of Air.       polyethylene glycol (MIRALAX) 17 g packet Take 17 g by mouth Daily As Needed (Use if senna-docusate is ineffective). 30 each 0 Unknown    warfarin (Coumadin) 2 MG tablet Take 2 tablets by mouth Every Night. 60 tablet 0      No Known Allergies    Objective      Vital Signs  Temp:  [97.3 °F (36.3 °C)-98.6 °F (37 °C)] 97.9 °F (36.6 °C)  Heart Rate:  [] 74  Resp:  [16-18] 18  BP: (142-195)/(63-88) 142/63    Intake & Output (last day)         07/28 0701  07/29 0700 07/29 0701  07/30 0700    P.O. 240     IV Piggyback 1000     Total Intake(mL/kg) 1240 (18.8)     Urine (mL/kg/hr) 2500     Total Output 2500     Net -1260            Urine Unmeasured Occurrence 3 x             Physical Exam  Constitutional:       General: He is not in acute distress.     Appearance: Normal appearance. He is not ill-appearing.   HENT:      Head: Normocephalic and atraumatic.   Cardiovascular:      Rate and Rhythm: Normal rate.   Pulmonary:      Effort: Pulmonary effort is normal. No respiratory distress.      Breath sounds: Decreased breath sounds present. No wheezing.   Musculoskeletal:      Cervical back: Normal range of motion and neck supple.      Comments: Limited ROM secondary to weakness   Skin:     General: Skin is warm and dry.   Neurological:      General: No focal deficit present.      Mental Status: He is alert.      Motor: Weakness present.   Psychiatric:         Mood and Affect: Mood normal.         Thought Content: Thought content normal.         Results Review:    I reviewed the patient's new clinical results.  I reviewed the patient's new imaging results and agree with the interpretation.  Discussed with patient, RN and Dr. Mijares    Imaging Results (Last 24 Hours)       Procedure Component Value Units Date/Time    XR Chest 1 View [[id number]] Collected: 07/29/24 0746     Updated: 07/29/24 0754    Narrative:      XR CHEST 1 VW-     Clinical: Rib fracture, rule out pneumothorax     Correlation with chest radiograph 7/28/2024 and chest CT 7/28/2024     FINDINGS: Hairline right rib fractures seen on CT cannot be appreciated  on the current chest radiograph. Vague opacity right mid to lower lung  zone corresponds to the pleural effusion. No pneumothorax. The left lung  is clear. The cardiomediastinal silhouette is stable. The remainder is  unremarkable.     CONCLUSION: Right-sided pleural effusion, no pneumothorax has developed.     This report was finalized on 7/29/2024 7:48 AM by Dr. Boris Lester M.D  on Workstation: [id number]       CT Chest Without Contrast Diagnostic [[id number]] Collected: 07/28/24 1056     Updated: 07/28/24 0155     Narrative:      CT OF THE CHEST WITHOUT CONTRAST 07/28/2024     HISTORY: Fell. Right side rib injury. Axial images were obtained from  the lung apices to the upper abdomen. No intravenous contrast was given.     There is a mildly displaced fracture of the right 6th rib and there  appear to be nondisplaced fractures of the right 5th and 7th ribs. No  pneumothorax is seen. There is a moderate size right pleural effusion  with some mild right lower lobe atelectasis. This demonstrates low  density fluid. Tiny subpleural probable calcified granuloma is seen in  the left lower lobe on image 48.     There is some aortic and coronary calcification. No pathologically  enlarged lymph nodes are seen. At least 2 probable hepatic cysts are  seen. A small calcified nodule is seen posterior to the right hepatic  lobe.       Impression:      1. At least 3 right rib fractures as described.  2. Moderate size right pleural effusion with some mild right lower lobe  atelectasis.     Radiation dose reduction techniques were utilized, including automated  exposure control and exposure modulation based on body size.        This report was finalized on 7/28/2024 11:52 PM by Dr. Natanael Gutiérrez M.D on Workstation: [id number]       CT Head Without Contrast [[id number]] Collected: 07/28/24 1104     Updated: 07/28/24 2342    Narrative:      HISTORY: Fell. Head injury. Neck pain.     CT OF THE BRAIN WITHOUT CONTRAST 07/28/2024     Axial images were obtained through the brain without intravenous  contrast. There is moderately severe diffuse atrophy. There is decreased  attenuation of the periventricular white matter bilaterally consistent  with small vessel white matter ischemic disease. Small low-density  subdural hygromas are seen over both frontal lobes similar to the  10/08/2023 study.     There is no evidence of acute infarction, hemorrhage, midline shift or  mass effect.     No skull fractures are seen.       Impression:      1. Atrophy  and small vessel ischemic disease.  2. No acute intracranial process.  3. 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        CT OF THE CERVICAL SPINE WITHOUT CONTRAST     Axial images were obtained from the skull base to the upper thoracic  spine. Sagittal and coronal reconstruction images were reviewed.     There is C3-4, C4-5 and C5-6 spondylosis. Minimal (1-2 mm)  anterolisthesis of C2 on C3 is seen. Minimal anterolisthesis of C7 on T1  is seen.     No cervical spine fractures are seen.     There is a right carotid stent. Bilateral carotid calcification is seen.     IMPRESSION:  1. Multilevel cervical degenerative disease.  2. No fractures are seen.  3. Right pleural effusion is seen. Please see additional dictation for  today's CT of the chest.     Radiation dose reduction techniques were utilized, including automated  exposure control and exposure modulation based on body size.        This report was finalized on 7/28/2024 11:39 PM by Dr. Natanael Gutiérrez M.D on Workstation: [id number]       CT Cervical Spine Without Contrast [[id number]] Collected: 07/28/24 1104     Updated: 07/28/24 2342    Narrative:      HISTORY: Fell. Head injury. Neck pain.     CT OF THE BRAIN WITHOUT CONTRAST 07/28/2024     Axial images were obtained through the brain without intravenous  contrast. There is moderately severe diffuse atrophy. There is decreased  attenuation of the periventricular white matter bilaterally consistent  with small vessel white matter ischemic disease. Small low-density  subdural hygromas are seen over both frontal lobes similar to the  10/08/2023 study.     There is no evidence of acute infarction, hemorrhage, midline shift or  mass effect.     No skull fractures are seen.       Impression:      1. Atrophy and small vessel ischemic disease.  2. No acute intracranial process.  3. 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        CT OF THE CERVICAL SPINE WITHOUT CONTRAST     Axial images were  obtained from the skull base to the upper thoracic  spine. Sagittal and coronal reconstruction images were reviewed.     There is C3-4, C4-5 and C5-6 spondylosis. Minimal (1-2 mm)  anterolisthesis of C2 on C3 is seen. Minimal anterolisthesis of C7 on T1  is seen.     No cervical spine fractures are seen.     There is a right carotid stent. Bilateral carotid calcification is seen.     IMPRESSION:  1. Multilevel cervical degenerative disease.  2. No fractures are seen.  3. Right pleural effusion is seen. Please see additional dictation for  today's CT of the chest.     Radiation dose reduction techniques were utilized, including automated  exposure control and exposure modulation based on body size.        This report was finalized on 7/28/2024 11:39 PM by Dr. Natanael Gutiérrez M.D on Workstation: [id number]       CT Angiogram Head [[id number]] Collected: 07/28/24 1816     Updated: 07/28/24 1926    Narrative:      CT ANGIOGRAM NECK AND HEAD WITH CONTRAST     HISTORY: Slurred speech, left facial droop.     COMPARISON: MRI brain 07/28/2024, CT head 07/28/2024 and CT angiogram of  the neck and head 12/09/2020.     FINDINGS: Initially, a noncontrasted CT examination of the brain was  performed. 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 Prominent CSF is appreciated overlying the anterior aspect of  the frontal lobes bilaterally, slightly more prominent on the left  consistent with hygromas.     A CT angiogram of the neck and head was then performed. Multiplanar as  well as 3-dimensional reconstructions were generated.     A moderate-to-large pleural fluid collection is present on the right,  only partially visualized. Calcified plaque is appreciated involving the  aortic arch and origins of the great vessels. There is a bovine  configuration of the great vessels. There is mild irregularity and  stenosis involving the left  subclavian artery proximally. A carotid  stent is appreciated involving the right carotid bifurcation. Decreased  attenuation is appreciated involving the posterior aspect of the stent  suggesting mild endothelial hyperplasia or mural thrombus similar in  appearance as compared to the prior study. The stent is widely patent.  Eccentric calcified plaque is appreciated involving the left internal  carotid artery proximally but without stenosis. The mid cervical ICAs  are tortuous. There is mild irregularity involving the cervical ICAs  bilaterally consistent with fibromuscular dysplasia, slightly more  prominent on the left. There is fusiform enlargement of the cervical ICA  on the left which measures 8 mm in diameter similar in appearance as  compared to the prior examination. Mild-to-moderate vascular  calcifications involving the carotid siphons are noted bilaterally. The  right A1 segment is mildly hypoplastic. There is moderate stenosis  involving the left M1 segment, similar in appearance as compared to the  prior examination. The proximal aspects of the anterior and middle  cerebral arteries appear unremarkable.     Both vertebral arteries were opacified. The right vertebral artery is  larger than that of the left. The prior study demonstrated a markedly  hypoplastic V4 segment. The V4 segment on the left appears to be  occluded on the current examination. A focal eccentric calcification is  appreciated involving the basilar artery proximally, present previously  and contributing to mild-to-moderate stenosis. Otherwise, the basilar  artery and left posterior cerebral artery appear unremarkable. There is  a fetal origin of the right posterior cerebral artery.       Impression:      1.  There is no evidence of acute infarction or of intracranial  hemorrhage. Atrophy, small vessel ischemic disease, vascular  calcification and bifrontal hygromas are noted. A carotid stent is  present on the right. There is  eccentric mural thrombus or endothelial  hyperplasia appreciated, mild, unchanged versus 12/09/2020. Irregularity  involving the distal aspects of the cervical ICAs is appreciated  bilaterally, more prominent on the left consistent with fibromuscular  dysplasia, unchanged. There is mild fusiform enlargement of the distal  cervical ICA on the left (8 mm), unchanged. The right vertebral artery  is dominant. The hypoplastic left vertebral artery distally is not  opacified and likely is occluded. Mild-to-moderate stenosis involving  the basilar artery proximally is noted, similar in appearance as  compared to the prior study. There is moderate stenosis involving the  mid left M1 segment similar in appearance as compared to the prior  examination. See above.  2.  There is partial visualization of a moderate-to-large pleural fluid  collection on the right.        Radiation dose reduction techniques were utilized, including automated  exposure control and exposure modulation based on body size.        This report was finalized on 7/28/2024 7:23 PM by Dr. Ramez Lind M.D  on Workstation: [id number]       CT Angiogram Neck [[id number]] Collected: 07/28/24 1816     Updated: 07/28/24 1926    Narrative:      CT ANGIOGRAM NECK AND HEAD WITH CONTRAST     HISTORY: Slurred speech, left facial droop.     COMPARISON: MRI brain 07/28/2024, CT head 07/28/2024 and CT angiogram of  the neck and head 12/09/2020.     FINDINGS: Initially, a noncontrasted CT examination of the brain was  performed. 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 Prominent CSF is appreciated overlying the anterior aspect of  the frontal lobes bilaterally, slightly more prominent on the left  consistent with hygromas.     A CT angiogram of the neck and head was then performed. Multiplanar as  well as 3-dimensional reconstructions were generated.     A  moderate-to-large pleural fluid collection is present on the right,  only partially visualized. Calcified plaque is appreciated involving the  aortic arch and origins of the great vessels. There is a bovine  configuration of the great vessels. There is mild irregularity and  stenosis involving the left subclavian artery proximally. A carotid  stent is appreciated involving the right carotid bifurcation. Decreased  attenuation is appreciated involving the posterior aspect of the stent  suggesting mild endothelial hyperplasia or mural thrombus similar in  appearance as compared to the prior study. The stent is widely patent.  Eccentric calcified plaque is appreciated involving the left internal  carotid artery proximally but without stenosis. The mid cervical ICAs  are tortuous. There is mild irregularity involving the cervical ICAs  bilaterally consistent with fibromuscular dysplasia, slightly more  prominent on the left. There is fusiform enlargement of the cervical ICA  on the left which measures 8 mm in diameter similar in appearance as  compared to the prior examination. Mild-to-moderate vascular  calcifications involving the carotid siphons are noted bilaterally. The  right A1 segment is mildly hypoplastic. There is moderate stenosis  involving the left M1 segment, similar in appearance as compared to the  prior examination. The proximal aspects of the anterior and middle  cerebral arteries appear unremarkable.     Both vertebral arteries were opacified. The right vertebral artery is  larger than that of the left. The prior study demonstrated a markedly  hypoplastic V4 segment. The V4 segment on the left appears to be  occluded on the current examination. A focal eccentric calcification is  appreciated involving the basilar artery proximally, present previously  and contributing to mild-to-moderate stenosis. Otherwise, the basilar  artery and left posterior cerebral artery appear unremarkable. There is  a fetal  origin of the right posterior cerebral artery.       Impression:      1.  There is no evidence of acute infarction or of intracranial  hemorrhage. Atrophy, small vessel ischemic disease, vascular  calcification and bifrontal hygromas are noted. A carotid stent is  present on the right. There is eccentric mural thrombus or endothelial  hyperplasia appreciated, mild, unchanged versus 12/09/2020. Irregularity  involving the distal aspects of the cervical ICAs is appreciated  bilaterally, more prominent on the left consistent with fibromuscular  dysplasia, unchanged. There is mild fusiform enlargement of the distal  cervical ICA on the left (8 mm), unchanged. The right vertebral artery  is dominant. The hypoplastic left vertebral artery distally is not  opacified and likely is occluded. Mild-to-moderate stenosis involving  the basilar artery proximally is noted, similar in appearance as  compared to the prior study. There is moderate stenosis involving the  mid left M1 segment similar in appearance as compared to the prior  examination. See above.  2.  There is partial visualization of a moderate-to-large pleural fluid  collection on the right.        Radiation dose reduction techniques were utilized, including automated  exposure control and exposure modulation based on body size.        This report was finalized on 7/28/2024 7:23 PM by Dr. Ramez Lind M.D  on Workstation: [id number]       MRI Brain Without Contrast [[id number]] Collected: 07/28/24 1606     Updated: 07/28/24 1614    Narrative:      MRI BRAIN WO CONTRAST-     HISTORY:  new left facial droop; S22.41XA-Multiple fractures of ribs,  right side, initial encounter for closed fracture; S51.011A-Laceration  without foreign body of right elbow, initial encounter;  W19.XXXA-Unspecified fall, initial encounter     COMPARISON: CT head 7/28/2024 and MRI brain 12/9/2020     FINDINGS: There is moderate diffuse atrophy. There is no evidence of  restricted diffusion  to suggest acute infarction. 2 small remote  infarcts involve the right cerebellar hemisphere laterally are  appreciated which were present on 12/9/2020. The right vertebral artery  is dominant. There is expected flow void in the basilar artery and in  the distal aspect of the internal carotid arteries bilaterally.  Prominent CSF is appreciated overlying the frontal lobes anteriorly (12  mm in thickness on the right and 10 mm in thickness on the left similar  in appearance as compared to the MRI examination of 12/9/2020. There is  no evidence of mass or mass effect on this noncontrasted MRI examination  of the brain.       Impression:      There is no evidence of an acute infarct. Atrophy, small  vessel ischemic disease and bifrontal hygromas are appreciated similar  appearances compared to 12/9/2020.     This report was finalized on 7/28/2024 4:11 PM by Dr. Ramez Lind M.D  on Workstation: [id number]               Lab Results:  Lab Results (last 24 hours)       Procedure Component Value Units Date/Time    High Sensitivity Troponin T [[id number]]  (Abnormal) Collected: 07/29/24 0938    Specimen: Blood Updated: 07/29/24 1023     HS Troponin T 26 ng/L     Narrative:      High Sensitive Troponin T Reference Range:  <14.0 ng/L- Negative Female for AMI  <22.0 ng/L- Negative Male for AMI  >=14 - Abnormal Female indicating possible myocardial injury.  >=22 - Abnormal Male indicating possible myocardial injury.   Clinicians would have to utilize clinical acumen, EKG, Troponin, and serial changes to determine if it is an Acute Myocardial Infarction or myocardial injury due to an underlying chronic condition.         Comprehensive Metabolic Panel [[id number]]  (Abnormal) Collected: 07/29/24 0938    Specimen: Blood Updated: 07/29/24 1023     Glucose 90 mg/dL      BUN 12 mg/dL      Creatinine 0.90 mg/dL      Sodium 124 mmol/L      Potassium 4.4 mmol/L      Comment: Slight hemolysis detected by analyzer. Result may be falsely  elevated.        Chloride 92 mmol/L      CO2 19.0 mmol/L      Calcium 9.2 mg/dL      Total Protein 7.7 g/dL      Albumin 3.9 g/dL      ALT (SGPT) 15 U/L      AST (SGOT) 33 U/L      Alkaline Phosphatase 66 U/L      Total Bilirubin 1.3 mg/dL      Globulin 3.8 gm/dL      A/G Ratio 1.0 g/dL      BUN/Creatinine Ratio 13.3     Anion Gap 13.0 mmol/L      eGFR 77.7 mL/min/1.73     Narrative:      GFR Normal >60  Chronic Kidney Disease <60  Kidney Failure <15    The GFR formula is only valid for adults with stable renal function between ages 18 and 70.    Osmolality, Serum [[id number]]  (Abnormal) Collected: 07/29/24 0938    Specimen: Blood Updated: 07/29/24 1014     Osmolality 261 mOsm/kg     Sodium, Urine, Random - Indwelling Urethral Catheter [[id number]] Collected: 07/29/24 0938    Specimen: Urine from Indwelling Urethral Catheter Updated: 07/29/24 1008     Sodium, Urine 180 mmol/L     Narrative:      Reference intervals for random urine have not been established.  Clinical usage is dependent upon physician's interpretation in combination with other laboratory tests.       Chloride, Urine, Random - Indwelling Urethral Catheter [[id number]] Collected: 07/29/24 0938    Specimen: Urine from Indwelling Urethral Catheter Updated: 07/29/24 1008     Chloride, Urine 146 mmol/L     Narrative:      Reference intervals for random urine have not been established.  Clinical usage is dependent upon physician's interpretation in combination with other laboratory tests.       CBC (No Diff) [[id number]]  (Abnormal) Collected: 07/29/24 0938    Specimen: Blood Updated: 07/29/24 1000     WBC 12.24 10*3/mm3      RBC 5.25 10*6/mm3      Hemoglobin 13.0 g/dL      Hematocrit 41.5 %      MCV 79.0 fL      MCH 24.8 pg      MCHC 31.3 g/dL      RDW 14.6 %      RDW-SD 41.3 fl      MPV 8.5 fL      Platelets 711 10*3/mm3     Osmolality, Urine - Urine, Clean Catch [[id number]] Collected: 07/29/24 0938    Specimen: Urine, Clean Catch Updated: 07/29/24 0959      Osmolality, Urine 550 mOsm/kg     Narrative:      Osmo Normal Reference Ranges:    Random:  mOsm/kg H2O, depending on fluid intake.  Random: >850 mOsm/kg H20, after 12 hour fluid restriction.    24 Hour: 300-900 mOsm/kg H2O.    POC Glucose Once [[id number]]  (Normal) Collected: 07/29/24 0922    Specimen: Blood Updated: 07/29/24 0924     Glucose 93 mg/dL     Basic Metabolic Panel [[id number]]  (Abnormal) Collected: 07/29/24 0054    Specimen: Blood from Arm, Left Updated: 07/29/24 0131     Glucose 101 mg/dL      BUN 15 mg/dL      Creatinine 0.90 mg/dL      Sodium 127 mmol/L      Potassium 4.2 mmol/L      Chloride 94 mmol/L      CO2 21.0 mmol/L      Calcium 8.6 mg/dL      BUN/Creatinine Ratio 16.7     Anion Gap 12.0 mmol/L      eGFR 77.7 mL/min/1.73     Narrative:      GFR Normal >60  Chronic Kidney Disease <60  Kidney Failure <15    The GFR formula is only valid for adults with stable renal function between ages 18 and 70.    CBC (No Diff) [[id number]]  (Abnormal) Collected: 07/29/24 0054    Specimen: Blood from Arm, Left Updated: 07/29/24 0114     WBC 10.04 10*3/mm3      RBC 4.99 10*6/mm3      Hemoglobin 12.4 g/dL      Hematocrit 39.7 %      MCV 79.6 fL      MCH 24.8 pg      MCHC 31.2 g/dL      RDW 15.2 %      RDW-SD 43.8 fl      MPV 8.6 fL      Platelets 630 10*3/mm3                 Assessment & Plan       Right rib fracture    Atrial fibrillation    Long term (current) use of anticoagulants    Urinary retention    Hyperlipidemia    Hyponatremia    Thrombocytosis    Leukocytosis    Fall    Gross hematuria      Assessment & Plan    Right-sided rib fractures: right ribs 5, 6 and 7 appear nondisplaced on CT chest.  Recommend nonoperative management with multi domal pain control.  We have initiated scheduled Tylenol and nightly gabapentin.  He may have Lidoderm patch as well.  Hold off on NSAIDs given his age.  May have Dilaudid and oxycodone as needed for breakthrough pain control.    Right-sided pleural effusion:  Likely secondary to rib fractures, but also concerning for hemothorax given he is on Eliquis.  Recommend to hold Eliquis and recheck a chest x-ray in the morning.  Patient is relatively asymptomatic on exam today and remains on room air.  Encourage pulmonary toilet.     I discussed the patients findings and our recommendations with patient, nursing staff, and Dr. Banks    Thank you for this consult and allowing us to participate in the care of your patient.  We will follow along with you during this hospitalization.       Wendy Knapp DNP, APRN  [facility]  07/29/24  15:59 EDT    I spent >65 minutes reviewing the patient's chart including medical history, notes, radiographic imaging, labs and performing an assessment and development of a plan and discussion with the patient/family at bedside.

## 2024-07-29 NOTE — PLAN OF CARE
Goal Outcome Evaluation:  Plan of Care Reviewed With: patient, daughter           Outcome Evaluation: Pt admit with fall, (+) rib fractures.  Current with treatment for UTI. History includes CVA 2017, confusion since came home from rehab in Dec 2023.  Pt lives with spouse (who has dementia) and family and caregivers provide 24/7 assist at home. At baseline pt uses walker and at times can ambulate to bathroom on own.  Pt gets assist with ADL.  Pt presents in OBS bed with dght who provides history and PLOF. Pt agreeable to getting OOB with assist and increase time.  Pt is close SBA to walk to BR for toilet xfer.  Pt need assist to adjust socks.  Review bathroom DME used at home.   Daughter feels patient moving at/near baseline and OT recommend SBA with mobility at home.  At this time rec cont with nsg to bathroom with walker.  Will follow for skilled OT if admitted.   Anticipate return home with family/CG 24/7 assist and recommend HH OT for continued safety and activity tolerance due to recent falls.      Anticipated Discharge Disposition (OT): home with 24/7 care, home with home health

## 2024-07-30 ENCOUNTER — APPOINTMENT (OUTPATIENT)
Dept: GENERAL RADIOLOGY | Facility: HOSPITAL | Age: 89
End: 2024-07-30
Payer: MEDICARE

## 2024-07-30 LAB
ALBUMIN SERPL-MCNC: 3.4 G/DL (ref 3.5–5.2)
ANION GAP SERPL CALCULATED.3IONS-SCNC: 10 MMOL/L (ref 5–15)
BUN SERPL-MCNC: 17 MG/DL (ref 8–23)
BUN/CREAT SERPL: 16.2 (ref 7–25)
CALCIUM SPEC-SCNC: 8.5 MG/DL (ref 8.2–9.6)
CHLORIDE SERPL-SCNC: 91 MMOL/L (ref 98–107)
CO2 SERPL-SCNC: 22 MMOL/L (ref 22–29)
CREAT SERPL-MCNC: 1.05 MG/DL (ref 0.76–1.27)
EGFRCR SERPLBLD CKD-EPI 2021: 64.6 ML/MIN/1.73
GLUCOSE SERPL-MCNC: 97 MG/DL (ref 65–99)
MAGNESIUM SERPL-MCNC: 1.8 MG/DL (ref 1.7–2.3)
PHOSPHATE SERPL-MCNC: 2.9 MG/DL (ref 2.5–4.5)
POTASSIUM SERPL-SCNC: 4.3 MMOL/L (ref 3.5–5.2)
SODIUM SERPL-SCNC: 123 MMOL/L (ref 136–145)
URATE SERPL-MCNC: 3.4 MG/DL (ref 3.4–7)

## 2024-07-30 PROCEDURE — 94799 UNLISTED PULMONARY SVC/PX: CPT

## 2024-07-30 PROCEDURE — 80069 RENAL FUNCTION PANEL: CPT | Performed by: INTERNAL MEDICINE

## 2024-07-30 PROCEDURE — 71045 X-RAY EXAM CHEST 1 VIEW: CPT

## 2024-07-30 PROCEDURE — 94664 DEMO&/EVAL PT USE INHALER: CPT

## 2024-07-30 PROCEDURE — 99232 SBSQ HOSP IP/OBS MODERATE 35: CPT

## 2024-07-30 PROCEDURE — 84550 ASSAY OF BLOOD/URIC ACID: CPT | Performed by: INTERNAL MEDICINE

## 2024-07-30 PROCEDURE — 25010000002 CEFTRIAXONE PER 250 MG: Performed by: INTERNAL MEDICINE

## 2024-07-30 PROCEDURE — 83735 ASSAY OF MAGNESIUM: CPT | Performed by: INTERNAL MEDICINE

## 2024-07-30 RX ORDER — ACETAMINOPHEN 325 MG/1
650 TABLET ORAL 3 TIMES DAILY
Status: DISCONTINUED | OUTPATIENT
Start: 2024-07-30 | End: 2024-07-31

## 2024-07-30 RX ORDER — TAMSULOSIN HYDROCHLORIDE 0.4 MG/1
0.4 CAPSULE ORAL DAILY
Status: DISCONTINUED | OUTPATIENT
Start: 2024-07-30 | End: 2024-07-31 | Stop reason: HOSPADM

## 2024-07-30 RX ADMIN — ACETAMINOPHEN 325MG 650 MG: 325 TABLET ORAL at 15:21

## 2024-07-30 RX ADMIN — LIDOCAINE 1 PATCH: 4 PATCH TOPICAL at 09:18

## 2024-07-30 RX ADMIN — SODIUM CHLORIDE TAB 1 GM 1 G: 1 TAB at 09:23

## 2024-07-30 RX ADMIN — ACETAMINOPHEN 325MG 650 MG: 325 TABLET ORAL at 09:22

## 2024-07-30 RX ADMIN — ASPIRIN 81 MG: 81 TABLET, CHEWABLE ORAL at 09:23

## 2024-07-30 RX ADMIN — ACETAMINOPHEN 325MG 650 MG: 325 TABLET ORAL at 22:09

## 2024-07-30 RX ADMIN — SODIUM CHLORIDE TAB 1 GM 1 G: 1 TAB at 12:45

## 2024-07-30 RX ADMIN — FUROSEMIDE 20 MG: 20 TABLET ORAL at 09:23

## 2024-07-30 RX ADMIN — Medication 10 ML: at 22:09

## 2024-07-30 RX ADMIN — CEFTRIAXONE SODIUM 1000 MG: 1 INJECTION, POWDER, FOR SOLUTION INTRAMUSCULAR; INTRAVENOUS at 15:21

## 2024-07-30 RX ADMIN — SODIUM CHLORIDE TAB 1 GM 1 G: 1 TAB at 17:55

## 2024-07-30 RX ADMIN — ATORVASTATIN CALCIUM 80 MG: 20 TABLET, FILM COATED ORAL at 09:22

## 2024-07-30 RX ADMIN — Medication 2.5 MG: at 22:09

## 2024-07-30 RX ADMIN — Medication 10 ML: at 09:31

## 2024-07-30 RX ADMIN — TAMSULOSIN HYDROCHLORIDE 0.4 MG: 0.4 CAPSULE ORAL at 09:31

## 2024-07-30 NOTE — PROGRESS NOTES
"    Chief Complaint: Typical rib fractures    Subjective:  Symptoms:  Stable.  No shortness of breath, cough or chest pain.    Diet:  Adequate intake.  No nausea or vomiting.    Activity level: Impaired due to weakness.    Pain:  He reports no pain.    Daughter at bedside.  Reports he has been comfortable.  Denies any significant pain.    Vital Signs:  Temp:  [97.4 °F (36.3 °C)-97.6 °F (36.4 °C)] 97.5 °F (36.4 °C)  Heart Rate:  [59-83] 83  Resp:  [16] 16  BP: (114-153)/(58-75) 153/75    Intake & Output (last day)         07/29 0701  07/30 0700 07/30 0701 07/31 0700    P.O. 210 420    Other 800     IV Piggyback      Total Intake(mL/kg) 1010 (15.3) 420 (6.4)    Urine (mL/kg/hr)      Total Output      Net +1010 +420                  Objective:  General Appearance:  Comfortable and in no acute distress.    Vital signs: (most recent): Blood pressure 153/75, pulse 83, temperature 97.5 °F (36.4 °C), temperature source Oral, resp. rate 16, height 172.7 cm (68\"), weight 65.8 kg (145 lb 1 oz), SpO2 97%.    Output: Urine output assessment: Newman catheter in place.    Lungs:  Normal effort and normal respiratory rate.  He is not in respiratory distress.    Heart: Normal rate.  Regular rhythm.    Chest: Symmetric chest wall expansion. Chest wall tenderness present.    Abdomen: Abdomen is soft and non-distended.    Neurological: Patient is alert and oriented to person, place and time.    Skin:  Warm.              Results Review:     I reviewed the patient's new clinical results.  I reviewed the patient's new imaging results and agree with the interpretation.  Discussed with patient, nurse, and surgeon    Imaging Results (Last 24 Hours)       Procedure Component Value Units Date/Time    XR Chest 1 View [[id number]] Collected: 07/30/24 0746     Updated: 07/30/24 0757    Narrative:      XR CHEST 1 VW-     HISTORY: Male who is 89 years-old, right pleural effusion     TECHNIQUE: Frontal view of the chest     COMPARISON: 7/29/2024   "   FINDINGS: The heart size is borderline. Aorta is calcified. Pulmonary  vasculature is mildly congested. Mildly increased opacity at the right  mid to lower lung, may reflect atelectasis/infiltrate and increased  pleural effusion. No pneumothorax. No acute osseous process.       Impression:      As described.     This report was finalized on 7/30/2024 7:52 AM by Dr. Leonard Fong M.D on Workstation: "[email or web address],"               Lab Results:     Lab Results (last 24 hours)       Procedure Component Value Units Date/Time    Uric Acid [[id number]]  (Normal) Collected: 07/30/24 0747    Specimen: Blood Updated: 07/30/24 0826     Uric Acid 3.4 mg/dL     Renal Function Panel [[id number]]  (Abnormal) Collected: 07/30/24 0747    Specimen: Blood Updated: 07/30/24 0826     Glucose 97 mg/dL      BUN 17 mg/dL      Creatinine 1.05 mg/dL      Sodium 123 mmol/L      Potassium 4.3 mmol/L      Chloride 91 mmol/L      CO2 22.0 mmol/L      Calcium 8.5 mg/dL      Albumin 3.4 g/dL      Phosphorus 2.9 mg/dL      Anion Gap 10.0 mmol/L      BUN/Creatinine Ratio 16.2     eGFR 64.6 mL/min/1.73     Narrative:      GFR Normal >60  Chronic Kidney Disease <60  Kidney Failure <15    The GFR formula is only valid for adults with stable renal function between ages 18 and 70.    Magnesium [[id number]]  (Normal) Collected: 07/30/24 0747    Specimen: Blood Updated: 07/30/24 0826     Magnesium 1.8 mg/dL              Assessment & Plan       Right rib fracture    Atrial fibrillation    Long term (current) use of anticoagulants    Urinary retention    Hyperlipidemia    Hyponatremia    Thrombocytosis    Leukocytosis    Fall    Gross hematuria     Assessment & Plan  Appears comfortable in recliner and eating his lunch.  On room air.  Denies shortness of breath.  Pain well-controlled.  Patient's daughter at bedside reports he has been doing well.  Follow-up chest x-ray reviewed which demonstrates no pneumothorax.  Slight increased opacity in the right mid to  lower lung likely reflective of atelectasis/infiltrate. No significant volume effusions.      Continue to recommend nonoperative medical management with analgesic medications and adherence to pulmonary hygiene to prevent pneumonia.  Limit use of narcotics given some confusion and his advanced age.  Encourage pulmonary hygiene including diligent use of I-S and increase activity as able.  Limit pushing, pulling, lifting anything greater than 10 pounds for the next 6 to 8 weeks.  Patient's Eliquis for hx of a.fib has been held at this juncture.  Consider risk versus benefit analysis for AC resumption given his advanced age, history of falls; however he does have a history of CVA.  Will defer to primary team although recommend continuing to hold Eliquis for 5 days, resuming on 8/4/2024 at the earliest from a thoracic surgery standpoint. Additionally, he is being followed by urology for retention and hematuria as well.     Not much more to add from thoracic surgery standpoint.  Will sign off at this time.  Please call with any questions.    LINDEN Rosas  [facility]  07/30/24  13:52 EDT

## 2024-07-30 NOTE — PLAN OF CARE
Goal Outcome Evaluation:              Outcome Evaluation: Patient is alert and oriented to self and place. No report of pain. Administered medications and explained catheter, catheter care, signs and symptoms regarding uti,     Patient has been up in chair today. Received rocephin.     Tolerated the food. Good appetite.    Patient vomited after giving sodium chloride tablet. This the second time he vomited after having that medication.

## 2024-07-30 NOTE — PROGRESS NOTES
[facility] Progress Note      Patient Name: Danni Aguilar  : 1926  MRN: [id number]  Primary Care Physician:  Justin Longoria MD  Date of admission: 2024    Subjective     Interval History:   Follow-up hyponatremia    The patient apparently vomited his salt tablets yesterday but not this morning, he is feeling better denies any chest pain or shortness of air, no orthopnea or PND, no nausea or vomiting.    Review of Systems:   As noted above    Objective     Vitals:   Temp:  [97.4 °F (36.3 °C)-97.9 °F (36.6 °C)] 97.5 °F (36.4 °C)  Heart Rate:  [59-83] 83  Resp:  [16-18] 16  BP: (114-153)/(58-75) 153/75    Intake/Output Summary (Last 24 hours) at 2024 1057  Last data filed at 2024 0500  Gross per 24 hour   Intake 1010 ml   Output --   Net 1010 ml       Physical Exam:    General Appearance: alert, awake, chronically ill and, no acute distress   Skin: warm and dry  HEENT: oral mucosa normal, nonicteric sclera  Neck: No JVD  Lungs: CTA, unlabored breathing effort  Heart: Irregularly irregular, no rub  Abdomen: soft, nontender, nondistended  : Newman catheter anchored  Extremities: no edema, cyanosis or clubbing  Neuro: normal speech and mental status     Scheduled Meds:     acetaminophen, 650 mg, Oral, TID  [Held by provider] apixaban, 5 mg, Oral, BID  aspirin, 81 mg, Oral, Daily  atorvastatin, 80 mg, Oral, Daily  cefTRIAXone, 1,000 mg, Intravenous, Q24H  furosemide, 20 mg, Oral, Daily  lidocaine 1% - EPINEPHrine 1:574536, 10 mL, Injection, Once  Lidocaine, 1 patch, Transdermal, Q24H  melatonin, 2.5 mg, Oral, Nightly  sodium chloride, 10 mL, Intravenous, Q12H  sodium chloride, 1 g, Oral, TID With Meals  tamsulosin, 0.4 mg, Oral, Daily      IV Meds:        Results Reviewed:   I have personally reviewed the results from the time of this admission to 2024 10:57 EDT     Results from last 7 days   Lab Units 24  0747 24  0938 24  0054 24  0922    SODIUM mmol/L 123* 124* 127* 130*   POTASSIUM mmol/L 4.3 4.4 4.2 4.5   CHLORIDE mmol/L 91* 92* 94* 95*   CO2 mmol/L 22.0 19.0* 21.0* 22.0   BUN mg/dL 17 12 15 17   CREATININE mg/dL 1.05 0.90 0.90 1.12   CALCIUM mg/dL 8.5 9.2 8.6 9.2   BILIRUBIN mg/dL  --  1.3*  --  0.9   ALK PHOS U/L  --  66  --  63   ALT (SGPT) U/L  --  15  --  15   AST (SGOT) U/L  --  33  --  24   GLUCOSE mg/dL 97 90 101* 104*       Estimated Creatinine Clearance: 37.4 mL/min (by C-G formula based on SCr of 1.05 mg/dL).    Results from last 7 days   Lab Units 07/30/24  0747   MAGNESIUM mg/dL 1.8   PHOSPHORUS mg/dL 2.9       Results from last 7 days   Lab Units 07/30/24  0747   URIC ACID mg/dL 3.4       Results from last 7 days   Lab Units 07/29/24  0938 07/29/24  0054 07/28/24  0922   WBC 10*3/mm3 12.24* 10.04 11.60*   HEMOGLOBIN g/dL 13.0 12.4* 12.9*   PLATELETS 10*3/mm3 711* 630* 804*             Assessment / Plan     ASSESSMENT:  Chronic hyponatremia treated with salt tablets prior to admission sodium was 130 on admission and is down to 123.  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 currently on salt tablets and furosemide fluid restriction  Fall with fractured ribs  Chronic A-fib, chronically anticoagulated  Carotid disease  Gross hematuria currently has Newman catheter anchored in    PLAN:  Continue the same treatment, check his sodium tomorrow if it is not improved would consider adding urea sodium  Since he vomited his salt tablets yesterday we will make sure it is given with the food  Surveillance labs    I reviewed the chart and other providers notes, reviewed labs  I discussed the case with the patient's daughter at the bedside  Copied text in this note has been reviewed and is accurate as of 07/30/24.       Thank you for involving us in the care of Danni Aguilar.  Please feel free to call with any questions.    Andres Shepherd MD  07/30/24  10:57 EDT    [facility]  [phone]    Please note that portions of  this note were completed with a voice recognition program.

## 2024-07-30 NOTE — NURSING NOTE
Redressed skin tears on right elbow. They were oozing sanguinous drainage which had bled through. Cleansed with normal saline. Applied Vaseline gauze over skin tears. Placed guaze on top of that and wrapped with kerlix. Patient's skin is fragile.

## 2024-07-30 NOTE — PROGRESS NOTES
F/U for BPH with retention, decompressive gross hematuria, poss UTI  No voiding issues at home  Did not fall from syncopal episode, walker became entangled    Doing well, no pain    AVSS, good UOP  Urine yellow with occasional old blood in tubing, draining well  Abdom benign    Start flomax daily if cleared with neurology/admitting teams    OK to remove Newman on day of planned DC home, or could be done at SNF if rehab planned  If bladder scan > 300 cc post void, replace Newman    Short course of antibiotic for poss UTI appropriate    Will arrange OP f/u with Dr. Devin Harris - may need upper tract imaging and cysto at some point but BPH with decompressive hematuria most likely etiology of blood in urine    Call if issues - will sign off

## 2024-07-30 NOTE — PROGRESS NOTES
[facility] Medicine Services  PROGRESS NOTE    Patient Name: Danni Aguilar  : 1926  MRN: [id number]    Date of Admission: 2024  Primary Care Physician: Justin Longoria MD    Subjective   Subjective     CC:  Follow-up multiple issues    Subjective: Patient denies any new neurological issues.  He says he feels well.  No other new complaints.  Patient does report he had vomiting with the salt tab yesterday but no further issues today.    Review of Systems  No current fevers or chills  No current shortness of breath or cough  No current chest pain or palpitations       Objective   Objective     Vital Signs:   Temp:  [97.4 °F (36.3 °C)-97.6 °F (36.4 °C)] 97.5 °F (36.4 °C)  Heart Rate:  [59-83] 83  Resp:  [16] 16  BP: (114-153)/(58-75) 153/75        Physical Exam:    Constitutional:Awake, alert, elderly appearing  HENT: NCAT, mucous membranes moist, neck supple  Respiratory: No cough or wheezes, normal respirations, nonlabored breathing   Cardiovascular: Pulse rate is normal, palpable radial pulses  Gastrointestinal:  soft, nontender, nondistended  Musculoskeletal: Right chest wall with some tenderness, thin and frail in appearance, mild lower extremity edema  Psychiatric: Appropriate affect, cooperative, conversational  Neurologic: Poor historian, no slurred speech or facial droop, follows commands  Skin: No rashes or jaundice, warm      Results Reviewed:  Results from last 7 days   Lab Units 24  0938 24  0922   WBC 10*3/mm3 12.24* 10.04 11.60*   HEMOGLOBIN g/dL 13.0 12.4* 12.9*   HEMATOCRIT % 41.5 39.7 42.2   PLATELETS 10*3/mm3 711* 630* 804*     Results from last 7 days   Lab Units 24  0747 24  0938 24  0922   SODIUM mmol/L 123* 124* 127* 130*   POTASSIUM mmol/L 4.3 4.4 4.2 4.5   CHLORIDE mmol/L 91* 92* 94* 95*   CO2 mmol/L 22.0 19.0* 21.0* 22.0   BUN mg/dL 17 12 15 17   CREATININE mg/dL 1.05 0.90 0.90 1.12   GLUCOSE mg/dL 97 90 101* 104*    CALCIUM mg/dL 8.5 9.2 8.6 9.2   ALK PHOS U/L  --  66  --  63   ALT (SGPT) U/L  --  15  --  15   AST (SGOT) U/L  --  33  --  24   HSTROP T ng/L  --  26*  --   --      Estimated Creatinine Clearance: 37.4 mL/min (by C-G formula based on SCr of 1.05 mg/dL).    Microbiology Results Abnormal       Procedure Component Value - Date/Time    Respiratory Panel PCR w/COVID-19(SARS-CoV-2) JENNIFER/ANGELA/JANET/PAD/COR/EKATERINA In-House, NP Swab in UTM/VTM, 2 HR TAT - Swab, Nasopharynx [[id number]]  (Normal) Collected: 07/28/24 1139    Lab Status: Final result Specimen: Swab from Nasopharynx Updated: 07/28/24 1245     ADENOVIRUS, PCR Not Detected     Coronavirus 229E Not Detected     Coronavirus HKU1 Not Detected     Coronavirus NL63 Not Detected     Coronavirus OC43 Not Detected     COVID19 Not Detected     Human Metapneumovirus Not Detected     Human Rhinovirus/Enterovirus Not Detected     Influenza A PCR Not Detected     Influenza B PCR Not Detected     Parainfluenza Virus 1 Not Detected     Parainfluenza Virus 2 Not Detected     Parainfluenza Virus 3 Not Detected     Parainfluenza Virus 4 Not Detected     RSV, PCR Not Detected     Bordetella pertussis pcr Not Detected     Bordetella parapertussis PCR Not Detected     Chlamydophila pneumoniae PCR Not Detected     Mycoplasma pneumo by PCR Not Detected    Narrative:      In the setting of a positive respiratory panel with a viral infection PLUS a negative procalcitonin without other underlying concern for bacterial infection, consider observing off antibiotics or discontinuation of antibiotics and continue supportive care. If the respiratory panel is positive for atypical bacterial infection (Bordetella pertussis, Chlamydophila pneumoniae, or Mycoplasma pneumoniae), consider antibiotic de-escalation to target atypical bacterial infection.            Imaging Results (Last 24 Hours)       Procedure Component Value Units Date/Time    XR Chest 1 View [[id number]] Collected: 07/30/24 0798      Updated: 07/30/24 0757    Narrative:      XR CHEST 1 VW-     HISTORY: Male who is 89 years-old, right pleural effusion     TECHNIQUE: Frontal view of the chest     COMPARISON: 7/29/2024     FINDINGS: The heart size is borderline. Aorta is calcified. Pulmonary  vasculature is mildly congested. Mildly increased opacity at the right  mid to lower lung, may reflect atelectasis/infiltrate and increased  pleural effusion. No pneumothorax. No acute osseous process.       Impression:      As described.     This report was finalized on 7/30/2024 7:52 AM by Dr. Leonard Fong M.D on Workstation: [email or web address]               Results for orders placed during the hospital encounter of 07/20/17    Adult transthoracic echo complete    Interpretation Summary  · Calculated EF = 71.1%.  · Left ventricular diastolic dysfunction (grade I) consistent with impaired relaxation.  · Right ventricular cavity is mild-to-moderately dilated.  · calcification of the aortic valve  · Mild mitral valve regurgitation is present  · Moderate tricuspid valve regurgitation is present.  · Estimated right ventricular systolic pressure from tricuspid regurgitation is mildly elevated (35-45 mmHg). Calculated right ventricular systolic pressure from tricuspid regurgitation is 41.2 mmHg.  · Calculated right ventricular systolic pressure from tricuspid regurgitation is 41.2 mmHg.  · Mild aortic valve regurgitation is present.  · Saline test results are negative.      I have reviewed the medications:  Scheduled Meds:acetaminophen, 650 mg, Oral, TID  [Held by provider] apixaban, 5 mg, Oral, BID  aspirin, 81 mg, Oral, Daily  atorvastatin, 80 mg, Oral, Daily  cefTRIAXone, 1,000 mg, Intravenous, Q24H  furosemide, 20 mg, Oral, Daily  lidocaine 1% - EPINEPHrine 1:825009, 10 mL, Injection, Once  Lidocaine, 1 patch, Transdermal, Q24H  melatonin, 2.5 mg, Oral, Nightly  sodium chloride, 10 mL, Intravenous, Q12H  sodium chloride, 1 g, Oral, TID With Meals  tamsulosin, 0.4  mg, Oral, Daily      Continuous Infusions:   PRN Meds:.  acetaminophen    senna-docusate sodium **AND** polyethylene glycol **AND** bisacodyl **AND** bisacodyl    nitroglycerin    ondansetron ODT **OR** ondansetron    oxyCODONE    [COMPLETED] Insert Peripheral IV **AND** sodium chloride    sodium chloride    sodium chloride    Tetanus-Diphth-Acell Pertussis    Assessment & Plan   Assessment & Plan     Active Hospital Problems    Diagnosis  POA    **Right rib fracture [S22.31XA]  Yes    Leukocytosis [D72.829]  Yes    Fall [W19.XXXA]  Yes    Gross hematuria [R31.0]  Clinically Undetermined    Hyponatremia [E87.1]  Yes    Thrombocytosis [D75.839]  Yes    Hyperlipidemia [E78.5]  Yes    Urinary retention [R33.9]  Yes    Atrial fibrillation [I48.91]  Yes    Long term (current) use of anticoagulants [Z79.01]  Not Applicable      Resolved Hospital Problems   No resolved problems to display.        Brief Hospital Course to date:  Danni Aguilar is a 89 y.o. male presents the hospital with mechanical fall and secondary right rib fractures.  He was found to have minimal leukocytosis, thrombocytosis, hyponatremia that is worsening as well as urine retention requiring Newman catheter with gross hematuria.     Discussion/plan for today:  Sodium level is lower today.  Patient reports he has had more polydipsia.  Plan for a very gentle 1.5 L total fluid restriction.  Change sodium tablet scheduling to mealtime to reduce nausea.  Nephrology has started very low-dose furosemide.  Is possible that pain and pleural region injuries are contributing to SIADH however hyponatremia is acute on chronic issue.    Continue multimodal pain control for rib fracture.  Stop gabapentin due to some confusion yesterday based on risk versus benefits assessment.  Repeat chest x-ray reviewed and shows persistent pleural effusion.  Plan to follow-up further for thoracic recommendations.    Restart Eliquis as hematuria has improved.  Planning for eventual  voiding trial on day of discharge.  Appreciate urology input.  Patient will need to follow-up with urology outpatient.    Possible TIA continue aspirin and atorvastatin as well as restart Eliquis when possible.  No further neurologic issues     Continue treatment for urinary tract infection.  Ciprofloxacin was transitioned to ceftriaxone per my preference.  Plan will be for likely discharge on Omnicef.  UTI likely contributing to urine retention.     Thrombocytosis noted.  Previous records were reviewed and notably platelets significantly elevated going back to December 2020.     PT OT for debility and recent fall.     Possible mild pharyngeal dysphagia, seen by speech therapy plan is for regular and thin with upright eating and small bites and sips.    DVT Prophylaxis: Mechanical      Disposition: Pending    CODE STATUS:   Code Status and Medical Interventions: No CPR (Do Not Attempt to Resuscitate); Limited Support; No intubation (DNI)   Ordered at: 07/28/24 1250     Medical Intervention Limits:    No intubation (DNI)     Level Of Support Discussed With:    Patient    Health Care Surrogate     Code Status (Patient has no pulse and is not breathing):    No CPR (Do Not Attempt to Resuscitate)     Medical Interventions (Patient has pulse or is breathing):    Limited Support       Ramez Paredes MD  07/30/24

## 2024-07-30 NOTE — PLAN OF CARE
Goal Outcome Evaluation:           Progress: no change  Outcome Evaluation: Pt transferred from OBS for right rib fracture. VSS. A&O x 3-4 with confusion at times. Room air. Newman catheter in place with red urine. Takes PO medications whole with no difficulty swallowing. Family at bedside. Patient complains of minimal pain in chest/rib area when he moves or coughs. Scheduled tylenol given. Refuses any other pain medication at this time. Continue plan of care.

## 2024-07-31 ENCOUNTER — READMISSION MANAGEMENT (OUTPATIENT)
Dept: CALL CENTER | Facility: HOSPITAL | Age: 89
End: 2024-07-31
Payer: MEDICARE

## 2024-07-31 VITALS
TEMPERATURE: 97.6 F | HEIGHT: 68 IN | RESPIRATION RATE: 16 BRPM | BODY MASS INDEX: 21.99 KG/M2 | SYSTOLIC BLOOD PRESSURE: 96 MMHG | HEART RATE: 87 BPM | WEIGHT: 145.06 LBS | OXYGEN SATURATION: 97 % | DIASTOLIC BLOOD PRESSURE: 55 MMHG
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
## 2024-07-31 PROBLEM — D72.829 LEUKOCYTOSIS: Status: RESOLVED | Noted: 2024-07-29 | Resolved: 2024-07-31

## 2024-07-31 PROBLEM — F03.90 DEMENTIA: Status: ACTIVE | Noted: 2024-07-31

## 2024-07-31 LAB
ALBUMIN SERPL-MCNC: 3.5 G/DL (ref 3.5–5.2)
ANION GAP SERPL CALCULATED.3IONS-SCNC: 10.3 MMOL/L (ref 5–15)
BUN SERPL-MCNC: 19 MG/DL (ref 8–23)
BUN/CREAT SERPL: 21.8 (ref 7–25)
CALCIUM SPEC-SCNC: 8.6 MG/DL (ref 8.2–9.6)
CHLORIDE SERPL-SCNC: 95 MMOL/L (ref 98–107)
CO2 SERPL-SCNC: 21.7 MMOL/L (ref 22–29)
CREAT SERPL-MCNC: 0.87 MG/DL (ref 0.76–1.27)
DEPRECATED RDW RBC AUTO: 42.7 FL (ref 37–54)
EGFRCR SERPLBLD CKD-EPI 2021: 78.5 ML/MIN/1.73
ERYTHROCYTE [DISTWIDTH] IN BLOOD BY AUTOMATED COUNT: 15.1 % (ref 12.3–15.4)
GLUCOSE SERPL-MCNC: 99 MG/DL (ref 65–99)
HCT VFR BLD AUTO: 39.2 % (ref 37.5–51)
HGB BLD-MCNC: 12.1 G/DL (ref 13–17.7)
MAGNESIUM SERPL-MCNC: 2.1 MG/DL (ref 1.7–2.3)
MCH RBC QN AUTO: 24.2 PG (ref 26.6–33)
MCHC RBC AUTO-ENTMCNC: 30.9 G/DL (ref 31.5–35.7)
MCV RBC AUTO: 78.6 FL (ref 79–97)
PHOSPHATE SERPL-MCNC: 3.3 MG/DL (ref 2.5–4.5)
PLATELET # BLD AUTO: 746 10*3/MM3 (ref 140–450)
PMV BLD AUTO: 8.6 FL (ref 6–12)
POTASSIUM SERPL-SCNC: 4.5 MMOL/L (ref 3.5–5.2)
RBC # BLD AUTO: 4.99 10*6/MM3 (ref 4.14–5.8)
SODIUM SERPL-SCNC: 127 MMOL/L (ref 136–145)
URATE SERPL-MCNC: 3.9 MG/DL (ref 3.4–7)
WBC NRBC COR # BLD AUTO: 10.33 10*3/MM3 (ref 3.4–10.8)

## 2024-07-31 PROCEDURE — 80069 RENAL FUNCTION PANEL: CPT | Performed by: INTERNAL MEDICINE

## 2024-07-31 PROCEDURE — 25010000002 CEFTRIAXONE PER 250 MG: Performed by: INTERNAL MEDICINE

## 2024-07-31 PROCEDURE — 85027 COMPLETE CBC AUTOMATED: CPT | Performed by: INTERNAL MEDICINE

## 2024-07-31 PROCEDURE — 97535 SELF CARE MNGMENT TRAINING: CPT

## 2024-07-31 PROCEDURE — 97530 THERAPEUTIC ACTIVITIES: CPT

## 2024-07-31 PROCEDURE — 84550 ASSAY OF BLOOD/URIC ACID: CPT | Performed by: INTERNAL MEDICINE

## 2024-07-31 PROCEDURE — 83735 ASSAY OF MAGNESIUM: CPT | Performed by: INTERNAL MEDICINE

## 2024-07-31 RX ORDER — LIDOCAINE 4 G/G
1 PATCH TOPICAL
Qty: 30 PATCH | Refills: 0 | Status: SHIPPED | OUTPATIENT
Start: 2024-08-01

## 2024-07-31 RX ORDER — SODIUM CHLORIDE 1 G/1
1 TABLET ORAL
Qty: 90 TABLET | Refills: 0 | Status: SHIPPED | OUTPATIENT
Start: 2024-07-31

## 2024-07-31 RX ORDER — FUROSEMIDE 20 MG/1
20 TABLET ORAL DAILY
Qty: 30 TABLET | Refills: 0 | Status: SHIPPED | OUTPATIENT
Start: 2024-08-01

## 2024-07-31 RX ORDER — QUETIAPINE FUMARATE 25 MG/1
12.5 TABLET, FILM COATED ORAL NIGHTLY PRN
Qty: 30 TABLET | Refills: 0 | Status: SHIPPED | OUTPATIENT
Start: 2024-07-31

## 2024-07-31 RX ORDER — ACETAMINOPHEN 325 MG/1
650 TABLET ORAL
Status: DISCONTINUED | OUTPATIENT
Start: 2024-07-31 | End: 2024-07-31 | Stop reason: HOSPADM

## 2024-07-31 RX ORDER — QUETIAPINE FUMARATE 25 MG/1
12.5 TABLET, FILM COATED ORAL
Status: DISCONTINUED | OUTPATIENT
Start: 2024-07-31 | End: 2024-07-31 | Stop reason: HOSPADM

## 2024-07-31 RX ORDER — CEFDINIR 300 MG/1
300 CAPSULE ORAL 2 TIMES DAILY
Qty: 6 CAPSULE | Refills: 0 | Status: SHIPPED | OUTPATIENT
Start: 2024-07-31 | End: 2024-08-03

## 2024-07-31 RX ORDER — ACETAMINOPHEN 325 MG/1
650 TABLET ORAL EVERY 6 HOURS PRN
Start: 2024-07-31

## 2024-07-31 RX ORDER — HYDROXYZINE HYDROCHLORIDE 25 MG/1
25 TABLET, FILM COATED ORAL ONCE AS NEEDED
Status: COMPLETED | OUTPATIENT
Start: 2024-07-31 | End: 2024-07-31

## 2024-07-31 RX ORDER — TAMSULOSIN HYDROCHLORIDE 0.4 MG/1
0.4 CAPSULE ORAL DAILY
Qty: 30 CAPSULE | Refills: 0 | Status: SHIPPED | OUTPATIENT
Start: 2024-08-01

## 2024-07-31 RX ADMIN — SODIUM CHLORIDE TAB 1 GM 1 G: 1 TAB at 09:15

## 2024-07-31 RX ADMIN — Medication 10 ML: at 09:16

## 2024-07-31 RX ADMIN — HYDROXYZINE HYDROCHLORIDE 25 MG: 25 TABLET ORAL at 03:36

## 2024-07-31 RX ADMIN — LIDOCAINE 1 PATCH: 4 PATCH TOPICAL at 09:15

## 2024-07-31 RX ADMIN — OXYCODONE HYDROCHLORIDE 2.5 MG: 5 TABLET ORAL at 03:25

## 2024-07-31 RX ADMIN — FUROSEMIDE 20 MG: 20 TABLET ORAL at 09:16

## 2024-07-31 RX ADMIN — ACETAMINOPHEN 325MG 650 MG: 325 TABLET ORAL at 13:34

## 2024-07-31 RX ADMIN — SODIUM CHLORIDE TAB 1 GM 1 G: 1 TAB at 13:34

## 2024-07-31 RX ADMIN — TAMSULOSIN HYDROCHLORIDE 0.4 MG: 0.4 CAPSULE ORAL at 09:15

## 2024-07-31 RX ADMIN — CEFTRIAXONE SODIUM 1000 MG: 1 INJECTION, POWDER, FOR SOLUTION INTRAMUSCULAR; INTRAVENOUS at 13:34

## 2024-07-31 RX ADMIN — ATORVASTATIN CALCIUM 80 MG: 20 TABLET, FILM COATED ORAL at 09:24

## 2024-07-31 RX ADMIN — ASPIRIN 81 MG: 81 TABLET, CHEWABLE ORAL at 09:15

## 2024-07-31 NOTE — PROGRESS NOTES
[facility] Medicine Services  PROGRESS NOTE    Patient Name: Danni Aguilar  : 1926  MRN: [id number]    Date of Admission: 2024  Primary Care Physician: Justin Longoria MD    Subjective   Subjective     CC:  Follow-up multiple issues    Subjective: Patient tells me he feels he rested very well last night.  His daughter is at the bedside and said he was up most the night and is very frustrated with his insomnia.  She is asking for something stronger to help him sleep.  We had extensive conversation regarding    Review of Systems  No current fevers or chills  No current shortness of breath or cough  No current chest pain or palpitations       Objective   Objective     Vital Signs:   Temp:  [97.2 °F (36.2 °C)-97.5 °F (36.4 °C)] 97.4 °F (36.3 °C)  Heart Rate:  [76-93] 80  Resp:  [16-18] 16  BP: (124-179)/(54-89) 132/54        Physical Exam:    Constitutional:Awake, alert, elderly appearing  HENT: NCAT, mucous membranes moist, neck supple  Respiratory: No cough or wheezes, normal respirations, nonlabored breathing   Cardiovascular: Pulse rate is normal, palpable radial pulses  Gastrointestinal:  soft, nontender, nondistended  Musculoskeletal: Right chest wall with some tenderness, thin and frail in appearance, mild lower extremity edema  Psychiatric: Appropriate affect, cooperative, conversational  Neurologic: Poor historian, no slurred speech or facial droop, follows commands  Skin: No rashes or jaundice, warm      Results Reviewed:  Results from last 7 days   Lab Units 24  0809 24  0938 24  0054   WBC 10*3/mm3 10.33 12.24* 10.04   HEMOGLOBIN g/dL 12.1* 13.0 12.4*   HEMATOCRIT % 39.2 41.5 39.7   PLATELETS 10*3/mm3 746* 711* 630*     Results from last 7 days   Lab Units 24  0809 24  0747 24  0938 244 24  0922   SODIUM mmol/L 127* 123* 124*   < > 130*   POTASSIUM mmol/L 4.5 4.3 4.4   < > 4.5   CHLORIDE mmol/L 95* 91* 92*   < > 95*   CO2 mmol/L 21.7*  22.0 19.0*   < > 22.0   BUN mg/dL 19 17 12   < > 17   CREATININE mg/dL 0.87 1.05 0.90   < > 1.12   GLUCOSE mg/dL 99 97 90   < > 104*   CALCIUM mg/dL 8.6 8.5 9.2   < > 9.2   ALK PHOS U/L  --   --  66  --  63   ALT (SGPT) U/L  --   --  15  --  15   AST (SGOT) U/L  --   --  33  --  24   HSTROP T ng/L  --   --  26*  --   --     < > = values in this interval not displayed.     Estimated Creatinine Clearance: 45.2 mL/min (by C-G formula based on SCr of 0.87 mg/dL).    Microbiology Results Abnormal       Procedure Component Value - Date/Time    Respiratory Panel PCR w/COVID-19(SARS-CoV-2) JENNIFER/ANGELA/JANET/PAD/COR/EKATERINA In-House, NP Swab in UTM/VTM, 2 HR TAT - Swab, Nasopharynx [[id number]]  (Normal) Collected: 07/28/24 1139    Lab Status: Final result Specimen: Swab from Nasopharynx Updated: 07/28/24 1245     ADENOVIRUS, PCR Not Detected     Coronavirus 229E Not Detected     Coronavirus HKU1 Not Detected     Coronavirus NL63 Not Detected     Coronavirus OC43 Not Detected     COVID19 Not Detected     Human Metapneumovirus Not Detected     Human Rhinovirus/Enterovirus Not Detected     Influenza A PCR Not Detected     Influenza B PCR Not Detected     Parainfluenza Virus 1 Not Detected     Parainfluenza Virus 2 Not Detected     Parainfluenza Virus 3 Not Detected     Parainfluenza Virus 4 Not Detected     RSV, PCR Not Detected     Bordetella pertussis pcr Not Detected     Bordetella parapertussis PCR Not Detected     Chlamydophila pneumoniae PCR Not Detected     Mycoplasma pneumo by PCR Not Detected    Narrative:      In the setting of a positive respiratory panel with a viral infection PLUS a negative procalcitonin without other underlying concern for bacterial infection, consider observing off antibiotics or discontinuation of antibiotics and continue supportive care. If the respiratory panel is positive for atypical bacterial infection (Bordetella pertussis, Chlamydophila pneumoniae, or Mycoplasma pneumoniae), consider antibiotic  de-escalation to target atypical bacterial infection.            Imaging Results (Last 24 Hours)       ** No results found for the last 24 hours. **            Results for orders placed during the hospital encounter of 07/20/17    Adult transthoracic echo complete    Interpretation Summary  · Calculated EF = 71.1%.  · Left ventricular diastolic dysfunction (grade I) consistent with impaired relaxation.  · Right ventricular cavity is mild-to-moderately dilated.  · calcification of the aortic valve  · Mild mitral valve regurgitation is present  · Moderate tricuspid valve regurgitation is present.  · Estimated right ventricular systolic pressure from tricuspid regurgitation is mildly elevated (35-45 mmHg). Calculated right ventricular systolic pressure from tricuspid regurgitation is 41.2 mmHg.  · Calculated right ventricular systolic pressure from tricuspid regurgitation is 41.2 mmHg.  · Mild aortic valve regurgitation is present.  · Saline test results are negative.      I have reviewed the medications:  Scheduled Meds:acetaminophen, 650 mg, Oral, Daily With Breakfast, Lunch & Dinner  [Held by provider] apixaban, 5 mg, Oral, BID  aspirin, 81 mg, Oral, Daily  atorvastatin, 80 mg, Oral, Daily  cefTRIAXone, 1,000 mg, Intravenous, Q24H  furosemide, 20 mg, Oral, Daily  lidocaine 1% - EPINEPHrine 1:008024, 10 mL, Injection, Once  Lidocaine, 1 patch, Transdermal, Q24H  melatonin, 5 mg, Oral, Daily With Dinner  QUEtiapine, 12.5 mg, Oral, Daily With Dinner  sodium chloride, 10 mL, Intravenous, Q12H  sodium chloride, 1 g, Oral, TID With Meals  tamsulosin, 0.4 mg, Oral, Daily      Continuous Infusions:   PRN Meds:.  acetaminophen    senna-docusate sodium **AND** polyethylene glycol **AND** bisacodyl **AND** bisacodyl    ondansetron ODT **OR** ondansetron    oxyCODONE    [COMPLETED] Insert Peripheral IV **AND** sodium chloride    sodium chloride    sodium chloride    Tetanus-Diphth-Acell Pertussis    Assessment & Plan    Assessment & Plan     Active Hospital Problems    Diagnosis  POA    **Right rib fracture [S22.31XA]  Yes    Dementia [F03.90]  Yes    Leukocytosis [D72.829]  Yes    Fall [W19.XXXA]  Yes    Gross hematuria [R31.0]  Clinically Undetermined    Hyponatremia [E87.1]  Yes    Thrombocytosis [D75.839]  Yes    Hyperlipidemia [E78.5]  Yes    Urinary retention [R33.9]  Yes    Atrial fibrillation [I48.91]  Yes    Long term (current) use of anticoagulants [Z79.01]  Not Applicable      Resolved Hospital Problems   No resolved problems to display.        Brief Hospital Course to date:  Danni Aguilar is a 89 y.o. male presents the hospital with mechanical fall and secondary right rib fractures.  He was found to have minimal leukocytosis, thrombocytosis, hyponatremia that is worsening as well as urine retention requiring Newman catheter with gross hematuria.     Discussion/plan for today:  Sodium is now improved to 127.  I did have a conversation with the nephrologist today who feels comfortable allowing him to discharge home at the sodium level as he appears relatively asymptomatic and is trending up.  We will continue low-dose furosemide as well as the sodium tablets and a very gentle 1.5 L fluid restriction.  I did have an extensive conversation with his daughter Cheyenne at the bedside.  She is very frustrated with his insomnia which has been a longstanding issue.  We discussed possible treatment options.  She says she gets agitated and notes he has had short-term memory problems for several months and feels he has undiagnosed dementia.  This seems consistent with what we have been seeing in the hospital.  He is having only limited response to melatonin.  I discussed the possibility of trying extremely low-dose quetiapine after discussing the risk benefits and side effects and blackbox warnings with the daughter.  She says she very much would like to try something that would help calm him down in the evenings when he sundown's and  see if he provides sleep.  She agrees to try this understanding the benefits.  I told her to hold this medication if he has any sign of oversedation and speak to the doctor.  She would like a prescription to use as needed when she goes home.  Plan will be for discharge today.  Please see discharge summary for further details.      CODE STATUS:   Code Status and Medical Interventions: No CPR (Do Not Attempt to Resuscitate); Limited Support; No intubation (DNI)   Ordered at: 07/28/24 1250     Medical Intervention Limits:    No intubation (DNI)     Level Of Support Discussed With:    Patient    Health Care Surrogate     Code Status (Patient has no pulse and is not breathing):    No CPR (Do Not Attempt to Resuscitate)     Medical Interventions (Patient has pulse or is breathing):    Limited Support       Ramez Paredes MD  07/31/24

## 2024-07-31 NOTE — NURSING NOTE
"Notified Dr. Paredes patient had voided with multiple clots and hematuria, post residual void=41ml., Dr. Paredes stated \"I'm ok for him to continue with discharge, but check with urology before discharging and report back to me what they recommend.\"   "

## 2024-07-31 NOTE — THERAPY TREATMENT NOTE
Patient Name: Danni Aguilar  : 1926    MRN: [id number]                              Today's Date: 2024       Admit Date: 2024    Visit Dx:     ICD-10-CM ICD-9-CM   1. Closed fracture of multiple ribs of right side, initial encounter  S22.41XA 807.09   2. Skin tear of right elbow without complication, initial encounter  S51.011A 881.01   3. Fall, initial encounter  W19.XXXA E888.9     Patient Active Problem List   Diagnosis    Acute CVA (cerebrovascular accident)    Atrial fibrillation    Long term (current) use of anticoagulants    Carotid stenosis    Urinary retention    Cerebrovascular accident (CVA) due to stenosis of right carotid artery    Hyperlipidemia    Cerebrovascular disease    Recent cerebrovascular accident (CVA)    RSV infection    Hyponatremia    Thrombocytosis    RSV (respiratory syncytial virus infection)    Right rib fracture    Gross hematuria    Dementia     Past Medical History:   Diagnosis Date    A-fib     Carotid artery stenosis     Hyperlipidemia     Irregular heart beat      Past Surgical History:   Procedure Laterality Date    BACK SURGERY      CAROTID ARTERY ANGIOPLASTY      CEREBRAL ANGIOGRAM N/A 2017    Procedure: DIAGNOSTIC CEREBRAL ANGIOGRAM AND RIGHT CAROTID STENT PLACEMENT;  Surgeon: Mauricio Yung MD;  Location: [facility] ;  Service:     RECTAL SURGERY        General Information       Row Name 24 1146          Physical Therapy Time and Intention    Document Type therapy note (daily note)  -EM     Mode of Treatment individual therapy;physical therapy  -EM       Row Name 24 1146          General Information    Existing Precautions/Restrictions fall  -EM               User Key  (r) = Recorded By, (t) = Taken By, (c) = Cosigned By      Initials Name Provider Type    EM Babs Aragon PT Physical Therapist                   Mobility       Row Name 24 1146          Bed Mobility    Comment, (Bed Mobility) not tested, up in  chair  -EM       Row Name 07/31/24 1146          Sit-Stand Transfer    Sit-Stand Dunn (Transfers) contact guard;verbal cues  -EM     Assistive Device (Sit-Stand Transfers) walker, front-wheeled  -EM       Row Name 07/31/24 1146          Gait/Stairs (Locomotion)    Dunn Level (Gait) contact guard  -EM     Assistive Device (Gait) walker, front-wheeled  -EM     Distance in Feet (Gait) 40  -EM     Deviations/Abnormal Patterns (Gait) stride length decreased;gait speed decreased  -EM     Bilateral Gait Deviations forward flexed posture  -EM               User Key  (r) = Recorded By, (t) = Taken By, (c) = Cosigned By      Initials Name Provider Type    Babs Campos PT Physical Therapist                   Obj/Interventions       Row Name 07/31/24 1147          Motor Skills    Therapeutic Exercise other (see comments)  AP, LAQ x 10 reps  -EM               User Key  (r) = Recorded By, (t) = Taken By, (c) = Cosigned By      Initials Name Provider Type    Babs Campos PT Physical Therapist                   Goals/Plan    No documentation.                  Clinical Impression       Row Name 07/31/24 1150          Pain    Pretreatment Pain Rating 0/10 - no pain  -EM       Row Name 07/31/24 1150          Plan of Care Review    Plan of Care Reviewed With patient  -EM     Outcome Evaluation Pt sitting up in chair, awake and alert, no c/o pain. patient performed LE exercises while sitting in chair, sit to stand with CGA, ambulated about 40 feet with rwx and CGA, slow but steady gait. d/c plan is home today with assistance.  -EM       Row Name 07/31/24 1150          Positioning and Restraints    Pre-Treatment Position sitting in chair/recliner  -EM     Post Treatment Position chair  -EM     In Chair reclined;call light within reach;exit alarm on;with family/caregiver  -EM               User Key  (r) = Recorded By, (t) = Taken By, (c) = Cosigned By      Initials Name Provider Type    TEODORA Aragon  Babs HICKEY, PT Physical Therapist                   Outcome Measures       Row Name 07/31/24 1151          How much help from another person do you currently need...    Turning from your back to your side while in flat bed without using bedrails? 3  -EM     Moving from lying on back to sitting on the side of a flat bed without bedrails? 3  -EM     Moving to and from a bed to a chair (including a wheelchair)? 3  -EM     Standing up from a chair using your arms (e.g., wheelchair, bedside chair)? 3  -EM     Climbing 3-5 steps with a railing? 3  -EM     To walk in hospital room? 3  -EM     AM-PAC 6 Clicks Score (PT) 18  -EM     Highest Level of Mobility Goal 6 --> Walk 10 steps or more  -EM       Row Name 07/31/24 1151          Functional Assessment    Outcome Measure Options AM-PAC 6 Clicks Daily Activity (OT)  -BC               User Key  (r) = Recorded By, (t) = Taken By, (c) = Cosigned By      Initials Name Provider Type    Babs Campos, PT Physical Therapist    BC Lauren You, OT Occupational Therapist                                 Physical Therapy Education       Title: PT OT SLP Therapies (In Progress)       Topic: Physical Therapy (In Progress)       Point: Mobility training (Done)       Learning Progress Summary             Patient Acceptance, E, VU by EM at 7/31/2024 1152    Acceptance, E,TB, NR by MS at 7/29/2024 1101                         Point: Home exercise program (In Progress)       Learning Progress Summary             Patient Acceptance, E,TB, NR by MS at 7/29/2024 1101                         Point: Body mechanics (In Progress)       Learning Progress Summary             Patient Acceptance, E,TB, NR by MS at 7/29/2024 1101                         Point: Precautions (In Progress)       Learning Progress Summary             Patient Acceptance, E,TB, NR by MS at 7/29/2024 1101                                         User Key       Initials Effective Dates Name Provider Type Discipline     EM 06/16/21 -  Babs Aragon PT Physical Therapist PT    MS 06/16/21 -  Alysia Mason PT Physical Therapist PT                  PT Recommendation and Plan     Plan of Care Reviewed With: patient  Outcome Evaluation: Pt sitting up in chair, awake and alert, no c/o pain. patient performed LE exercises while sitting in chair, sit to stand with CGA, ambulated about 40 feet with rwx and CGA, slow but steady gait. d/c plan is home today with assistance.     Time Calculation:         PT Charges       Row Name 07/31/24 1152             Time Calculation    Start Time 1130  -EM      Stop Time 1141  -EM      Time Calculation (min) 11 min  -EM      PT Received On 07/31/24  -EM      PT - Next Appointment 08/01/24  -EM         Time Calculation- PT    Total Timed Code Minutes- PT 11 minute(s)  -EM         Timed Charges    70909 - PT Therapeutic Exercise Minutes 3  -EM      86963 - PT Therapeutic Activity Minutes 8  -EM         Total Minutes    Timed Charges Total Minutes 11  -EM       Total Minutes 11  -EM                User Key  (r) = Recorded By, (t) = Taken By, (c) = Cosigned By      Initials Name Provider Type    EM Babs Aragon PT Physical Therapist                  Therapy Charges for Today       Code Description Service Date Service Provider Modifiers Qty    [id number]  PT THERAPEUTIC ACT EA 15 MIN 7/31/2024 Babs Aragon PT GP 1            PT G-Codes  Outcome Measure Options: AM-PAC 6 Clicks Daily Activity (OT)  AM-PAC 6 Clicks Score (PT): 18  AM-PAC 6 Clicks Score (OT): 13  Modified [place] Scale: 4 - Moderately severe disability.  Unable to walk without assistance, and unable to attend to own bodily needs without assistance.       Babs Aragon PT  7/31/2024

## 2024-07-31 NOTE — PROGRESS NOTES
[facility] Progress Note      Patient Name: Danni Aguilar  : 1926  MRN: [id number]  Primary Care Physician:  Justin Longoria MD  Date of admission: 2024    Subjective     Interval History:   Follow-up hyponatremia    The patient was restless last night according to his daughter did not sleep he was somewhat agitated.    Review of Systems:   Not obtainable    Objective     Vitals:   Temp:  [97.2 °F (36.2 °C)-97.5 °F (36.4 °C)] 97.4 °F (36.3 °C)  Heart Rate:  [76-93] 93  Resp:  [16-18] 16  BP: (124-179)/(64-89) 179/89    Intake/Output Summary (Last 24 hours) at 2024 0836  Last data filed at 2024 0643  Gross per 24 hour   Intake 620 ml   Output 2800 ml   Net -2180 ml       Physical Exam:    General Appearance: Confused, agitated chronically ill and, no acute distress   Skin: warm and dry  HEENT: oral mucosa normal, nonicteric sclera  Neck: No JVD  Lungs: CTA, unlabored breathing effort  Heart: Irregularly irregular, no rub  Abdomen: soft, nontender, nondistended  : Newman catheter anchored  Extremities: no edema, cyanosis or clubbing  Neuro: normal speech and mental status     Scheduled Meds:     acetaminophen, 650 mg, Oral, Daily With Breakfast, Lunch & Dinner  [Held by provider] apixaban, 5 mg, Oral, BID  aspirin, 81 mg, Oral, Daily  atorvastatin, 80 mg, Oral, Daily  cefTRIAXone, 1,000 mg, Intravenous, Q24H  furosemide, 20 mg, Oral, Daily  lidocaine 1% - EPINEPHrine 1:586098, 10 mL, Injection, Once  Lidocaine, 1 patch, Transdermal, Q24H  melatonin, 5 mg, Oral, Daily With Dinner  QUEtiapine, 12.5 mg, Oral, Daily With Dinner  sodium chloride, 10 mL, Intravenous, Q12H  sodium chloride, 1 g, Oral, TID With Meals  tamsulosin, 0.4 mg, Oral, Daily      IV Meds:        Results Reviewed:   I have personally reviewed the results from the time of this admission to 2024 08:36 EDT     Results from last 7 days   Lab Units 24  0747 24  0938 24  0054  07/28/24  0922   SODIUM mmol/L 123* 124* 127* 130*   POTASSIUM mmol/L 4.3 4.4 4.2 4.5   CHLORIDE mmol/L 91* 92* 94* 95*   CO2 mmol/L 22.0 19.0* 21.0* 22.0   BUN mg/dL 17 12 15 17   CREATININE mg/dL 1.05 0.90 0.90 1.12   CALCIUM mg/dL 8.5 9.2 8.6 9.2   BILIRUBIN mg/dL  --  1.3*  --  0.9   ALK PHOS U/L  --  66  --  63   ALT (SGPT) U/L  --  15  --  15   AST (SGOT) U/L  --  33  --  24   GLUCOSE mg/dL 97 90 101* 104*       Estimated Creatinine Clearance: 37.4 mL/min (by C-G formula based on SCr of 1.05 mg/dL).    Results from last 7 days   Lab Units 07/30/24  0747   MAGNESIUM mg/dL 1.8   PHOSPHORUS mg/dL 2.9       Results from last 7 days   Lab Units 07/30/24  0747   URIC ACID mg/dL 3.4       Results from last 7 days   Lab Units 07/29/24  0938 07/29/24  0054 07/28/24  0922   WBC 10*3/mm3 12.24* 10.04 11.60*   HEMOGLOBIN g/dL 13.0 12.4* 12.9*   PLATELETS 10*3/mm3 711* 630* 804*             Assessment / Plan     ASSESSMENT:  Chronic hyponatremia treated with salt tablets prior to admission sodium was 130 on admission, and yesterday was 123.  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       Thank you for involving us in the care of Danni Aguilar.  Please feel free to call with any questions.    Andres Shepherd MD  07/31/24  08:36 EDT    [facility]  [phone]    Please note that portions of this note were completed with a voice recognition program.

## 2024-07-31 NOTE — PLAN OF CARE
Goal Outcome Evaluation:  Plan of Care Reviewed With: patient, caregiver        Progress: improving  Outcome Evaluation: Pt demonstrated w/ good ADL participation/performance today for in room walker transfers w/ CGA to min A. Pt does require consistent cues for sequence, walker mgmt/safety, hand placement, but is progressing well towards therapy goals. Pt's DIL present and receptive to all home safety education.      Anticipated Discharge Disposition (OT): home with 24/7 care, home with home health

## 2024-07-31 NOTE — NURSING NOTE
Notified [facility] r/t patient's hematuria and blood clots, awaiting callback from Dr. Escamilla

## 2024-07-31 NOTE — PLAN OF CARE
Goal Outcome Evaluation:  Plan of Care Reviewed With: patient           Outcome Evaluation: Pt sitting up in chair, awake and alert, no c/o pain. patient performed LE exercises while sitting in chair, sit to stand with CGA, ambulated about 40 feet with rwx and CGA, slow but steady gait. d/c plan is home today with assistance.

## 2024-07-31 NOTE — DISCHARGE SUMMARY
[facility] Medicine Services  DISCHARGE SUMMARY    Patient Name: Danni Aguilar  : 1926  MRN: [id number]    Date of Admission: 2024  9:02 AM  Date of Discharge: 2024  Primary Care Physician: Justin Longoria MD    Consults       Date and Time Order Name Status Description    2024 10:27 AM Inpatient Hospitalist Consult Completed     2024  9:18 AM Inpatient Urology Consult Completed     2024  8:48 AM Inpatient Nephrology Consult Completed     2024  1:56 PM Inpatient Neurology Consult Stroke Completed     2024 12:23 PM Inpatient Thoracic Surgery Consult Completed             Hospital Course       Active Hospital Problems    Diagnosis  POA    **Right rib fracture [S22.31XA]  Yes    Dementia [F03.90]  Yes    Gross hematuria [R31.0]  Clinically Undetermined    Hyponatremia [E87.1]  Yes    Thrombocytosis [D75.839]  Yes    Hyperlipidemia [E78.5]  Yes    Urinary retention [R33.9]  Yes    Atrial fibrillation [I48.91]  Yes    Long term (current) use of anticoagulants [Z79.01]  Not Applicable      Resolved Hospital Problems    Diagnosis Date Resolved POA    Leukocytosis [D72.829] 2024 Yes    Fall [W19.XXXA] 2024 Yes          Hospital Course:  Danni Aguilar is a 89 y.o. male  presents the hospital with mechanical fall and secondary right rib fractures.  He was found to have minimal leukocytosis, thrombocytosis, hyponatremia that is worsening as well as urine retention requiring Newman catheter with gross hematuria.     Hyponatremia:  Patient was treated with 3 times daily sodium tablets per recommendation with nephrology.  He was placed on very low-dose furosemide and a very gentle 1.5 L total daily fluid restriction.  He had gradual improvement in sodium and I have spoken with nephrologist today who says he is cleared to discharge.  Patient will need to follow-up in nephrology clinic and nephrologist is planning to arrange for follow-up.  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  Low sodium is acute on chronic    Rib fractures:  Continue multimodal pain control for rib fracture.   Repeat chest x-ray reviewed and shows persistent pleural effusion.  Lidocaine patch and Tylenol at discharge.  Risk versus narcotic after discharge therapy is felt to outweigh the benefits due to advanced age and frailty.  Fall prevention is going to be imperative at discharge and has been counseled to family who wishes to take him home and says they have good family support and has someone stay with him 24/7.    Atrial fibrillation:  Reasonably stable.  Case was discussed with cardiothoracic surgery and urology.  Cardiothoracic surgery recommends not to restart Eliquis at least not until 8/4 however with recent frequent falls the risks of anticoagulation may outweigh the benefits.  I recommend patient hold Eliquis until he follows up with primary care provider to discuss risk versus benefit.  Patient also having hematuria that is improved with Eliquis.      Hematuria and urine retention:  Plan for voiding trial prior to discharge today.  Newman catheter will have to be replaced if patient is unable to void.  Urology is planning to arrange for follow-up.  Flomax has been initiated.     Possible TIA continue aspirin and atorvastatin.  No further neurologic issues.  Please see neurology notes for full details.  Again there is a significant risk versus benefit assessment with anticoagulation.  Neurologist recommended further goals of care conversation further discussions with family about risk versus benefit and outpatient follow-up.    Probable dementia:  Patient has had significant sundowning during hospital stay.  Family reported sundowning at home it has been a recurrent issue with intermittent agitation.  Family is asking for something for agitation at home.  After discussing risks versus benefits including black box warnings of treatment options we will start extremely low quetiapine  on an as-needed basis in the evenings and patient family can monitor response and discuss with primary care at follow-up.  At this dose may need to be increased at a later date at his age is very low.  Melatonin also in the evenings to promote sleep for insomnia.     Continue treatment for urinary tract infection.  Ciprofloxacin was transitioned to ceftriaxone per my preference. discharge on Omnicef.  UTI likely contributing to urine retention.     Thrombocytosis noted.  Previous records were reviewed and notably platelets significantly elevated going back to December 2020.     PT OT for debility and recent fall.  Plan is for home with family and 24-hour caregiving     Possible mild pharyngeal dysphagia, seen by speech therapy plan is for regular and thin with upright eating and small bites and sips.    At the time of discharge patient was told to take all medications as prescribed, keep all follow-up appointments, and call their doctor or return to the hospital with any worsening or concerning symptoms.  Due to advanced age and significant comorbid conditions and recent functional decline patient has had high risk for worsening morbidity or mortality in the relatively near future.  This has been communicated to family who is understanding.  Recommend continued goals of care outpatient conversations.    Please note that this note was made using Dragon voice recognition software          Day of Discharge         Subjective: Patient tells me he feels he rested very well last night.  His daughter is at the bedside and said he was up most the night and is very frustrated with his insomnia.  She is asking for something stronger to help him sleep.  We had extensive conversation regarding.  Family is very eager to take him home today.     Vital Signs:   Temp:  [97.2 °F (36.2 °C)-97.5 °F (36.4 °C)] 97.4 °F (36.3 °C)  Heart Rate:  [76-93] 80  Resp:  [16-18] 16  BP: (124-179)/(54-89) 132/54     Physical  Exam:    Constitutional:Awake, alert, elderly appearing  HENT: NCAT, mucous membranes moist, neck supple  Respiratory: No cough or wheezes, normal respirations, nonlabored breathing   Cardiovascular: Pulse rate is normal, palpable radial pulses  Gastrointestinal:  soft, nontender, nondistended  Musculoskeletal: Right chest wall with some tenderness, thin and frail in appearance, mild lower extremity edema  Psychiatric: Appropriate affect, cooperative, conversational  Neurologic: Poor historian, no slurred speech or facial droop, follows commands  Skin: No rashes or jaundice, warm    Pertinent  and/or Most Recent Results     Results from last 7 days   Lab Units 07/31/24  0809 07/30/24  0747 07/29/24  0938 07/29/24  0054 07/28/24  0922   WBC 10*3/mm3 10.33  --  12.24* 10.04 11.60*   HEMOGLOBIN g/dL 12.1*  --  13.0 12.4* 12.9*   HEMATOCRIT % 39.2  --  41.5 39.7 42.2   PLATELETS 10*3/mm3 746*  --  711* 630* 804*   SODIUM mmol/L 127* 123* 124* 127* 130*   POTASSIUM mmol/L 4.5 4.3 4.4 4.2 4.5   CHLORIDE mmol/L 95* 91* 92* 94* 95*   CO2 mmol/L 21.7* 22.0 19.0* 21.0* 22.0   BUN mg/dL 19 17 12 15 17   CREATININE mg/dL 0.87 1.05 0.90 0.90 1.12   GLUCOSE mg/dL 99 97 90 101* 104*   CALCIUM mg/dL 8.6 8.5 9.2 8.6 9.2         Microbiology Results Abnormal       Procedure Component Value - Date/Time    Respiratory Panel PCR w/COVID-19(SARS-CoV-2) JENNIFER/ANGELA/JANET/PAD/COR/EKATERINA In-House, NP Swab in UTM/VTM, 2 HR TAT - Swab, Nasopharynx [[id number]]  (Normal) Collected: 07/28/24 1133    Lab Status: Final result Specimen: Swab from Nasopharynx Updated: 07/28/24 1245     ADENOVIRUS, PCR Not Detected     Coronavirus 229E Not Detected     Coronavirus HKU1 Not Detected     Coronavirus NL63 Not Detected     Coronavirus OC43 Not Detected     COVID19 Not Detected     Human Metapneumovirus Not Detected     Human Rhinovirus/Enterovirus Not Detected     Influenza A PCR Not Detected     Influenza B PCR Not Detected     Parainfluenza Virus 1 Not Detected      Parainfluenza Virus 2 Not Detected     Parainfluenza Virus 3 Not Detected     Parainfluenza Virus 4 Not Detected     RSV, PCR Not Detected     Bordetella pertussis pcr Not Detected     Bordetella parapertussis PCR Not Detected     Chlamydophila pneumoniae PCR Not Detected     Mycoplasma pneumo by PCR Not Detected    Narrative:      In the setting of a positive respiratory panel with a viral infection PLUS a negative procalcitonin without other underlying concern for bacterial infection, consider observing off antibiotics or discontinuation of antibiotics and continue supportive care. If the respiratory panel is positive for atypical bacterial infection (Bordetella pertussis, Chlamydophila pneumoniae, or Mycoplasma pneumoniae), consider antibiotic de-escalation to target atypical bacterial infection.            Imaging Results (All)       Procedure Component Value Units Date/Time    XR Chest 1 View [[id number]] Collected: 07/30/24 0746     Updated: 07/30/24 0757    Narrative:      XR CHEST 1 VW-     HISTORY: Male who is 89 years-old, right pleural effusion     TECHNIQUE: Frontal view of the chest     COMPARISON: 7/29/2024     FINDINGS: The heart size is borderline. Aorta is calcified. Pulmonary  vasculature is mildly congested. Mildly increased opacity at the right  mid to lower lung, may reflect atelectasis/infiltrate and increased  pleural effusion. No pneumothorax. No acute osseous process.       Impression:      As described.     This report was finalized on 7/30/2024 7:52 AM by Dr. Leonard Fong M.D on Workstation: [id number]       XR Chest 1 View [[id number]] Collected: 07/29/24 0746     Updated: 07/29/24 0751    Narrative:      XR CHEST 1 VW-     Clinical: Rib fracture, rule out pneumothorax     Correlation with chest radiograph 7/28/2024 and chest CT 7/28/2024     FINDINGS: Hairline right rib fractures seen on CT cannot be appreciated  on the current chest radiograph. Vague opacity right mid to lower  lung  zone corresponds to the pleural effusion. No pneumothorax. The left lung  is clear. The cardiomediastinal silhouette is stable. The remainder is  unremarkable.     CONCLUSION: Right-sided pleural effusion, no pneumothorax has developed.     This report was finalized on 7/29/2024 7:48 AM by Dr. Boris Lester M.D  on Workstation: [id number]       CT Chest Without Contrast Diagnostic [[id number]] Collected: 07/28/24 1056     Updated: 07/28/24 2355    Narrative:      CT OF THE CHEST WITHOUT CONTRAST 07/28/2024     HISTORY: Fell. Right side rib injury. Axial images were obtained from  the lung apices to the upper abdomen. No intravenous contrast was given.     There is a mildly displaced fracture of the right 6th rib and there  appear to be nondisplaced fractures of the right 5th and 7th ribs. No  pneumothorax is seen. There is a moderate size right pleural effusion  with some mild right lower lobe atelectasis. This demonstrates low  density fluid. Tiny subpleural probable calcified granuloma is seen in  the left lower lobe on image 48.     There is some aortic and coronary calcification. No pathologically  enlarged lymph nodes are seen. At least 2 probable hepatic cysts are  seen. A small calcified nodule is seen posterior to the right hepatic  lobe.       Impression:      1. At least 3 right rib fractures as described.  2. Moderate size right pleural effusion with some mild right lower lobe  atelectasis.     Radiation dose reduction techniques were utilized, including automated  exposure control and exposure modulation based on body size.        This report was finalized on 7/28/2024 11:52 PM by Dr. Natanael Gutiérrez M.D on Workstation: [id number]       CT Head Without Contrast [[id number]] Collected: 07/28/24 1104     Updated: 07/28/24 2342    Narrative:      HISTORY: Fell. Head injury. Neck pain.     CT OF THE BRAIN WITHOUT CONTRAST 07/28/2024     Axial images were obtained through the brain without  intravenous  contrast. There is moderately severe diffuse atrophy. There is decreased  attenuation of the periventricular white matter bilaterally consistent  with small vessel white matter ischemic disease. Small low-density  subdural hygromas are seen over both frontal lobes similar to the  10/08/2023 study.     There is no evidence of acute infarction, hemorrhage, midline shift or  mass effect.     No skull fractures are seen.       Impression:      1. Atrophy and small vessel ischemic disease.  2. No acute intracranial process.  3. 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        CT OF THE CERVICAL SPINE WITHOUT CONTRAST     Axial images were obtained from the skull base to the upper thoracic  spine. Sagittal and coronal reconstruction images were reviewed.     There is C3-4, C4-5 and C5-6 spondylosis. Minimal (1-2 mm)  anterolisthesis of C2 on C3 is seen. Minimal anterolisthesis of C7 on T1  is seen.     No cervical spine fractures are seen.     There is a right carotid stent. Bilateral carotid calcification is seen.     IMPRESSION:  1. Multilevel cervical degenerative disease.  2. No fractures are seen.  3. Right pleural effusion is seen. Please see additional dictation for  today's CT of the chest.     Radiation dose reduction techniques were utilized, including automated  exposure control and exposure modulation based on body size.        This report was finalized on 7/28/2024 11:39 PM by Dr. Natanael Gutiérrez M.D on Workstation: [id number]       CT Cervical Spine Without Contrast [[id number]] Collected: 07/28/24 1104     Updated: 07/28/24 2342    Narrative:      HISTORY: Fell. Head injury. Neck pain.     CT OF THE BRAIN WITHOUT CONTRAST 07/28/2024     Axial images were obtained through the brain without intravenous  contrast. There is moderately severe diffuse atrophy. There is decreased  attenuation of the periventricular white matter bilaterally consistent  with small vessel white matter ischemic  disease. Small low-density  subdural hygromas are seen over both frontal lobes similar to the  10/08/2023 study.     There is no evidence of acute infarction, hemorrhage, midline shift or  mass effect.     No skull fractures are seen.       Impression:      1. Atrophy and small vessel ischemic disease.  2. No acute intracranial process.  3. 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        CT OF THE CERVICAL SPINE WITHOUT CONTRAST     Axial images were obtained from the skull base to the upper thoracic  spine. Sagittal and coronal reconstruction images were reviewed.     There is C3-4, C4-5 and C5-6 spondylosis. Minimal (1-2 mm)  anterolisthesis of C2 on C3 is seen. Minimal anterolisthesis of C7 on T1  is seen.     No cervical spine fractures are seen.     There is a right carotid stent. Bilateral carotid calcification is seen.     IMPRESSION:  1. Multilevel cervical degenerative disease.  2. No fractures are seen.  3. Right pleural effusion is seen. Please see additional dictation for  today's CT of the chest.     Radiation dose reduction techniques were utilized, including automated  exposure control and exposure modulation based on body size.        This report was finalized on 7/28/2024 11:39 PM by Dr. Natanael Gutiérrez M.D on Workstation: [id number]       CT Angiogram Head [[id number]] Collected: 07/28/24 1816     Updated: 07/28/24 1926    Narrative:      CT ANGIOGRAM NECK AND HEAD WITH CONTRAST     HISTORY: Slurred speech, left facial droop.     COMPARISON: MRI brain 07/28/2024, CT head 07/28/2024 and CT angiogram of  the neck and head 12/09/2020.     FINDINGS: Initially, a noncontrasted CT examination of the brain was  performed. 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 Prominent CSF is appreciated overlying the anterior aspect of  the frontal lobes bilaterally, slightly more prominent on the  left  consistent with hygromas.     A CT angiogram of the neck and head was then performed. Multiplanar as  well as 3-dimensional reconstructions were generated.     A moderate-to-large pleural fluid collection is present on the right,  only partially visualized. Calcified plaque is appreciated involving the  aortic arch and origins of the great vessels. There is a bovine  configuration of the great vessels. There is mild irregularity and  stenosis involving the left subclavian artery proximally. A carotid  stent is appreciated involving the right carotid bifurcation. Decreased  attenuation is appreciated involving the posterior aspect of the stent  suggesting mild endothelial hyperplasia or mural thrombus similar in  appearance as compared to the prior study. The stent is widely patent.  Eccentric calcified plaque is appreciated involving the left internal  carotid artery proximally but without stenosis. The mid cervical ICAs  are tortuous. There is mild irregularity involving the cervical ICAs  bilaterally consistent with fibromuscular dysplasia, slightly more  prominent on the left. There is fusiform enlargement of the cervical ICA  on the left which measures 8 mm in diameter similar in appearance as  compared to the prior examination. Mild-to-moderate vascular  calcifications involving the carotid siphons are noted bilaterally. The  right A1 segment is mildly hypoplastic. There is moderate stenosis  involving the left M1 segment, similar in appearance as compared to the  prior examination. The proximal aspects of the anterior and middle  cerebral arteries appear unremarkable.     Both vertebral arteries were opacified. The right vertebral artery is  larger than that of the left. The prior study demonstrated a markedly  hypoplastic V4 segment. The V4 segment on the left appears to be  occluded on the current examination. A focal eccentric calcification is  appreciated involving the basilar artery proximally,  present previously  and contributing to mild-to-moderate stenosis. Otherwise, the basilar  artery and left posterior cerebral artery appear unremarkable. There is  a fetal origin of the right posterior cerebral artery.       Impression:      1.  There is no evidence of acute infarction or of intracranial  hemorrhage. Atrophy, small vessel ischemic disease, vascular  calcification and bifrontal hygromas are noted. A carotid stent is  present on the right. There is eccentric mural thrombus or endothelial  hyperplasia appreciated, mild, unchanged versus 12/09/2020. Irregularity  involving the distal aspects of the cervical ICAs is appreciated  bilaterally, more prominent on the left consistent with fibromuscular  dysplasia, unchanged. There is mild fusiform enlargement of the distal  cervical ICA on the left (8 mm), unchanged. The right vertebral artery  is dominant. The hypoplastic left vertebral artery distally is not  opacified and likely is occluded. Mild-to-moderate stenosis involving  the basilar artery proximally is noted, similar in appearance as  compared to the prior study. There is moderate stenosis involving the  mid left M1 segment similar in appearance as compared to the prior  examination. See above.  2.  There is partial visualization of a moderate-to-large pleural fluid  collection on the right.        Radiation dose reduction techniques were utilized, including automated  exposure control and exposure modulation based on body size.        This report was finalized on 7/28/2024 7:23 PM by Dr. Ramez Lind M.D  on Workstation: [id number]       CT Angiogram Neck [[id number]] Collected: 07/28/24 1816     Updated: 07/28/24 1926    Narrative:      CT ANGIOGRAM NECK AND HEAD WITH CONTRAST     HISTORY: Slurred speech, left facial droop.     COMPARISON: MRI brain 07/28/2024, CT head 07/28/2024 and CT angiogram of  the neck and head 12/09/2020.     FINDINGS: Initially, a noncontrasted CT examination of the  brain was  performed. 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 Prominent CSF is appreciated overlying the anterior aspect of  the frontal lobes bilaterally, slightly more prominent on the left  consistent with hygromas.     A CT angiogram of the neck and head was then performed. Multiplanar as  well as 3-dimensional reconstructions were generated.     A moderate-to-large pleural fluid collection is present on the right,  only partially visualized. Calcified plaque is appreciated involving the  aortic arch and origins of the great vessels. There is a bovine  configuration of the great vessels. There is mild irregularity and  stenosis involving the left subclavian artery proximally. A carotid  stent is appreciated involving the right carotid bifurcation. Decreased  attenuation is appreciated involving the posterior aspect of the stent  suggesting mild endothelial hyperplasia or mural thrombus similar in  appearance as compared to the prior study. The stent is widely patent.  Eccentric calcified plaque is appreciated involving the left internal  carotid artery proximally but without stenosis. The mid cervical ICAs  are tortuous. There is mild irregularity involving the cervical ICAs  bilaterally consistent with fibromuscular dysplasia, slightly more  prominent on the left. There is fusiform enlargement of the cervical ICA  on the left which measures 8 mm in diameter similar in appearance as  compared to the prior examination. Mild-to-moderate vascular  calcifications involving the carotid siphons are noted bilaterally. The  right A1 segment is mildly hypoplastic. There is moderate stenosis  involving the left M1 segment, similar in appearance as compared to the  prior examination. The proximal aspects of the anterior and middle  cerebral arteries appear unremarkable.     Both vertebral arteries were opacified. The right  vertebral artery is  larger than that of the left. The prior study demonstrated a markedly  hypoplastic V4 segment. The V4 segment on the left appears to be  occluded on the current examination. A focal eccentric calcification is  appreciated involving the basilar artery proximally, present previously  and contributing to mild-to-moderate stenosis. Otherwise, the basilar  artery and left posterior cerebral artery appear unremarkable. There is  a fetal origin of the right posterior cerebral artery.       Impression:      1.  There is no evidence of acute infarction or of intracranial  hemorrhage. Atrophy, small vessel ischemic disease, vascular  calcification and bifrontal hygromas are noted. A carotid stent is  present on the right. There is eccentric mural thrombus or endothelial  hyperplasia appreciated, mild, unchanged versus 12/09/2020. Irregularity  involving the distal aspects of the cervical ICAs is appreciated  bilaterally, more prominent on the left consistent with fibromuscular  dysplasia, unchanged. There is mild fusiform enlargement of the distal  cervical ICA on the left (8 mm), unchanged. The right vertebral artery  is dominant. The hypoplastic left vertebral artery distally is not  opacified and likely is occluded. Mild-to-moderate stenosis involving  the basilar artery proximally is noted, similar in appearance as  compared to the prior study. There is moderate stenosis involving the  mid left M1 segment similar in appearance as compared to the prior  examination. See above.  2.  There is partial visualization of a moderate-to-large pleural fluid  collection on the right.        Radiation dose reduction techniques were utilized, including automated  exposure control and exposure modulation based on body size.        This report was finalized on 7/28/2024 7:23 PM by Dr. Ramez Lind M.D  on Workstation: [id number]       MRI Brain Without Contrast [[id number]] Collected: 07/28/24 1603     Updated:  07/28/24 1614    Narrative:      MRI BRAIN WO CONTRAST-     HISTORY:  new left facial droop; S22.41XA-Multiple fractures of ribs,  right side, initial encounter for closed fracture; S51.011A-Laceration  without foreign body of right elbow, initial encounter;  W19.XXXA-Unspecified fall, initial encounter     COMPARISON: CT head 7/28/2024 and MRI brain 12/9/2020     FINDINGS: There is moderate diffuse atrophy. There is no evidence of  restricted diffusion to suggest acute infarction. 2 small remote  infarcts involve the right cerebellar hemisphere laterally are  appreciated which were present on 12/9/2020. The right vertebral artery  is dominant. There is expected flow void in the basilar artery and in  the distal aspect of the internal carotid arteries bilaterally.  Prominent CSF is appreciated overlying the frontal lobes anteriorly (12  mm in thickness on the right and 10 mm in thickness on the left similar  in appearance as compared to the MRI examination of 12/9/2020. There is  no evidence of mass or mass effect on this noncontrasted MRI examination  of the brain.       Impression:      There is no evidence of an acute infarct. Atrophy, small  vessel ischemic disease and bifrontal hygromas are appreciated similar  appearances compared to 12/9/2020.     This report was finalized on 7/28/2024 4:11 PM by Dr. Ramez Lind M.D  on Workstation: [id number]       XR Wrist 3+ View Right [[id number]] Collected: 07/28/24 1126     Updated: 07/28/24 1130    Narrative:      RIGHT WRIST     HISTORY: Fall, pain.     COMPARISON: None.     FINDINGS: 3 views of the right wrist were obtained. The study is  hampered somewhat by patient positioning. There is moderate radiocarpal  joint space narrowing. Chondrocalcinosis and extensive vascular  calcification is appreciated. There is no evidence of fracture. If the  patient's symptoms persist, a follow-up study in 7 to 10 days would be  suggested.     This report was finalized on  7/28/2024 11:27 AM by Dr. Ramez Lind M.D on Workstation: [id number]       XR Chest 1 View [[id number]] Collected: 07/28/24 1011     Updated: 07/28/24 1016    Narrative:      XR CHEST 1 VW-7/28/2024     HISTORY: Weakness.     Heart size is at the upper limits of normal. There is mild haziness of  the right base which may indicate a minimal right pleural effusion with  slight blunting of the right costophrenic sulcus as well. There is some  aortic calcification. No pneumothorax is seen.       Impression:      1. Borderline cardiomegaly.  2. Mild haziness of the right base as described.        This report was finalized on 7/28/2024 10:13 AM by Dr. Natanael Gutiérrez M.D on Workstation: [id number]               Results for orders placed during the hospital encounter of 08/14/20    Duplex Carotid Ultrasound CAR    Interpretation Summary  · Right carotid stent present without any evidence of hemodynamically significant in-stent restenosis.  · Left internal carotid artery plaque without significant stenosis.      Results for orders placed during the hospital encounter of 08/14/20    Duplex Carotid Ultrasound CAR    Interpretation Summary  · Right carotid stent present without any evidence of hemodynamically significant in-stent restenosis.  · Left internal carotid artery plaque without significant stenosis.      Results for orders placed during the hospital encounter of 07/20/17    Adult transthoracic echo complete    Interpretation Summary  · Calculated EF = 71.1%.  · Left ventricular diastolic dysfunction (grade I) consistent with impaired relaxation.  · Right ventricular cavity is mild-to-moderately dilated.  · calcification of the aortic valve  · Mild mitral valve regurgitation is present  · Moderate tricuspid valve regurgitation is present.  · Estimated right ventricular systolic pressure from tricuspid regurgitation is mildly elevated (35-45 mmHg). Calculated right ventricular systolic pressure from tricuspid  regurgitation is 41.2 mmHg.  · Calculated right ventricular systolic pressure from tricuspid regurgitation is 41.2 mmHg.  · Mild aortic valve regurgitation is present.  · Saline test results are negative.        Discharge Details        Discharge Medications        New Medications        Instructions Start Date   acetaminophen 325 MG tablet  Commonly known as: TYLENOL   650 mg, Oral, Every 6 Hours PRN      cefdinir 300 MG capsule  Commonly known as: OMNICEF   300 mg, Oral, 2 Times Daily      furosemide 20 MG tablet  Commonly known as: LASIX   20 mg, Oral, Daily   Start Date: August 1, 2024     Lidocaine 4 %   1 patch, Transdermal, Every 24 Hours Scheduled, Apply to right rib area, Remove & Discard patch within 12 hours or as directed by MD   Start Date: August 1, 2024     melatonin 5 MG tablet tablet   5 mg, Oral, Daily With Dinner, Available over-the-counter      QUEtiapine 25 MG tablet  Commonly known as: SEROquel   12.5 mg, Oral, Nightly PRN      tamsulosin 0.4 MG capsule 24 hr capsule  Commonly known as: FLOMAX   0.4 mg, Oral, Daily   Start Date: August 1, 2024            Changes to Medications        Instructions Start Date   sodium chloride 1 g tablet  What changed:   when to take this  additional instructions   1 g, Oral, 3 Times Daily With Meals             Continue These Medications        Instructions Start Date   aspirin 81 MG chewable tablet   81 mg, Oral, Daily      atorvastatin 80 MG tablet  Commonly known as: LIPITOR   80 mg, Oral, Daily      benzonatate 100 MG capsule  Commonly known as: Tessalon Perles   100 mg, Oral, 3 Times Daily PRN      guaiFENesin 600 MG 12 hr tablet  Commonly known as: MUCINEX   1,200 mg, Oral, Every 12 Hours Scheduled      ipratropium-albuterol 0.5-2.5 mg/3 ml nebulizer  Commonly known as: DUO-NEB   3 mL, Nebulization, Every 6 Hours PRN      polyethylene glycol 17 g packet  Commonly known as: MIRALAX   17 g, Oral, Daily PRN      sennosides-docusate 8.6-50 MG per  tablet  Commonly known as: PERICOLACE   2 tablets, Oral, 2 Times Daily             Stop These Medications      apixaban 5 MG tablet tablet  Commonly known as: ELIQUIS     ciprofloxacin 500 MG tablet  Commonly known as: CIPRO     warfarin 2 MG tablet  Commonly known as: Coumadin              No Known Allergies      Discharge Disposition:  Home or Self Care    Diet:  Hospital:  Diet Order   Procedures    Diet: Regular/House, Fluid Restriction (240 mL/tray); 1500 mL/day; Fluid Consistency: Thin (IDDSI 0)       Activity:  Activity Instructions       Activity as Tolerated      Other Activity Instructions      Activity Instructions: Please limit limit pushing, pulling, lifting anything greater than 10 pounds for the next 6 to 8 weeks.                 CODE STATUS:    Code Status and Medical Interventions: No CPR (Do Not Attempt to Resuscitate); Limited Support; No intubation (DNI)   Ordered at: 07/28/24 1250     Medical Intervention Limits:    No intubation (DNI)     Level Of Support Discussed With:    Patient    Health Care Surrogate     Code Status (Patient has no pulse and is not breathing):    No CPR (Do Not Attempt to Resuscitate)     Medical Interventions (Patient has pulse or is breathing):    Limited Support           Additional Instructions for the Follow-ups that You Need to Schedule       Discharge Follow-up with PCP   As directed       Currently Documented PCP:    Justin Longoria MD    PCP Phone Number:    [phone]     Follow Up Details: Recommend primary care provider follow-up within 3 to 5 days.  Please call today to schedule.  Recommend to discuss the risk versus benefits of whether to restart Eliquis        Discharge Follow-up with Specified Provider: Follow-up with nephrologist for low sodium as instructed.   As directed      To: Follow-up with nephrologist for low sodium as instructed.        Discharge Follow-up with Specified Provider: Follow-up with urology for recent urinary retention as  instructed.  Urology plans to call you within the next week with an appointment.  Please call [facility] with any questions   As directed      To: Follow-up with urology for recent urinary retention as instructed.  Urology plans to call you within the next week with an appointment.  Please call [facility] with any questions               Follow-up Information       Justin Longoria MD .    Specialty: Family Medicine  Why: Recommend primary care provider follow-up within 3 to 5 days.  Please call today to schedule.  Recommend to discuss the risk versus benefits of whether to restart Eliquis  Contact information:  [address] [facility] [id number] [phone]                                 Ramez Paredes MD  07/31/24      Time Spent on Discharge:  I spent greater than 40 minutes on this discharge activity which included: face-to-face encounter with the patient, reviewing the data in the system, coordination of the care with the nursing staff as well as consultants, documentation, and entering orders.

## 2024-07-31 NOTE — PLAN OF CARE
Goal Outcome Evaluation:  Plan of Care Reviewed With: daughter        Progress: no change  Outcome Evaluation: VSS. Pt is oriented to self and location, but not to situation or time. He denies pain, but is fidgeting and restless through the night. He pulls at his catheter and IV. Daughter is at bedside attempting to redirect him, but he is difficult to distract. Gave pt roxicodone x1 during the shift and called [facility] for one time dose of 25mg hydroxizine; the medications were ineffective in controlling the pt's discomfort. Per the pt's daughter, he got 1 hr of sleep and continued trying to pull at his lines. Urinary cath is still draining bloody urine. Will continue to monitor.

## 2024-07-31 NOTE — THERAPY TREATMENT NOTE
Patient Name: Danni Aguilar  : 1926    MRN: [id number]                              Today's Date: 2024       Admit Date: 2024    Visit Dx:     ICD-10-CM ICD-9-CM   1. Closed fracture of multiple ribs of right side, initial encounter  S22.41XA 807.09   2. Skin tear of right elbow without complication, initial encounter  S51.011A 881.01   3. Fall, initial encounter  W19.XXXA E888.9     Patient Active Problem List   Diagnosis    Acute CVA (cerebrovascular accident)    Atrial fibrillation    Long term (current) use of anticoagulants    Carotid stenosis    Urinary retention    Cerebrovascular accident (CVA) due to stenosis of right carotid artery    Hyperlipidemia    Cerebrovascular disease    Recent cerebrovascular accident (CVA)    RSV infection    Hyponatremia    Thrombocytosis    RSV (respiratory syncytial virus infection)    Right rib fracture    Gross hematuria    Dementia     Past Medical History:   Diagnosis Date    A-fib     Carotid artery stenosis     Hyperlipidemia     Irregular heart beat      Past Surgical History:   Procedure Laterality Date    BACK SURGERY      CAROTID ARTERY ANGIOPLASTY      CEREBRAL ANGIOGRAM N/A 2017    Procedure: DIAGNOSTIC CEREBRAL ANGIOGRAM AND RIGHT CAROTID STENT PLACEMENT;  Surgeon: Mauricio Yung MD;  Location: [facility] ;  Service:     RECTAL SURGERY        General Information       Row Name 24 1103          OT Time and Intention    Document Type therapy note (daily note)  -BC     Mode of Treatment individual therapy  -BC       Row Name 24 1103          General Information    Patient Profile Reviewed yes  -BC     Existing Precautions/Restrictions no known precautions/restrictions  -BC       Row Name 24 1103          Cognition    Orientation Status (Cognition) oriented to;place;person;time;verbal cues/prompts needed for orientation  -BC       Row Name 24 1103          Safety Issues, Functional Mobility    Safety Issues  Affecting Function (Mobility) sequencing abilities  -BC     Impairments Affecting Function (Mobility) balance;strength  -BC               User Key  (r) = Recorded By, (t) = Taken By, (c) = Cosigned By      Initials Name Provider Type    BC Lauren You OT Occupational Therapist                     Mobility/ADL's       Row Name 07/31/24 1141          Bed Mobility    Bed Mobility rolling right;rolling left;supine-sit  -BC     Rolling Left [place] (Bed Mobility) supervision;verbal cues  -BC     Rolling Right [place] (Bed Mobility) supervision;verbal cues  -BC     Supine-Sit [place] (Bed Mobility) minimum assist (75% patient effort);other (see comments)  -BC     Bed Mobility, Safety Issues cognitive deficits limit understanding  -BC     Comment, (Bed Mobility) cues for sequence, LUE grasping onto caregiver (DIL present) w/ OT cueing for increased Pt participation to sit up.  -BC       Row Name 07/31/24 1141          Transfers    Transfers bed-chair transfer  -BC       Row Name 07/31/24 1141          Bed-Chair Transfer    Bed-Chair [place] (Transfers) minimum assist (75% patient effort);contact guard  -BC     Assistive Device (Bed-Chair Transfers) walker, standard  -BC     Comment, (Bed-Chair Transfer) CGA-Min A for in room transfers and mobility bed>BR commode>doorway up to chair.  -BC       Row Name 07/31/24 1141          Sit-Stand Transfer    Sit-Stand [place] (Transfers) contact guard  -BC     Assistive Device (Sit-Stand Transfers) walker, standard  -BC       Row Name 07/31/24 1141          Stand-Sit Transfer    Stand-Sit [place] (Transfers) contact guard  -BC     Assistive Device (Stand-Sit Transfers) walker, standard  -BC     Comment, (Stand-Sit Transfer) mod cues for reachback to sit down to chair.  -BC       Row Name 07/31/24 1141          Toilet Transfer    Type (Toilet Transfer) sit-stand;stand-sit  -BC     [place] Level (Toilet Transfer) minimum assist (75% patient  effort)  -BC     Assistive Device (Toilet Transfer) walker, standard  -BC     Comment, (Toilet Transfer) cues for hand placement, reachback, weighshift.  -BC       Row Name 07/31/24 1141          Functional Mobility    Functional Mobility- Ind. Level contact guard assist;minimum assist (75% patient effort)  -BC     Functional Mobility- Device walker, standard  -BC     Functional Mobility- Safety Issues balance decreased during turns  -BC     Functional Mobility- Comment close sup/CGA w/ waker mgmt straigth pathways, min A w/ dynamic turns and backing up in bathroom.  -BC     Patient was able to Ambulate yes  -BC       Row Name 07/31/24 1141          Toileting Assessment/Training    [place] Level (Toileting) maximum assist (25% patient effort)  -BC     Assistive Devices (Toileting) raised toilet seat  -BC     Position (Toileting) other (see comments)  -BC       Row Name 07/31/24 1141          Grooming Assessment/Training    Comment, (Grooming) Pt rolling/bridging in bed w/ toileting mgmt, OT cont'd w/ toileting tasks in bathroom for increased ADL participation. max A for brief doff/don and mgmt.  -BC               User Key  (r) = Recorded By, (t) = Taken By, (c) = Cosigned By      Initials Name Provider Type    Lauren Rajan OT Occupational Therapist                   Obj/Interventions       Row Name 07/31/24 1147          Balance    Balance Assessment sit to stand dynamic balance;standing dynamic balance  -BC     Static Sitting Balance standby assist  -BC     Position, Sitting Balance unsupported  -BC     Static Standing Balance verbal cues;contact guard  -BC     Dynamic Standing Balance verbal cues;minimal assist  -BC     Position/Device Used, Standing Balance walker, standard  -BC     Balance Interventions standing  -BC               User Key  (r) = Recorded By, (t) = Taken By, (c) = Cosigned By      Initials Name Provider Type    Lauren Rajan OT Occupational Therapist                    Goals/Plan    No documentation.                  Clinical Impression       Row Name 07/31/24 1148          Pain Assessment    Pretreatment Pain Rating 0/10 - no pain  -BC     Posttreatment Pain Rating 0/10 - no pain  -BC       Row Name 07/31/24 1148          Plan of Care Review    Plan of Care Reviewed With patient;caregiver  -BC     Progress improving  -BC     Outcome Evaluation Pt demonstrated w/ good ADL participation/performance today for in room walker transfers w/ CGA to min A. Pt does require consistent cues for sequence, walker mgmt/safety, hand placement, but is progressing well towards therapy goals. Pt's DIL present and receptive to all home safety education.  -BC       Row Name 07/31/24 1148          Therapy Plan Review/Discharge Plan (OT)    Equipment Needs Upon Discharge (OT) commode chair  -BC     Anticipated Discharge Disposition (OT) home with 24/7 care;home with home health  -BC       Row Name 07/31/24 1148          Vital Signs    O2 Delivery Pre Treatment room air  -BC     O2 Delivery Intra Treatment room air  -[facility] Name 07/31/24 1148          Positioning and Restraints    Pre-Treatment Position in bed  -BC     Post Treatment Position chair  -BC     In Chair with PT;with family/caregiver;exit alarm on;encouraged to call for assist;call light within reach  -BC               User Key  (r) = Recorded By, (t) = Taken By, (c) = Cosigned By      Initials Name Provider Type    Lauren Rajan OT Occupational Therapist                   Outcome Measures       Row Name 07/31/24 1151          How much help from another is currently needed...    Putting on and taking off regular lower body clothing? 2  -BC     Bathing (including washing, rinsing, and drying) 2  -BC     Toileting (which includes using toilet bed pan or urinal) 2  -BC     Putting on and taking off regular upper body clothing 2  -BC     Taking care of personal grooming (such as brushing teeth) 2  -BC     Eating meals 3  -BC      -[facility] 6 Clicks Score (OT) 13  -BC       Row Name 07/31/24 1151          How much help from another person do you currently need...    Turning from your back to your side while in flat bed without using bedrails? 3  -EM     Moving from lying on back to sitting on the side of a flat bed without bedrails? 3  -EM     Moving to and from a bed to a chair (including a wheelchair)? 3  -EM     Standing up from a chair using your arms (e.g., wheelchair, bedside chair)? 3  -EM     Climbing 3-5 steps with a railing? 3  -EM     To walk in hospital room? 3  -EM     AM-PAC 6 Clicks Score (PT) 18  -EM     Highest Level of Mobility Goal 6 --> Walk 10 steps or more  -       Row Name 07/31/24 1151          Functional Assessment    Outcome Measure Options -PAC 6 Clicks Daily Activity (OT)  -BC               User Key  (r) = Recorded By, (t) = Taken By, (c) = Cosigned By      Initials Name Provider Type    EM Babs Aragon, PT Physical Therapist    BC Lauren You, OT Occupational Therapist                    Occupational Therapy Education       Title: PT OT SLP Therapies (In Progress)       Topic: Occupational Therapy (Done)       Point: ADL training (Done)       Description:   Instruct learner(s) on proper safety adaptation and remediation techniques during self care or transfers.   Instruct in proper use of assistive devices.                  Learning Progress Summary             Patient Eager, E, VU,NR by BC at 7/31/2024 1151    Comment: Education on home safety/DME use, BSC recommendations, walker safety, HEP, POC. Cont teaching    Acceptance, E, Bed IU by  at 7/29/2024 0855    Comment: role of OT, eval results, xfer tech, review AD   Family Eager, E, VU,NR by BC at 7/31/2024 1151    Comment: Education on home safety/DME use, BSC recommendations, walker safety, HEP, POC. Cont teaching    Acceptance, E, Bed IU by  at 7/29/2024 0855    Comment: role of OT, eval results, xfer tech, review AD                          Point: Home exercise program (Done)       Description:   Instruct learner(s) on appropriate technique for monitoring, assisting and/or progressing therapeutic exercises/activities.                  Learning Progress Summary             Patient Eager, E, VU,NR by BC at 7/31/2024 1151    Comment: Education on home safety/DME use, BSC recommendations, walker safety, HEP, POC. Cont teaching   Family Eager, E, VU,NR by BC at 7/31/2024 1151    Comment: Education on home safety/DME use, BSC recommendations, walker safety, HEP, POC. Cont teaching                         Point: Precautions (Done)       Description:   Instruct learner(s) on prescribed precautions during self-care and functional transfers.                  Learning Progress Summary             Patient Eager, E, VU,NR by BC at 7/31/2024 1151    Comment: Education on home safety/DME use, BSC recommendations, walker safety, HEP, POC. Cont teaching    Acceptance, E, Bed IU by  at 7/29/2024 0855    Comment: role of OT, eval results, xfer tech, review AD   Family Eager, E, VU,NR by BC at 7/31/2024 1151    Comment: Education on home safety/DME use, BSC recommendations, walker safety, HEP, POC. Cont teaching    Acceptance, E, Bed IU by  at 7/29/2024 0855    Comment: role of OT, eval results, xfer tech, review AD                         Point: Body mechanics (Done)       Description:   Instruct learner(s) on proper positioning and spine alignment during self-care, functional mobility activities and/or exercises.                  Learning Progress Summary             Patient Eager, E, VU,NR by BC at 7/31/2024 1151    Comment: Education on home safety/DME use, BSC recommendations, walker safety, HEP, POC. Cont teaching   Family Eager, E, VU,NR by BC at 7/31/2024 1151    Comment: Education on home safety/DME use, BSC recommendations, walker safety, HEP, POC. Cont teaching                                         User Key       Initials Effective Dates Name Provider  Type Discipline    LE 06/16/21 -  Michelle Morales, OTR Occupational Therapist OT    BC 07/29/24 -  Lauren You OT Occupational Therapist OT                  OT Recommendation and Plan     Plan of Care Review  Plan of Care Reviewed With: patient, caregiver  Progress: improving  Outcome Evaluation: Pt demonstrated w/ good ADL participation/performance today for in room walker transfers w/ CGA to min A. Pt does require consistent cues for sequence, walker mgmt/safety, hand placement, but is progressing well towards therapy goals. Pt's DIL present and receptive to all home safety education.     Time Calculation:         Time Calculation- OT       Row Name 07/31/24 1152             Time Calculation- OT    OT Start Time 1103  -BC      OT Stop Time 1130  -BC      OT Time Calculation (min) 27 min  -BC      Total Timed Code Minutes- OT 27 minute(s)  -BC         Timed Charges    49852 - OT Self Care/Mgmt Minutes 27  -BC         Total Minutes    Timed Charges Total Minutes 27  -BC       Total Minutes 27  -BC                User Key  (r) = Recorded By, (t) = Taken By, (c) = Cosigned By      Initials Name Provider Type    BC Lauren You OT Occupational Therapist                  Therapy Charges for Today       Code Description Service Date Service Provider Modifiers Qty    [id number] HC OT SELF CARE/MGMT/TRAIN EA 15 MIN 7/31/2024 Lauren You OT GO 2                 Lauren You OT  7/31/2024

## 2024-07-31 NOTE — CASE MANAGEMENT/SOCIAL WORK
Discharge Planning Assessment  [facility]     Patient Name: Danni Aguilar  MRN: [id number]  Today's Date: 7/31/2024    Admit Date: 7/28/2024    Plan: Home with [facility]   Discharge Needs Assessment       Row Name 07/31/24 1249       Living Environment    People in Home spouse    Current Living Arrangements home    Potentially Unsafe Housing Conditions none    Primary Care Provided by self    Provides Primary Care For no one    Family Caregiver if Needed spouse;child(tiny), adult    Quality of Family Relationships involved;helpful    Able to Return to Prior Arrangements yes       Resource/Environmental Concerns    Resource/Environmental Concerns none       Transition Planning    Patient/Family Anticipates Transition to home    Patient/Family Anticipated Services at Transition home health care    Transportation Anticipated family or friend will provide       Discharge Needs Assessment    Equipment Currently Used at Home walker, standard;shower chair;grab bar    Concerns to be Addressed no discharge needs identified;denies needs/concerns at this time    Equipment Needed After Discharge none    Outpatient/Agency/Support Group Needs homecare agency    Discharge Facility/Level of Care Needs home with home health                   Discharge Plan       Row Name 07/31/24 1249       Plan    Plan Home with [facility]    Plan Comments CCP met with patient and patient's daughter in [place] at bedside. CCP role explained and discharge planning discussed. Face sheet verified and MCCLURE noted. Patient's PCP is Dr. Longoria. Patient lives with his wife but his children provide 24/7 care for him. Patient has grab bars and shower chair present in the bathroom. Patient has been to [facility] in the past. Plan is for patient to return home at discharge. CCP discussed home health; patient's daughter in law would like [facility]. Referral placed in [facility]. Graciela TOR[facility]                  Continued Care and Services -  Admitted Since 7/28/2024       Home Medical Care       Service Provider Request Status Selected Services Address Phone Fax Patient Preferred    [facility] [facility] [address], [address], [facility] [address] [phone] --                  Expected Discharge Date and Time       Expected Discharge Date Expected Discharge Time    Jul 31, 2024            Demographic Summary       Row Name 07/31/24 1248       General Information    Admission Type observation    Arrived From emergency department    Required Notices Provided Observation Status Notice    Referral Source admission list    Reason for Consult discharge planning    Preferred Language English                   Functional Status       Row Name 07/31/24 1249       Functional Status    Usual Activity Tolerance good    Current Activity Tolerance good       Functional Status, IADL    Medications assistive equipment    Meal Preparation assistive equipment    Housekeeping assistive equipment    Laundry assistive equipment    Shopping assistive equipment       Mental Status    General Appearance WDL WDL       Mental Status Summary    Recent Changes in Mental Status/Cognitive Functioning no changes                   Psychosocial    No documentation.                  Abuse/Neglect    No documentation.                  Legal    No documentation.                  Substance Abuse    No documentation.                  Patient Forms    No documentation.                     BRIAN Salinas

## 2024-08-01 NOTE — CASE MANAGEMENT/SOCIAL WORK
Case Management Discharge Note      Final Note: Home with [facility]         Selected Continued Care - Discharged on 7/31/2024 Admission date: 7/28/2024 - Discharge disposition: Home or Self Care      Destination    No services have been selected for the patient.                Durable Medical Equipment    No services have been selected for the patient.                Dialysis/Infusion    No services have been selected for the patient.                Home Medical Care Coordination complete.      Service Provider Selected Services Address Phone Fax Patient Preferred    [facility] [address], [address], [facility] [address] [phone] --              Therapy    No services have been selected for the patient.                Community Resources    No services have been selected for the patient.                Community & DME    No services have been selected for the patient.                         Final Discharge Disposition Code: 06 - home with home health care

## 2024-08-01 NOTE — OUTREACH NOTE
Prep Survey      Flowsheet Row Responses   [facility] facility patient discharged from? [facility]   Is LACE score < 7 ? No   Eligibility Readm Mgmt   Discharge diagnosis Right rib fracture, mechanical fall   Does the patient have one of the following disease processes/diagnoses(primary or secondary)? Other   Does the patient have Home health ordered? Yes   What is the Home health agency?  [facility]   Is there a DME ordered? No   Prep survey completed? Yes            Antonieta DELGADILLO - Registered Nurse

## 2024-08-05 ENCOUNTER — READMISSION MANAGEMENT (OUTPATIENT)
Dept: CALL CENTER | Facility: HOSPITAL | Age: 89
End: 2024-08-05
Payer: MEDICARE

## 2024-08-05 NOTE — OUTREACH NOTE
Medical Week 1 Survey      Flowsheet Row Responses   [facility] patient discharged from? [place]   Does the patient have one of the following disease processes/diagnoses(primary or secondary)? Other   Week 1 attempt successful? No   Unsuccessful attempts Attempt 1            Cash ETIENNE - Registered Nurse

## 2024-08-09 ENCOUNTER — READMISSION MANAGEMENT (OUTPATIENT)
Dept: CALL CENTER | Facility: HOSPITAL | Age: 89
End: 2024-08-09
Payer: MEDICARE

## 2024-08-09 NOTE — OUTREACH NOTE
Medical Week 1 Survey      Flowsheet Row Responses   [facility] patient discharged from? [place]   Does the patient have one of the following disease processes/diagnoses(primary or secondary)? Other   Week 1 attempt successful? Yes   Call start time 1101   Call end time 1105   Discharge diagnosis Right rib fracture, mechanical fall   Person spoke with today (if not patient) and relationship Bethany, daughter   Meds reviewed with patient/caregiver? Yes   Is the patient having any side effects they believe may be caused by any medication additions or changes? No   Does the patient have all medications ordered at discharge? No   What is keeping the patient from filling the prescriptions? --  [didn't get Lidocaine patches]   Nursing Interventions No intervention needed   Is the patient taking all medications as directed (includes completed medication regime)? Yes   Does the patient have a primary care provider?  Yes   Does the patient have an appointment with their PCP within 7 days of discharge? Yes   Has the patient kept scheduled appointments due by today? Yes   What is the Home health agency?  [facility]    Has home health visited the patient within 72 hours of discharge? Yes   Home health comments Has transferred to Hospice care   Psychosocial issues? No   Did the patient receive a copy of their discharge instructions? Yes   Nursing interventions Reviewed instructions with patient   What is the patient's perception of their health status since discharge? Improving   Is the patient/caregiver able to teach back signs and symptoms related to disease process for when to call PCP? Yes   Is the patient/caregiver able to teach back signs and symptoms related to disease process for when to call 911? Yes   Is the patient/caregiver able to teach back the hierarchy of who to call/visit for symptoms/problems? PCP, Specialist, Home health nurse, Urgent Care, ED, 911 Yes   Additional teach back comments states Hospice  care has taken over, states pt's pain minimal   Week 1 call completed? Yes   Graduated Yes   Did the patient feel the follow up calls were helpful during their recovery period? Yes   Would this patient benefit from a Referral to Amb Social Work? No   Is the patient interested in additional calls from an ambulatory ? No   Graduated/Revoked comments pt now in Hospice care, no more calls needed   Call end time 1105            Hermelinda DELGADILLO - Registered Nurse

## (undated) DEVICE — SPNG GZ STRL 2S 4X4 12PLY

## (undated) DEVICE — NDL PERC 1PRT THNWALL W/BASEPLT 18G 7CM

## (undated) DEVICE — EMBOSHIELD NAV6 EMBOLIC PROTECTION SYSTEM 5.0 MM X 190 CM: Brand: EMBOSHIELD  NAV6

## (undated) DEVICE — PINNACLE R/O II INTRODUCER SHEATH WITH RADIOPAQUE MARKER: Brand: PINNACLE

## (undated) DEVICE — SHLD ANGIO 2LAYR CIR FEN

## (undated) DEVICE — CATH TEMPO 5F VER 135 Â° 100CM: Brand: TEMPO

## (undated) DEVICE — RADIFOCUS TORQUE DEVICE MULTI-TORQUE VISE: Brand: RADIFOCUS TORQUE DEVICE

## (undated) DEVICE — MANIFLD 3PRT RT OFFHNDL 500PSI 34BAR

## (undated) DEVICE — PK ANGIO CERBRL RAD 40

## (undated) DEVICE — SOL NACL 0.9PCT 1000ML

## (undated) DEVICE — GW WWSS35300 WHOLEY WIRE V04: Brand: WHOLEY™

## (undated) DEVICE — TBG ART PRESS 60 IN

## (undated) DEVICE — SHEATH FLXOR SHUTTLESELECT .038 6F2.2 90

## (undated) DEVICE — SOL NS 500ML

## (undated) DEVICE — AVIATOR PLUS .014 4.0X20 142CM: Brand: AVIATOR

## (undated) DEVICE — GLV SURG BIOGEL M LTX PF 8 1/2

## (undated) DEVICE — SKIN PREP TRAY W/CHG: Brand: MEDLINE INDUSTRIES, INC.

## (undated) DEVICE — PRESSURE MONITORING SET: Brand: TRUWAVE

## (undated) DEVICE — PINNACLE INTRODUCER SHEATH: Brand: PINNACLE

## (undated) DEVICE — KT NEURO CUST

## (undated) DEVICE — GW AMPLTZ SUPERSTIFF SHT/TPR STR .035IN 260CM

## (undated) DEVICE — SYR CORNRY CONTRL 10ML

## (undated) DEVICE — RADIFOCUS GLIDEWIRE: Brand: GLIDEWIRE

## (undated) DEVICE — COPILOT BLEEDBACK CONTROL VALVE: Brand: COPILOT

## (undated) DEVICE — DEV CLS VASC MYNX 6FTO 7FR